# Patient Record
Sex: FEMALE | Race: WHITE | NOT HISPANIC OR LATINO | Employment: OTHER | URBAN - METROPOLITAN AREA
[De-identification: names, ages, dates, MRNs, and addresses within clinical notes are randomized per-mention and may not be internally consistent; named-entity substitution may affect disease eponyms.]

---

## 2017-01-02 RX ORDER — HYDROCHLOROTHIAZIDE 25 MG/1
TABLET ORAL
Qty: 30 TABLET | Refills: 11 | Status: SHIPPED | OUTPATIENT
Start: 2017-01-02 | End: 2017-03-03

## 2017-01-30 ENCOUNTER — OFFICE VISIT (OUTPATIENT)
Dept: PAIN MEDICINE | Facility: CLINIC | Age: 62
End: 2017-01-30
Attending: ANESTHESIOLOGY
Payer: COMMERCIAL

## 2017-01-30 VITALS
SYSTOLIC BLOOD PRESSURE: 123 MMHG | HEART RATE: 90 BPM | TEMPERATURE: 99 F | WEIGHT: 248.69 LBS | HEIGHT: 67 IN | BODY MASS INDEX: 39.03 KG/M2 | DIASTOLIC BLOOD PRESSURE: 76 MMHG

## 2017-01-30 DIAGNOSIS — M62.830 LUMBAR PARASPINAL MUSCLE SPASM: ICD-10-CM

## 2017-01-30 DIAGNOSIS — M79.10 MYALGIA: ICD-10-CM

## 2017-01-30 DIAGNOSIS — G57.11 MERALGIA PARAESTHETICA, RIGHT: Primary | ICD-10-CM

## 2017-01-30 PROCEDURE — 20553 NJX 1/MLT TRIGGER POINTS 3/>: CPT | Mod: 59,S$GLB,, | Performed by: ANESTHESIOLOGY

## 2017-01-30 PROCEDURE — 64450 NJX AA&/STRD OTHER PN/BRANCH: CPT | Mod: RT,S$GLB,, | Performed by: ANESTHESIOLOGY

## 2017-01-30 PROCEDURE — 3074F SYST BP LT 130 MM HG: CPT | Mod: S$GLB,,, | Performed by: ANESTHESIOLOGY

## 2017-01-30 PROCEDURE — 99214 OFFICE O/P EST MOD 30 MIN: CPT | Mod: 25,S$GLB,, | Performed by: ANESTHESIOLOGY

## 2017-01-30 PROCEDURE — 3078F DIAST BP <80 MM HG: CPT | Mod: S$GLB,,, | Performed by: ANESTHESIOLOGY

## 2017-01-30 PROCEDURE — 99999 PR PBB SHADOW E&M-EST. PATIENT-LVL III: CPT | Mod: PBBFAC,,, | Performed by: ANESTHESIOLOGY

## 2017-01-30 PROCEDURE — 76942 ECHO GUIDE FOR BIOPSY: CPT | Mod: RT,S$GLB,, | Performed by: ANESTHESIOLOGY

## 2017-01-30 RX ORDER — BETAMETHASONE SODIUM PHOSPHATE AND BETAMETHASONE ACETATE 3; 3 MG/ML; MG/ML
6 INJECTION, SUSPENSION INTRA-ARTICULAR; INTRALESIONAL; INTRAMUSCULAR; SOFT TISSUE
Status: COMPLETED | OUTPATIENT
Start: 2017-01-30 | End: 2017-01-30

## 2017-01-30 RX ORDER — BUPIVACAINE HYDROCHLORIDE 5 MG/ML
13.5 INJECTION, SOLUTION EPIDURAL; INTRACAUDAL
Status: COMPLETED | OUTPATIENT
Start: 2017-01-30 | End: 2017-01-30

## 2017-01-30 RX ADMIN — BETAMETHASONE SODIUM PHOSPHATE AND BETAMETHASONE ACETATE 6 MG: 3; 3 INJECTION, SUSPENSION INTRA-ARTICULAR; INTRALESIONAL; INTRAMUSCULAR; SOFT TISSUE at 03:01

## 2017-01-30 RX ADMIN — BUPIVACAINE HYDROCHLORIDE 67.5 MG: 5 INJECTION, SOLUTION EPIDURAL; INTRACAUDAL at 03:01

## 2017-01-30 NOTE — MR AVS SNAPSHOT
[facility]  [address]  [place] [address]  Phone: [phone]  Fax: [phone]                  Taty Max   2017 2:45 PM   Office Visit    Description:  Female : 1955   Provider:  Vashti Poe MD   Department:  [facility]           Reason for Visit     Leg Pain           Diagnoses this Visit        Comments    Meralgia paraesthetica, right    -  Primary     Myalgia         Lumbar paraspinal muscle spasm                To Do List           Goals (5 Years of Data)     None      [facility]     [facility] On Call Nurse Care Line -  Assistance  Registered nurses in the [facility] On Call Center provide clinical advisement, health education, appointment booking, and other advisory services.  Call for this free service at [phone].             Medications           Message regarding Medications     Verify the changes and/or additions to your medication regime listed below are the same as discussed with your clinician today.  If any of these changes or additions are incorrect, please notify your healthcare provider.        These medications were administered today        Dose Freq    bupivacaine (PF) 0.5% (5 mg/ml) injection 67.5 mg 13.5 mL Clinic/HOD 1 time    Si.5 mLs (67.5 mg total) by Perineural route one time.    Class: Normal    Route: Perineural    betamethasone acetate-betamethasone sodium phosphate injection 6 mg 6 mg Clinic/HOD 1 time    Si mL (6 mg total) by Intra-Lesional route one time.    Class: Normal    Route: Intra-Lesional           Verify that the below list of medications is an accurate representation of the medications you are currently taking.  If none reported, the list may be blank. If incorrect, please contact your healthcare provider. Carry this list with you in case of emergency.           Current Medications     blood sugar diagnostic Strp 1 strip by Misc.(Non-Drug; Combo Route) route 2 (two) times daily.  "DISP GLUCOMETER OF CHOICE AND LANCETS AS WELL    cyclobenzaprine (FLEXERIL) 5 MG tablet TAKE 1 TABLET (5 MG TOTAL) BY MOUTH NIGHTLY.    duloxetine (CYMBALTA) 30 MG capsule TAKE 1 CAPSULE (30 MG TOTAL) BY MOUTH ONCE DAILY.    econazole nitrate 1 % cream Apply topically 2 (two) times daily.    hydrochlorothiazide (HYDRODIURIL) 25 MG tablet TAKE 1 TABLET (25 MG TOTAL) BY MOUTH ONCE DAILY.    KRILL OIL ORAL Take 1 tablet by mouth once daily.    metformin (GLUCOPHAGE-XR) 750 MG 24 hr tablet 1 pill in the AM    montelukast (SINGULAIR) 10 mg tablet TAKE 1 TABLET (10 MG TOTAL) BY MOUTH EVERY EVENING.    multivitamin (THERAGRAN) per tablet Take 1 tablet by mouth once daily. 1 Tablet Oral Every day    zolpidem (AMBIEN) 5 MG Tab TAKE 1 TABLET  NIGHTLY AS NEEDED    albuterol 90 mcg/actuation inhaler Inhale 1-2 puffs into the lungs every 6 (six) hours as needed for Wheezing.    BENICAR 40 mg tablet TAKE 1 TABLET ONE TIME DAILY    diazePAM (VALIUM) 5 MG tablet TAKE 1 TABLET BY MOUTH AT BEDTIME AS NEEDED FOR ANXIETY AND SPASMS    fluocinonide 0.05% (LIDEX) 0.05 % cream Apply topically 2 (two) times daily.    fluorouracil (EFUDEX) 5 % cream AAA bid x 2 weeks    fluticasone (FLONASE) 50 mcg/actuation nasal spray 2 sprays by Each Nare route once daily.    levocetirizine (XYZAL) 5 MG tablet TAKE 1 TABLET BY MOUTH EVERY EVENING    metaxalone (SKELAXIN) 800 MG tablet TAKE 1 TABLET BY MOUTH 4 TIMES DAILY AS NEEDED FOR PAIN    penicillin v potassium (VEETID) 500 MG tablet Take 500 mg by mouth 4 (four) times daily. PT IS TAKING FOR DENTAL PURPOSES    tretinoin (RETIN-A) 0.025 % gel Apply topically nightly. Gel Topical .  AAA face qhs           Clinical Reference Information           Vital Signs - Last Recorded  Most recent update: 1/30/2017  2:18 PM by Toni Cesar MA    BP Pulse Temp Ht Wt BMI    123/76 90 98.5 °F (36.9 °C) (Oral) 5' 7" (1.702 m) 112.8 kg (248 lb 10.9 oz) 38.95 kg/m2      Blood Pressure          Most Recent Value    " BP  123/76      Allergies as of 1/30/2017     Oxaliplatin    Factive [Gemifloxacin]    Daypro [Oxaprozin]    Latex      Immunizations Administered on Date of Encounter - 1/30/2017     None      Administrations This Visit     betamethasone acetate-betamethasone sodium phosphate injection 6 mg     Admin Date Action Dose Route Administered By             01/30/2017 Given 6 mg Intra-Lesional Vashti Poe MD                    bupivacaine (PF) 0.5% (5 mg/ml) injection 67.5 mg     Admin Date Action Dose Route Administered By             01/30/2017 Given 67.5 mg Perineural Vashti Poe MD                      Instructions    Look up miracle noodles    Recommend obtaining a lidocaine patch 4%, which is the over the counter strength, and cutting this to a smaller size.  Use this over the site where I inject the nerve in your leg.

## 2017-01-30 NOTE — PATIENT INSTRUCTIONS
Look up mirkarla hanson    Recommend obtaining a lidocaine patch 4%, which is the over the counter strength, and cutting this to a smaller size.  Use this over the site where I inject the nerve in your leg.

## 2017-01-30 NOTE — PROGRESS NOTES
"Subjective:      Patient ID: Taty Max is a 61 y.o. female.    Chief Complaint: Leg Pain    Referred by: No ref. provider found     HPI:    Ms Max is a 60 yo female who presents today with back and right thigh pain. She complains of pain in her back and numbness in her right thigh. She states her pain is aggravated with bending forward. She had 3 injections at an outside pain clinic Nov-Feb, that helped her left leg pain but increased her right thigh numbness. She describes numbness on her outer right thigh. Her pain is improved with 2 gabapentin, but she gets diarrhea. She has a history of colon cancer with chemotherapy. The pain is described in detail below.    Interval History (10/17/2014):  She returns today for follow up. She reports that the lateral femoral cutaneous nerve block was very helpful for her right-sided thigh pain, numbness, and tingling. She reports recent muscle spasms in her low back but has made it difficult for her to perform her activities at her job. These are relieved with heat and stretching. The topical cream has been helpful for the pain.    Interval History (11/14/2014):  She returns today for follow up. She reports that the trigger point injections were very helpful for her muscle spasms, but they are starting to wear off on the right side. Last Tuesday, she had a severe spasm that caused severe pain in her anterior thigh. She reports that her muscles felt like they were being "shredded." This continues to be intermittently painful. She also has bilateral low back pain that radiates into her lateral thigh. This is similar to the pain she had in the past that was helped by the L5/S1 epidural.    Interval History (12/8/14)  She returns today for follow up.  She reports that the L4-L5 ASHLEY on 11/25/14 has been helpful for the pain, but pain in her right hip/buttocks area persists. She received about 50% relief of her pain from the epidural. The majority of her pain now, she locates " over the right SI joint, in addition to spasms in her lower back on the right side. She takes muscle relaxers at night for the spasms. She takes the pain is more severe after walking for long periods. The right lower extremity pain is much improved since epidural.     Interval History (1/14/2015):  She returns today for follow up.  She reports that the SI joint injection provided 50% relief.  She no longer has the sharp pain and is able to walk farther.  She continues to have significant pain in the AM on that right side.  The cream and the patch have been helpful for the pain.    Interval History (3/23/2016):  She returns today for follow up.  She reports that the most recent SI joint injection and TPIs have been helpful for the pain.  She is able to walk farther and work.  She does her HEP and stretches.    Interval History (9/23/2016):  She returns today for follow up.  She reports that her low back pain has begun to return.  She is also having more right lateral thigh pain.    Interval History (11/10/2016):  She returns today for follow up.  She reports that the RFA helped her back pain.  She is now experiencing increased pain over her right lateral thigh.  She is also having a muscle spasm in her right lower back.    Interval History (1/30/2017):  She returns today for follow up.  She reports that the most recent LFCN block lasted until two weeks ago.  Lidocaine patch over distal anterior thigh was not helpful. Gabapentin, NSAIDs are helpful.  Overall, her pain is improved with not working.    Physical Therapy: none    Non-pharmacologic Treatment: chiropractic  · TENS? yes    Pain Medications:   · Currently taking: gabapentin, aleve, ibuprofen, tylenol, Topical cream  · Has tried in the past: celebrex  · Has not tried: Opioids, TCAs    Blood thinners: no    Interventional Therapies:   · L5/S1 ILESI by Dr. Saavedra, most recent in 10/2012: 10 months relief  · Right LFCN block 9/2014: Excellent relief of right thigh  pain  · Bilateral lumbar TPIs 10/2014: Excellent relief of muscle pain x 3 weeks  · L4-L5 ILESI on 11/25/14 with 50% relief  · Right SI joint injection 12/2014:  50% relief  · Right SIJ injection and TPIs 3/2016: Excellent relief  · L4/5 ILESI 8/30/2016: Excellent relief of leg pain  · Right L3-5 Cooled RFA 10/2016:  Good relief of sharp back pain  · Right LFCN block 11/10/2016: Good relief until mid January 2017    Relevant Surgeries: no    Affecting sleep? yes    Affecting daily activities? yes    Depressive symptoms? no  · SI/HI? No    Work status: She has been laid off. 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     Pain Scales  Best: 3/10  Worst: 10/10  Usually: 3/10  Today: 6/10    Low-back Pain   This is a chronic problem. The current episode started more than 1 year ago. The problem occurs intermittently. The problem has been gradually improving since onset. The pain is present in the lumbar spine. The pain is at a severity of 5/10. Exacerbated by: increased activity. Associated symptoms include leg pain. Pertinent negatives include no chest pain, fever, headaches or weight loss. She has tried injection treatment and muscle relaxant for the symptoms. Physical therapy was not tried.      Review of Systems   Constitution: Negative for chills, fever, malaise/fatigue, weight gain and weight loss.   HENT: Negative for ear pain, headaches and hoarse voice.    Eyes: Negative for blurred vision, pain and visual disturbance.   Cardiovascular: Negative for chest pain, dyspnea on exertion and irregular heartbeat.   Respiratory: Negative for cough, shortness of breath and wheezing.    Endocrine: Negative for cold intolerance and heat intolerance.   Hematologic/Lymphatic: Negative for adenopathy and bleeding problem. Does not bruise/bleed easily.   Skin: Negative for color change, itching and rash.   Musculoskeletal: Positive for back pain.  "  Gastrointestinal: Negative for change in bowel habit, diarrhea, hematemesis, hematochezia, melena and vomiting.   Genitourinary: Negative for flank pain, frequency, hematuria and urgency.   Neurological: Negative for difficulty with concentration, dizziness, loss of balance and seizures.   Psychiatric/Behavioral: Negative for altered mental status, depression and suicidal ideas. The patient is not nervous/anxious.    Allergic/Immunologic: Negative for HIV exposure.           Objective:      Vitals:    01/30/17 1413   BP: 123/76   Pulse: 90   Temp: 98.5 °F (36.9 °C)   TempSrc: Oral   Weight: 112.8 kg (248 lb 10.9 oz)   Height: 5' 7" (1.702 m)   PainSc:   7   PainLoc: Leg     Ortho/SPM Exam        GEN: Well developed, well nourished. No acute distress. No pain behavior.  HEENT: No trauma. Mucous membranes moist. Nares patent bilaterally.  PSYCH: Normal affect. Thought content appropriate.  CHEST: Breathing symmetric. No audible wheezing.  ABD: Soft, non-tender, non-distended.  SKIN: Warm, pink, dry. No rash on exposed areas.   EXT: No cyanosis, clubbing, or edema. No color change or changes in nail or hair growth.  NEURO/MUSCULOSKELETAL:  Fully alert, oriented, and appropriate. Speech normal pete. No cranial nerve deficits.   Gait: wide base gait, normal pete. negative trendelenburg sign bilaterally.   Motor Strength: 5/5 motor strength throughout lower extremities.   Sensory: Allodynia along lateral thigh of right lower extremity, confirmed with light touch.   Reflexes: 1+ and symmetric throughout. Downgoing Babinski's bilaterally. No clonus or spasticity.  L-Spine: Full ROM with minimal pain on extension.     Imaging:      Result Narrative    DATE OF EXAM: Nov 23 2010     MRI 0013 - MRI SPINE CNL LUM W AND WO CONTR:   [id number]    CLINICAL HISTORY: 724.2 LUMBAGO    PROCEDURE COMMENT: MULTIPLANAR, MULTISEQUENCE MR IMAGES WERE OBTAINED   BEFORE AND AFTER THE INJECTION OF 20 ML OF IV GADOLINIUM.    ICD 9 " CODE(S): ()    CPT 4 CODE(S)/MODIFIER(S): ()    RESULTS: NO PRIOR MRIs ARE AVAILABLE FOR COMPARISON.     THERE IS NO EVIDENCE OF MARROW REPLACEMENT PROCESS, INFECTION, OR TUMOR.   NO ABNORMAL AREAS OF ENHANCEMENT ARE NOTED. MULTILEVEL OSSEOUS AND DISC   DEGENERATIVE CHANGE ARE NOTED WHICH WILL DETAILED BELOW. NO PARASPINOUS   SOFT TISSUE ABNORMALITY IS PRESENT. THE CONUS TERMINATES POSTERIOR TO L1.   THE ALIGNMENT IS ADEQUATE. LIMITED EVALUATION OF INTRAABDOMINAL   STRUCTURES REVEALS A 1 CM LEFT RENAL CYST.     AT T10-T11 THERE IS LOSS OF DISC SPACE HEIGHT.    AT T11-T12 THERE IS LOSS OF DISC SPACE HEIGHT.    AT T12-L1 THERE IS NO SIGNIFICANT ABNORMALITY PRESENT. NO CANAL OR   FORAMINAL STENOSIS.     AT L1-L2 THERE IS A MILD DISC BULGE PRESENT WITHOUT CANAL OR FORAMINAL   NARROWING.    AT L2-L3 THERE IS A MILD DISC BULGE PRESENT. THERE IS AN OSTEOPHYTE IN   THE RIGHT POSTERIOR PARACENTRAL REGION AND THIS IS CAUSING MILD NEURAL   FORAMINAL NARROWING.     AT L3-L4 THERE IS DISC BULGE PRESENT WITH MILD BILATERAL NEURAL FORAMINAL   NARROWING. THERE IS THICKENING OF THE LIGAMENTUM FLAVUM. THERE IS NO   CANAL NARROWING AT THIS LEVEL.     AT L4-L5 THERE IS SIGNIFICANT LOSS OF DISC SPACE HEIGHT AND ENDPLATE   CHANGES. THERE IS MILD CANAL STENOSIS SECONDARY TO LIGAMENTUM FLAVUM   THICKENING AND DISC BULGE.     AT L5-S1 THERE IS A BROAD BASED DISC BULGE PROTRUDING INTO BOTH NEURAL   FORAMINA. THERE IS MASS EFFECT SUPERIORLY ON THE EXITING NERVE ROOTS AT   L5-S1. THERE IS LOSS OF DISC SPACE HEIGHT AT THIS LEVEL AND MILD   ENDPLATE CHANGES.     IMPRESSION: MULTILEVEL OSSEOUS DEGENERATIVE CHANGE WITH MILD IMPINGEMENT   ON THE L5-S1 NERVE ROOT SUPERIORLY BY THE DISC.            Assessment:       Encounter Diagnoses   Name Primary?    Meralgia paraesthetica, right Yes    Myalgia     Lumbar paraspinal muscle spasm            Plan:       Taty was seen today for leg pain.    Diagnoses and all orders for this visit:    Meralgia  paraesthetica, right  -     bupivacaine (PF) 0.5% (5 mg/ml) injection 67.5 mg; 13.5 mLs (67.5 mg total) by Perineural route one time.  -     betamethasone acetate-betamethasone sodium phosphate injection 6 mg; 1 mL (6 mg total) by Intra-Lesional route one time.    Myalgia  -     bupivacaine (PF) 0.5% (5 mg/ml) injection 67.5 mg; 13.5 mLs (67.5 mg total) by Perineural route one time.  -     betamethasone acetate-betamethasone sodium phosphate injection 6 mg; 1 mL (6 mg total) by Intra-Lesional route one time.    Lumbar paraspinal muscle spasm  -     bupivacaine (PF) 0.5% (5 mg/ml) injection 67.5 mg; 13.5 mLs (67.5 mg total) by Perineural route one time.  -     betamethasone acetate-betamethasone sodium phosphate injection 6 mg; 1 mL (6 mg total) by Intra-Lesional route one time.    I discussed the treatment options with her today, including risks, benefits, and alternatives. All available images were reviewed. The patient is aware of the risks and benefits of the medications being prescribed, common side effects, and proper usage.    1. Repeat right LFCN block as below  1. If short duration, will set up for pulsed RFA  2. Can repeat right L3-5, cooled RFA PRN  3. Can repeat other injections as needed.    4. Flexeril 5mg provides her some relief from spasms. Instructed that she can take two tablets as needed for spasms in addition to her skelaxin 800mg which she has been taking together for spasms.  5. Continue pain patch as needed.   6. Continue Low back exercises and stretching maneuvers given for home use.  7. RTC as needed.    Date of Procedure: 01/30/2017    Procedure: Right LFCN Block using US    Referring Provider: None    Pre-op diagnosis: Meralgia paresthetica    Post-op diagnosis: Same     Physician: Dr. Vashti Poe     Assistant: Dr. Gonzalez    Anesthestia: local    EBL: None    Specimens: None    Lateral Femoral Cutaneous Nerve Block using US guidance, right:   The procedure was discussed with the  patient including complications of nerve damage, spinal headache, bleeding, infection, and failure of pain relief.     All medications, allergies, and relevant histories were reviewed. No recent antibiotics or infections. A time-out was taken to verify the correct patient, procedure, laterality, and appropriate medications/allergies.     The patient was placed in the supine position. Inguinal area was prepped with chlorhexidine x 3 and draped. Sterile precautions were observed throughout the procedure. After identifying the lateral femoral cutaneous nerve superficial to the sartorious muscle on the right using US guidance, Xylocaine 1% was infiltrated locally 1.5 cm. A 22-gauge B level needle was introduced and advanced to the lateral femoral cutaneous nerve. Stimulation with 0.35 mA reproduced her pain in her right leg.  An 5 mL mixture of 4.5cc of bupivacaine 0.5% with 0.5 cc of betamethasone (3mg) was injected around the nerve after repeated negative aspirations. Needle was removed and a Band-Aid dressing applied.      Special equipment and extra time required due to obesity.    Trigger Point Injection:   The procedure was discussed with the patient including complications of damage, bleeding, infection, and failure of pain relief.     All medications, allergies, and relevant histories were reviewed. No recent antibiotics or infections.  A time-out was taken to verify the correct patient, procedure, laterality, and appropriate medications/allergies.    Trigger points were identified by palpation and marked. CHG prep of sites done. A 27-gauge needle was advanced to the point of maximal tenderness, and 1 mL of a mixture of 0.5% bupivacaine with betamethasone 3 mg was injected after negative aspiration. All sites done in the same manner. Patient tolerated the procedure well and without complications. Sites injected included: right lumbar paraspinals x 3     The patient tolerated the procedure well and was discharged  in excellent condition.      The patient tolerated the procedure well without any complications and was released in excellent condition.    Greater than 25 minutes spent in total in todays visit with the patient, with more than half that time direct face to face counseling and education with the patient today. We discussed the disease process, prognosis, treatment plan, and risks and benefits.    The above plan and management options were discussed at length with patient. Patient is in agreement with the above and verbalized understanding. It will be communicated with the consulting physician via electronic record, fax, or mail

## 2017-02-10 RX ORDER — CYCLOBENZAPRINE HCL 5 MG
TABLET ORAL
Qty: 30 TABLET | Refills: 2 | Status: SHIPPED | OUTPATIENT
Start: 2017-02-10 | End: 2017-05-01 | Stop reason: SDUPTHER

## 2017-02-13 RX ORDER — DIAZEPAM 5 MG/1
TABLET ORAL
Qty: 30 TABLET | Refills: 1 | Status: SHIPPED | OUTPATIENT
Start: 2017-02-13 | End: 2017-05-03 | Stop reason: SDUPTHER

## 2017-02-13 RX ORDER — TRAMADOL HYDROCHLORIDE 50 MG/1
TABLET ORAL
Qty: 60 TABLET | Refills: 3 | Status: SHIPPED | OUTPATIENT
Start: 2017-02-13 | End: 2017-04-11 | Stop reason: SDUPTHER

## 2017-02-14 ENCOUNTER — TELEPHONE (OUTPATIENT)
Dept: PAIN MEDICINE | Facility: CLINIC | Age: 62
End: 2017-02-14

## 2017-02-14 NOTE — TELEPHONE ENCOUNTER
----- Message from Shlomo May sent at 2/14/2017 10:36 AM CST -----  X_  1st Request  _  2nd Request  _  3rd Request        Who: Taty Max    Why: Patient needs to schedule a right thigh injection    What Number to Call Back: [phone]    When to Expect a call back: (Before the end of the day)   -- if the call is after 12:00, the call back will be tomorrow.

## 2017-02-20 ENCOUNTER — OFFICE VISIT (OUTPATIENT)
Dept: PAIN MEDICINE | Facility: CLINIC | Age: 62
End: 2017-02-20
Attending: ANESTHESIOLOGY
Payer: COMMERCIAL

## 2017-02-20 VITALS
TEMPERATURE: 98 F | SYSTOLIC BLOOD PRESSURE: 143 MMHG | RESPIRATION RATE: 18 BRPM | BODY MASS INDEX: 38.76 KG/M2 | WEIGHT: 246.94 LBS | DIASTOLIC BLOOD PRESSURE: 92 MMHG | HEIGHT: 67 IN | HEART RATE: 98 BPM

## 2017-02-20 DIAGNOSIS — M79.651 PAIN OF RIGHT THIGH: Primary | ICD-10-CM

## 2017-02-20 PROCEDURE — 76942 ECHO GUIDE FOR BIOPSY: CPT | Mod: RT,S$GLB,, | Performed by: ANESTHESIOLOGY

## 2017-02-20 PROCEDURE — 3080F DIAST BP >= 90 MM HG: CPT | Mod: S$GLB,,, | Performed by: ANESTHESIOLOGY

## 2017-02-20 PROCEDURE — 99999 PR PBB SHADOW E&M-EST. PATIENT-LVL III: CPT | Mod: PBBFAC,,, | Performed by: ANESTHESIOLOGY

## 2017-02-20 PROCEDURE — 64450 NJX AA&/STRD OTHER PN/BRANCH: CPT | Mod: RT,S$GLB,, | Performed by: ANESTHESIOLOGY

## 2017-02-20 PROCEDURE — 3077F SYST BP >= 140 MM HG: CPT | Mod: S$GLB,,, | Performed by: ANESTHESIOLOGY

## 2017-02-20 PROCEDURE — 99213 OFFICE O/P EST LOW 20 MIN: CPT | Mod: 25,S$GLB,, | Performed by: ANESTHESIOLOGY

## 2017-02-20 RX ORDER — BETAMETHASONE SODIUM PHOSPHATE AND BETAMETHASONE ACETATE 3; 3 MG/ML; MG/ML
6 INJECTION, SUSPENSION INTRA-ARTICULAR; INTRALESIONAL; INTRAMUSCULAR; SOFT TISSUE
Status: COMPLETED | OUTPATIENT
Start: 2017-02-20 | End: 2017-02-20

## 2017-02-20 RX ORDER — BUPIVACAINE HYDROCHLORIDE 2.5 MG/ML
4.5 INJECTION, SOLUTION EPIDURAL; INFILTRATION; INTRACAUDAL
Status: COMPLETED | OUTPATIENT
Start: 2017-02-20 | End: 2017-02-20

## 2017-02-20 RX ADMIN — BUPIVACAINE HYDROCHLORIDE 11.25 MG: 2.5 INJECTION, SOLUTION EPIDURAL; INFILTRATION; INTRACAUDAL at 02:02

## 2017-02-20 RX ADMIN — BETAMETHASONE SODIUM PHOSPHATE AND BETAMETHASONE ACETATE 6 MG: 3; 3 INJECTION, SUSPENSION INTRA-ARTICULAR; INTRALESIONAL; INTRAMUSCULAR; SOFT TISSUE at 02:02

## 2017-02-20 NOTE — PROGRESS NOTES
"Subjective:      Patient ID: Taty Max is a 61 y.o. female.    Chief Complaint: Procedure (right anterior & femoral cutaneous Nerve Block)    Referred by: No ref. provider found     HPI:    Ms Max is a 58 yo female who presents today with back and right thigh pain. She complains of pain in her back and numbness in her right thigh. She states her pain is aggravated with bending forward. She had 3 injections at an outside pain clinic Nov-Feb, that helped her left leg pain but increased her right thigh numbness. She describes numbness on her outer right thigh. Her pain is improved with 2 gabapentin, but she gets diarrhea. She has a history of colon cancer with chemotherapy. The pain is described in detail below.    Interval History (10/17/2014):  She returns today for follow up. She reports that the lateral femoral cutaneous nerve block was very helpful for her right-sided thigh pain, numbness, and tingling. She reports recent muscle spasms in her low back but has made it difficult for her to perform her activities at her job. These are relieved with heat and stretching. The topical cream has been helpful for the pain.    Interval History (11/14/2014):  She returns today for follow up. She reports that the trigger point injections were very helpful for her muscle spasms, but they are starting to wear off on the right side. Last Tuesday, she had a severe spasm that caused severe pain in her anterior thigh. She reports that her muscles felt like they were being "shredded." This continues to be intermittently painful. She also has bilateral low back pain that radiates into her lateral thigh. This is similar to the pain she had in the past that was helped by the L5/S1 epidural.    Interval History (12/8/14)  She returns today for follow up.  She reports that the L4-L5 ASHLEY on 11/25/14 has been helpful for the pain, but pain in her right hip/buttocks area persists. She received about 50% relief of her pain from the " epidural. The majority of her pain now, she locates over the right SI joint, in addition to spasms in her lower back on the right side. She takes muscle relaxers at night for the spasms. She takes the pain is more severe after walking for long periods. The right lower extremity pain is much improved since epidural.     Interval History (1/14/2015):  She returns today for follow up.  She reports that the SI joint injection provided 50% relief.  She no longer has the sharp pain and is able to walk farther.  She continues to have significant pain in the AM on that right side.  The cream and the patch have been helpful for the pain.    Interval History (3/23/2016):  She returns today for follow up.  She reports that the most recent SI joint injection and TPIs have been helpful for the pain.  She is able to walk farther and work.  She does her HEP and stretches.    Interval History (9/23/2016):  She returns today for follow up.  She reports that her low back pain has begun to return.  She is also having more right lateral thigh pain.    Interval History (11/10/2016):  She returns today for follow up.  She reports that the RFA helped her back pain.  She is now experiencing increased pain over her right lateral thigh.  She is also having a muscle spasm in her right lower back.    Interval History (1/30/2017):  She returns today for follow up.  She reports that the most recent LFCN block lasted until two weeks ago.  Lidocaine patch over distal anterior thigh was not helpful. Gabapentin, NSAIDs are helpful.  Overall, her pain is improved with not working.    Interval History (2/20/2017):  She returns today for follow up.  She reports that the LFCN block has been helpful for the pain in her lateral thigh, but she is still having pain in her anterior thigh.  Lidocaine patches have been helpful for the pain, but they won't stay on long enough    Physical Therapy: none    Non-pharmacologic Treatment: chiropractic  · TENS?  yes    Pain Medications:   · Currently taking: gabapentin, aleve, ibuprofen, tylenol, Topical cream  · Has tried in the past: celebrex  · Has not tried: Opioids, TCAs    Blood thinners: no    Interventional Therapies:   · L5/S1 ILESI by Dr. Saavedra, most recent in 10/2012: 10 months relief  · Right LFCN block 9/2014: Excellent relief of right thigh pain  · Bilateral lumbar TPIs 10/2014: Excellent relief of muscle pain x 3 weeks  · L4-L5 ILESI on 11/25/14 with 50% relief  · Right SI joint injection 12/2014:  50% relief  · Right SIJ injection and TPIs 3/2016: Excellent relief  · L4/5 ILESI 8/30/2016: Excellent relief of leg pain  · Right L3-5 Cooled RFA 10/2016:  Good relief of sharp back pain  · Right LFCN block 11/10/2016: Good relief until mid January 2017    Relevant Surgeries: no    Affecting sleep? yes    Affecting daily activities? yes    Depressive symptoms? no  · SI/HI? No    Work status: She has been laid off. 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     Pain Scales  Best: 3/10  Worst: 10/10  Usually: 3/10  Today: 6/10    Low-back Pain   This is a chronic problem. The current episode started more than 1 year ago. The problem occurs intermittently. The problem has been gradually improving since onset. The pain is present in the lumbar spine. The pain is at a severity of 5/10. Exacerbated by: increased activity. Associated symptoms include leg pain. Pertinent negatives include no chest pain, fever, headaches or weight loss. She has tried injection treatment and muscle relaxant for the symptoms. Physical therapy was not tried.          HPI    ROS  Review of Systems   Constitution: Negative for chills, fever, malaise/fatigue, weight gain and weight loss.   HENT: Negative for ear pain, headaches and hoarse voice.    Eyes: Negative for blurred vision, pain and visual disturbance.   Cardiovascular: Negative for chest pain, dyspnea on exertion and  irregular heartbeat.   Respiratory: Negative for cough, shortness of breath and wheezing.    Endocrine: Negative for cold intolerance and heat intolerance.   Hematologic/Lymphatic: Negative for adenopathy and bleeding problem. Does not bruise/bleed easily.   Skin: Negative for color change, itching and rash.   Musculoskeletal: Positive for right thigh pain.   Gastrointestinal: Negative for change in bowel habit, diarrhea, hematemesis, hematochezia, melena and vomiting.   Genitourinary: Negative for flank pain, frequency, hematuria and urgency.   Neurological: Negative for difficulty with concentration, dizziness, loss of balance and seizures.   Psychiatric/Behavioral: Negative for altered mental status, depression and suicidal ideas. The patient is not nervous/anxious.    Allergic/Immunologic: Negative for HIV exposure.     Past Medical History   Diagnosis Date    Allergic rhinitis, seasonal 1/28/2013    Anxiety disorder 1/28/2013    Basal cell carcinoma     Bilateral retinal lattice degeneration     Cataract     Colon cancer 2008     s/p resection    DDD (degenerative disc disease), lumbar 11/25/2014    Diabetes mellitus type II, uncontrolled 7/25/2014    High myopia     Horseshoe retinal tear of both eyes     HTN (hypertension) 1/28/2013    Hypertension     Hypothyroidism 4/29/2014    Lumbar disc disease 7/25/2014    Metabolic syndrome 4/29/2014    Osteopenia 5/10/2013    Screening for colorectal cancer 9/11/2015     Normal 6/ 2015---5 yrs    Vitamin D deficiency disease     Vitreous detachment of both eyes        Past Surgical History   Procedure Laterality Date    Colon surgery      Cataract extraction      S/p laser ou      Colectomy       s/p reanastemosis    Cataract extraction bilateral w/ anterior vitrectomy      Eye examination under anesthesia w/ retinal cryotherapy and retinal laser      Uvulopalatopharyngoplasty  1990s    Ankle surgery       left     Hernia repair      Tubal  "ligation      Nasal polyp excision      Tonsillectomy      Adenoidectomy      Kidney surgery       "dilate urethra tube"    Nasal septum surgery      Breast lumpectomy         Review of patient's allergies indicates:   Allergen Reactions    Oxaliplatin Hives    Factive [gemifloxacin] Hives    Daypro [oxaprozin] Rash    Latex Hives     Adhesives       Current Outpatient Prescriptions   Medication Sig Dispense Refill    BENICAR 40 mg tablet TAKE 1 TABLET ONE TIME DAILY 90 tablet 3    blood sugar diagnostic Strp 1 strip by Misc.(Non-Drug; Combo Route) route 2 (two) times daily. DISP GLUCOMETER OF CHOICE AND LANCETS AS WELL 100 strip 12    cyclobenzaprine (FLEXERIL) 5 MG tablet TAKE 1 TABLET BY MOUTH NIGHTLY 30 tablet 2    diazePAM (VALIUM) 5 MG tablet TAKE 1 TABLET BY MOUTH AT BEDTIME AS NEEDED FOR ANXIETY AND SPASMS 30 tablet 1    duloxetine (CYMBALTA) 30 MG capsule TAKE 1 CAPSULE (30 MG TOTAL) BY MOUTH ONCE DAILY. 30 capsule 11    econazole nitrate 1 % cream Apply topically 2 (two) times daily. 30 g 3    fluorouracil (EFUDEX) 5 % cream AAA bid x 2 weeks 40 g 1    fluticasone (FLONASE) 50 mcg/actuation nasal spray 2 sprays by Each Nare route once daily. 3 Bottle 12    hydrochlorothiazide (HYDRODIURIL) 25 MG tablet TAKE 1 TABLET (25 MG TOTAL) BY MOUTH ONCE DAILY. 30 tablet 11    KRILL OIL ORAL Take 1 tablet by mouth once daily.      levocetirizine (XYZAL) 5 MG tablet TAKE 1 TABLET BY MOUTH EVERY EVENING 30 tablet 5    metaxalone (SKELAXIN) 800 MG tablet TAKE 1 TABLET BY MOUTH 4 TIMES DAILY AS NEEDED FOR PAIN 120 tablet 2    metformin (GLUCOPHAGE-XR) 750 MG 24 hr tablet 1 pill in the AM 90 tablet 3    montelukast (SINGULAIR) 10 mg tablet TAKE 1 TABLET (10 MG TOTAL) BY MOUTH EVERY EVENING. 90 tablet 1    multivitamin (THERAGRAN) per tablet Take 1 tablet by mouth once daily. 1 Tablet Oral Every day      penicillin v potassium (VEETID) 500 MG tablet Take 500 mg by mouth 4 (four) times daily. PT IS " "TAKING FOR DENTAL PURPOSES      tramadol (ULTRAM) 50 mg tablet TAKE 1 TO 2 TABLET(S) BY MOUTH EVERY 6 HOURS AS NEEDED 60 tablet 3    tretinoin (RETIN-A) 0.025 % gel Apply topically nightly. Gel Topical .  AAA face qhs 45 g 3    zolpidem (AMBIEN) 5 MG Tab TAKE 1 TABLET  NIGHTLY AS NEEDED 90 tablet 1    albuterol 90 mcg/actuation inhaler Inhale 1-2 puffs into the lungs every 6 (six) hours as needed for Wheezing. 1 Inhaler 0    fluocinonide 0.05% (LIDEX) 0.05 % cream Apply topically 2 (two) times daily. 30 g 5     No current facility-administered medications for this visit.        Family History   Problem Relation Age of Onset    Cancer Maternal Grandmother     Hyperlipidemia Mother     Hypertension Mother     Breast cancer Mother     Allergies Father     Allergies Sister     Allergies Brother     Heart disease Maternal Grandfather     Stroke Paternal Grandmother     Colon cancer Neg Hx     Ovarian cancer Neg Hx        Social History     Social History    Marital status:      Spouse name: N/A    Number of children: N/A    Years of education: N/A     Occupational History     [employer]     Social History Main Topics    Smoking status: Former Smoker     Quit date: 7/17/1977    Smokeless tobacco: Never Used    Alcohol use Yes      Comment: once per week    Drug use: No    Sexual activity: Yes     Partners: Male     Birth control/ protection: Post-menopausal     Other Topics Concern    Not on file     Social History Narrative           Objective:      Vitals:    02/20/17 1325   BP: (!) 143/92   Pulse: 98   Resp: 18   Temp: 98.4 °F (36.9 °C)   TempSrc: Oral   Weight: 112 kg (246 lb 14.6 oz)   Height: 5' 7" (1.702 m)   PainSc:   7         Ortho/SPM Exam      GEN: Well developed, well nourished. No acute distress. No pain behavior.  HEENT: No trauma. Mucous membranes moist. Nares patent bilaterally.  PSYCH: Normal affect. Thought content appropriate.  CHEST: Breathing symmetric. No " audible wheezing.  ABD: Soft, non-tender, non-distended.  SKIN: Warm, pink, dry. No rash on exposed areas.   EXT: No cyanosis, clubbing, or edema. No color change or changes in nail or hair growth.  NEURO/MUSCULOSKELETAL:  Fully alert, oriented, and appropriate. Speech normal pete. No cranial nerve deficits.   Gait: wide base gait, normal pete. negative trendelenburg sign bilaterally.   Motor Strength: 5/5 motor strength throughout lower extremities.   Sensory: Allodynia along anterior thigh of right lower extremity, confirmed with light touch.   Reflexes: 1+ and symmetric throughout. Downgoing Babinski's bilaterally. No clonus or spasticity.  L-Spine: Full ROM with minimal pain on extension.         Imaging:      Result Narrative    DATE OF EXAM: Nov 23 2010     MRI 0013 - MRI SPINE CNL LUM W AND WO CONTR:   [id number]    CLINICAL HISTORY: 724.2 LUMBAGO    PROCEDURE COMMENT: MULTIPLANAR, MULTISEQUENCE MR IMAGES WERE OBTAINED   BEFORE AND AFTER THE INJECTION OF 20 ML OF IV GADOLINIUM.    ICD 9 CODE(S): ()    CPT 4 CODE(S)/MODIFIER(S): ()    RESULTS: NO PRIOR MRIs ARE AVAILABLE FOR COMPARISON.     THERE IS NO EVIDENCE OF MARROW REPLACEMENT PROCESS, INFECTION, OR TUMOR.   NO ABNORMAL AREAS OF ENHANCEMENT ARE NOTED. MULTILEVEL OSSEOUS AND DISC   DEGENERATIVE CHANGE ARE NOTED WHICH WILL DETAILED BELOW. NO PARASPINOUS   SOFT TISSUE ABNORMALITY IS PRESENT. THE CONUS TERMINATES POSTERIOR TO L1.   THE ALIGNMENT IS ADEQUATE. LIMITED EVALUATION OF INTRAABDOMINAL   STRUCTURES REVEALS A 1 CM LEFT RENAL CYST.     AT T10-T11 THERE IS LOSS OF DISC SPACE HEIGHT.    AT T11-T12 THERE IS LOSS OF DISC SPACE HEIGHT.    AT T12-L1 THERE IS NO SIGNIFICANT ABNORMALITY PRESENT. NO CANAL OR   FORAMINAL STENOSIS.     AT L1-L2 THERE IS A MILD DISC BULGE PRESENT WITHOUT CANAL OR FORAMINAL   NARROWING.    AT L2-L3 THERE IS A MILD DISC BULGE PRESENT. THERE IS AN OSTEOPHYTE IN   THE RIGHT POSTERIOR PARACENTRAL REGION AND THIS IS CAUSING MILD  NEURAL   FORAMINAL NARROWING.     AT L3-L4 THERE IS DISC BULGE PRESENT WITH MILD BILATERAL NEURAL FORAMINAL   NARROWING. THERE IS THICKENING OF THE LIGAMENTUM FLAVUM. THERE IS NO   CANAL NARROWING AT THIS LEVEL.     AT L4-L5 THERE IS SIGNIFICANT LOSS OF DISC SPACE HEIGHT AND ENDPLATE   CHANGES. THERE IS MILD CANAL STENOSIS SECONDARY TO LIGAMENTUM FLAVUM   THICKENING AND DISC BULGE.     AT L5-S1 THERE IS A BROAD BASED DISC BULGE PROTRUDING INTO BOTH NEURAL   FORAMINA. THERE IS MASS EFFECT SUPERIORLY ON THE EXITING NERVE ROOTS AT   L5-S1. THERE IS LOSS OF DISC SPACE HEIGHT AT THIS LEVEL AND MILD   ENDPLATE CHANGES.     IMPRESSION: MULTILEVEL OSSEOUS DEGENERATIVE CHANGE WITH MILD IMPINGEMENT   ON THE L5-S1 NERVE ROOT SUPERIORLY BY THE DISC.            Assessment:       Encounter Diagnosis   Name Primary?    Pain of right thigh Yes           Plan:       Taty was seen today for procedure.    Diagnoses and all orders for this visit:    Pain of right thigh  -     bupivacaine (PF) 0.25% (2.5 mg/ml) injection 11.25 mg; 4.5 mLs (11.25 mg total) by Perineural route one time.  -     betamethasone acetate-betamethasone sodium phosphate injection 6 mg; 1 mL (6 mg total) by Intra-Lesional route one time.    I discussed the treatment options with her today, including risks, benefits, and alternatives. All available images were reviewed. The patient is aware of the risks and benefits of the medications being prescribed, common side effects, and proper usage.    1. Right anterior cutaneous branches of the femoral nerve block as below  2. Can repeat right LFCN block PRN  3. Can repeat right L3-5, cooled RFA PRN  4. Can repeat other injections as needed.    5. Flexeril 5mg provides her some relief from spasms. Instructed that she can take two tablets as needed for spasms in addition to her skelaxin 800mg which she has been taking together for spasms.  6. Continue pain patch as needed.   7. Continue Low back exercises and stretching  maneuvers given for home use.  8. RTC as needed.      Anterior Femoral Cutaneous Nerve Branches Block using US guidance, right:   The procedure was discussed with the patient including complications of nerve damage, spinal headache, bleeding, infection, and failure of pain relief.     All medications, allergies, and relevant histories were reviewed. No recent antibiotics or infections. A time-out was taken to verify the correct patient, procedure, laterality, and appropriate medications/allergies.     The patient was placed in the supine position. Anterior thigh was prepped with chlorhexidine x 3 and draped. Sterile precautions were observed throughout the procedure. After identifying the sartorious muscle and the femoral vessels on the right about 1/3-1/2 intermediate down the thigh using US guidance, Xylocaine 1% was infiltrated locally 1.5 cm. A 22-gauge B level needle was introduced and advanced to the medial aspect of the sartorious muscle.  A 5 mL mixture of4.5cc of bupivacaine 0.25% with 3 mg betamethasone was injected around the nerve after repeated negative aspirations. Needle was removed and a Band-Aid dressing applied.     The patient tolerated the procedure well without any complications and was released in excellent condition.      The above plan and management options were discussed at length with patient. Patient is in agreement with the above and verbalized understanding. It will be communicated with the consulting physician via electronic record, fax, or mail

## 2017-03-03 ENCOUNTER — PATIENT MESSAGE (OUTPATIENT)
Dept: FAMILY MEDICINE | Facility: CLINIC | Age: 62
End: 2017-03-03

## 2017-03-03 ENCOUNTER — OFFICE VISIT (OUTPATIENT)
Dept: FAMILY MEDICINE | Facility: CLINIC | Age: 62
End: 2017-03-03
Payer: COMMERCIAL

## 2017-03-03 VITALS
OXYGEN SATURATION: 96 % | HEIGHT: 67 IN | WEIGHT: 249.81 LBS | TEMPERATURE: 98 F | DIASTOLIC BLOOD PRESSURE: 80 MMHG | HEART RATE: 95 BPM | SYSTOLIC BLOOD PRESSURE: 130 MMHG | BODY MASS INDEX: 39.21 KG/M2

## 2017-03-03 DIAGNOSIS — I10 ESSENTIAL HYPERTENSION: ICD-10-CM

## 2017-03-03 DIAGNOSIS — E03.9 HYPOTHYROIDISM, UNSPECIFIED TYPE: ICD-10-CM

## 2017-03-03 DIAGNOSIS — E83.52 HYPERCALCEMIA: ICD-10-CM

## 2017-03-03 DIAGNOSIS — R79.89 ABNORMAL LIVER FUNCTION TESTS: ICD-10-CM

## 2017-03-03 DIAGNOSIS — E55.9 VITAMIN D DEFICIENCY DISEASE: ICD-10-CM

## 2017-03-03 DIAGNOSIS — E78.5 HYPERLIPIDEMIA, UNSPECIFIED HYPERLIPIDEMIA TYPE: ICD-10-CM

## 2017-03-03 PROCEDURE — 3079F DIAST BP 80-89 MM HG: CPT | Mod: S$GLB,,, | Performed by: INTERNAL MEDICINE

## 2017-03-03 PROCEDURE — 1160F RVW MEDS BY RX/DR IN RCRD: CPT | Mod: S$GLB,,, | Performed by: INTERNAL MEDICINE

## 2017-03-03 PROCEDURE — 3045F PR MOST RECENT HEMOGLOBIN A1C LEVEL 7.0-9.0%: CPT | Mod: S$GLB,,, | Performed by: INTERNAL MEDICINE

## 2017-03-03 PROCEDURE — 3075F SYST BP GE 130 - 139MM HG: CPT | Mod: S$GLB,,, | Performed by: INTERNAL MEDICINE

## 2017-03-03 PROCEDURE — 99999 PR PBB SHADOW E&M-EST. PATIENT-LVL III: CPT | Mod: PBBFAC,,, | Performed by: INTERNAL MEDICINE

## 2017-03-03 PROCEDURE — 4010F ACE/ARB THERAPY RXD/TAKEN: CPT | Mod: S$GLB,,, | Performed by: INTERNAL MEDICINE

## 2017-03-03 PROCEDURE — 99214 OFFICE O/P EST MOD 30 MIN: CPT | Mod: S$GLB,,, | Performed by: INTERNAL MEDICINE

## 2017-03-03 RX ORDER — ZOLPIDEM TARTRATE 5 MG/1
TABLET ORAL
Qty: 90 TABLET | Refills: 1 | Status: SHIPPED | OUTPATIENT
Start: 2017-03-03 | End: 2017-05-22 | Stop reason: SDUPTHER

## 2017-03-03 RX ORDER — HYDROCHLOROTHIAZIDE 12.5 MG/1
12.5 TABLET ORAL DAILY
Qty: 90 TABLET | Refills: 11 | Status: SHIPPED | OUTPATIENT
Start: 2017-03-03 | End: 2017-12-14 | Stop reason: SDUPTHER

## 2017-03-03 RX ORDER — LANCETS
EACH MISCELLANEOUS
Refills: 12 | COMMUNITY
Start: 2016-12-16 | End: 2019-02-04

## 2017-03-03 RX ORDER — OLMESARTAN MEDOXOMIL 20 MG/1
20 TABLET ORAL DAILY
Qty: 90 TABLET | Refills: 3 | Status: SHIPPED | OUTPATIENT
Start: 2017-03-03 | End: 2017-05-22 | Stop reason: SDUPTHER

## 2017-03-03 NOTE — PROGRESS NOTES
Chief complaint Followup on blood pressure,, diabetes, cholesterol    Patient's a 61-year-old white female with controlled diabetes, hypertension and hyperlipidemia.  She did not have labs prior to this appointment.  Her last A1c many months ago had risen to 7.6.  Her lipids were significant with an LDL of 163.  Liver function also worsened with the rise in the glucose.  Her calcium remained slightly elevated at 10.6.  Thyroid function was normal.  She improved her diet and her glucose readings by review of very much improved and is sleeping normal.  We discussed at great length her persistent hyperlipidemia for many years.  She tried 3 statins in the past and had mental can use and dysfunction and she had adamantly refuses taking another.  We discussed Zetia and I will encourage her to Consider.  Blood pressure running much better and she cut her Benicar and HCTZ in half and we'll make this correction.  We discussed the potential for cirrhosis with her fatty liver.  Counseled at length regarding all these issues.Total time over 25 minutes with over 50% counseling.    ROS:   CONST: Losing a little weight, no other new myalgias arthralgias and no new neurological deficits, no skin rashes     PAST MEDICAL HISTORY:                                                        1.  Allergic rhinitis.                                                       2.  Obstructive sleep apnea status post UPPP and treatment with CPAP.        3.  Chronic sinusitis with sinus surgery x2, Dr. Tucker and Dr. lAston.       4.  Hypertension.                                                            5.  Obesity.                                                                 6.  Colon cancer, status post right hemicolectomy and chemotherapy.          7.  History of iron deficiency anemia.                                       8.  Left superficial vein thrombosis of the arm.                             9.  Insomnia.                                                                 10.  Basal cell carcinoma.                                                   11.  Memory issues when taking WelChol.                                      12.  Hyperlipidemia with LDL rise on WelChol. Tried 3 statins -various effects- memory issue made her lose a job                               13.  Hypercalcemia secondary to HCTZ.                                        14.  Lumbar radiculopathy with history of epidurals -Dr Saavedra                       15.  Chronic cough due to allergies, per pulmonary workup, 2010.           16.  Osteopenia by bone density in .                                     17.  Elevated ALT.                                                           18.  Tubes tied.                                                             19.  Hernia repair.                                                          20.  Lumpectomy, of the breast I presume.   21.  COLONOSCOPY normal 6/15, 5 years  22. Left ankle surgery   23. METABOLIC SYNDROME/DM -A1c  was 6.9  24. Vit D def-    25.  Hypothyroid?,  Placed on [place] Thyroid by Dr. Montgomery??                                                                                                               SOCIAL HISTORY:  Works as a pipe .   for 30 years.  Has       prior marriage and no children.  Quit smoking in .  Drinks once a week.                                                                               FAMILY HISTORY:  Father  at 53 in a car accident.  Mom in good health.   Brother 46, a sister is 52.  She states that all members of her family have  Hypertension.                                                                  Vitals as above      Taty was seen today for diabetes.    Diagnoses and all orders for this visit:    Uncontrolled type 2 diabetes mellitus without complication, without long-term current use of insulin, hopefully improved  -     Hemoglobin A1c; Future  -      "Microalbumin/creatinine urine ratio; Future    Essential hypertension, improving    Hyperlipidemia, unspecified hyperlipidemia type, still uncontrolled and discussed the benefits of statin therapy but she adamantly declines.  Consider alternate treatment  -     Lipid panel; Future    Vitamin D deficiency disease, reassess in light of the hypercalcemia  -     Vitamin D; Future    Hypothyroidism, unspecified type, recent TSH normal    Hypercalcemia, assess for hyperparathyroidism  -     PTH, intact; Future  -     Vitamin D; Future    Abnormal liver function tests, hopefully improving better diabetic control  -     Comprehensive metabolic panel; Future    Chronic insomnia, Ambien refilled    Other orders  -     zolpidem (AMBIEN) 5 MG Tab; TAKE 1 TABLET  NIGHTLY AS NEEDED  -     olmesartan (BENICAR) 20 MG tablet; Take 1 tablet (20 mg total) by mouth once daily.  -     hydrochlorothiazide (HYDRODIURIL) 12.5 MG Tab; Take 1 tablet (12.5 mg total) by mouth once daily.  -     blood sugar diagnostic Strp; 1 strip by Misc.(Non-Drug; Combo Route) route 2 (two) times daily. DISP GLUCOMETER OF CHOICE AND LANCETS AS WELL             Based on patient's anxiety level after a lengthy discussion today, access to the clinical note I don't think would be therapeutic"This note will not be shared with the patient."  "

## 2017-04-12 RX ORDER — TRAMADOL HYDROCHLORIDE 50 MG/1
TABLET ORAL
Qty: 60 TABLET | Refills: 1 | OUTPATIENT
Start: 2017-04-12

## 2017-04-12 RX ORDER — TRAMADOL HYDROCHLORIDE 50 MG/1
TABLET ORAL
Qty: 60 TABLET | Refills: 1 | Status: SHIPPED | OUTPATIENT
Start: 2017-04-12 | End: 2017-05-22 | Stop reason: SDUPTHER

## 2017-04-28 RX ORDER — MONTELUKAST SODIUM 10 MG/1
10 TABLET ORAL NIGHTLY
Qty: 90 TABLET | Refills: 3 | Status: SHIPPED | OUTPATIENT
Start: 2017-04-28 | End: 2017-12-14 | Stop reason: SDUPTHER

## 2017-04-28 NOTE — TELEPHONE ENCOUNTER
----- Message from Yeimi Ward sent at 4/28/2017 12:27 PM CDT -----  [facility] pharmacy calling for refill on montelukast (SINGULAIR) 10 mg tablet. [facility] can be reached at [phone] Thank you!

## 2017-05-01 RX ORDER — CYCLOBENZAPRINE HCL 5 MG
TABLET ORAL
Qty: 30 TABLET | Refills: 2 | Status: SHIPPED | OUTPATIENT
Start: 2017-05-01 | End: 2017-08-04 | Stop reason: SDUPTHER

## 2017-05-03 ENCOUNTER — PATIENT MESSAGE (OUTPATIENT)
Dept: FAMILY MEDICINE | Facility: CLINIC | Age: 62
End: 2017-05-03

## 2017-05-05 RX ORDER — DIAZEPAM 5 MG/1
TABLET ORAL
Qty: 30 TABLET | Refills: 1 | Status: SHIPPED | OUTPATIENT
Start: 2017-05-05 | End: 2017-07-07 | Stop reason: SDUPTHER

## 2017-05-08 ENCOUNTER — LAB VISIT (OUTPATIENT)
Dept: LAB | Facility: HOSPITAL | Age: 62
End: 2017-05-08
Attending: INTERNAL MEDICINE
Payer: COMMERCIAL

## 2017-05-08 DIAGNOSIS — E78.5 HYPERLIPIDEMIA, UNSPECIFIED HYPERLIPIDEMIA TYPE: ICD-10-CM

## 2017-05-08 DIAGNOSIS — E83.52 HYPERCALCEMIA: ICD-10-CM

## 2017-05-08 DIAGNOSIS — E55.9 VITAMIN D DEFICIENCY DISEASE: ICD-10-CM

## 2017-05-08 DIAGNOSIS — R79.89 ABNORMAL LIVER FUNCTION TESTS: ICD-10-CM

## 2017-05-08 LAB
25(OH)D3+25(OH)D2 SERPL-MCNC: 45 NG/ML
ALBUMIN SERPL BCP-MCNC: 3.8 G/DL
ALP SERPL-CCNC: 60 U/L
ALT SERPL W/O P-5'-P-CCNC: 30 U/L
ANION GAP SERPL CALC-SCNC: 12 MMOL/L
AST SERPL-CCNC: 26 U/L
BILIRUB SERPL-MCNC: 0.4 MG/DL
BUN SERPL-MCNC: 20 MG/DL
CALCIUM SERPL-MCNC: 10.5 MG/DL
CHLORIDE SERPL-SCNC: 102 MMOL/L
CHOLEST/HDLC SERPL: 3.4 {RATIO}
CO2 SERPL-SCNC: 28 MMOL/L
CREAT SERPL-MCNC: 1 MG/DL
EST. GFR  (AFRICAN AMERICAN): >60 ML/MIN/1.73 M^2
EST. GFR  (NON AFRICAN AMERICAN): >60 ML/MIN/1.73 M^2
GLUCOSE SERPL-MCNC: 122 MG/DL
HDL/CHOLESTEROL RATIO: 29.1 %
HDLC SERPL-MCNC: 265 MG/DL
HDLC SERPL-MCNC: 77 MG/DL
LDLC SERPL CALC-MCNC: 136.2 MG/DL
NONHDLC SERPL-MCNC: 188 MG/DL
POTASSIUM SERPL-SCNC: 5.3 MMOL/L
PROT SERPL-MCNC: 7.6 G/DL
PTH-INTACT SERPL-MCNC: 46 PG/ML
SODIUM SERPL-SCNC: 142 MMOL/L
TRIGL SERPL-MCNC: 259 MG/DL

## 2017-05-08 PROCEDURE — 83036 HEMOGLOBIN GLYCOSYLATED A1C: CPT

## 2017-05-08 PROCEDURE — 80053 COMPREHEN METABOLIC PANEL: CPT

## 2017-05-08 PROCEDURE — 83970 ASSAY OF PARATHORMONE: CPT

## 2017-05-08 PROCEDURE — 36415 COLL VENOUS BLD VENIPUNCTURE: CPT | Mod: PO

## 2017-05-08 PROCEDURE — 82306 VITAMIN D 25 HYDROXY: CPT

## 2017-05-08 PROCEDURE — 80061 LIPID PANEL: CPT

## 2017-05-09 LAB
ESTIMATED AVG GLUCOSE: 137 MG/DL
ESTIMATED AVG GLUCOSE: 137 MG/DL
HBA1C MFR BLD HPLC: 6.4 %
HBA1C MFR BLD HPLC: 6.4 %

## 2017-05-22 ENCOUNTER — OFFICE VISIT (OUTPATIENT)
Dept: FAMILY MEDICINE | Facility: CLINIC | Age: 62
End: 2017-05-22
Payer: COMMERCIAL

## 2017-05-22 VITALS
DIASTOLIC BLOOD PRESSURE: 100 MMHG | OXYGEN SATURATION: 98 % | HEIGHT: 67 IN | WEIGHT: 261.94 LBS | SYSTOLIC BLOOD PRESSURE: 148 MMHG | TEMPERATURE: 98 F | BODY MASS INDEX: 41.11 KG/M2 | HEART RATE: 96 BPM

## 2017-05-22 DIAGNOSIS — E78.5 HYPERLIPIDEMIA, UNSPECIFIED HYPERLIPIDEMIA TYPE: ICD-10-CM

## 2017-05-22 DIAGNOSIS — M25.511 ACUTE PAIN OF RIGHT SHOULDER: Primary | ICD-10-CM

## 2017-05-22 DIAGNOSIS — G47.00 INSOMNIA, UNSPECIFIED TYPE: ICD-10-CM

## 2017-05-22 DIAGNOSIS — E55.9 VITAMIN D DEFICIENCY DISEASE: ICD-10-CM

## 2017-05-22 DIAGNOSIS — I10 ESSENTIAL HYPERTENSION: ICD-10-CM

## 2017-05-22 DIAGNOSIS — T45.1X5A NEUROPATHY DUE TO CHEMOTHERAPEUTIC DRUG: ICD-10-CM

## 2017-05-22 DIAGNOSIS — G62.0 NEUROPATHY DUE TO CHEMOTHERAPEUTIC DRUG: ICD-10-CM

## 2017-05-22 PROCEDURE — 3077F SYST BP >= 140 MM HG: CPT | Mod: S$GLB,,, | Performed by: INTERNAL MEDICINE

## 2017-05-22 PROCEDURE — 99214 OFFICE O/P EST MOD 30 MIN: CPT | Mod: S$GLB,,, | Performed by: INTERNAL MEDICINE

## 2017-05-22 PROCEDURE — 3080F DIAST BP >= 90 MM HG: CPT | Mod: S$GLB,,, | Performed by: INTERNAL MEDICINE

## 2017-05-22 PROCEDURE — 1160F RVW MEDS BY RX/DR IN RCRD: CPT | Mod: S$GLB,,, | Performed by: INTERNAL MEDICINE

## 2017-05-22 PROCEDURE — 4010F ACE/ARB THERAPY RXD/TAKEN: CPT | Mod: S$GLB,,, | Performed by: INTERNAL MEDICINE

## 2017-05-22 PROCEDURE — 99999 PR PBB SHADOW E&M-EST. PATIENT-LVL III: CPT | Mod: PBBFAC,,, | Performed by: INTERNAL MEDICINE

## 2017-05-22 PROCEDURE — 3044F HG A1C LEVEL LT 7.0%: CPT | Mod: S$GLB,,, | Performed by: INTERNAL MEDICINE

## 2017-05-22 RX ORDER — OLMESARTAN MEDOXOMIL 20 MG/1
20 TABLET ORAL DAILY
Qty: 90 TABLET | Refills: 3 | Status: SHIPPED | OUTPATIENT
Start: 2017-05-22 | End: 2017-12-14 | Stop reason: SDUPTHER

## 2017-05-22 RX ORDER — TRAMADOL HYDROCHLORIDE 50 MG/1
TABLET ORAL
Qty: 60 TABLET | Refills: 5 | Status: SHIPPED | OUTPATIENT
Start: 2017-05-22 | End: 2017-08-04 | Stop reason: SDUPTHER

## 2017-05-22 RX ORDER — ZOLPIDEM TARTRATE 5 MG/1
TABLET ORAL
Qty: 30 TABLET | Refills: 5 | Status: SHIPPED | OUTPATIENT
Start: 2017-05-22 | End: 2017-09-18 | Stop reason: SDUPTHER

## 2017-05-22 NOTE — PROGRESS NOTES
Chief complaint Followup on blood pressure,, diabetes, cholesterol, right shoulder pain    Patient's a 62-year-old white female with controlled diabetes, hypertension and hyperlipidemia.  She did have labs prior to the appointment.  Her A1c has improved from 7.6 back down to her baseline 6.4.  Vitamin D is normal.  HDL 77.  LDL improved from 163 down to 136 and we discussed that still not at goal.  We discussed the benefits of statin therapy in the recommendation that all diabetics be on statin but she is very resistant and reluctant and declines at this time.  She has had about 1 month of right arm pain worse with abduction.  She may well have overdone it lifting a heavy jug of water from one seat to the next in her car.  She had been doing this on a regular basis and is now stopped.  She rates her current pain today 3 out of 10.  She does have impingement .  No other injury per she does not sleep on that side.  Patient has painful neuropathy to both feet after chemotherapy and she has a new electric old device that does significantly improve her symptoms and she can now walk around stores with greater comfort and for greater distances.  Her blood pressures up today feeling that she is in pain.  We will have her follow-up in 3 months for diabetes and blood pressure assessment.      ROS:   CONST:  no other new myalgias arthralgias and no new neurological deficits, no skin rashes     PAST MEDICAL HISTORY:                                                        1.  Allergic rhinitis.                                                       2.  Obstructive sleep apnea status post UPPP and treatment with CPAP.        3.  Chronic sinusitis with sinus surgery x2, Dr. Tucker and Dr. Alston.       4.  Hypertension.                                                            5.  Obesity.                                                                 6.  Colon cancer, status post right hemicolectomy and chemotherapy.          7.   History of iron deficiency anemia.                                       8.  Left superficial vein thrombosis of the arm.                             9.  Insomnia.                                                                10.  Basal cell carcinoma.                                                   11.  Memory issues when taking WelChol.                                      12.  Hyperlipidemia with LDL rise on WelChol. Tried 3 statins -various effects- memory issue made her lose a job                               13.  Hypercalcemia secondary to HCTZ.                                        14.  Lumbar radiculopathy with history of epidurals -Dr Saavedra                       15.  Chronic cough due to allergies, per pulmonary workup, 2010.           16.  Osteopenia by bone density in .                                     17.  Elevated ALT.                                                           18.  Tubes tied.                                                             19.  Hernia repair.                                                          20.  Lumpectomy, of the breast I presume.   21.  COLONOSCOPY normal 6/15, 5 years  22. Left ankle surgery   23. METABOLIC SYNDROME/DM -A1c  was 6.9  24. Vit D def-    25.  Hypothyroid?,  Placed on [place] Thyroid by Dr. Montgomery??    26.  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   for 30 years.  Has       prior marriage and no children.  Quit smoking in .  Drinks once a week.                                                                               FAMILY HISTORY:  Father  at 53 in a car accident.  Mom in good health.   Brother 46, a sister is 52.  She states that all members of her family have  Hypertension.                                                                  Vitals as  "above    SHOULDER: both arms no defects, impingement on abduction on   The right    with reduced ROM and pain in supraspinatus. Other shoulder has good ROM. Both joints stable. Strength 5/5 both arms but slightly reduced due to pain on affected side.   SKIN no rashes, warm to touch.  NECK full ROM, no defect, no muscle pain    Taty was seen today for diabetes and results.    Diagnoses and all orders for this visit:    Acute pain of right shoulder, supraspinatus tendinitis with no suspected tear, offered physical therapy but she would like to look on the Internet and do her own exercises and she has some rubber band already.  Discussed limitations and the possibility that exercises could be done incorrectly.  If she fails to respond we will refer to physical therapy.  She can continue her meloxicam and I will refill her tramadol    Essential hypertension, monitor at home and reassess    Uncontrolled type 2 diabetes mellitus without complication, without long-term current use of insulin, still uncontrolled but improving  -     Ambulatory referral to Podiatry    Hyperlipidemia, unspecified hyperlipidemia type, some improvement but discussed benefits of statin which she declines    Vitamin D deficiency disease, labs reviewed and vitamin D responding    Neuropathy due to chemotherapeutic drug, using over-the-counter devices and frankincense oil    Insomnia, Ambien refilled           Based on patient's anxiety level after a lengthy discussion today, access to the clinical note I don't think would be therapeutic"This note will not be shared with the patient."  "

## 2017-05-23 ENCOUNTER — PATIENT MESSAGE (OUTPATIENT)
Dept: FAMILY MEDICINE | Facility: CLINIC | Age: 62
End: 2017-05-23

## 2017-05-24 RX ORDER — MELOXICAM 7.5 MG/1
7.5 TABLET ORAL DAILY
Qty: 30 TABLET | Refills: 6 | Status: SHIPPED | OUTPATIENT
Start: 2017-05-24 | End: 2017-12-14 | Stop reason: SDUPTHER

## 2017-05-24 RX ORDER — TRAMADOL HYDROCHLORIDE 50 MG/1
TABLET ORAL
Qty: 60 TABLET | Refills: 5 | OUTPATIENT
Start: 2017-05-24

## 2017-05-24 NOTE — TELEPHONE ENCOUNTER
----- Message from Fernanda Bajwa sent at 5/24/2017  3:29 PM CDT -----  Contact: [facility]  Pharmacist calling regarding TRAMADOL. Please call [phone]

## 2017-05-26 ENCOUNTER — TELEPHONE (OUTPATIENT)
Dept: FAMILY MEDICINE | Facility: CLINIC | Age: 62
End: 2017-05-26

## 2017-06-13 ENCOUNTER — OFFICE VISIT (OUTPATIENT)
Dept: PODIATRY | Facility: CLINIC | Age: 62
End: 2017-06-13
Payer: COMMERCIAL

## 2017-06-13 VITALS — WEIGHT: 261 LBS | BODY MASS INDEX: 40.97 KG/M2 | HEIGHT: 67 IN

## 2017-06-13 DIAGNOSIS — M20.10 HALLUX ABDUCTO VALGUS, UNSPECIFIED LATERALITY: ICD-10-CM

## 2017-06-13 DIAGNOSIS — E11.9 COMPREHENSIVE DIABETIC FOOT EXAMINATION, TYPE 2 DM, ENCOUNTER FOR: ICD-10-CM

## 2017-06-13 DIAGNOSIS — T45.1X5A NEUROPATHY DUE TO CHEMOTHERAPEUTIC DRUG: Primary | ICD-10-CM

## 2017-06-13 DIAGNOSIS — M20.41 HAMMER TOES OF BOTH FEET: ICD-10-CM

## 2017-06-13 DIAGNOSIS — G62.0 NEUROPATHY DUE TO CHEMOTHERAPEUTIC DRUG: Primary | ICD-10-CM

## 2017-06-13 DIAGNOSIS — M20.42 HAMMER TOES OF BOTH FEET: ICD-10-CM

## 2017-06-13 PROCEDURE — 3044F HG A1C LEVEL LT 7.0%: CPT | Mod: S$GLB,,, | Performed by: PODIATRIST

## 2017-06-13 PROCEDURE — 99999 PR PBB SHADOW E&M-EST. PATIENT-LVL III: CPT | Mod: PBBFAC,,, | Performed by: PODIATRIST

## 2017-06-13 PROCEDURE — 4010F ACE/ARB THERAPY RXD/TAKEN: CPT | Mod: S$GLB,,, | Performed by: PODIATRIST

## 2017-06-13 PROCEDURE — 99203 OFFICE O/P NEW LOW 30 MIN: CPT | Mod: S$GLB,,, | Performed by: PODIATRIST

## 2017-06-13 RX ORDER — HYDROCHLOROTHIAZIDE 12.5 MG/1
CAPSULE ORAL
Status: ON HOLD | COMMUNITY
Start: 2017-06-07 | End: 2018-11-19

## 2017-06-13 NOTE — PROGRESS NOTES
Subjective:      Patient ID: Taty Max is a 62 y.o. female.    Chief Complaint: Diabetes Mellitus (pcp dr. Dotson 5-22-17); Diabetic Foot Exam; and Nail Care    Taty is a 62 y.o. female who presents to the clinic for evaluation and treatment of high risk feet. Taty has a past medical history of Allergic rhinitis, seasonal (1/28/2013); Anxiety disorder (1/28/2013); Basal cell carcinoma; Bilateral retinal lattice degeneration; Cataract; Colon cancer (2008); DDD (degenerative disc disease), lumbar (11/25/2014); Diabetes mellitus type II, uncontrolled (7/25/2014); High myopia; Horseshoe retinal tear of both eyes; HTN (hypertension) (1/28/2013); Hypertension; Hypothyroidism (4/29/2014); Lumbar disc disease (7/25/2014); Metabolic syndrome (4/29/2014); Neuropathy due to chemotherapeutic drug (5/22/2017); Osteopenia (5/10/2013); Screening for colorectal cancer (9/11/2015); Vitamin D deficiency disease; and Vitreous detachment of both eyes. The patient has no major complaints with feet. Chief concern is how to care for feet as a diabetic. This patient has documented high risk feet requiring routine maintenance secondary to peripheral neuropathy.    Patient has neuropathy secondary to chemo 9 yrs ago. Patient uses a revitive medic device to and in neuropathic pain and relates some sensation has returned.    PCP: Fidel Dotson MD    Date Last Seen by PCP:   Chief Complaint   Patient presents with    Diabetes Mellitus     pcp dr. Dotson 5-22-17    Diabetic Foot Exam    Nail Care       Current shoe gear:  propet tennis shoes    Hemoglobin A1C   Date Value Ref Range Status   05/08/2017 6.4 (H) 4.5 - 6.2 % Final     Comment:     According to ADA guidelines, hemoglobin A1C <7.0% represents  optimal control in non-pregnant diabetic patients.  Different  metrics may apply to specific populations.   Standards of Medical Care in Diabetes - 2016.  For the purpose of screening for the presence of diabetes:  <5.7%      Consistent with the absence of diabetes  5.7-6.4%  Consistent with increasing risk for diabetes   (prediabetes)  >or=6.5%  Consistent with diabetes  Currently no consensus exists for use of hemoglobin A1C  for diagnosis of diabetes for children.     05/08/2017 6.4 (H) 4.5 - 6.2 % Final     Comment:     According to ADA guidelines, hemoglobin A1C <7.0% represents  optimal control in non-pregnant diabetic patients.  Different  metrics may apply to specific populations.   Standards of Medical Care in Diabetes - 2016.  For the purpose of screening for the presence of diabetes:  <5.7%     Consistent with the absence of diabetes  5.7-6.4%  Consistent with increasing risk for diabetes   (prediabetes)  >or=6.5%  Consistent with diabetes  Currently no consensus exists for use of hemoglobin A1C  for diagnosis of diabetes for children.     09/20/2016 7.6 (H) 4.5 - 6.2 % Final     Comment:     According to ADA guidelines, hemoglobin A1C <7.0% represents  optimal control in non-pregnant diabetic patients.  Different  metrics may apply to specific populations.   Standards of Medical Care in Diabetes - 2016.  For the purpose of screening for the presence of diabetes:  <5.7%     Consistent with the absence of diabetes  5.7-6.4%  Consistent with increasing risk for diabetes   (prediabetes)  >or=6.5%  Consistent with diabetes  Currently no consensus exists for use of hemoglobin A1C  for diagnosis of diabetes for children.           Patient Active Problem List   Diagnosis    Hypertension    Salmonella gastroenteritis    Anxiety disorder    Vitamin D deficiency disease    HTN (hypertension)    Allergic rhinitis, seasonal    Hyperlipidemia    Osteopenia    Horseshoe retinal tear, both eyes    Lattice degeneration, bilateral    Posterior vitreous detachment, both eyes    Posterior capsular opacification, bilateral    After-cataract, obscuring vision - Both Eyes    Vitreous detachment - Both Eyes    Idiopathic hypertension     High myopia - Both Eyes    Post-operative state - Left Eye    Lattice degeneration of both retinas - Both Eyes    Elevated blood pressure reading    Dizziness    Metabolic syndrome    Hypothyroidism    Pseudophakia    Refractive error    Diabetes mellitus type II, uncontrolled    Lumbar disc disease    DDD (degenerative disc disease), lumbar    Sacroiliitis    Lumbar spondylosis    Lumbar facet arthropathy    Screening    Screening for colorectal cancer    Neuropathy due to chemotherapeutic drug       Current Outpatient Prescriptions on File Prior to Visit   Medication Sig Dispense Refill    ACCU-CHEK SOFTCLIX LANCETS [facility] USE AS DIRECTED  12    blood sugar diagnostic Strp 1 strip by Misc.(Non-Drug; Combo Route) route 2 (two) times daily. DISP GLUCOMETER OF CHOICE AND LANCETS AS WELL 100 strip 12    cyclobenzaprine (FLEXERIL) 5 MG tablet TAKE 1 TABLET BY MOUTH NIGHTLY 30 tablet 2    diazePAM (VALIUM) 5 MG tablet TAKE 1 TABLET BY MOUTH AT BEDTIME AS NEEDED FOR ANXIETY AND SPASMS 30 tablet 1    duloxetine (CYMBALTA) 30 MG capsule TAKE 1 CAPSULE (30 MG TOTAL) BY MOUTH ONCE DAILY. 30 capsule 11    econazole nitrate 1 % cream Apply topically 2 (two) times daily. 30 g 3    fluorouracil (EFUDEX) 5 % cream AAA bid x 2 weeks 40 g 1    fluticasone (FLONASE) 50 mcg/actuation nasal spray 2 sprays by Each Nare route once daily. 3 Bottle 12    hydrochlorothiazide (HYDRODIURIL) 12.5 MG Tab Take 1 tablet (12.5 mg total) by mouth once daily. 90 tablet 11    KRILL OIL ORAL Take 1 tablet by mouth once daily.      levocetirizine (XYZAL) 5 MG tablet TAKE 1 TABLET BY MOUTH EVERY EVENING 30 tablet 5    meloxicam (MOBIC) 7.5 MG tablet Take 1 tablet (7.5 mg total) by mouth once daily. 30 tablet 6    metaxalone (SKELAXIN) 800 MG tablet TAKE 1 TABLET BY MOUTH 4 TIMES DAILY AS NEEDED FOR PAIN 120 tablet 2    metformin (GLUCOPHAGE-XR) 750 MG 24 hr tablet 1 pill in the AM 90 tablet 3    montelukast (SINGULAIR)  "10 mg tablet Take 1 tablet (10 mg total) by mouth every evening. 90 tablet 3    multivitamin (THERAGRAN) per tablet Take 1 tablet by mouth once daily. 1 Tablet Oral Every day      olmesartan (BENICAR) 20 MG tablet Take 1 tablet (20 mg total) by mouth once daily. 90 tablet 3    penicillin v potassium (VEETID) 500 MG tablet Take 500 mg by mouth 4 (four) times daily. PT IS TAKING FOR DENTAL PURPOSES      tramadol (ULTRAM) 50 mg tablet TAKE 1 TO 2 TABLET(S) BY MOUTH EVERY 6 HOURS AS NEEDED 60 tablet 5    tretinoin (RETIN-A) 0.025 % gel Apply topically nightly. Gel Topical .  AAA face qhs 45 g 3    zolpidem (AMBIEN) 5 MG Tab TAKE 1 TABLET  NIGHTLY AS NEEDED 30 tablet 5    albuterol 90 mcg/actuation inhaler Inhale 1-2 puffs into the lungs every 6 (six) hours as needed for Wheezing. 1 Inhaler 0    fluocinonide 0.05% (LIDEX) 0.05 % cream Apply topically 2 (two) times daily. 30 g 5     No current facility-administered medications on file prior to visit.        Review of patient's allergies indicates:   Allergen Reactions    Oxaliplatin Hives    Factive [gemifloxacin] Hives    Daypro [oxaprozin] Rash    Latex Hives     Adhesives       Past Surgical History:   Procedure Laterality Date    ADENOIDECTOMY      ANKLE SURGERY      left     BREAST LUMPECTOMY      CATARACT EXTRACTION      CATARACT EXTRACTION BILATERAL W/ ANTERIOR VITRECTOMY      COLECTOMY      s/p reanastemosis    COLON SURGERY      EYE EXAMINATION UNDER ANESTHESIA W/ RETINAL CRYOTHERAPY AND RETINAL LASER      HERNIA REPAIR      KIDNEY SURGERY      "dilate urethra tube"    NASAL POLYP EXCISION      NASAL SEPTUM SURGERY      s/p laser ou      TONSILLECTOMY      TUBAL LIGATION      UVULOPALATOPHARYNGOPLASTY  1990s       Family History   Problem Relation Age of Onset    Cancer Maternal Grandmother     Hyperlipidemia Mother     Hypertension Mother     Breast cancer Mother     Allergies Father     Allergies Sister     Allergies Brother  " "   Heart disease Maternal Grandfather     Stroke Paternal Grandmother     Colon cancer Neg Hx     Ovarian cancer Neg Hx        Social History     Social History    Marital status:      Spouse name: N/A    Number of children: N/A    Years of education: N/A     Occupational History     [employer]     Social History Main Topics    Smoking status: Former Smoker     Quit date: 7/17/1977    Smokeless tobacco: Never Used    Alcohol use Yes      Comment: once per week    Drug use: No    Sexual activity: Yes     Partners: Male     Birth control/ protection: Post-menopausal     Other Topics Concern    Not on file     Social History Narrative    No narrative on file       Review of Systems   Constitution: Negative for chills, fever and weakness.   Cardiovascular: Positive for leg swelling. Negative for claudication.   Respiratory: Negative for cough and shortness of breath.    Skin: Positive for dry skin. Negative for itching, nail changes and rash.   Musculoskeletal: Positive for arthritis, back pain, joint pain, muscle cramps (nocturnal) and myalgias. Negative for falls, joint swelling and muscle weakness.   Gastrointestinal: Negative for diarrhea, nausea and vomiting.   Neurological: Positive for numbness and paresthesias. Negative for tremors.   Psychiatric/Behavioral: Negative for altered mental status and hallucinations.           Objective:       Vitals:    06/13/17 1532   Weight: 118.4 kg (261 lb)   Height: 5' 7" (1.702 m)   PainSc: 0-No pain       Physical Exam   Constitutional:  Non-toxic appearance. She does not have a sickly appearance. No distress.   Pt. is well-developed, well-nourished, appears stated age, in no acute distress, alert and oriented x 3. No evidence of depression, anxiety, or agitation. Calm, cooperative, and communicative. Appropriate interactions and affect.   Cardiovascular:   Pulses:       Dorsalis pedis pulses are 2+ on the right side, and 2+ on the left side.      "   Posterior tibial pulses are 2+ on the right side, and 2+ on the left side.   dorsalis pedis, posterior tibial pulses, and perforating peroneal pulses are palpable bilaterally. Capillary refill time is within normal limits. Digital hair present.      Pulmonary/Chest: No respiratory distress.   Musculoskeletal:        Right ankle: No tenderness. No lateral malleolus, no medial malleolus, no AITFL, no CF ligament and no posterior TFL tenderness found. Achilles tendon exhibits no pain, no defect and normal Ramos's test results.        Left ankle: No tenderness. No lateral malleolus, no medial malleolus, no AITFL, no CF ligament and no posterior TFL tenderness found. Achilles tendon exhibits no pain, no defect and normal Ramos's test results.        Right foot: There is no tenderness and no bony tenderness.        Left foot: There is no tenderness and no bony tenderness.    Decreased stride, station of gait.  apropulsive toe off.  Increased angle and base of gait.      Patient has hammertoes of digits 2-5 bilateral partially reducible without symptom today.     Visible and palpable bunion without pain at dorsomedial 1st metatarsal head right and left.  Hallux abducted right and left partially reducible, tracks laterally without being track bound.  No ecchymosis, erythema, edema, or cardinal signs infection or signs of trauma same foot.     Lymphadenopathy:   No lymphatic streaking    Negative lymphadenopathy bilateral popliteal fossa and tarsal tunnel.     Neurological:   Monroe-Abbe 5.07 monofilamant testing is diminished Enmanuel feet. Decreased/absent vibratory sensation bilateral feet to 128Hz tuning fork.     Paresthesias, and hyperesthesia bilateral feet with no clearly identified trigger or source.           Skin: Skin is warm, dry and intact. No rash noted. She is not diaphoretic. No cyanosis. No pallor. Nails show no clubbing.   Skin is of normal turgor. Normal temperature gradient. Examination of the  skin reveals no evidence of significant rashes, open lesions, suspicious appearing nevi or other concerning lesions.    Psychiatric: Her mood appears not anxious. Her affect is not inappropriate. Her speech is not slurred. She is not combative. She is communicative. She is attentive.   Nursing note reviewed.            Assessment:       Encounter Diagnoses   Name Primary?    Neuropathy due to chemotherapeutic drug Yes    Comprehensive diabetic foot examination, type 2 DM, encounter for     Hallux abducto valgus, unspecified laterality     Hammer toes of both feet          Plan:       Taty was seen today for diabetes mellitus, diabetic foot exam and nail care.    Diagnoses and all orders for this visit:    Neuropathy due to chemotherapeutic drug    Comprehensive diabetic foot examination, type 2 DM, encounter for    Hallux abducto valgus, unspecified laterality    Hammer toes of both feet      I counseled the patient on her conditions, their implications and medical management.    Greater than 50% of this visit spent on counseling and coordination of care.    Greater than 20 minutes spent on education about the diabetic foot, neuropathy, and prevention of limb loss.    Shoe inspection. Diabetic Foot Education. Patient reminded of the importance of good nutrition and blood sugar control to help prevent podiatric complications of diabetes. Patient instructed on proper foot hygeine. We discussed wearing proper shoe gear, daily foot inspections, never walking without protective shoe gear, never putting sharp instruments to feet.      She will continue to monitor the areas daily, inspect her feet, wear protective shoe gear when ambulatory, moisturizer to maintain skin integrity and follow in this office in approximately 6-12 months, sooner p.r.n.

## 2017-06-13 NOTE — LETTER
June 13, 2017      Fidel Dotson MD  [address] [place] [address]           [facility]  [address] [place] [address]  Phone: [phone]          Patient: Taty Max   MR Number: [id number]   YOB: 1955   Date of Visit: 6/13/2017       Dear Dr. Fidel Dotson:    Thank you for referring Taty Max to me for evaluation. Attached you will find relevant portions of my assessment and plan of care.    If you have questions, please do not hesitate to call me. I look forward to following Taty Max along with you.    Sincerely,    Belinda Mcmanus DPM    Enclosure  CC:  No Recipients    If you would like to receive this communication electronically, please contact [email or web address] or [phone] to request more information on [email or web address] Link access.    For providers and/or their staff who would like to refer a patient to [facility], please contact us through our one-stop-shop provider referral line, [facility], at [phone].    If you feel you have received this communication in error or would no longer like to receive these types of communications, please e-mail [email or web address]

## 2017-06-13 NOTE — PATIENT INSTRUCTIONS
Recommend lotions: eucerin, aquaphor, A&D ointment, gold bond for diabetics, sween    Shoe recommendations: (try 6pm.com, zappos.com , nordstromrack.[email or web address], or shoes.com for discounted prices) you can visit DSW shoes in [place] or brendon rack in the [facility] as well    Asics (GT 1000 or gel foundations), new balance, saucony (stabil c3),  Napier (GTS or Beast or transcend), vionic, propet (tennis shoe)    sofft brand, clarks, crocs, aerosoles, naturalizers, SAS, ecco, lissette, elise jauregui, rockports (dress shoes)    Vionic, volitiles, burkenstocks, fitflops, propet (sandals)    Nike comfort thong sandals, crocs, propet (house shoes)    Nail Home remedy:  Vicks Vapor rub OR Listerine and apple cider vinegar in a spray bottle to nails    Occasional soaks for 15-20 mins in luke warm water with 1 cup of listerine and 1 cup of apple cider vinegar are ok You may add several drops of oil of oregano or tea tree oil as well      Diabetes: Inspecting Your Feet  Diabetes increases your chances of developing foot problems. So inspect your feet every day. This helps you find small skin irritations before they become serious infections. If you have trouble seeing the bottoms of your feet, use a mirror or ask a family member or friend to help.     Pressure spots on the bottom of the foot are common areas where problems develop.   How to check your feet  Below are tips to help you look for foot problems. Try to check your feet at the same time each day, such as when you get out of bed in the morning:  · Check the top of each foot. The tops of toes, back of the heel, and outer edge of the foot can get a lot of rubbing from poor-fitting shoes.  · Check the bottom of each foot. Daily wear and tear often leads to problems at pressure spots.  · Check the toes and nails. Fungal infections often occur between toes. Toenail problems can also be a sign of fungal infections or lead to breaks in the skin.  · Check your shoes, too. Loose objects  inside a shoe can injure the foot. Use your hand to feel inside your shoes for things like an, loose stitching, or rough areas that could irritate your skin.  Warning signs  Look for any color changes in the foot. Redness with streaks can signal a severe infection, which needs immediate medical attention. Tell your doctor right away if you have any of these problems:  · Swelling, sometimes with color changes, may be a sign of poor blood flow or infection. Symptoms include tenderness and an increase in the size of your foot.  · Warm or hot areas on your feet may be signs of infection. A foot that is cold may not be getting enough blood.  · Sensations such as burning, tingling, or pins and needles can be signs of a problem. Also check for areas that may be numb.  · Hot spots are caused by friction or pressure. Look for hot spots in areas that get a lot of rubbing. Hot spots can turn into blisters, calluses, or sores.  · Cracks and sores are caused by dry or irritated skin. They are a sign that the skin is breaking down, which can lead to infection.  · Toenail problems to watch for include nails growing into the skin (ingrown toenail) and causing redness or pain. Thick, yellow, or discolored nails can signal a fungal infection.  · Drainage and odor can develop from untreated sores and ulcers. Call your doctor right away if you notice white or yellow drainage, bleeding, or unpleasant odor.   © 1872-6941 "[employer]. [address] [place] [address], [place] [address]. All rights reserved. This information is not intended as a substitute for professional medical care. Always follow your healthcare professional's instructions.

## 2017-07-07 RX ORDER — DIAZEPAM 5 MG/1
TABLET ORAL
Qty: 30 TABLET | Refills: 3 | Status: SHIPPED | OUTPATIENT
Start: 2017-07-07 | End: 2018-01-21 | Stop reason: SDUPTHER

## 2017-08-04 RX ORDER — TRAMADOL HYDROCHLORIDE 50 MG/1
TABLET ORAL
Qty: 60 TABLET | Refills: 3 | Status: SHIPPED | OUTPATIENT
Start: 2017-08-04 | End: 2017-10-03 | Stop reason: SDUPTHER

## 2017-08-04 RX ORDER — CYCLOBENZAPRINE HCL 5 MG
TABLET ORAL
Qty: 30 TABLET | Refills: 3 | Status: SHIPPED | OUTPATIENT
Start: 2017-08-04 | End: 2018-01-05 | Stop reason: SDUPTHER

## 2017-09-18 RX ORDER — ZOLPIDEM TARTRATE 5 MG/1
TABLET ORAL
Qty: 30 TABLET | Refills: 0 | Status: SHIPPED | OUTPATIENT
Start: 2017-09-18 | End: 2017-10-17 | Stop reason: SDUPTHER

## 2017-09-18 RX ORDER — METFORMIN HYDROCHLORIDE 750 MG/1
TABLET, EXTENDED RELEASE ORAL
Qty: 120 TABLET | Refills: 0 | Status: SHIPPED | OUTPATIENT
Start: 2017-09-18 | End: 2017-12-14 | Stop reason: SDUPTHER

## 2017-10-03 DIAGNOSIS — R52 PAIN: Primary | ICD-10-CM

## 2017-10-04 RX ORDER — TRAMADOL HYDROCHLORIDE 50 MG/1
TABLET ORAL
Qty: 60 TABLET | OUTPATIENT
Start: 2017-10-04

## 2017-10-04 RX ORDER — TRAMADOL HYDROCHLORIDE 50 MG/1
TABLET ORAL
Qty: 60 TABLET | Refills: 3 | Status: SHIPPED | OUTPATIENT
Start: 2017-10-04 | End: 2017-11-16 | Stop reason: SDUPTHER

## 2017-10-05 ENCOUNTER — PATIENT MESSAGE (OUTPATIENT)
Dept: FAMILY MEDICINE | Facility: CLINIC | Age: 62
End: 2017-10-05

## 2017-10-17 RX ORDER — ZOLPIDEM TARTRATE 5 MG/1
TABLET ORAL
Qty: 30 TABLET | Refills: 0 | Status: SHIPPED | OUTPATIENT
Start: 2017-10-17 | End: 2017-12-14 | Stop reason: SDUPTHER

## 2017-10-23 ENCOUNTER — PATIENT MESSAGE (OUTPATIENT)
Dept: FAMILY MEDICINE | Facility: CLINIC | Age: 62
End: 2017-10-23

## 2017-11-16 DIAGNOSIS — R52 PAIN: ICD-10-CM

## 2017-11-17 DIAGNOSIS — E11.9 TYPE 2 DIABETES MELLITUS WITHOUT COMPLICATION: ICD-10-CM

## 2017-11-21 RX ORDER — TRAMADOL HYDROCHLORIDE 50 MG/1
TABLET ORAL
Qty: 60 TABLET | Refills: 5 | Status: SHIPPED | OUTPATIENT
Start: 2017-11-21 | End: 2017-12-14 | Stop reason: SDUPTHER

## 2017-12-04 DIAGNOSIS — R52 PAIN: ICD-10-CM

## 2017-12-04 RX ORDER — TRAMADOL HYDROCHLORIDE 50 MG/1
TABLET ORAL
Qty: 60 TABLET | OUTPATIENT
Start: 2017-12-04

## 2017-12-14 ENCOUNTER — OFFICE VISIT (OUTPATIENT)
Dept: FAMILY MEDICINE | Facility: CLINIC | Age: 62
End: 2017-12-14
Payer: COMMERCIAL

## 2017-12-14 ENCOUNTER — LAB VISIT (OUTPATIENT)
Dept: LAB | Facility: HOSPITAL | Age: 62
End: 2017-12-14
Attending: INTERNAL MEDICINE
Payer: COMMERCIAL

## 2017-12-14 VITALS
OXYGEN SATURATION: 96 % | WEIGHT: 273.38 LBS | SYSTOLIC BLOOD PRESSURE: 138 MMHG | HEIGHT: 67 IN | BODY MASS INDEX: 42.91 KG/M2 | DIASTOLIC BLOOD PRESSURE: 74 MMHG | HEART RATE: 104 BPM | TEMPERATURE: 98 F

## 2017-12-14 DIAGNOSIS — L30.9 DERMATITIS: ICD-10-CM

## 2017-12-14 DIAGNOSIS — M47.816 LUMBAR SPONDYLOSIS: ICD-10-CM

## 2017-12-14 DIAGNOSIS — G47.00 INSOMNIA, UNSPECIFIED TYPE: ICD-10-CM

## 2017-12-14 DIAGNOSIS — R52 PAIN: ICD-10-CM

## 2017-12-14 DIAGNOSIS — L30.4 INTERTRIGO: ICD-10-CM

## 2017-12-14 DIAGNOSIS — K43.9 VENTRAL HERNIA WITHOUT OBSTRUCTION OR GANGRENE: ICD-10-CM

## 2017-12-14 DIAGNOSIS — I10 ESSENTIAL HYPERTENSION: ICD-10-CM

## 2017-12-14 DIAGNOSIS — E78.5 HYPERLIPIDEMIA, UNSPECIFIED HYPERLIPIDEMIA TYPE: ICD-10-CM

## 2017-12-14 DIAGNOSIS — E03.9 HYPOTHYROIDISM, UNSPECIFIED TYPE: ICD-10-CM

## 2017-12-14 DIAGNOSIS — J30.2 CHRONIC SEASONAL ALLERGIC RHINITIS, UNSPECIFIED TRIGGER: ICD-10-CM

## 2017-12-14 LAB
ANION GAP SERPL CALC-SCNC: 9 MMOL/L
BUN SERPL-MCNC: 18 MG/DL
CALCIUM SERPL-MCNC: 10.4 MG/DL
CHLORIDE SERPL-SCNC: 103 MMOL/L
CO2 SERPL-SCNC: 29 MMOL/L
CREAT SERPL-MCNC: 0.9 MG/DL
EST. GFR  (AFRICAN AMERICAN): >60 ML/MIN/1.73 M^2
EST. GFR  (NON AFRICAN AMERICAN): >60 ML/MIN/1.73 M^2
ESTIMATED AVG GLUCOSE: 131 MG/DL
GLUCOSE SERPL-MCNC: 129 MG/DL
HBA1C MFR BLD HPLC: 6.2 %
POTASSIUM SERPL-SCNC: 4.7 MMOL/L
SODIUM SERPL-SCNC: 141 MMOL/L

## 2017-12-14 PROCEDURE — 99999 PR PBB SHADOW E&M-EST. PATIENT-LVL IV: CPT | Mod: PBBFAC,,, | Performed by: INTERNAL MEDICINE

## 2017-12-14 PROCEDURE — 80048 BASIC METABOLIC PNL TOTAL CA: CPT

## 2017-12-14 PROCEDURE — 99214 OFFICE O/P EST MOD 30 MIN: CPT | Mod: S$GLB,,, | Performed by: INTERNAL MEDICINE

## 2017-12-14 PROCEDURE — 36415 COLL VENOUS BLD VENIPUNCTURE: CPT | Mod: PO

## 2017-12-14 PROCEDURE — 83036 HEMOGLOBIN GLYCOSYLATED A1C: CPT

## 2017-12-14 RX ORDER — TRAMADOL HYDROCHLORIDE 50 MG/1
TABLET ORAL
Qty: 60 TABLET | Refills: 5 | Status: SHIPPED | OUTPATIENT
Start: 2017-12-14 | End: 2018-03-20 | Stop reason: SDUPTHER

## 2017-12-14 RX ORDER — MONTELUKAST SODIUM 10 MG/1
10 TABLET ORAL NIGHTLY
Qty: 90 TABLET | Refills: 3 | Status: SHIPPED | OUTPATIENT
Start: 2017-12-14 | End: 2018-11-16 | Stop reason: SDUPTHER

## 2017-12-14 RX ORDER — METFORMIN HYDROCHLORIDE 750 MG/1
750 TABLET, EXTENDED RELEASE ORAL EVERY MORNING
Qty: 360 TABLET | Refills: 3 | Status: SHIPPED | OUTPATIENT
Start: 2017-12-14 | End: 2018-11-12 | Stop reason: SDUPTHER

## 2017-12-14 RX ORDER — ECONAZOLE NITRATE 10 MG/G
CREAM TOPICAL 2 TIMES DAILY
Qty: 85 G | Refills: 12 | Status: SHIPPED | OUTPATIENT
Start: 2017-12-14 | End: 2018-04-30

## 2017-12-14 RX ORDER — HYDROCHLOROTHIAZIDE 12.5 MG/1
12.5 TABLET ORAL DAILY
Qty: 90 TABLET | Refills: 11 | Status: SHIPPED | OUTPATIENT
Start: 2017-12-14 | End: 2019-04-24 | Stop reason: SDUPTHER

## 2017-12-14 RX ORDER — OLMESARTAN MEDOXOMIL 20 MG/1
20 TABLET ORAL DAILY
Qty: 90 TABLET | Refills: 3 | Status: SHIPPED | OUTPATIENT
Start: 2017-12-14 | End: 2018-11-12 | Stop reason: SDUPTHER

## 2017-12-14 RX ORDER — MELOXICAM 7.5 MG/1
7.5 TABLET ORAL DAILY
Qty: 90 TABLET | Refills: 6 | Status: SHIPPED | OUTPATIENT
Start: 2017-12-14 | End: 2018-11-12 | Stop reason: SDUPTHER

## 2017-12-14 RX ORDER — ZOLPIDEM TARTRATE 5 MG/1
5 TABLET ORAL NIGHTLY
Qty: 30 TABLET | Refills: 5 | Status: SHIPPED | OUTPATIENT
Start: 2017-12-14 | End: 2018-06-28 | Stop reason: SDUPTHER

## 2017-12-14 RX ORDER — TRAMADOL HYDROCHLORIDE 50 MG/1
TABLET ORAL
Qty: 60 TABLET | Refills: 5 | Status: SHIPPED | OUTPATIENT
Start: 2017-12-14 | End: 2017-12-14 | Stop reason: SDUPTHER

## 2017-12-14 NOTE — PROGRESS NOTES
Chief complaint Followup on blood pressure,, diabetes, cholesterol    Patient's a 62-year-old white female with controlled diabetes, hypertension and hyperlipidemia.  She did NOT have labs prior to the appointment.  Her A1c had improved from 7.6 back down to her baseline 6.4.  Vitamin D is normal.  HDL 77.  LDL improved from 163 down to 136 and we discussed that still not at goal.  We discussed the benefits of statin therapy in the recommendation that all diabetics be on statin but she is very resistant and reluctant and declines at this time.   .  Her blood pressures is good today we again discussed the benefits of following up every 3 months for A1c.  She can use her 90 day supplies as a reminder.  Her ALLERGIES have an acting up with 1-2 months of a dry cough.  He discussed how she needs to restart her Flonase which she had been off although she is continuing on Zyrtec and Singulair.  She also has a ventral hernia that protrudes when she coughs.  It is in the upper abdomen at the top of her abdominal incision.  It is nontender and reducible today but does protrude when she coughs.  As long as is not giving her any trouble she can monitor.  We discussed going to the emergency room should he become acutely painful or nonreducible.  She now has a new insurance and needs several medications changed at 90 day and sent to a new pharmacy which was all done by myself searching for the pharmacy and so forth.  She needs her Ambien which she takes during the week but not on the weekends.  She does occasionally take her tramadol for arthralgias.  All these issues reviewed and patient counseled.Total time over 25 minutes with over 50% counseling.    ROS:   CONST:  no other new myalgias arthralgias and no new neurological deficits, no skin rashes     PAST MEDICAL HISTORY:                                                        1.  Allergic rhinitis.                                                       2.  Obstructive sleep  apnea status post UPPP and treatment with CPAP.        3.  Chronic sinusitis with sinus surgery x2, Dr. Tucker and Dr. Alston.       4.  Hypertension.                                                            5.  Obesity.                                                                 6.  Colon cancer, status post right hemicolectomy and chemotherapy.          7.  History of iron deficiency anemia.                                       8.  Left superficial vein thrombosis of the arm.                             9.  Insomnia.                                                                10.  Basal cell carcinoma.                                                   11.  Memory issues when taking WelChol.                                      12.  Hyperlipidemia with LDL rise on WelChol. Tried 3 statins -various effects- memory issue made her lose a job                               13.  Hypercalcemia secondary to HCTZ.                                        14.  Lumbar radiculopathy with history of epidurals -Dr Saavedra                       15.  Chronic cough due to allergies, per pulmonary workup, 7/2010.           16.  Osteopenia by bone density in 2008.                                     17.  Elevated ALT.                                                           18.  Tubes tied.                                                             19.  Hernia repair.                                                          20.  Lumpectomy, of the breast I presume.   21.  COLONOSCOPY normal 6/15, 5 years  22. Left ankle surgery 2010  23. METABOLIC SYNDROME/DM -A1c 11/11 was 6.9  24. Vit D def- 1/13   25.  Hypothyroid?,  Placed on [place] Thyroid by Dr. Montgomery??    26.  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   for 30 years.  Has       prior marriage and no children.   Quit smoking in .  Drinks once a week.                                                                               FAMILY HISTORY:  Father  at 53 in a car accident.  Mom in good health.   Brother 46, a sister is 52.  She states that all members of her family have  Hypertension.                                                                  Vitals as above    Taty was seen today for cough.    Diagnoses and all orders for this visit:    Uncontrolled type 2 diabetes mellitus without complication, without long-term current use of insulin, hopefully improved, discussed the importance of statin therapy in diabetics but she adamantly refuses  -     Basic metabolic panel; Future  -     Hemoglobin A1c; Future    Essential hypertension, chronic and stable    Hypothyroidism, unspecified type, chronic and stable    Hyperlipidemia, unspecified hyperlipidemia type, declines statin    Intertrigo  -     econazole nitrate 1 % cream; Apply topically 2 (two) times daily.      Pain, controlled substance management done  -     Discontinue: traMADol (ULTRAM) 50 mg tablet; TAKE TAKE 1 TO 2 TABLETS BY MOUTH EVERY 6 HOURS AS NEEDED FOR PAIN  -     traMADol (ULTRAM) 50 mg tablet; TAKE TAKE 1 TO 2 TABLETS BY MOUTH EVERY 6 HOURS AS NEEDED FOR PAIN    Insomnia, unspecified type, intermittent use of Ambien    Lumbar spondylosis, tramadol refilled    Chronic seasonal allergic rhinitis, unspecified trigger, restart triple therapy    Ventral hernia without obstruction or gangrene, monitor    Other orders  -     olmesartan (BENICAR) 20 MG tablet; Take 1 tablet (20 mg total) by mouth once daily.  -     montelukast (SINGULAIR) 10 mg tablet; Take 1 tablet (10 mg total) by mouth every evening.  -     metFORMIN (GLUCOPHAGE-XR) 750 MG 24 hr tablet; Take 1 tablet (750 mg total) by mouth every morning.  -     hydroCHLOROthiazide (HYDRODIURIL) 12.5 MG Tab; Take 1 tablet (12.5 mg total) by mouth once daily.  -     meloxicam (MOBIC) 7.5 MG tablet;  "Take 1 tablet (7.5 mg total) by mouth once daily.  -     zolpidem (AMBIEN) 5 MG Tab; Take 1 tablet (5 mg total) by mouth nightly. at bedtime.  -     blood sugar diagnostic Strp; 1 strip by Misc.(Non-Drug; Combo Route) route 2 (two) times daily. DISP GLUCOMETER OF CHOICE AND LANCETS AS WELL                Based on patient's anxiety level after a lengthy discussion today, access to the clinical note I don't think would be therapeutic"This note will not be shared with the patient."  "

## 2018-01-04 ENCOUNTER — HOSPITAL ENCOUNTER (EMERGENCY)
Facility: OTHER | Age: 63
Discharge: HOME | End: 2018-01-04
Attending: EMERGENCY MEDICINE
Payer: COMMERCIAL

## 2018-01-04 VITALS
WEIGHT: 270 LBS | HEIGHT: 67 IN | RESPIRATION RATE: 20 BRPM | DIASTOLIC BLOOD PRESSURE: 99 MMHG | OXYGEN SATURATION: 98 % | TEMPERATURE: 98 F | BODY MASS INDEX: 42.38 KG/M2 | SYSTOLIC BLOOD PRESSURE: 153 MMHG | HEART RATE: 97 BPM

## 2018-01-04 DIAGNOSIS — S61.211A LACERATION OF LEFT INDEX FINGER WITHOUT FOREIGN BODY WITHOUT DAMAGE TO NAIL, INITIAL ENCOUNTER: Primary | ICD-10-CM

## 2018-01-04 LAB — POCT GLUCOSE: 93 MG/DL (ref 70–110)

## 2018-01-04 PROCEDURE — 12001 RPR S/N/AX/GEN/TRNK 2.5CM/<: CPT | Mod: F1

## 2018-01-04 PROCEDURE — 25000003 PHARM REV CODE 250: Performed by: EMERGENCY MEDICINE

## 2018-01-04 PROCEDURE — 99283 EMERGENCY DEPT VISIT LOW MDM: CPT | Mod: 25

## 2018-01-04 RX ADMIN — BACITRACIN ZINC, NEOMYCIN SULFATE, POLYMYXIN B SULFATE 1 EACH: 3.5; 5000; 4 OINTMENT TOPICAL at 01:01

## 2018-01-04 NOTE — ED PROVIDER NOTES
Encounter Date: 1/4/2018       History     Chief Complaint   Patient presents with    Laceration     Pt. reports she accicdently cut herself with a knife while cutting an orange approx. 30 minutes PTA. Pt. presents with finger wrapped in kitchen towel.      Chief complaint: Laceration    -year-old sustained a laceration to her left index finger about an hour prior to arrival.  Patient cut it while slicing an orange.  She reports throbbing pain.  She was unable to get the bleeding stopped.  Her pain is mild.  Tetanus is up-to-date.  She denies numbness      The history is provided by the patient.     Review of patient's allergies indicates:   Allergen Reactions    Oxaliplatin Hives    Factive [gemifloxacin] Hives    Daypro [oxaprozin] Rash    Latex Hives     Adhesives     Past Medical History:   Diagnosis Date    Allergic rhinitis, seasonal 1/28/2013    Anxiety disorder 1/28/2013    Basal cell carcinoma     Bilateral retinal lattice degeneration     Cataract     Colon cancer 2008    s/p resection    DDD (degenerative disc disease), lumbar 11/25/2014    Diabetes mellitus type II, uncontrolled 7/25/2014    High myopia     Horseshoe retinal tear of both eyes     HTN (hypertension) 1/28/2013    Hypertension     Hypothyroidism 4/29/2014    Lumbar disc disease 7/25/2014    Metabolic syndrome 4/29/2014    Neuropathy due to chemotherapeutic drug 5/22/2017    Osteopenia 5/10/2013    Screening for colorectal cancer 9/11/2015    Normal 6/ 2015---5 yrs    Vitamin D deficiency disease     Vitreous detachment of both eyes      Past Surgical History:   Procedure Laterality Date    ADENOIDECTOMY      ANKLE SURGERY      left     BREAST LUMPECTOMY      CATARACT EXTRACTION      CATARACT EXTRACTION BILATERAL W/ ANTERIOR VITRECTOMY      COLECTOMY      s/p reanastemosis    COLON SURGERY      EYE EXAMINATION UNDER ANESTHESIA W/ RETINAL CRYOTHERAPY AND RETINAL LASER      HERNIA REPAIR      KIDNEY SURGERY    "   "dilate urethra tube"    NASAL POLYP EXCISION      NASAL SEPTUM SURGERY      s/p laser ou      TONSILLECTOMY      TUBAL LIGATION      UVULOPALATOPHARYNGOPLASTY  1990s     Family History   Problem Relation Age of Onset    Cancer Maternal Grandmother     Hyperlipidemia Mother     Hypertension Mother     Breast cancer Mother     Allergies Father     Allergies Sister     Allergies Brother     Heart disease Maternal Grandfather     Stroke Paternal Grandmother     Colon cancer Neg Hx     Ovarian cancer Neg Hx      Social History   Substance Use Topics    Smoking status: Former Smoker     Quit date: 7/17/1977    Smokeless tobacco: Never Used    Alcohol use Yes      Comment: once per week     Review of Systems   Skin: Positive for wound. Negative for color change.   Neurological: Negative for weakness and numbness.   Hematological: Does not bruise/bleed easily.       Physical Exam     Initial Vitals [01/04/18 1253]   BP Pulse Resp Temp SpO2   (!) 153/99 97 20 98 °F (36.7 °C) 98 %      MAP       117         Physical Exam    Pulmonary/Chest: No respiratory distress.   Musculoskeletal: Normal range of motion. She exhibits tenderness (left 2nd finger). She exhibits no edema.        Hands:  Neurological: She is alert and oriented to person, place, and time. She has normal strength. No sensory deficit.   Skin: Capillary refill takes less than 2 seconds.         ED Course   Lac Repair  Date/Time: 1/4/2018 1:47 PM  Performed by: EDDIE ROBLES  Authorized by: EDDIE ROBLES   Body area: upper extremity  Location details: left index finger  Laceration length: 1 cm  Foreign bodies: no foreign bodies  Tendon involvement: none  Nerve involvement: none  Vascular damage: no  Anesthesia: local infiltration    Anesthesia:  Local Anesthetic: lidocaine 1% without epinephrine  Anesthetic total: 0.5 mL  Patient sedated: no  Preparation: Patient was prepped and draped in the usual sterile fashion.  Irrigation " solution: saline  Irrigation method: syringe  Amount of cleaning: standard  Debridement: none  Degree of undermining: none  Skin closure: 5-0 nylon  Number of sutures: 2  Technique: simple  Approximation: close  Approximation difficulty: simple  Dressing: antibiotic ointment and bulky dressing  Patient tolerance: Patient tolerated the procedure well with no immediate complications        Labs Reviewed   POCT GLUCOSE   POCT GLUCOSE MONITORING CONTINUOUS             Medical Decision Making:   Initial Assessment:   2-year-old presents with a laceration to her left index finger that occurred just prior to arrival.  On exam patient has a small laceration without neurovascular deficits.  ED Management:  Patient's wound was sutured and she was instructed on wound care.                   ED Course      Clinical Impression:   The encounter diagnosis was Laceration of left index finger without foreign body without damage to nail, initial encounter.                           Joseline Sewell MD  01/04/18 8821

## 2018-01-05 RX ORDER — CYCLOBENZAPRINE HCL 5 MG
TABLET ORAL
Qty: 30 TABLET | Refills: 1 | Status: SHIPPED | OUTPATIENT
Start: 2018-01-05 | End: 2018-03-20 | Stop reason: SDUPTHER

## 2018-01-22 RX ORDER — DIAZEPAM 5 MG/1
TABLET ORAL
Qty: 30 TABLET | Refills: 3 | Status: SHIPPED | OUTPATIENT
Start: 2018-01-22 | End: 2018-05-29 | Stop reason: SDUPTHER

## 2018-03-20 DIAGNOSIS — R52 PAIN: ICD-10-CM

## 2018-03-22 DIAGNOSIS — R52 PAIN: ICD-10-CM

## 2018-03-22 RX ORDER — TRAMADOL HYDROCHLORIDE 50 MG/1
TABLET ORAL
Qty: 60 TABLET | Refills: 5 | Status: SHIPPED | OUTPATIENT
Start: 2018-03-22 | End: 2018-06-19 | Stop reason: SDUPTHER

## 2018-03-22 RX ORDER — TRAMADOL HYDROCHLORIDE 50 MG/1
TABLET ORAL
Qty: 60 TABLET | OUTPATIENT
Start: 2018-03-22

## 2018-03-22 RX ORDER — CYCLOBENZAPRINE HCL 5 MG
5 TABLET ORAL NIGHTLY
Qty: 30 TABLET | Refills: 1 | Status: SHIPPED | OUTPATIENT
Start: 2018-03-22 | End: 2018-05-29 | Stop reason: SDUPTHER

## 2018-03-23 RX ORDER — CYCLOBENZAPRINE HCL 5 MG
TABLET ORAL
Qty: 30 TABLET | Refills: 4 | Status: SHIPPED | OUTPATIENT
Start: 2018-03-23 | End: 2018-04-30 | Stop reason: SDUPTHER

## 2018-04-16 ENCOUNTER — PATIENT MESSAGE (OUTPATIENT)
Dept: PAIN MEDICINE | Facility: CLINIC | Age: 63
End: 2018-04-16

## 2018-04-30 ENCOUNTER — OFFICE VISIT (OUTPATIENT)
Dept: PAIN MEDICINE | Facility: CLINIC | Age: 63
End: 2018-04-30
Attending: ANESTHESIOLOGY
Payer: COMMERCIAL

## 2018-04-30 VITALS
BODY MASS INDEX: 42.49 KG/M2 | HEART RATE: 98 BPM | SYSTOLIC BLOOD PRESSURE: 117 MMHG | DIASTOLIC BLOOD PRESSURE: 73 MMHG | TEMPERATURE: 98 F | WEIGHT: 270.75 LBS | HEIGHT: 67 IN

## 2018-04-30 DIAGNOSIS — G57.11 MERALGIA PARAESTHETICA, RIGHT: ICD-10-CM

## 2018-04-30 DIAGNOSIS — M79.2 NEURALGIA: ICD-10-CM

## 2018-04-30 DIAGNOSIS — M47.816 LUMBAR SPONDYLOSIS: ICD-10-CM

## 2018-04-30 DIAGNOSIS — M79.651 PAIN OF RIGHT THIGH: Primary | ICD-10-CM

## 2018-04-30 DIAGNOSIS — E66.01 MORBID OBESITY WITH BMI OF 40.0-44.9, ADULT: ICD-10-CM

## 2018-04-30 PROCEDURE — 64450 NJX AA&/STRD OTHER PN/BRANCH: CPT | Mod: RT,S$GLB,, | Performed by: ANESTHESIOLOGY

## 2018-04-30 PROCEDURE — 99214 OFFICE O/P EST MOD 30 MIN: CPT | Mod: 25,S$GLB,, | Performed by: ANESTHESIOLOGY

## 2018-04-30 PROCEDURE — 99999 PR PBB SHADOW E&M-EST. PATIENT-LVL III: CPT | Mod: PBBFAC,,, | Performed by: ANESTHESIOLOGY

## 2018-04-30 PROCEDURE — 3074F SYST BP LT 130 MM HG: CPT | Mod: CPTII,S$GLB,, | Performed by: ANESTHESIOLOGY

## 2018-04-30 PROCEDURE — 3078F DIAST BP <80 MM HG: CPT | Mod: CPTII,S$GLB,, | Performed by: ANESTHESIOLOGY

## 2018-04-30 RX ORDER — LIDOCAINE HYDROCHLORIDE 10 MG/ML
3 INJECTION INFILTRATION; PERINEURAL
Status: COMPLETED | OUTPATIENT
Start: 2018-04-30 | End: 2018-04-30

## 2018-04-30 RX ORDER — BETAMETHASONE SODIUM PHOSPHATE AND BETAMETHASONE ACETATE 3; 3 MG/ML; MG/ML
6 INJECTION, SUSPENSION INTRA-ARTICULAR; INTRALESIONAL; INTRAMUSCULAR; SOFT TISSUE
Status: COMPLETED | OUTPATIENT
Start: 2018-04-30 | End: 2018-04-30

## 2018-04-30 RX ORDER — BUPIVACAINE HYDROCHLORIDE 2.5 MG/ML
4 INJECTION, SOLUTION EPIDURAL; INFILTRATION; INTRACAUDAL ONCE
Status: COMPLETED | OUTPATIENT
Start: 2018-04-30 | End: 2018-04-30

## 2018-04-30 RX ADMIN — BUPIVACAINE HYDROCHLORIDE 10 MG: 2.5 INJECTION, SOLUTION EPIDURAL; INFILTRATION; INTRACAUDAL at 11:04

## 2018-04-30 RX ADMIN — LIDOCAINE HYDROCHLORIDE 3 ML: 10 INJECTION INFILTRATION; PERINEURAL at 11:04

## 2018-04-30 RX ADMIN — BETAMETHASONE SODIUM PHOSPHATE AND BETAMETHASONE ACETATE 6 MG: 3; 3 INJECTION, SUSPENSION INTRA-ARTICULAR; INTRALESIONAL; INTRAMUSCULAR; SOFT TISSUE at 11:04

## 2018-04-30 NOTE — PROGRESS NOTES
"Subjective:      Patient ID: Taty Max is a 62 y.o. female.    Chief Complaint: Low-back Pain and Leg Pain (right side)    Referred by: No ref. provider found     HPI:    Ms Max is a 58 yo female who presents today with back and right thigh pain. She complains of pain in her back and numbness in her right thigh. She states her pain is aggravated with bending forward. She had 3 injections at an outside pain clinic Nov-Feb, that helped her left leg pain but increased her right thigh numbness. She describes numbness on her outer right thigh. Her pain is improved with 2 gabapentin, but she gets diarrhea. She has a history of colon cancer with chemotherapy. The pain is described in detail below.    Interval History (10/17/2014):  She returns today for follow up. She reports that the lateral femoral cutaneous nerve block was very helpful for her right-sided thigh pain, numbness, and tingling. She reports recent muscle spasms in her low back but has made it difficult for her to perform her activities at her job. These are relieved with heat and stretching. The topical cream has been helpful for the pain.    Interval History (11/14/2014):  She returns today for follow up. She reports that the trigger point injections were very helpful for her muscle spasms, but they are starting to wear off on the right side. Last Tuesday, she had a severe spasm that caused severe pain in her anterior thigh. She reports that her muscles felt like they were being "shredded." This continues to be intermittently painful. She also has bilateral low back pain that radiates into her lateral thigh. This is similar to the pain she had in the past that was helped by the L5/S1 epidural.    Interval History (12/8/14)  She returns today for follow up.  She reports that the L4-L5 ASHLEY on 11/25/14 has been helpful for the pain, but pain in her right hip/buttocks area persists. She received about 50% relief of her pain from the epidural. The " majority of her pain now, she locates over the right SI joint, in addition to spasms in her lower back on the right side. She takes muscle relaxers at night for the spasms. She takes the pain is more severe after walking for long periods. The right lower extremity pain is much improved since epidural.     Interval History (1/14/2015):  She returns today for follow up.  She reports that the SI joint injection provided 50% relief.  She no longer has the sharp pain and is able to walk farther.  She continues to have significant pain in the AM on that right side.  The cream and the patch have been helpful for the pain.    Interval History (3/23/2016):  She returns today for follow up.  She reports that the most recent SI joint injection and TPIs have been helpful for the pain.  She is able to walk farther and work.  She does her HEP and stretches.    Interval History (9/23/2016):  She returns today for follow up.  She reports that her low back pain has begun to return.  She is also having more right lateral thigh pain.    Interval History (11/10/2016):  She returns today for follow up.  She reports that the RFA helped her back pain.  She is now experiencing increased pain over her right lateral thigh.  She is also having a muscle spasm in her right lower back.    Interval History (1/30/2017):  She returns today for follow up.  She reports that the most recent LFCN block lasted until two weeks ago.  Lidocaine patch over distal anterior thigh was not helpful. Gabapentin, NSAIDs are helpful.  Overall, her pain is improved with not working.    Interval History (2/20/2017):  She returns today for follow up.  She reports that the LFCN block has been helpful for the pain in her lateral thigh, but she is still having pain in her anterior thigh.  Lidocaine patches have been helpful for the pain, but they won't stay on long enough    Interval History (4/30/2018):  She returns today for follow up.  She reports that the LFCN block  and the anterior femoral cutaneous nerve block have been helpful for the pain in her thigh.  Her pain did not return until the beginning of 2018.  Her right sided low back pain is returning as well.    Physical Therapy: none    Non-pharmacologic Treatment: chiropractic  · TENS? yes    Pain Medications:   · Currently taking: gabapentin, aleve, ibuprofen, tylenol, aspercreme (very helpful)  · Has tried in the past: celebrex  · Has not tried: Opioids, TCAs    Blood thinners: no    Interventional Therapies:   · L5/S1 ILESI by Dr. Saavedra, most recent in 10/2012: 10 months relief  · Right LFCN block 9/2014: Excellent relief of right thigh pain  · Bilateral lumbar TPIs 10/2014: Excellent relief of muscle pain x 3 weeks  · L4-L5 ILESI on 11/25/14 with 50% relief  · Right SI joint injection 12/2014:  50% relief  · Right SIJ injection and TPIs 3/2016: Excellent relief  · L4/5 ILESI 8/30/2016: Excellent relief of leg pain  · Right L3-5 Cooled RFA 10/2016:  Good relief of sharp back pain  · Right LFCN block 11/10/2016: Good relief until mid January 2017  · 2/2017: Right anterior femoral cutaneous nerve block    Relevant Surgeries: no    Affecting sleep? yes    Affecting daily activities? yes    Depressive symptoms? no  · SI/HI? 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     Pain Scales  Best: 3/10  Worst: 10/10  Usually: 3/10  Today: 6/10    Low-back Pain   This is a chronic problem. The current episode started more than 1 year ago. The problem occurs intermittently. The problem has been gradually improving since onset. The pain is present in the lumbar spine. The pain is at a severity of 5/10. Exacerbated by: increased activity. Associated symptoms include leg pain. Pertinent negatives include no chest pain, fever, headaches or weight loss. She has tried injection treatment and muscle relaxant for the symptoms. Physical therapy was not tried.          Pain Scales  Best: 5/10  Worst:  9/10  Usually: 5/10  Today: 8/10    Low-back Pain   Associated symptoms include leg pain.   Leg Pain          ROS  Review of Systems   Constitution: Negative for chills, fever, malaise/fatigue, weight gain and weight loss.   HENT: Negative for ear pain, headaches and hoarse voice.    Eyes: Negative for blurred vision, pain and visual disturbance.   Cardiovascular: Negative for chest pain, dyspnea on exertion and irregular heartbeat.   Respiratory: Negative for cough, shortness of breath and wheezing.    Endocrine: Negative for cold intolerance and heat intolerance.   Hematologic/Lymphatic: Negative for adenopathy and bleeding problem. Does not bruise/bleed easily.   Skin: Negative for color change, itching and rash.   Musculoskeletal: Positive for right thigh pain.   Gastrointestinal: Negative for change in bowel habit, diarrhea, hematemesis, hematochezia, melena and vomiting.   Genitourinary: Negative for flank pain, frequency, hematuria and urgency.   Neurological: Negative for difficulty with concentration, dizziness, loss of balance and seizures.   Psychiatric/Behavioral: Negative for altered mental status, depression and suicidal ideas. The patient is not nervous/anxious.    Allergic/Immunologic: Negative for HIV exposure.     Past Medical History:   Diagnosis Date    Allergic rhinitis, seasonal 1/28/2013    Anxiety disorder 1/28/2013    Basal cell carcinoma     Bilateral retinal lattice degeneration     Cataract     Colon cancer 2008    s/p resection    DDD (degenerative disc disease), lumbar 11/25/2014    Diabetes mellitus type II, uncontrolled 7/25/2014    High myopia     Horseshoe retinal tear of both eyes     HTN (hypertension) 1/28/2013    Hypertension     Hypothyroidism 4/29/2014    Lumbar disc disease 7/25/2014    Metabolic syndrome 4/29/2014    Neuropathy due to chemotherapeutic drug 5/22/2017    Osteopenia 5/10/2013    Screening for colorectal cancer 9/11/2015    Normal 6/ 2015---5  "yrs    Vitamin D deficiency disease     Vitreous detachment of both eyes        Past Surgical History:   Procedure Laterality Date    ADENOIDECTOMY      ANKLE SURGERY      left     BREAST LUMPECTOMY      CATARACT EXTRACTION      CATARACT EXTRACTION BILATERAL W/ ANTERIOR VITRECTOMY      COLECTOMY      s/p reanastemosis    COLON SURGERY      EYE EXAMINATION UNDER ANESTHESIA W/ RETINAL CRYOTHERAPY AND RETINAL LASER      HERNIA REPAIR      KIDNEY SURGERY      "dilate urethra tube"    NASAL POLYP EXCISION      NASAL SEPTUM SURGERY      s/p laser ou      TONSILLECTOMY      TUBAL LIGATION      UVULOPALATOPHARYNGOPLASTY  1990s       Review of patient's allergies indicates:   Allergen Reactions    Oxaliplatin Hives    Factive [gemifloxacin] Hives    Daypro [oxaprozin] Rash    Latex Hives     Adhesives       Current Outpatient Prescriptions   Medication Sig Dispense Refill    ACCU-CHEK SOFTCLIX LANCETS [facility] [place] USE AS DIRECTED  12    blood sugar diagnostic Strp 1 strip by Misc.(Non-Drug; Combo Route) route 2 (two) times daily. DISP GLUCOMETER OF CHOICE AND LANCETS AS WELL 100 strip 12    cyclobenzaprine (FLEXERIL) 5 MG tablet Take 1 tablet (5 mg total) by mouth nightly. 30 tablet 1    diazePAM (VALIUM) 5 MG tablet TAKE ONE TABLET BY MOUTH EVERY NIGHT AT BEDTIME AS NEEDED FOR ANXIETY AND spasms 30 tablet 3    fluticasone (FLONASE) 50 mcg/actuation nasal spray 2 sprays by Each Nare route once daily. 3 Bottle 12    hydroCHLOROthiazide (HYDRODIURIL) 12.5 MG Tab Take 1 tablet (12.5 mg total) by mouth once daily. 90 tablet 11    hydrochlorothiazide (MICROZIDE) 12.5 mg capsule       KRILL OIL ORAL Take 1 tablet by mouth once daily.      meloxicam (MOBIC) 7.5 MG tablet Take 1 tablet (7.5 mg total) by mouth once daily. 90 tablet 6    metaxalone (SKELAXIN) 800 MG tablet TAKE 1 TABLET BY MOUTH 4 TIMES DAILY AS NEEDED FOR PAIN 120 tablet 2    metFORMIN (GLUCOPHAGE-XR) 750 MG 24 hr tablet Take 1 tablet (750 mg " total) by mouth every morning. 360 tablet 3    montelukast (SINGULAIR) 10 mg tablet Take 1 tablet (10 mg total) by mouth every evening. 90 tablet 3    multivitamin (THERAGRAN) per tablet Take 1 tablet by mouth once daily. 1 Tablet Oral Every day      olmesartan (BENICAR) 20 MG tablet Take 1 tablet (20 mg total) by mouth once daily. 90 tablet 3    traMADol (ULTRAM) 50 mg tablet TAKE TAKE 1 TO 2 TABLETS BY MOUTH EVERY 6 HOURS AS NEEDED FOR PAIN 60 tablet 5    zolpidem (AMBIEN) 5 MG Tab Take 1 tablet (5 mg total) by mouth nightly. at bedtime. 30 tablet 5    albuterol 90 mcg/actuation inhaler Inhale 1-2 puffs into the lungs every 6 (six) hours as needed for Wheezing. 1 Inhaler 0    fluocinonide 0.05% (LIDEX) 0.05 % cream Apply topically 2 (two) times daily. 30 g 5    levocetirizine (XYZAL) 5 MG tablet TAKE 1 TABLET BY MOUTH EVERY EVENING 30 tablet 5    penicillin v potassium (VEETID) 500 MG tablet Take 500 mg by mouth 4 (four) times daily. PT IS TAKING FOR DENTAL PURPOSES      tretinoin (RETIN-A) 0.025 % gel Apply topically nightly. Gel Topical .  AAA face qhs 45 g 3     No current facility-administered medications for this visit.        Family History   Problem Relation Age of Onset    Cancer Maternal Grandmother     Hyperlipidemia Mother     Hypertension Mother     Breast cancer Mother     Allergies Father     Allergies Sister     Allergies Brother     Heart disease Maternal Grandfather     Stroke Paternal Grandmother     Colon cancer Neg Hx     Ovarian cancer Neg Hx        Social History     Social History    Marital status:      Spouse name: N/A    Number of children: N/A    Years of education: N/A     Occupational History     [employer]     Social History Main Topics    Smoking status: Former Smoker     Quit date: 7/17/1977    Smokeless tobacco: Never Used    Alcohol use Yes      Comment: once per week    Drug use: No    Sexual activity: Yes     Partners: Male     Birth  "control/ protection: Post-menopausal     Other Topics Concern    Not on file     Social History Narrative    No narrative on file           Objective:      Vitals:    04/30/18 1007   Weight: 122.8 kg (270 lb 11.6 oz)   Height: 5' 7" (1.702 m)   PainSc:   8   PainLoc: Back         Ortho/SPM Exam      GEN: Well developed, well nourished. No acute distress. No pain behavior.  HEENT: No trauma. Mucous membranes moist. Nares patent bilaterally.  PSYCH: Normal affect. Thought content appropriate.  CHEST: Breathing symmetric. No audible wheezing.  ABD: Soft, non-tender, non-distended.  SKIN: Warm, pink, dry. No rash on exposed areas.   EXT: No cyanosis, clubbing, or edema. No color change or changes in nail or hair growth.  NEURO/MUSCULOSKELETAL:  Fully alert, oriented, and appropriate. Speech normal pete. No cranial nerve deficits.   Gait: wide base gait, normal pete. negative trendelenburg sign bilaterally.   Motor Strength: 5/5 motor strength throughout lower extremities.   Sensory: Allodynia along anterior thigh of right lower extremity, confirmed with light touch, and over LFCN distribution  Reflexes: 1+ and symmetric throughout. Downgoing Babinski's bilaterally. No clonus or spasticity.  L-Spine: Full ROM with minimal pain on extension.         Imaging:      Result Narrative    DATE OF EXAM: Nov 23 2010     MRI 0013 - MRI SPINE CNL LUM W AND WO CONTR:   [id number]    CLINICAL HISTORY: 724.2 LUMBAGO    PROCEDURE COMMENT: MULTIPLANAR, MULTISEQUENCE MR IMAGES WERE OBTAINED   BEFORE AND AFTER THE INJECTION OF 20 ML OF IV GADOLINIUM.    ICD 9 CODE(S): ()    CPT 4 CODE(S)/MODIFIER(S): ()    RESULTS: NO PRIOR MRIs ARE AVAILABLE FOR COMPARISON.     THERE IS NO EVIDENCE OF MARROW REPLACEMENT PROCESS, INFECTION, OR TUMOR.   NO ABNORMAL AREAS OF ENHANCEMENT ARE NOTED. MULTILEVEL OSSEOUS AND DISC   DEGENERATIVE CHANGE ARE NOTED WHICH WILL DETAILED BELOW. NO PARASPINOUS   SOFT TISSUE ABNORMALITY IS PRESENT. THE CONUS " TERMINATES POSTERIOR TO L1.   THE ALIGNMENT IS ADEQUATE. LIMITED EVALUATION OF INTRAABDOMINAL   STRUCTURES REVEALS A 1 CM LEFT RENAL CYST.     AT T10-T11 THERE IS LOSS OF DISC SPACE HEIGHT.    AT T11-T12 THERE IS LOSS OF DISC SPACE HEIGHT.    AT T12-L1 THERE IS NO SIGNIFICANT ABNORMALITY PRESENT. NO CANAL OR   FORAMINAL STENOSIS.     AT L1-L2 THERE IS A MILD DISC BULGE PRESENT WITHOUT CANAL OR FORAMINAL   NARROWING.    AT L2-L3 THERE IS A MILD DISC BULGE PRESENT. THERE IS AN OSTEOPHYTE IN   THE RIGHT POSTERIOR PARACENTRAL REGION AND THIS IS CAUSING MILD NEURAL   FORAMINAL NARROWING.     AT L3-L4 THERE IS DISC BULGE PRESENT WITH MILD BILATERAL NEURAL FORAMINAL   NARROWING. THERE IS THICKENING OF THE LIGAMENTUM FLAVUM. THERE IS NO   CANAL NARROWING AT THIS LEVEL.     AT L4-L5 THERE IS SIGNIFICANT LOSS OF DISC SPACE HEIGHT AND ENDPLATE   CHANGES. THERE IS MILD CANAL STENOSIS SECONDARY TO LIGAMENTUM FLAVUM   THICKENING AND DISC BULGE.     AT L5-S1 THERE IS A BROAD BASED DISC BULGE PROTRUDING INTO BOTH NEURAL   FORAMINA. THERE IS MASS EFFECT SUPERIORLY ON THE EXITING NERVE ROOTS AT   L5-S1. THERE IS LOSS OF DISC SPACE HEIGHT AT THIS LEVEL AND MILD   ENDPLATE CHANGES.     IMPRESSION: MULTILEVEL OSSEOUS DEGENERATIVE CHANGE WITH MILD IMPINGEMENT   ON THE L5-S1 NERVE ROOT SUPERIORLY BY THE DISC.            Assessment:       Encounter Diagnoses   Name Primary?    Pain of right thigh Yes    Meralgia paraesthetica, right     Neuralgia     Lumbar spondylosis     Morbid obesity with BMI of 40.0-44.9, adult            Plan:       Taty was seen today for low-back pain and leg pain.    Diagnoses and all orders for this visit:    Pain of right thigh    Meralgia paraesthetica, right  -     bupivacaine (PF) 0.25% (2.5 mg/ml) injection 10 mg; Inject 4 mLs (10 mg total) into the muscle once.  -     betamethasone acetate-betamethasone sodium phosphate injection 6 mg; Inject 1 mL (6 mg total) into the muscle one time.  -      lidocaine HCL 10 mg/ml (1%) injection 3 mL; 3 mLs by Other route one time.    Neuralgia  -     bupivacaine (PF) 0.25% (2.5 mg/ml) injection 10 mg; 4 mLs (10 mg total) by Intrapleural route once.  -     betamethasone acetate-betamethasone sodium phosphate injection 6 mg; Inject 1 mL (6 mg total) into the muscle one time.    Lumbar spondylosis    Morbid obesity with BMI of 40.0-44.9, adult      I discussed the treatment options with her today, including risks, benefits, and alternatives. All available images were reviewed. The patient is aware of the risks and benefits of the medications being prescribed, common side effects, and proper usage.    1. Right anterior cutaneous branches of the femoral nerve block and LCFN block under US as below  2. Schedule for repeat right L3-5, thermal RFA   3. Flexeril 5mg provides her some relief from spasms. Instructed that she can take two tablets as needed for spasms in addition to her skelaxin 800mg which she has been taking together for spasms.  4. Continue pain patch as needed.   5. Continue Low back exercises and stretching maneuvers given for home use.  6. RTC for above.      Lateral Femoral Cutaneous Nerve Block using US guidance, Right:   The procedure was discussed with the patient including complications of nerve damage, spinal headache, bleeding, infection, and failure of pain relief.     All medications, allergies, and relevant histories were reviewed. No recent antibiotics or infections. A time-out was taken to verify the correct patient, procedure, laterality, and appropriate medications/allergies.     The patient was placed in the supine position. Inguinal area was prepped with chlorhexidine x 3 and draped. Sterile precautions were observed throughout the procedure. After identifying the lateral femoral cutaneous nerve superficial to the sartorious muscle on the right using US guidance, Xylocaine 1% was infiltrated locally 1.5 cm. A 22-gauge B level needle was  introduced and advanced to the lateral femoral cutaneous nerve. Stimulation with 0.48 mA reproduced her pain in her right leg.  A 5 mL mixture of 4 cc of bupivacaine 0.25% with 1cc of betamethasone (6mg) was injected around the nerve after repeated negative aspirations. Needle was removed and a Band-Aid dressing applied.      Anterior Femoral Cutaneous Nerve Branches Block using US guidance, right:   The procedure was discussed with the patient including complications of nerve damage, spinal headache, bleeding, infection, and failure of pain relief.     All medications, allergies, and relevant histories were reviewed. No recent antibiotics or infections. A time-out was taken to verify the correct patient, procedure, laterality, and appropriate medications/allergies.     The patient was placed in the supine position. Anterior thigh was prepped with chlorhexidine x 3 and draped. Sterile precautions were observed throughout the procedure. After identifying the sartorious muscle and the femoral vessels on the right about 1/3-1/2 FPC down the thigh using US guidance, Xylocaine 1% was infiltrated locally 1.5 cm. A 22-gauge B level needle was introduced and advanced to the medial aspect of the sartorious muscle.  A 5 mL mixture of 4 cc of bupivacaine 0.25% with 6 mg betamethasone was injected around the nerve after repeated negative aspirations. Needle was removed and a Band-Aid dressing applied.     The patient tolerated the procedures well without any complications and was released in excellent condition.      The above plan and management options were discussed at length with patient. Patient is in agreement with the above and verbalized understanding. It will be communicated with the consulting physician via electronic record, fax, or mail

## 2018-05-07 ENCOUNTER — TELEPHONE (OUTPATIENT)
Dept: ADMINISTRATIVE | Facility: OTHER | Age: 63
End: 2018-05-07

## 2018-05-07 NOTE — TELEPHONE ENCOUNTER
----- Message from Minnie Toth LPN sent at 5/4/2018  4:58 PM CDT -----      ----- Message -----  From: Clara Mc  Sent: 5/4/2018   3:09 PM  To: [facility] Staff    Name of Who is Calling: BLAYNE SNYDER [[id number]]      What is the request in detail: Pt would like to reschedule her procedure that's scheduled for 5/11/18      Can the clinic reply by [facility]:  Yes       What Number to Call Back if not in [facility]: [phone]

## 2018-05-11 DIAGNOSIS — E11.9 TYPE 2 DIABETES MELLITUS WITHOUT COMPLICATION: ICD-10-CM

## 2018-05-22 ENCOUNTER — PATIENT MESSAGE (OUTPATIENT)
Dept: PAIN MEDICINE | Facility: CLINIC | Age: 63
End: 2018-05-22

## 2018-05-29 NOTE — TELEPHONE ENCOUNTER
Contacted patient regarding message.     Patient stated she wanted to know if the RFA was approved, because last time she had the RFA, it was very successful and the relief lasted about 18 months, so she would like to have it again.    Staff informed patient that per pre-service last notes approval was pending, however, the procedure was cancelled and once it's cancelled pre-service stops working on it.    Patient stated she had cancelled it since she was notified it will not be covered and that they required more documentation, however, she wanted to know if they could try again.    Patient was informed message will be relayed to Dr. Poe for review and advise, and a member of the staff will contact her once a response is received, or if Dr. Poe orders the procedure again, one of the procedure schedulers will contact her to schedule the procedure and it will be resubmitted for approval.    Patient acknowledged information given and expressed understanding.    Please review and advise.

## 2018-05-30 RX ORDER — DIAZEPAM 5 MG/1
TABLET ORAL
Qty: 30 TABLET | Refills: 3 | Status: SHIPPED | OUTPATIENT
Start: 2018-05-30 | End: 2018-10-01 | Stop reason: SDUPTHER

## 2018-05-30 RX ORDER — CYCLOBENZAPRINE HCL 5 MG
5 TABLET ORAL NIGHTLY
Qty: 30 TABLET | Refills: 1 | Status: SHIPPED | OUTPATIENT
Start: 2018-05-30 | End: 2018-07-26 | Stop reason: SDUPTHER

## 2018-06-01 ENCOUNTER — TELEPHONE (OUTPATIENT)
Dept: DERMATOLOGY | Facility: CLINIC | Age: 63
End: 2018-06-01

## 2018-06-01 NOTE — TELEPHONE ENCOUNTER
----- Message from Sabrina Richards sent at 6/1/2018 12:02 PM CDT -----  Contact: pt at [phone]  [facility] pt-Portia-pt is returning a call from Patient Returning Call from [facility]    Who Left Message for Patient:Portia/ref to appt  Communication Preference:call  Additional Information:3 days ago in ref to her appt.

## 2018-06-01 NOTE — TELEPHONE ENCOUNTER
Please submit for a repeat right L3-5 Thermal RFA with IV sedation.  I am not sure what the hold up is as she had this done in 10/2016 with 100% relief of her sharp shooting pain.

## 2018-06-01 NOTE — TELEPHONE ENCOUNTER
Can you please contact patient to schedule procedure, please see Dr. Poe's orders below. Thank you.

## 2018-06-19 DIAGNOSIS — R52 PAIN: ICD-10-CM

## 2018-06-19 RX ORDER — TRAMADOL HYDROCHLORIDE 50 MG/1
TABLET ORAL
Qty: 60 TABLET | Refills: 3 | Status: SHIPPED | OUTPATIENT
Start: 2018-06-19 | End: 2018-08-22 | Stop reason: SDUPTHER

## 2018-06-25 ENCOUNTER — TELEPHONE (OUTPATIENT)
Dept: PAIN MEDICINE | Facility: CLINIC | Age: 63
End: 2018-06-25

## 2018-06-25 NOTE — TELEPHONE ENCOUNTER
----- Message from Marcelino Ray sent at 6/25/2018  3:25 PM CDT -----            Name of Who is Calling: BLAYNE SNYDER [[id number]]      What is the request in detail: Pt would like to speak with the nurse regarding her upcoming. She would like to know if the procedure is going to cover it. Please call to discuss.      Can the clinic reply by [facility]: yes      [facility] [phone]

## 2018-06-25 NOTE — TELEPHONE ENCOUNTER
Hello, this is staff I've received your request I'm returning your call from the pain management clinic at [facility]. I just wanted to let you know that I'm working on getting an answer for you by return call. ( Please add all other notes here if needed.)    Staff contacted patient regarding message, no answer, left message asking for a return call.

## 2018-06-26 ENCOUNTER — PATIENT MESSAGE (OUTPATIENT)
Dept: FAMILY MEDICINE | Facility: CLINIC | Age: 63
End: 2018-06-26

## 2018-06-26 ENCOUNTER — PATIENT MESSAGE (OUTPATIENT)
Dept: PAIN MEDICINE | Facility: OTHER | Age: 63
End: 2018-06-26

## 2018-06-26 ENCOUNTER — TELEPHONE (OUTPATIENT)
Dept: PAIN MEDICINE | Facility: CLINIC | Age: 63
End: 2018-06-26

## 2018-06-26 DIAGNOSIS — Z00.00 ROUTINE MEDICAL EXAM: ICD-10-CM

## 2018-06-26 NOTE — TELEPHONE ENCOUNTER
Contacted patient regarding message.    Patient stated she wanted to know the if her procedure was approved or denied and wanted to speak to someone to see what needs to be done in regards to it.    Patient also stated after speaking to the pre-authorization staff and finds out they will definitely not approve it, she may ask Dr. Poe to have an epidural instead.    Staff informed patient as per pre-service notes, it states her procedure was denied. Staff provided patient with pre-services phone number and advised her to contact the office after she speaks with them.    Patient expressed understanding.

## 2018-06-26 NOTE — TELEPHONE ENCOUNTER
Kenia returned call and stated that a request for authorization for the 07/06/18 procedure had not been submitted because the procedure had already been denied in May 2018 and this was the same procedure previously ordered.    Kenia Daniel with Pre-service stated that a Peer to Peer needs to be done.     Staff informed Kenia that a request to do a peer to peer will be sent to Dr. Poe.    Staff sent Kenia Daniel with Pre-service an IM requesting contact number/information for Dr. Poe to do Peer to Peer. Information will be sent to Dr. Poe once received.

## 2018-06-26 NOTE — TELEPHONE ENCOUNTER
Staff contacted pre-service department and spoke to lAisa Nolan in regards to pre-authorization/denial for the procedure.     Alisa stated she will inform the staff in charge of the pre-authorization and will have her contact the patient and the clinic in regards to this matter.

## 2018-06-26 NOTE — TELEPHONE ENCOUNTER
Patient requesting an epidural steroid injection until she can get approval for the RFA.    Can you please review patient's message and advise. Thank you.

## 2018-06-26 NOTE — TELEPHONE ENCOUNTER
----- Message from Tuyet Bautista sent at 6/26/2018  2:02 PM CDT -----  Contact: pt            Name of Who is Calling: Taty      What is the request in detail: returning call to clinic about previous msg. Please call and advise      Can the clinic reply by [facility]: no      [facility] [phone]

## 2018-06-27 ENCOUNTER — TELEPHONE (OUTPATIENT)
Dept: PAIN MEDICINE | Facility: CLINIC | Age: 63
End: 2018-06-27

## 2018-06-27 NOTE — TELEPHONE ENCOUNTER
Staff called [facility] at [phone] ext. [phone] to schedule Peer to Peer and spoke to Briana, she transferred call to Marie-'s voice mail, staff left detailed message with patient's reference number requesting a peer to peer for patient's procedure and requested a return call to a direct line [phone].        Message     Hi,         Please contact Marie SHIN @  [facility], [phone]; ext.[phone] or [phone]   to discuss case and/or do peer to peer. The ref# is [id number].         Thank You   Kenia   Ext.[phone]

## 2018-06-28 NOTE — TELEPHONE ENCOUNTER
As a follow up to my message on June 1, I am confused as to why the RFA is being denied as this is a repeat procedure having last been done in 10/2016 with near total relief.  Can we set up a phone call with them to review this?

## 2018-06-28 NOTE — TELEPHONE ENCOUNTER
Per Kenia Daniel from pre-service, a peer to peer needs to be done.    Staff contacted Marie at [facility] [phone] ext. [phone], and a peer to peer was requested.    Marie stated they cannot give an exact time but they will call today 06/28/18 between 1:30pm-3:30pm or 07/05/18 between 9:00am-11:00am to do peer to peer.

## 2018-06-29 RX ORDER — ZOLPIDEM TARTRATE 5 MG/1
5 TABLET ORAL NIGHTLY
Qty: 30 TABLET | Refills: 5 | Status: SHIPPED | OUTPATIENT
Start: 2018-06-29 | End: 2018-12-27 | Stop reason: SDUPTHER

## 2018-07-01 ENCOUNTER — PATIENT MESSAGE (OUTPATIENT)
Dept: FAMILY MEDICINE | Facility: CLINIC | Age: 63
End: 2018-07-01

## 2018-07-02 ENCOUNTER — PATIENT MESSAGE (OUTPATIENT)
Dept: FAMILY MEDICINE | Facility: CLINIC | Age: 63
End: 2018-07-02

## 2018-07-02 ENCOUNTER — PATIENT MESSAGE (OUTPATIENT)
Dept: PAIN MEDICINE | Facility: OTHER | Age: 63
End: 2018-07-02

## 2018-07-05 ENCOUNTER — LAB VISIT (OUTPATIENT)
Dept: LAB | Facility: HOSPITAL | Age: 63
End: 2018-07-05
Attending: INTERNAL MEDICINE
Payer: COMMERCIAL

## 2018-07-05 DIAGNOSIS — Z00.00 ROUTINE MEDICAL EXAM: ICD-10-CM

## 2018-07-05 LAB
25(OH)D3+25(OH)D2 SERPL-MCNC: 34 NG/ML
ALBUMIN SERPL BCP-MCNC: 3.9 G/DL
ALP SERPL-CCNC: 68 U/L
ALT SERPL W/O P-5'-P-CCNC: 66 U/L
ANION GAP SERPL CALC-SCNC: 10 MMOL/L
AST SERPL-CCNC: 40 U/L
BASOPHILS # BLD AUTO: 0.05 K/UL
BASOPHILS NFR BLD: 0.8 %
BILIRUB SERPL-MCNC: 0.5 MG/DL
BUN SERPL-MCNC: 18 MG/DL
CALCIUM SERPL-MCNC: 10.3 MG/DL
CHLORIDE SERPL-SCNC: 102 MMOL/L
CHOLEST SERPL-MCNC: 252 MG/DL
CHOLEST/HDLC SERPL: 3.9 {RATIO}
CO2 SERPL-SCNC: 27 MMOL/L
CREAT SERPL-MCNC: 0.9 MG/DL
DIFFERENTIAL METHOD: ABNORMAL
EOSINOPHIL # BLD AUTO: 0.4 K/UL
EOSINOPHIL NFR BLD: 6.1 %
ERYTHROCYTE [DISTWIDTH] IN BLOOD BY AUTOMATED COUNT: 13.1 %
EST. GFR  (AFRICAN AMERICAN): >60 ML/MIN/1.73 M^2
EST. GFR  (NON AFRICAN AMERICAN): >60 ML/MIN/1.73 M^2
ESTIMATED AVG GLUCOSE: 128 MG/DL
GLUCOSE SERPL-MCNC: 123 MG/DL
HBA1C MFR BLD HPLC: 6.1 %
HCT VFR BLD AUTO: 42 %
HDLC SERPL-MCNC: 65 MG/DL
HDLC SERPL: 25.8 %
HGB BLD-MCNC: 13.3 G/DL
IMM GRANULOCYTES # BLD AUTO: 0.02 K/UL
IMM GRANULOCYTES NFR BLD AUTO: 0.3 %
LDLC SERPL CALC-MCNC: 134 MG/DL
LYMPHOCYTES # BLD AUTO: 2.4 K/UL
LYMPHOCYTES NFR BLD: 40.1 %
MCH RBC QN AUTO: 30 PG
MCHC RBC AUTO-ENTMCNC: 31.7 G/DL
MCV RBC AUTO: 95 FL
MONOCYTES # BLD AUTO: 0.5 K/UL
MONOCYTES NFR BLD: 8.4 %
NEUTROPHILS # BLD AUTO: 2.7 K/UL
NEUTROPHILS NFR BLD: 44.3 %
NONHDLC SERPL-MCNC: 187 MG/DL
NRBC BLD-RTO: 0 /100 WBC
PLATELET # BLD AUTO: 267 K/UL
PMV BLD AUTO: 10.3 FL
POTASSIUM SERPL-SCNC: 4.7 MMOL/L
PROT SERPL-MCNC: 7.5 G/DL
RBC # BLD AUTO: 4.43 M/UL
SODIUM SERPL-SCNC: 139 MMOL/L
TRIGL SERPL-MCNC: 265 MG/DL
TSH SERPL DL<=0.005 MIU/L-ACNC: 1.5 UIU/ML
WBC # BLD AUTO: 6.04 K/UL

## 2018-07-05 PROCEDURE — 85025 COMPLETE CBC W/AUTO DIFF WBC: CPT

## 2018-07-05 PROCEDURE — 84443 ASSAY THYROID STIM HORMONE: CPT

## 2018-07-05 PROCEDURE — 80053 COMPREHEN METABOLIC PANEL: CPT

## 2018-07-05 PROCEDURE — 36415 COLL VENOUS BLD VENIPUNCTURE: CPT | Mod: PO

## 2018-07-05 PROCEDURE — 80061 LIPID PANEL: CPT

## 2018-07-05 PROCEDURE — 82306 VITAMIN D 25 HYDROXY: CPT

## 2018-07-05 PROCEDURE — 83036 HEMOGLOBIN GLYCOSYLATED A1C: CPT

## 2018-07-06 ENCOUNTER — SURGERY (OUTPATIENT)
Age: 63
End: 2018-07-06

## 2018-07-06 ENCOUNTER — HOSPITAL ENCOUNTER (OUTPATIENT)
Facility: OTHER | Age: 63
Discharge: HOME | End: 2018-07-06
Attending: ANESTHESIOLOGY | Admitting: ANESTHESIOLOGY
Payer: COMMERCIAL

## 2018-07-06 VITALS
OXYGEN SATURATION: 96 % | BODY MASS INDEX: 42.38 KG/M2 | SYSTOLIC BLOOD PRESSURE: 120 MMHG | HEIGHT: 67 IN | DIASTOLIC BLOOD PRESSURE: 73 MMHG | RESPIRATION RATE: 18 BRPM | TEMPERATURE: 99 F | WEIGHT: 270 LBS | HEART RATE: 90 BPM

## 2018-07-06 DIAGNOSIS — M47.816 LUMBAR SPONDYLOSIS: Primary | ICD-10-CM

## 2018-07-06 LAB — POCT GLUCOSE: 115 MG/DL (ref 70–110)

## 2018-07-06 PROCEDURE — 99152 MOD SED SAME PHYS/QHP 5/>YRS: CPT | Mod: ,,, | Performed by: ANESTHESIOLOGY

## 2018-07-06 PROCEDURE — 64635 DESTROY LUMB/SAC FACET JNT: CPT | Performed by: ANESTHESIOLOGY

## 2018-07-06 PROCEDURE — 64635 DESTROY LUMB/SAC FACET JNT: CPT | Mod: RT,,, | Performed by: ANESTHESIOLOGY

## 2018-07-06 PROCEDURE — 64636 DESTROY L/S FACET JNT ADDL: CPT | Mod: RT,,, | Performed by: ANESTHESIOLOGY

## 2018-07-06 PROCEDURE — S0020 INJECTION, BUPIVICAINE HYDRO: HCPCS | Performed by: ANESTHESIOLOGY

## 2018-07-06 PROCEDURE — 82947 ASSAY GLUCOSE BLOOD QUANT: CPT | Performed by: ANESTHESIOLOGY

## 2018-07-06 PROCEDURE — 64636 DESTROY L/S FACET JNT ADDL: CPT | Performed by: ANESTHESIOLOGY

## 2018-07-06 PROCEDURE — 63600175 PHARM REV CODE 636 W HCPCS: Performed by: ANESTHESIOLOGY

## 2018-07-06 PROCEDURE — 25000003 PHARM REV CODE 250: Performed by: ANESTHESIOLOGY

## 2018-07-06 RX ORDER — FENTANYL CITRATE 50 UG/ML
INJECTION, SOLUTION INTRAMUSCULAR; INTRAVENOUS
Status: DISCONTINUED | OUTPATIENT
Start: 2018-07-06 | End: 2018-07-06 | Stop reason: HOSPADM

## 2018-07-06 RX ORDER — MIDAZOLAM HYDROCHLORIDE 1 MG/ML
INJECTION INTRAMUSCULAR; INTRAVENOUS
Status: DISCONTINUED | OUTPATIENT
Start: 2018-07-06 | End: 2018-07-06 | Stop reason: HOSPADM

## 2018-07-06 RX ORDER — SODIUM CHLORIDE 9 MG/ML
INJECTION, SOLUTION INTRAVENOUS CONTINUOUS
Status: DISCONTINUED | OUTPATIENT
Start: 2018-07-06 | End: 2018-07-06 | Stop reason: HOSPADM

## 2018-07-06 RX ORDER — BUPIVACAINE HYDROCHLORIDE 5 MG/ML
INJECTION, SOLUTION EPIDURAL; INTRACAUDAL
Status: DISCONTINUED | OUTPATIENT
Start: 2018-07-06 | End: 2018-07-06 | Stop reason: HOSPADM

## 2018-07-06 RX ORDER — LIDOCAINE HYDROCHLORIDE 20 MG/ML
INJECTION, SOLUTION INFILTRATION; PERINEURAL
Status: DISCONTINUED | OUTPATIENT
Start: 2018-07-06 | End: 2018-07-06 | Stop reason: HOSPADM

## 2018-07-06 RX ORDER — DEXAMETHASONE SODIUM PHOSPHATE 4 MG/ML
INJECTION, SOLUTION INTRA-ARTICULAR; INTRALESIONAL; INTRAMUSCULAR; INTRAVENOUS; SOFT TISSUE
Status: DISCONTINUED | OUTPATIENT
Start: 2018-07-06 | End: 2018-07-06 | Stop reason: HOSPADM

## 2018-07-06 RX ADMIN — MIDAZOLAM HYDROCHLORIDE 1 MG: 1 INJECTION, SOLUTION INTRAMUSCULAR; INTRAVENOUS at 11:07

## 2018-07-06 RX ADMIN — DEXAMETHASONE SODIUM PHOSPHATE 10 MG: 4 INJECTION, SOLUTION INTRAMUSCULAR; INTRAVENOUS at 11:07

## 2018-07-06 RX ADMIN — LIDOCAINE HYDROCHLORIDE 10 MG: 20 INJECTION, SOLUTION INFILTRATION; PERINEURAL at 11:07

## 2018-07-06 RX ADMIN — SODIUM CHLORIDE: 0.9 INJECTION, SOLUTION INTRAVENOUS at 11:07

## 2018-07-06 RX ADMIN — BUPIVACAINE HYDROCHLORIDE 10 ML: 5 INJECTION, SOLUTION EPIDURAL; INTRACAUDAL; PERINEURAL at 11:07

## 2018-07-06 RX ADMIN — FENTANYL CITRATE 50 MCG: 50 INJECTION, SOLUTION INTRAMUSCULAR; INTRAVENOUS at 11:07

## 2018-07-06 NOTE — H&P
HPI 62 yo female here for repeat RFA on the right      PMHx, PSHx, Allergies, Medications reviewed in epic    ROS negative except pain complaints in HPI    OBJECTIVE:    There were no vitals taken for this visit.    PHYSICAL EXAMINATION:    GENERAL: Well appearing, in no acute distress, alert and oriented x3.  PSYCH:  Mood and affect appropriate.  SKIN: Skin color, texture, turgor normal, no rashes or lesions.  CV: RRR with palpation of the radial artery.  PULM: No evidence of respiratory difficulty, symmetric chest rise. Clear to auscultation.  NEURO: Cranial nerves grossly intact.    Plan:    Proceed with procedure as planned    Vashti Poe  07/06/2018

## 2018-07-06 NOTE — OP NOTE
"Date of Procedure: 07/06/2018    Procedure: Right L3-5 Lumbar Medial Branch Nerve Thermal Radiofrequency Ablation    Pre-op diagnosis: Lumbar Spondylosis [M47.816]    Post-op diagnosis: Lumbar Spondylosis [M47.816]     Physician: Dr. Vashti Poe     Assistant: Dr. Mendosa    Anesthestia: local/IV sedation:  Versed 2 mg and fentanyl 50 mcg IV.  Conscious sedation provided by MD and monitored by RN.  Total sedation time was less than 45 minutes. (See nurse documentation and case log for sedation time)    EBL: None    Specimens: None    All medications, allergies, and relevant histories were reviewed. No recent antibiotics or infections.  A time-out was taken to verify the correct patient, procedure, laterality, and appropriate medications/allergies.    Procedure: Lumbar RFA    Lumbar Medial Branch Block with radiofrequency ablation, levels L3-5     The procedure risks, benefits, and possible complications were discussed with the patient including nerve damage, infection, spinal headache, and paresis.   Patient was placed in the prone position with the midriff elevated. Skin was prepped with CHG and draped. Oblique view of the spine was obtained with fluoroscopy. Entry sites were marked over the skin and Xylocaine 1% was used to anesthetize the skin and subcutaneous tissues.     A 5 " Collins Venom needle was introduced at an angle to parallel the medial branches in the groove between superior articular process and transverse process, and L5 primary dorsal ramus at the junction of the S1 superior articular process and sacral ala.    Multifidus stim elicited at each level.  No distal motor stimulation was elicited at any level at 2V with a frequency of 2Hz.  All impedances were within the acceptable range.    1cc of 2% lidocaine was injected at each level.  Thermal RF was then conducted at each level at 80 degrees, for 2:30 minutes   1 cc of a mixture of 0.5% bupivacaine with dexamethasone 5 mg was injected at each " level.    Special equipment and extra time required due to obesity.    Patient tolerated the procedure well and there were no complications.      Future Management:   If helpful, can repeat as needed.  Follow up with me in 4-6 weeks.      I certify that I provided the above services.  I was present for the entire procedure, which was performed by myself with the assistance of the resident physician.  There were no parts of the procedure that were performed not by myself or without my direct supervision.

## 2018-07-06 NOTE — DISCHARGE INSTRUCTIONS
Home Care Instructions Pain Management:    1. DIET:   You may resume your normal diet today.   2. BATHING:   You may shower with luke warm water. No soaking in tub.  3. DRESSING:   You may remove your bandage today.   4. ACTIVITY LEVEL:   You may resume your normal activities 24 hrs after your procedure.  5. MEDICATIONS:   You may resume your normal medications today.   6. SPECIAL INSTRUCTIONS:   No heat to the injection site for 24 hrs including, bath or shower, heating pad, moist heat, or hot tubs.    Use ice pack to injection site for any pain or discomfort.  Apply ice packs for 20 minute intervals as needed.   If you have received any sedatives by mouth today you may not drive for 12 hours.    If you have received any sedation through your IV, you may not drive for 24 hrs.     PLEASE CALL YOUR DOCTOR IF:  1. Redness or swelling around the injection site.  2. Fever of 101 degrees  3. Drainage (pus) from the injection site.  4. For any continuous bleeding (some dried blood over the incision is normal.)  5. For severe headache that is relieved when lying flat.    FOR EMERGENCIES:   If any unusual problems or difficulties occur during clinic hours, call [phone] or [phone].     Adult Procedural Sedation Instructions    Recovery After Procedural Sedation (Adult)  You have been given medicine by vein to make you sleep during your surgery. This may have included both a pain medicine and sleeping medicine. Most of the effects have worn off. But you may still have some drowsiness for the next 6 to 8 hours.  Home care  Follow these guidelines when you get home:  · For the next 8 hours, you should be watched by a responsible adult. This person should make sure your condition is not getting worse.  · Don't drink any alcohol for the next 24 hours.  · Don't drive, operate dangerous machinery, or make important business or personal decisions during the next 24 hours.  Note: Your healthcare provider may tell you not to take  any medicine by mouth for pain or sleep in the next 4 hours. These medicines may react with the medicines you were given in the hospital. This could cause a much stronger response than usual.  Follow-up care  Follow up with your healthcare provider if you are not alert and back to your usual level of activity within 12 hours.  When to seek medical advice  Call your healthcare provider right away if any of these occur:  · Drowsiness gets worse  · Weakness or dizziness gets worse  · Repeated vomiting  · You can't be awakened   Date Last Reviewed: 10/18/2016  © 5942-5817 [employer]. [address] [place] [id number]. All rights reserved. This information is not intended as a substitute for professional medical care. Always follow your healthcare professional's instructions.

## 2018-07-06 NOTE — DISCHARGE SUMMARY
Discharge Note  Short Stay      SUMMARY     Admit Date: 7/6/2018    Attending Physician: Vashti Poe    Procedure: Right L3-5 Lumbar Medial Branch Nerve Thermal Radiofrequency Ablation    Discharge Physician: Vashti Poe    Discharge Date: 7/6/2018 11:55 AM    Final Diagnosis: Facet arthritis of lumbar region [M46.96]    Disposition: Home or self care    Patient Instructions:   Current Discharge Medication List      CONTINUE these medications which have NOT CHANGED    Details   ACCU-CHEK SOFTCLIX LANCETS Misc USE AS DIRECTED  Refills: 12      albuterol 90 mcg/actuation inhaler Inhale 1-2 puffs into the lungs every 6 (six) hours as needed for Wheezing.  Qty: 1 Inhaler, Refills: 0      blood sugar diagnostic Strp 1 strip by Misc.(Non-Drug; Combo Route) route 2 (two) times daily. DISP GLUCOMETER OF CHOICE AND LANCETS AS WELL  Qty: 100 strip, Refills: 12      cyclobenzaprine (FLEXERIL) 5 MG tablet Take 1 tablet (5 mg total) by mouth nightly.  Qty: 30 tablet, Refills: 1      diazePAM (VALIUM) 5 MG tablet TAKE ONE TABLET BY MOUTH EVERY NIGHT AT BEDTIME AS NEEDED FOR ANXIETY AND spasms  Qty: 30 tablet, Refills: 3      fluticasone (FLONASE) 50 mcg/actuation nasal spray 2 sprays by Each Nare route once daily.  Qty: 3 Bottle, Refills: 12      KRILL OIL ORAL Take 1 tablet by mouth once daily.      levocetirizine (XYZAL) 5 MG tablet TAKE 1 TABLET BY MOUTH EVERY EVENING  Qty: 30 tablet, Refills: 5      meloxicam (MOBIC) 7.5 MG tablet Take 1 tablet (7.5 mg total) by mouth once daily.  Qty: 90 tablet, Refills: 6      metaxalone (SKELAXIN) 800 MG tablet TAKE 1 TABLET BY MOUTH 4 TIMES DAILY AS NEEDED FOR PAIN  Qty: 120 tablet, Refills: 2      metFORMIN (GLUCOPHAGE-XR) 750 MG 24 hr tablet Take 1 tablet (750 mg total) by mouth every morning.  Qty: 360 tablet, Refills: 3      montelukast (SINGULAIR) 10 mg tablet Take 1 tablet (10 mg total) by mouth every evening.  Qty: 90 tablet, Refills: 3      multivitamin (THERAGRAN) per  tablet Take 1 tablet by mouth once daily. 1 Tablet Oral Every day      olmesartan (BENICAR) 20 MG tablet Take 1 tablet (20 mg total) by mouth once daily.  Qty: 90 tablet, Refills: 3      penicillin v potassium (VEETID) 500 MG tablet Take 500 mg by mouth 4 (four) times daily. PT IS TAKING FOR DENTAL PURPOSES      traMADol (ULTRAM) 50 mg tablet TAKE 1 OR 2 TABLETS BY MOUTH EVERY 6 HOURS AS NEEDED FOR PAIN  Qty: 60 tablet, Refills: 3    Associated Diagnoses: Pain      tretinoin (RETIN-A) 0.025 % gel Apply topically nightly. Gel Topical .  AAA face qhs  Qty: 45 g, Refills: 3      zolpidem (AMBIEN) 5 MG Tab Take 1 tablet (5 mg total) by mouth nightly. at bedtime.  Qty: 30 tablet, Refills: 5      fluocinonide 0.05% (LIDEX) 0.05 % cream Apply topically 2 (two) times daily.  Qty: 30 g, Refills: 5    Associated Diagnoses: Rash      hydroCHLOROthiazide (HYDRODIURIL) 12.5 MG Tab Take 1 tablet (12.5 mg total) by mouth once daily.  Qty: 90 tablet, Refills: 11      hydrochlorothiazide (MICROZIDE) 12.5 mg capsule              Resume home diet and activity

## 2018-07-10 ENCOUNTER — OFFICE VISIT (OUTPATIENT)
Dept: DERMATOLOGY | Facility: CLINIC | Age: 63
End: 2018-07-10
Payer: COMMERCIAL

## 2018-07-10 VITALS — BODY MASS INDEX: 42.29 KG/M2 | WEIGHT: 270 LBS

## 2018-07-10 DIAGNOSIS — L81.4 LENTIGINES: ICD-10-CM

## 2018-07-10 DIAGNOSIS — L57.0 MULTIPLE ACTINIC KERATOSES: Primary | ICD-10-CM

## 2018-07-10 DIAGNOSIS — Z85.828 HISTORY OF SKIN CANCER: ICD-10-CM

## 2018-07-10 PROCEDURE — 17003 DESTRUCT PREMALG LES 2-14: CPT | Mod: S$GLB,,, | Performed by: DERMATOLOGY

## 2018-07-10 PROCEDURE — 17000 DESTRUCT PREMALG LESION: CPT | Mod: S$GLB,,, | Performed by: DERMATOLOGY

## 2018-07-10 PROCEDURE — 99213 OFFICE O/P EST LOW 20 MIN: CPT | Mod: 25,S$GLB,, | Performed by: DERMATOLOGY

## 2018-07-10 PROCEDURE — 99999 PR PBB SHADOW E&M-EST. PATIENT-LVL III: CPT | Mod: PBBFAC,,, | Performed by: DERMATOLOGY

## 2018-07-10 PROCEDURE — 3008F BODY MASS INDEX DOCD: CPT | Mod: CPTII,S$GLB,, | Performed by: DERMATOLOGY

## 2018-07-10 RX ORDER — FLUOROURACIL 50 MG/G
CREAM TOPICAL
Qty: 40 G | Refills: 3 | Status: SHIPPED | OUTPATIENT
Start: 2018-07-10 | End: 2023-02-13

## 2018-07-10 NOTE — PROGRESS NOTES
Subjective:       Patient ID:  Taty Max is a 63 y.o. female who presents for   Chief Complaint   Patient presents with    Spot     chest    Skin Check     UBSE     History of Present Illness: The patient presents with chief complaint of spots.  Location: chest  Duration: months  Signs/Symptoms: none    Prior treatments: none\  This is a high risk patient here to check for the development of new lesions.            Review of Systems   Constitutional: Negative for fever.   Skin: Negative for itching and rash.   Hematologic/Lymphatic: Does not bruise/bleed easily.        Objective:    Physical Exam   Constitutional: She appears well-developed and well-nourished. No distress.   Neurological: She is alert and oriented to person, place, and time. She is not disoriented.   Psychiatric: She has a normal mood and affect.   Skin:   Areas Examined (abnormalities noted in diagram):   Head / Face Inspection Performed  Neck Inspection Performed  Chest / Axilla Inspection Performed  Back Inspection Performed  RUE Inspected  LUE Inspection Performed                   Diagram Legend     Erythematous scaling macule/papule c/w actinic keratosis       Vascular papule c/w angioma      Pigmented verrucoid papule/plaque c/w seborrheic keratosis      Yellow umbilicated papule c/w sebaceous hyperplasia      Irregularly shaped tan macule c/w lentigo     1-2 mm smooth white papules consistent with Milia      Movable subcutaneous cyst with punctum c/w epidermal inclusion cyst      Subcutaneous movable cyst c/w pilar cyst      Firm pink to brown papule c/w dermatofibroma      Pedunculated fleshy papule(s) c/w skin tag(s)      Evenly pigmented macule c/w junctional nevus     Mildly variegated pigmented, slightly irregular-bordered macule c/w mildly atypical nevus      Flesh colored to evenly pigmented papule c/w intradermal nevus       Pink pearly papule/plaque c/w basal cell carcinoma      Erythematous hyperkeratotic cursted plaque  c/w SCC      Surgical scar with no sign of skin cancer recurrence      Open and closed comedones      Inflammatory papules and pustules      Verrucoid papule consistent consistent with wart     Erythematous eczematous patches and plaques     Dystrophic onycholytic nail with subungual debris c/w onychomycosis     Umbilicated papule    Erythematous-base heme-crusted tan verrucoid plaque consistent with inflamed seborrheic keratosis     Erythematous Silvery Scaling Plaque c/w Psoriasis     See annotation      Assessment / Plan:        Multiple actinic keratoses   Cryosurgery Procedure Note    Verbal consent from the patient is obtained and the patient is aware of the precancerous quality and need for treatment of these lesions. Liquid nitrogen cryosurgery is applied to the 5 actinic keratoses, as detailed in the physical exam, to produce a freeze injury.    -     fluorouracil (EFUDEX) 5 % cream; Use hs for 2 weeks  Dispense: 40 g; Refill: 3    History of skin cancer  Comments:  bcc right arm             Follow-up in about 3 months (around 10/10/2018).

## 2018-07-26 RX ORDER — CYCLOBENZAPRINE HCL 5 MG
5 TABLET ORAL NIGHTLY
Qty: 30 TABLET | Refills: 1 | Status: SHIPPED | OUTPATIENT
Start: 2018-07-26 | End: 2018-10-01 | Stop reason: SDUPTHER

## 2018-08-02 ENCOUNTER — OFFICE VISIT (OUTPATIENT)
Dept: PAIN MEDICINE | Facility: CLINIC | Age: 63
End: 2018-08-02
Attending: ANESTHESIOLOGY
Payer: COMMERCIAL

## 2018-08-02 VITALS
TEMPERATURE: 98 F | WEIGHT: 271.19 LBS | HEIGHT: 67 IN | RESPIRATION RATE: 18 BRPM | BODY MASS INDEX: 42.56 KG/M2 | HEART RATE: 97 BPM | DIASTOLIC BLOOD PRESSURE: 80 MMHG | SYSTOLIC BLOOD PRESSURE: 144 MMHG

## 2018-08-02 DIAGNOSIS — G57.11 MERALGIA PARAESTHETICA, RIGHT: ICD-10-CM

## 2018-08-02 DIAGNOSIS — M47.816 LUMBAR SPONDYLOSIS: ICD-10-CM

## 2018-08-02 DIAGNOSIS — M79.18 PIRIFORMIS MUSCLE PAIN: ICD-10-CM

## 2018-08-02 DIAGNOSIS — G89.4 CHRONIC PAIN DISORDER: Primary | ICD-10-CM

## 2018-08-02 DIAGNOSIS — E66.01 MORBID OBESITY WITH BMI OF 40.0-44.9, ADULT: ICD-10-CM

## 2018-08-02 PROCEDURE — 3079F DIAST BP 80-89 MM HG: CPT | Mod: CPTII,S$GLB,, | Performed by: ANESTHESIOLOGY

## 2018-08-02 PROCEDURE — 3077F SYST BP >= 140 MM HG: CPT | Mod: CPTII,S$GLB,, | Performed by: ANESTHESIOLOGY

## 2018-08-02 PROCEDURE — 99213 OFFICE O/P EST LOW 20 MIN: CPT | Mod: S$GLB,,, | Performed by: ANESTHESIOLOGY

## 2018-08-02 PROCEDURE — 3008F BODY MASS INDEX DOCD: CPT | Mod: CPTII,S$GLB,, | Performed by: ANESTHESIOLOGY

## 2018-08-02 PROCEDURE — 99999 PR PBB SHADOW E&M-EST. PATIENT-LVL IV: CPT | Mod: PBBFAC,,, | Performed by: ANESTHESIOLOGY

## 2018-08-02 NOTE — PROGRESS NOTES
"Subjective:      Patient ID: Taty Max is a 63 y.o. female.    Chief Complaint: No chief complaint on file.    Referred by: No ref. provider found     HPI:    Ms Max is a 58 yo female who presents today with back and right thigh pain. She complains of pain in her back and numbness in her right thigh. She states her pain is aggravated with bending forward. She had 3 injections at an outside pain clinic Nov-Feb, that helped her left leg pain but increased her right thigh numbness. She describes numbness on her outer right thigh. Her pain is improved with 2 gabapentin, but she gets diarrhea. She has a history of colon cancer with chemotherapy. The pain is described in detail below.    Interval History (10/17/2014):  She returns today for follow up. She reports that the lateral femoral cutaneous nerve block was very helpful for her right-sided thigh pain, numbness, and tingling. She reports recent muscle spasms in her low back but has made it difficult for her to perform her activities at her job. These are relieved with heat and stretching. The topical cream has been helpful for the pain.    Interval History (11/14/2014):  She returns today for follow up. She reports that the trigger point injections were very helpful for her muscle spasms, but they are starting to wear off on the right side. Last Tuesday, she had a severe spasm that caused severe pain in her anterior thigh. She reports that her muscles felt like they were being "shredded." This continues to be intermittently painful. She also has bilateral low back pain that radiates into her lateral thigh. This is similar to the pain she had in the past that was helped by the L5/S1 epidural.    Interval History (12/8/14)  She returns today for follow up.  She reports that the L4-L5 ASHLEY on 11/25/14 has been helpful for the pain, but pain in her right hip/buttocks area persists. She received about 50% relief of her pain from the epidural. The majority of her " pain now, she locates over the right SI joint, in addition to spasms in her lower back on the right side. She takes muscle relaxers at night for the spasms. She takes the pain is more severe after walking for long periods. The right lower extremity pain is much improved since epidural.     Interval History (1/14/2015):  She returns today for follow up.  She reports that the SI joint injection provided 50% relief.  She no longer has the sharp pain and is able to walk farther.  She continues to have significant pain in the AM on that right side.  The cream and the patch have been helpful for the pain.    Interval History (3/23/2016):  She returns today for follow up.  She reports that the most recent SI joint injection and TPIs have been helpful for the pain.  She is able to walk farther and work.  She does her HEP and stretches.    Interval History (9/23/2016):  She returns today for follow up.  She reports that her low back pain has begun to return.  She is also having more right lateral thigh pain.    Interval History (11/10/2016):  She returns today for follow up.  She reports that the RFA helped her back pain.  She is now experiencing increased pain over her right lateral thigh.  She is also having a muscle spasm in her right lower back.    Interval History (1/30/2017):  She returns today for follow up.  She reports that the most recent LFCN block lasted until two weeks ago.  Lidocaine patch over distal anterior thigh was not helpful. Gabapentin, NSAIDs are helpful.  Overall, her pain is improved with not working.    Interval History (2/20/2017):  She returns today for follow up.  She reports that the LFCN block has been helpful for the pain in her lateral thigh, but she is still having pain in her anterior thigh.  Lidocaine patches have been helpful for the pain, but they won't stay on long enough    Interval History (4/30/2018):  She returns today for follow up.  She reports that the LFCN block and the anterior  femoral cutaneous nerve block have been helpful for the pain in her thigh.  Her pain did not return until the beginning of 2018.  Her right sided low back pain is returning as well.    Interval History (8/2/2018):  She returns today for follow up.  She reports that the RFA has been helpful for the pain.  She reports 90% relief from this procedure.  She is happy with her current pain control except that she would like to improve her endurance.    Physical Therapy: Went to [facility] for 6 weeks.  The exercises made it worse, but the stretches were helpful.  She does pelvic tilts, the cat stretch, and the baby pose.    Non-pharmacologic Treatment: chiropractic  · TENS? yes    Pain Medications:   · Currently taking: gabapentin, aleve, ibuprofen, tylenol, aspercreme (very helpful)  · Has tried in the past: celebrex  · Has not tried: Opioids, TCAs    Blood thinners: no    Interventional Therapies:   · L5/S1 ILESI by Dr. Saavedra, most recent in 10/2012: 10 months relief  · Right LFCN block 9/2014: Excellent relief of right thigh pain  · Bilateral lumbar TPIs 10/2014: Excellent relief of muscle pain x 3 weeks  · L4-L5 ILESI on 11/25/14 with 50% relief  · Right SI joint injection 12/2014:  50% relief  · Right SIJ injection and TPIs 3/2016: Excellent relief  · L4/5 ILESI 8/30/2016: Excellent relief of leg pain  · Right L3-5 Cooled RFA 10/2016:  Good relief of sharp back pain  · Right LFCN block 11/10/2016: Good relief until mid January 2017  · 2/2017: Right anterior femoral cutaneous nerve block  · 4/2018: Right anterior femoral cutaneous nerve and right LFCN block: 100% relief of that pain  · 7/2018: Right L3-5 Thermal RFA: 90% relief    Relevant Surgeries: no    Affecting sleep? yes    Affecting daily activities? yes    Depressive symptoms? no  · SI/HI? 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     Pain Scales  Best: 3/10  Worst: 10/10  Usually: 3/10  Today: 6/10    Low-back Pain    This is a chronic problem. The current episode started more than 1 year ago. The problem occurs intermittently. The problem has been gradually improving since onset. The pain is present in the lumbar spine. The pain is at a severity of 5/10. Exacerbated by: increased activity. Associated symptoms include leg pain. Pertinent negatives include no chest pain, fever, headaches or weight loss. She has tried injection treatment and muscle relaxant for the symptoms. Physical therapy was not tried.          Pain Scales  Best: 3/10  Worst: 7/10  Usually: 4/10  Today: 3/10    Low-back Pain   Associated symptoms include leg pain.   Leg Pain          ROS  Review of Systems   Constitution: Negative for chills, fever, malaise/fatigue, weight gain and weight loss.   HENT: Negative for ear pain, headaches and hoarse voice.    Eyes: Negative for blurred vision, pain and visual disturbance.   Cardiovascular: Negative for chest pain, dyspnea on exertion and irregular heartbeat.   Respiratory: Negative for cough, shortness of breath and wheezing.    Endocrine: Negative for cold intolerance and heat intolerance.   Hematologic/Lymphatic: Negative for adenopathy and bleeding problem. Does not bruise/bleed easily.   Skin: Negative for color change, itching and rash.   Musculoskeletal: Positive for right thigh pain.   Gastrointestinal: Negative for change in bowel habit, diarrhea, hematemesis, hematochezia, melena and vomiting.   Genitourinary: Negative for flank pain, frequency, hematuria and urgency.   Neurological: Negative for difficulty with concentration, dizziness, loss of balance and seizures.   Psychiatric/Behavioral: Negative for altered mental status, depression and suicidal ideas. The patient is not nervous/anxious.    Allergic/Immunologic: Negative for HIV exposure.     Past Medical History:   Diagnosis Date    Allergic rhinitis, seasonal 1/28/2013    Anxiety disorder 1/28/2013    Basal cell carcinoma     Bilateral  "retinal lattice degeneration     Cataract     Colon cancer 2008    s/p resection    DDD (degenerative disc disease), lumbar 11/25/2014    Diabetes mellitus type II, uncontrolled 7/25/2014    High myopia     Horseshoe retinal tear of both eyes     HTN (hypertension) 1/28/2013    Hypertension     Hypothyroidism 4/29/2014    Lumbar disc disease 7/25/2014    Metabolic syndrome 4/29/2014    Neuropathy due to chemotherapeutic drug 5/22/2017    Osteopenia 5/10/2013    Screening for colorectal cancer 9/11/2015    Normal 6/ 2015---5 yrs    Vitamin D deficiency disease     Vitreous detachment of both eyes        Past Surgical History:   Procedure Laterality Date    ADENOIDECTOMY      ANKLE SURGERY      left     BREAST LUMPECTOMY      CATARACT EXTRACTION      CATARACT EXTRACTION BILATERAL W/ ANTERIOR VITRECTOMY      COLECTOMY      s/p reanastemosis    COLON SURGERY      EYE EXAMINATION UNDER ANESTHESIA W/ RETINAL CRYOTHERAPY AND RETINAL LASER      HERNIA REPAIR      KIDNEY SURGERY      "dilate urethra tube"    NASAL POLYP EXCISION      NASAL SEPTUM SURGERY      RADIOFREQUENCY ABLATION OF LUMBAR MEDIAL BRANCH NERVE AT SINGLE LEVEL Right 7/6/2018    Procedure: RADIOFREQUENCY ABLATION, NERVE, MEDIAL BRANCH, LUMBAR, 1 LEVEL;  Surgeon: Vashti Poe MD;  Location: [facility], [place] [facility] [place]  Service: Pain Management;  Laterality: Right;  RIght Thermal RFA @ L3-5  (REPEAT)  [id number]  with IV Sedation    CONSENT NEEDED    s/p laser ou      TONSILLECTOMY      TUBAL LIGATION      UVULOPALATOPHARYNGOPLASTY  1990s       Review of patient's allergies indicates:   Allergen Reactions    Oxaliplatin Hives    Factive [gemifloxacin] Hives    Daypro [oxaprozin] Rash    Latex Hives     Adhesives       Current Outpatient Prescriptions   Medication Sig Dispense Refill    ACCU-CHEK SOFTCLIX LANCETS Misc USE AS DIRECTED  12    albuterol 90 mcg/actuation inhaler Inhale 1-2 puffs into the lungs every 6 (six) hours as " needed for Wheezing. 1 Inhaler 0    blood sugar diagnostic Strp 1 strip by Misc.(Non-Drug; Combo Route) route 2 (two) times daily. DISP GLUCOMETER OF CHOICE AND LANCETS AS WELL 100 strip 12    cyclobenzaprine (FLEXERIL) 5 MG tablet Take 1 tablet (5 mg total) by mouth nightly. 30 tablet 1    diazePAM (VALIUM) 5 MG tablet TAKE ONE TABLET BY MOUTH EVERY NIGHT AT BEDTIME AS NEEDED FOR ANXIETY AND spasms 30 tablet 3    fluocinonide 0.05% (LIDEX) 0.05 % cream Apply topically 2 (two) times daily. 30 g 5    fluorouracil (EFUDEX) 5 % cream Use hs for 2 weeks 40 g 3    fluticasone (FLONASE) 50 mcg/actuation nasal spray 2 sprays by Each Nare route once daily. 3 Bottle 12    hydroCHLOROthiazide (HYDRODIURIL) 12.5 MG Tab Take 1 tablet (12.5 mg total) by mouth once daily. 90 tablet 11    hydrochlorothiazide (MICROZIDE) 12.5 mg capsule       KRILL OIL ORAL Take 1 tablet by mouth once daily.      levocetirizine (XYZAL) 5 MG tablet TAKE 1 TABLET BY MOUTH EVERY EVENING 30 tablet 5    meloxicam (MOBIC) 7.5 MG tablet Take 1 tablet (7.5 mg total) by mouth once daily. 90 tablet 6    metaxalone (SKELAXIN) 800 MG tablet TAKE 1 TABLET BY MOUTH 4 TIMES DAILY AS NEEDED FOR PAIN 120 tablet 2    metFORMIN (GLUCOPHAGE-XR) 750 MG 24 hr tablet Take 1 tablet (750 mg total) by mouth every morning. 360 tablet 3    montelukast (SINGULAIR) 10 mg tablet Take 1 tablet (10 mg total) by mouth every evening. 90 tablet 3    multivitamin (THERAGRAN) per tablet Take 1 tablet by mouth once daily. 1 Tablet Oral Every day      olmesartan (BENICAR) 20 MG tablet Take 1 tablet (20 mg total) by mouth once daily. 90 tablet 3    ONETOUCH DELICA LANCETS 33 gauge Misc       penicillin v potassium (VEETID) 500 MG tablet Take 500 mg by mouth 4 (four) times daily. PT IS TAKING FOR DENTAL PURPOSES      traMADol (ULTRAM) 50 mg tablet TAKE 1 OR 2 TABLETS BY MOUTH EVERY 6 HOURS AS NEEDED FOR PAIN 60 tablet 3    tretinoin (RETIN-A) 0.025 % gel Apply topically  nightly. Gel Topical .  AAA face qhs 45 g 3    zolpidem (AMBIEN) 5 MG Tab Take 1 tablet (5 mg total) by mouth nightly. at bedtime. 30 tablet 5     No current facility-administered medications for this visit.        Family History   Problem Relation Age of Onset    Cancer Maternal Grandmother     Hyperlipidemia Mother     Hypertension Mother     Breast cancer Mother     Allergies Father     Allergies Sister     Allergies Brother     Heart disease Maternal Grandfather     Stroke Paternal Grandmother     Colon cancer Neg Hx     Ovarian cancer Neg Hx        Social History     Social History    Marital status:      Spouse name: N/A    Number of children: N/A    Years of education: N/A     Occupational History     [employer]     Social History Main Topics    Smoking status: Former Smoker     Quit date: 7/17/1977    Smokeless tobacco: Never Used    Alcohol use Yes      Comment: once per week    Drug use: No    Sexual activity: Yes     Partners: Male     Birth control/ protection: Post-menopausal     Other Topics Concern    Not on file     Social History Narrative    No narrative on file           Objective:      There were no vitals filed for this visit.      Ortho/SPM Exam      GEN: Well developed, well nourished. No acute distress. No pain behavior.  HEENT: No trauma. Mucous membranes moist. Nares patent bilaterally.  PSYCH: Normal affect. Thought content appropriate.  CHEST: Breathing symmetric. No audible wheezing.  ABD: Soft, non-tender, non-distended.  SKIN: Warm, pink, dry. No rash on exposed areas.   EXT: No cyanosis, clubbing, or edema. No color change or changes in nail or hair growth.  NEURO/MUSCULOSKELETAL:  Fully alert, oriented, and appropriate. Speech normal pete. No cranial nerve deficits.   Gait: wide base gait, normal pete. negative trendelenburg sign bilaterally.   Motor Strength: 5/5 motor strength throughout lower extremities.   Sensory: Allodynia along  anterior thigh of right lower extremity, confirmed with light touch, and over LFCN distribution  Reflexes: 1+ and symmetric throughout. Downgoing Babinski's bilaterally. No clonus or spasticity.  L-Spine: Full ROM with minimal pain on extension.         Imaging:      Result Narrative    DATE OF EXAM: Nov 23 2010     MRI 0013 - MRI SPINE CNL LUM W AND WO CONTR:   [id number]    CLINICAL HISTORY: 724.2 LUMBAGO    PROCEDURE COMMENT: MULTIPLANAR, MULTISEQUENCE MR IMAGES WERE OBTAINED   BEFORE AND AFTER THE INJECTION OF 20 ML OF IV GADOLINIUM.    ICD 9 CODE(S): ()    CPT 4 CODE(S)/MODIFIER(S): ()    RESULTS: NO PRIOR MRIs ARE AVAILABLE FOR COMPARISON.     THERE IS NO EVIDENCE OF MARROW REPLACEMENT PROCESS, INFECTION, OR TUMOR.   NO ABNORMAL AREAS OF ENHANCEMENT ARE NOTED. MULTILEVEL OSSEOUS AND DISC   DEGENERATIVE CHANGE ARE NOTED WHICH WILL DETAILED BELOW. NO PARASPINOUS   SOFT TISSUE ABNORMALITY IS PRESENT. THE CONUS TERMINATES POSTERIOR TO L1.   THE ALIGNMENT IS ADEQUATE. LIMITED EVALUATION OF INTRAABDOMINAL   STRUCTURES REVEALS A 1 CM LEFT RENAL CYST.     AT T10-T11 THERE IS LOSS OF DISC SPACE HEIGHT.    AT T11-T12 THERE IS LOSS OF DISC SPACE HEIGHT.    AT T12-L1 THERE IS NO SIGNIFICANT ABNORMALITY PRESENT. NO CANAL OR   FORAMINAL STENOSIS.     AT L1-L2 THERE IS A MILD DISC BULGE PRESENT WITHOUT CANAL OR FORAMINAL   NARROWING.    AT L2-L3 THERE IS A MILD DISC BULGE PRESENT. THERE IS AN OSTEOPHYTE IN   THE RIGHT POSTERIOR PARACENTRAL REGION AND THIS IS CAUSING MILD NEURAL   FORAMINAL NARROWING.     AT L3-L4 THERE IS DISC BULGE PRESENT WITH MILD BILATERAL NEURAL FORAMINAL   NARROWING. THERE IS THICKENING OF THE LIGAMENTUM FLAVUM. THERE IS NO   CANAL NARROWING AT THIS LEVEL.     AT L4-L5 THERE IS SIGNIFICANT LOSS OF DISC SPACE HEIGHT AND ENDPLATE   CHANGES. THERE IS MILD CANAL STENOSIS SECONDARY TO LIGAMENTUM FLAVUM   THICKENING AND DISC BULGE.     AT L5-S1 THERE IS A BROAD BASED DISC BULGE PROTRUDING INTO BOTH NEURAL    FORAMINA. THERE IS MASS EFFECT SUPERIORLY ON THE EXITING NERVE ROOTS AT   L5-S1. THERE IS LOSS OF DISC SPACE HEIGHT AT THIS LEVEL AND MILD   ENDPLATE CHANGES.     IMPRESSION: MULTILEVEL OSSEOUS DEGENERATIVE CHANGE WITH MILD IMPINGEMENT   ON THE L5-S1 NERVE ROOT SUPERIORLY BY THE DISC.            Assessment:       Encounter Diagnoses   Name Primary?    Chronic pain disorder Yes    Lumbar spondylosis     Piriformis muscle pain     Meralgia paraesthetica, right     Morbid obesity with BMI of 40.0-44.9, adult            Plan:       Taty was seen today for follow-up.    Diagnoses and all orders for this visit:    Chronic pain disorder  -     Ambulatory consult to [facility]    Lumbar spondylosis  -     Ambulatory consult to [facility]    Piriformis muscle pain  -     Ambulatory consult to [facility]    Meralgia paraesthetica, right  -     Ambulatory consult to [facility]    Morbid obesity with BMI of 40.0-44.9, adult  -     Ambulatory consult to [facility]      I discussed the treatment options with her today, including risks, benefits, and alternatives. All available images were reviewed. The patient is aware of the risks and benefits of the medications being prescribed, common side effects, and proper usage.    1. Referral to [facility] for ongoing PT exercises.  We had an extensive discussion regarding the expected time course for PT for chronic pain vs after a surgery.  Specifically, we discussed that PT for chronic pain almost always causes a worsening of pain before any improvement, which is generally not seen for 3-6 months, in which time she will likely already be home doing home exercises given that a formal course of PT generally lasts anywhere from 6-12 weeks  2. Can repeat right L3-5 thermal RFA PRN  3. Can repeat Right anterior cutaneous branches of the femoral nerve block and LCFN block under US PRN  4. Schedule for repeat right L3-5,  thermal RFA   5. Flexeril 5mg provides her some relief from spasms. Instructed that she can take two tablets as needed for spasms in addition to her skelaxin 800mg which she has been taking together for spasms.  6. Continue pain patch as needed.   7. Continue Low back exercises and stretching maneuvers given for home use.  8. RTC in 6 weeks or sooner if needed    The above plan and management options were discussed at length with patient. Patient is in agreement with the above and verbalized understanding. It will be communicated with the consulting physician via electronic record, fax, or mail

## 2018-08-21 DIAGNOSIS — R52 PAIN: ICD-10-CM

## 2018-08-22 DIAGNOSIS — R52 PAIN: ICD-10-CM

## 2018-08-23 RX ORDER — TRAMADOL HYDROCHLORIDE 50 MG/1
TABLET ORAL
Qty: 60 TABLET | Refills: 3 | Status: SHIPPED | OUTPATIENT
Start: 2018-08-23 | End: 2018-10-18 | Stop reason: SDUPTHER

## 2018-08-24 RX ORDER — TRAMADOL HYDROCHLORIDE 50 MG/1
TABLET ORAL
Qty: 60 TABLET | Refills: 3 | Status: ON HOLD | OUTPATIENT
Start: 2018-08-24 | End: 2018-11-19

## 2018-08-27 ENCOUNTER — CLINICAL SUPPORT (OUTPATIENT)
Dept: REHABILITATION | Facility: HOSPITAL | Age: 63
End: 2018-08-27
Payer: COMMERCIAL

## 2018-08-27 DIAGNOSIS — M54.42 CHRONIC BILATERAL LOW BACK PAIN WITH BILATERAL SCIATICA: ICD-10-CM

## 2018-08-27 DIAGNOSIS — M54.41 CHRONIC BILATERAL LOW BACK PAIN WITH BILATERAL SCIATICA: ICD-10-CM

## 2018-08-27 DIAGNOSIS — G89.29 CHRONIC BILATERAL LOW BACK PAIN WITH BILATERAL SCIATICA: ICD-10-CM

## 2018-08-27 PROCEDURE — 97110 THERAPEUTIC EXERCISES: CPT | Mod: PN

## 2018-08-27 PROCEDURE — 97161 PT EVAL LOW COMPLEX 20 MIN: CPT | Mod: PN

## 2018-08-27 NOTE — PLAN OF CARE
[facility] - PHYSICAL THERAPY EVALUATION     Name: Taty Max  Clinic Number: [id number]    Taty is a 63 y.o. female evaluated on 08/27/2018.   Time In: 3:30  Time out: 4:52    Diagnosis:   Encounter Diagnosis   Name Primary?    Chronic bilateral low back pain with bilateral sciatica      Physician: Vashti Poe MD  Treatment Orders: PT Eval and Treat    Past Medical History:   Diagnosis Date    Allergic rhinitis, seasonal 1/28/2013    Anxiety disorder 1/28/2013    Basal cell carcinoma     Bilateral retinal lattice degeneration     Cataract     Colon cancer 2008    s/p resection    DDD (degenerative disc disease), lumbar 11/25/2014    Diabetes mellitus type II, uncontrolled 7/25/2014    High myopia     Horseshoe retinal tear of both eyes     HTN (hypertension) 1/28/2013    Hypertension     Hypothyroidism 4/29/2014    Lumbar disc disease 7/25/2014    Metabolic syndrome 4/29/2014    Neuropathy due to chemotherapeutic drug 5/22/2017    Osteopenia 5/10/2013    Screening for colorectal cancer 9/11/2015    Normal 6/ 2015---5 yrs    Vitamin D deficiency disease     Vitreous detachment of both eyes      Current Outpatient Medications   Medication Sig    ACCU-CHEK SOFTCLIX LANCETS Misc USE AS DIRECTED    albuterol 90 mcg/actuation inhaler Inhale 1-2 puffs into the lungs every 6 (six) hours as needed for Wheezing.    blood sugar diagnostic Strp 1 strip by Misc.(Non-Drug; Combo Route) route 2 (two) times daily. DISP GLUCOMETER OF CHOICE AND LANCETS AS WELL    cyclobenzaprine (FLEXERIL) 5 MG tablet Take 1 tablet (5 mg total) by mouth nightly.    diazePAM (VALIUM) 5 MG tablet TAKE ONE TABLET BY MOUTH EVERY NIGHT AT BEDTIME AS NEEDED FOR ANXIETY AND spasms    fluocinonide 0.05% (LIDEX) 0.05 % cream Apply topically 2 (two) times daily.    fluorouracil (EFUDEX) 5 % cream Use hs for 2 weeks    fluticasone (FLONASE) 50 mcg/actuation nasal spray 2 sprays by Each Nare route once daily.     hydroCHLOROthiazide (HYDRODIURIL) 12.5 MG Tab Take 1 tablet (12.5 mg total) by mouth once daily.    hydrochlorothiazide (MICROZIDE) 12.5 mg capsule     KRILL OIL ORAL Take 1 tablet by mouth once daily.    levocetirizine (XYZAL) 5 MG tablet TAKE 1 TABLET BY MOUTH EVERY EVENING    meloxicam (MOBIC) 7.5 MG tablet Take 1 tablet (7.5 mg total) by mouth once daily.    metaxalone (SKELAXIN) 800 MG tablet TAKE 1 TABLET BY MOUTH 4 TIMES DAILY AS NEEDED FOR PAIN    metFORMIN (GLUCOPHAGE-XR) 750 MG 24 hr tablet Take 1 tablet (750 mg total) by mouth every morning.    montelukast (SINGULAIR) 10 mg tablet Take 1 tablet (10 mg total) by mouth every evening.    multivitamin (THERAGRAN) per tablet Take 1 tablet by mouth once daily. 1 Tablet Oral Every day    olmesartan (BENICAR) 20 MG tablet Take 1 tablet (20 mg total) by mouth once daily.    ONETOUCH DELICA LANCETS 33 gauge Misc     penicillin v potassium (VEETID) 500 MG tablet Take 500 mg by mouth 4 (four) times daily. PT IS TAKING FOR DENTAL PURPOSES    traMADol (ULTRAM) 50 mg tablet TAKE 1 OR 2 TABLETS BY MOUTH EVERY 6 HOURS AS NEEDED FOR PAIN    traMADol (ULTRAM) 50 mg tablet TAKE 1 OR 2 TABLETS BY MOUTH EVERY 6 HOURS AS NEEDED FOR PAIN    tretinoin (RETIN-A) 0.025 % gel Apply topically nightly. Gel Topical .  AAA face qhs    zolpidem (AMBIEN) 5 MG Tab Take 1 tablet (5 mg total) by mouth nightly. at bedtime.     No current facility-administered medications for this visit.      Review of patient's allergies indicates:   Allergen Reactions    Oxaliplatin Hives    Factive [gemifloxacin] Hives    Daypro [oxaprozin] Rash    Latex Hives     Adhesives   Precautions: Standard       Pattern of pain determined: : 3  Plan of care Expiration: 90 days  Visit # authorized: 10  Authorization period  To Vendor Referred By By Location/Place of Service By Department   none Vashti Poe MD [facility]   Priority Start Date Expiration Date Referral  Entered By   Routine 08/27/2018 12/31/2018 Vashti Poe MD   Visits Requested Visits Authorized Visits Completed Visits Scheduled   1 10  1         HISTORY   History of Present Illness: Patient has c/o Bilateral Low Back Pain with B LE radiculopathy, L>R.  Her symptoms have persisted for many years and increase with functional activities.  There is no specific cause for her symptoms.      Diagnostic Tests: From EPIC     Pain Scale: Taty rates pain on a scale of 0-10 to be 10 at worst; 6 currently; 3 at best using VAS.   Pain location: Bilateral Low Back Pain with B LE radiculopathy, L>R    Aggravating factors: Not sitting correctly, prolonged walking and standing on concrete, 1/2 bend, walking up incline  Easing Factors: ice packs, stretches, Chiro, epsom salt bath, essential oils  Disturbed Sleep: yes    Pattern of pain questions:  1.  Where is your pain the worst? Bilateral Low Back Pain with B LE radiculopathy, L>R  2.  Is your pain constant or intermittent? constant  3.  Does bending forward make your typical pain worse? yes  4.  Since the start of your back pain, has there been a change in your bowel or bladder? Constant Bowel  5.  What can't you do now that you use to be able to do? Scrub the bathtub, run the vacuum, walk without a limp, Stronger legs    Prior Treatment: ambien  Prior functional status: Independent  DME owned/used: CPAP machine owned  Occupation:  Pipe    Leisure: Concerts, prolonged sitting/concerts                     Pts goals:  Lose weight, yard work, take a walk around the block    Red Flag Screening:   Cough  Sneeze  Strain: (+)  Bladder/ bowel: (+)  Falls: (--)  Night pain: (+)  Unexplained weight loss: (--)  General health: Good    OBJECTIVE     Postural examination/scapula alignment: Rounded shoulder, Head forward, Slouched posture and Increased Lumbar Lordosis    Correction of posture: better with lumbar roll    MOVEMENT LOSS    ROM Loss   Flexion moderate loss    Extension moderate loss   Side bending Right moderate loss   Side bending Left major loss   Rotation Right moderate loss   Rotation Left moderate loss     Lower Extremity Strength  Right LE  Left LE    Hip flexion: 4+/5 Hip flexion: 4+/5   Hip extension:  4+/5 Hip extension: 4+/5   Hip abduction: 4+/5 Hip abduction: 4+/5   Hip adduction:  4+/5 Hip adduction:  4+/5   Hip Internal rotation   4+/5 Hip Internal rotation 4+/5   Knee Flexion 4+/5 Knee Flexion 4+/5   Knee Extension 4+/5 Knee Extension 4+/5   Ankle dorsiflexion: 4+/5 Ankle dorsiflexion: 4+/5   Ankle plantarflexion: 4+/5 Ankle plantarflexion: 4+/5       GAIT:  Assistive Device used: none  Level of Assistance: independent  Patient displays the following gait deviations:  no gait deviations observed.     Special Tests:       -SLR test    NEUROLOGICAL SCREENING     Sensory deficit: B LE's are intact to light touch.    Reflexes:    Left Right   Patella Tendon absent absent   Achilles Tendon absent absent   Babinski  (--) (--)   Clonus (--) (--)       Baseline Isometric Testing on Med X equipment: Testing administered by PT  Date of testing: Will text first visit.      CMS Impairment/Limitation/Restriction for FOTO Lumbar Spine Survey  Status Limitation G-Code CMS Severity Modifier  Intake 36% 64% Current Status CL - At least 60 percent but less than 80 percent  Predicted 46% 54% Goal Status+ CK - At least 40 percent but less than 60 percent  D/C Status CL **only report if this is a one time visit  +Based on FOTO predicted change score    Goal:  CK, 46%-54%    Score interpretation is as follows:     TEST SCORE  Modifier  Impairment Limitation Restriction    0/50  CH  0 % impaired, limited or restricted   1-9/50  CI  @ least 1% but less than 20% impaired, limited or restricted   10-19/50  CJ  @ least 20%<40% impaired, limited or restricted   20-29/50  CK  @ least 40%<60% impaired, limited or restricted   30-39/50  CL  @ least 60% <80% impaired, limited or  restricted   40-49/50  CM  @ least 80%<100% impaired limited or restricted   50/50  CN  100% impaired, limited or restricted       Treatment   Time In: 3:50  Time Out: 4:52    PT Evaluation Completed? Yes  Discussed Plan of Care with patient: Yes      Home Exercise Program as follows:   Handouts were given to the patient. Pt demo good understanding of the education provided. Taty demonstrated good return demonstration of activities.     - Patient received education regarding proper posture and body mechanics.  Patient was given top 10 tips handout which discusses posture seated, standing, lifting correctly, components of exercise, importance of nutrition and hydration, and importance of sleep.  - [place] roll tried, recommended, and purchase information was provided.    - Patient received a handout regarding anticipated muscular soreness following the isometric test and strategies for management were reviewed with patient including stretching, using ice and scheduled rest.       Pt was instructed in and performed the following:   Cardiovascular exercise and therapeutic exercise to improve posture, lumbar/cervical ROM, strength, and muscular endurance as follows:     Taty received therapeutic exercises to develop/improve posture, lumbar/cervical ROM, strength and muscular endurance for 32 minutes including the following exercises:       Assessment   This is a 63 y.o. female referred to [facility] and presents with a medical diagnosis of   Encounter Diagnosis   Name Primary?    Chronic bilateral low back pain with bilateral sciatica     and demonstrates limitations as described below in the problem list. Pt rehab potential is Good. Pt presents with Chronic Low Back Pain with Bilateral Sciatica.    Pain Pattern: 3       Patient received education on the Healthy Back program, purpose of the isometric test, progression of back strengthening as well as wellness approach and systemic strengthening.  Details  of the program were discussed.  Reviewed that patient should feel support/pressure from med ex restraints but no pain or discomfort and patient expressed understanding.    Based on the above history and physical examination an active physical therapy program is recommended.  Pt will continue to benefit from skilled outpatient physical therapy to address the deficits listed below in the chart, provide pt/family education and to maximize pt's level of independence in the home and community environment. .     No environmental, cultural, spiritual, developmental or education needs expressed or noted    Medical necessity is demonstrated by the following problem list.    Pt presents with the following impairments:   History  Co-morbidities and personal factors that may impact the plan of care Examination  Body Structures and Functions, activity limitations and participation restrictions that may impact the plan of care Clinical Presentation   Decision Making/ Complexity Score   Co-morbidities:     Past Medical History:   Diagnosis Date    Allergic rhinitis, seasonal 1/28/2013    Anxiety disorder 1/28/2013    Basal cell carcinoma     Bilateral retinal lattice degeneration     Cataract     Colon cancer 2008    s/p resection    DDD (degenerative disc disease), lumbar 11/25/2014    Diabetes mellitus type II, uncontrolled 7/25/2014    High myopia     Horseshoe retinal tear of both eyes     HTN (hypertension) 1/28/2013    Hypertension     Hypothyroidism 4/29/2014    Lumbar disc disease 7/25/2014    Metabolic syndrome 4/29/2014    Neuropathy due to chemotherapeutic drug 5/22/2017    Osteopenia 5/10/2013    Screening for colorectal cancer 9/11/2015    Normal 6/ 2015---5 yrs    Vitamin D deficiency disease     Vitreous detachment of both eyes            Personal Factors:   no deficits Body Regions:   back  lower extremities    Body Systems:   ROM  strength  motor control    Activity limitations:   Learning and  applying knowledge  no deficits    General Tasks and Commands  no deficits    Communication  no deficits    Mobility  lifting and carrying objects  walking    Self care  no deficits    Domestic Life  doing house work (cleaning house, washing dishes, laundry)  assisting others    Interactions/Relationships  intimate relationships    Life Areas  no deficits    Community and Social Life  community life  recreation and leisure    Participation Restrictions:   None     stable and uncomplicated   Low         GOALS: Pt is in agreement with the following goals.    Short term goals:  6 weeks or 10 visits   1.  Pt will demonstrate increased lumbar ROM by at least 3 degrees from the initial ROM value with improvements noted in functional ROM and ability to perform ADLs  2.  Pt will demonstrate increased maximum isometric torque value by 5% when compared to the initial value resulting in improved ability to perform bending, lifting, and carrying activities safely, confidently.    3.  Patient report a reduction in worst pain score by 1-2 points for improved tolerance during work and recreational activities  4.  Pt able to perform HEP correctly with minimal cueing or supervision for therapist      Long term goals: 13 weeks or 20 visits   1. Pt will demonstrate increased lumbar ROM by at least 6 degrees from initial ROM value, resulting in improved ability to perform functional fwd bending while standing and sitting.   2. Pt will demonstrate increased maximum isometric torque value by 10% when compared to the initial value resulting in improved ability to perform bending, lifting, and carrying activities safely, confidently.  3. Pt to demonstrate ability to independently control and reduce their pain through posture positioning and mechanical movements throughout a typical day.  4.  Patient will demonstrate improved overall function per FOTO Survey to CK, 46%-54% score or less.      Plan   Outpatient physical therapy 2x week for  "13 weeks or 20 visits to include the following:   - Patient education  - Therapeutic exercise  - Manual therapy  - Performance testing   - Neuromuscular Re-education  - Therapeutic activity   - Modalities    Pt may be seen by PTA as part of the rehabilitation team.     Therapist: Dante Orozco, PT  8/27/2018    "I certify the need for these services furnished under this plan of treatment and while under my care."    ____________________________________  Physician/Referring Practitioner    _______________  Date of Signature          "

## 2018-08-28 NOTE — PLAN OF CARE
[facility]     Name: Taty Max  Clinic Number: [id number]    Taty is a 63 y.o. female evaluated on 08/27/2018.   Time In: 3:30  Time out: 4:52    Diagnosis:   Encounter Diagnosis   Name Primary?    Chronic bilateral low back pain with bilateral sciatica      Physician: Vashti Poe MD  Treatment Orders: PT Eval and Treat    Past Medical History:   Diagnosis Date    Allergic rhinitis, seasonal 1/28/2013    Anxiety disorder 1/28/2013    Basal cell carcinoma     Bilateral retinal lattice degeneration     Cataract     Colon cancer 2008    s/p resection    DDD (degenerative disc disease), lumbar 11/25/2014    Diabetes mellitus type II, uncontrolled 7/25/2014    High myopia     Horseshoe retinal tear of both eyes     HTN (hypertension) 1/28/2013    Hypertension     Hypothyroidism 4/29/2014    Lumbar disc disease 7/25/2014    Metabolic syndrome 4/29/2014    Neuropathy due to chemotherapeutic drug 5/22/2017    Osteopenia 5/10/2013    Screening for colorectal cancer 9/11/2015    Normal 6/ 2015---5 yrs    Vitamin D deficiency disease     Vitreous detachment of both eyes      Current Outpatient Medications   Medication Sig    ACCU-CHEK SOFTCLIX LANCETS Misc USE AS DIRECTED    albuterol 90 mcg/actuation inhaler Inhale 1-2 puffs into the lungs every 6 (six) hours as needed for Wheezing.    blood sugar diagnostic Strp 1 strip by Misc.(Non-Drug; Combo Route) route 2 (two) times daily. DISP GLUCOMETER OF CHOICE AND LANCETS AS WELL    cyclobenzaprine (FLEXERIL) 5 MG tablet Take 1 tablet (5 mg total) by mouth nightly.    diazePAM (VALIUM) 5 MG tablet TAKE ONE TABLET BY MOUTH EVERY NIGHT AT BEDTIME AS NEEDED FOR ANXIETY AND spasms    fluocinonide 0.05% (LIDEX) 0.05 % cream Apply topically 2 (two) times daily.    fluorouracil (EFUDEX) 5 % cream Use hs for 2 weeks    fluticasone (FLONASE) 50 mcg/actuation nasal spray 2 sprays by Each Nare route once daily.     hydroCHLOROthiazide (HYDRODIURIL) 12.5 MG Tab Take 1 tablet (12.5 mg total) by mouth once daily.    hydrochlorothiazide (MICROZIDE) 12.5 mg capsule     KRILL OIL ORAL Take 1 tablet by mouth once daily.    levocetirizine (XYZAL) 5 MG tablet TAKE 1 TABLET BY MOUTH EVERY EVENING    meloxicam (MOBIC) 7.5 MG tablet Take 1 tablet (7.5 mg total) by mouth once daily.    metaxalone (SKELAXIN) 800 MG tablet TAKE 1 TABLET BY MOUTH 4 TIMES DAILY AS NEEDED FOR PAIN    metFORMIN (GLUCOPHAGE-XR) 750 MG 24 hr tablet Take 1 tablet (750 mg total) by mouth every morning.    montelukast (SINGULAIR) 10 mg tablet Take 1 tablet (10 mg total) by mouth every evening.    multivitamin (THERAGRAN) per tablet Take 1 tablet by mouth once daily. 1 Tablet Oral Every day    olmesartan (BENICAR) 20 MG tablet Take 1 tablet (20 mg total) by mouth once daily.    ONETOUCH DELICA LANCETS 33 gauge Misc     penicillin v potassium (VEETID) 500 MG tablet Take 500 mg by mouth 4 (four) times daily. PT IS TAKING FOR DENTAL PURPOSES    traMADol (ULTRAM) 50 mg tablet TAKE 1 OR 2 TABLETS BY MOUTH EVERY 6 HOURS AS NEEDED FOR PAIN    traMADol (ULTRAM) 50 mg tablet TAKE 1 OR 2 TABLETS BY MOUTH EVERY 6 HOURS AS NEEDED FOR PAIN    tretinoin (RETIN-A) 0.025 % gel Apply topically nightly. Gel Topical .  AAA face qhs    zolpidem (AMBIEN) 5 MG Tab Take 1 tablet (5 mg total) by mouth nightly. at bedtime.     No current facility-administered medications for this visit.      Review of patient's allergies indicates:   Allergen Reactions    Oxaliplatin Hives    Factive [gemifloxacin] Hives    Daypro [oxaprozin] Rash    Latex Hives     Adhesives   Precautions: Standard       Pattern of pain determined: : 3  Plan of care Expiration: 90 days  Visit # authorized: 10  Authorization period  To Vendor Referred By By Location/Place of Service By Department   none Vashti Poe MD [facility]   Priority Start Date Expiration Date Referral  Entered By   Routine 08/27/2018 12/31/2018 Vashti Poe MD   Visits Requested Visits Authorized Visits Completed Visits Scheduled   1 10  1         HISTORY   History of Present Illness: Patient has c/o Bilateral Low Back Pain with B LE radiculopathy, L>R.  Her symptoms have persisted for many years and increase with functional activities.  There is no specific cause for her symptoms.      Diagnostic Tests: From EPIC     Pain Scale: Taty rates pain on a scale of 0-10 to be 10 at worst; 6 currently; 3 at best using VAS.   Pain location: Bilateral Low Back Pain with B LE radiculopathy, L>R    Aggravating factors: Not sitting correctly, prolonged walking and standing on concrete, 1/2 bend, walking up incline  Easing Factors: ice packs, stretches, Chiro, epsom salt bath, essential oils  Disturbed Sleep: yes    Pattern of pain questions:  1.  Where is your pain the worst? Bilateral Low Back Pain with B LE radiculopathy, L>R  2.  Is your pain constant or intermittent? constant  3.  Does bending forward make your typical pain worse? yes  4.  Since the start of your back pain, has there been a change in your bowel or bladder? Constant Bowel  5.  What can't you do now that you use to be able to do? Scrub the bathtub, run the vacuum, walk without a limp, Stronger legs    Prior Treatment: ambien  Prior functional status: Independent  DME owned/used: CPAP machine owned  Occupation:  Pipe    Leisure: Concerts, prolonged sitting/concerts                     Pts goals:  Lose weight, yard work, take a walk around the block    Red Flag Screening:   Cough  Sneeze  Strain: (+)  Bladder/ bowel: (+)  Falls: (--)  Night pain: (+)  Unexplained weight loss: (--)  General health: Good    OBJECTIVE     Postural examination/scapula alignment: Rounded shoulder, Head forward, Slouched posture and Increased Lumbar Lordosis    Correction of posture: better with lumbar roll    MOVEMENT LOSS    ROM Loss   Flexion moderate loss    Extension moderate loss   Side bending Right moderate loss   Side bending Left major loss   Rotation Right moderate loss   Rotation Left moderate loss     Lower Extremity Strength  Right LE  Left LE    Hip flexion: 4+/5 Hip flexion: 4+/5   Hip extension:  4+/5 Hip extension: 4+/5   Hip abduction: 4+/5 Hip abduction: 4+/5   Hip adduction:  4+/5 Hip adduction:  4+/5   Hip Internal rotation   4+/5 Hip Internal rotation 4+/5   Knee Flexion 4+/5 Knee Flexion 4+/5   Knee Extension 4+/5 Knee Extension 4+/5   Ankle dorsiflexion: 4+/5 Ankle dorsiflexion: 4+/5   Ankle plantarflexion: 4+/5 Ankle plantarflexion: 4+/5       GAIT:  Assistive Device used: none  Level of Assistance: independent  Patient displays the following gait deviations:  no gait deviations observed.     Special Tests:       -SLR test    NEUROLOGICAL SCREENING     Sensory deficit: B LE's are intact to light touch.    Reflexes:    Left Right   Patella Tendon absent absent   Achilles Tendon absent absent   Babinski  (--) (--)   Clonus (--) (--)       Baseline Isometric Testing on Med X equipment: Testing administered by PT  Date of testing: Will text first visit.      CMS Impairment/Limitation/Restriction for FOTO Lumbar Spine Survey  Status Limitation G-Code CMS Severity Modifier  Intake 36% 64% Current Status CL - At least 60 percent but less than 80 percent  Predicted 46% 54% Goal Status+ CK - At least 40 percent but less than 60 percent  D/C Status CL **only report if this is a one time visit  +Based on FOTO predicted change score    Goal:  CK, 46%-54%    Score interpretation is as follows:     TEST SCORE  Modifier  Impairment Limitation Restriction    0/50  CH  0 % impaired, limited or restricted   1-9/50  CI  @ least 1% but less than 20% impaired, limited or restricted   10-19/50  CJ  @ least 20%<40% impaired, limited or restricted   20-29/50  CK  @ least 40%<60% impaired, limited or restricted   30-39/50  CL  @ least 60% <80% impaired, limited or  restricted   40-49/50  CM  @ least 80%<100% impaired limited or restricted   50/50  CN  100% impaired, limited or restricted       Treatment   Time In: 3:50  Time Out: 4:52    PT Evaluation Completed? Yes  Discussed Plan of Care with patient: Yes    Pt was instructed in and performed the following:   Cardiovascular exercise and therapeutic exercise to improve posture, lumbar/cervical ROM, strength, and muscular endurance as follows:     Taty received therapeutic exercises to develop/improve posture, lumbar/cervical ROM, strength and muscular endurance for 32 minutes including the following exercises:     Back Extension 10:5 sec hold  Posterior Pelvic Tilt 10:5 sec hold  Supine Trunk Rotation with SwissBall 10:5 sec hold    Followed by Ice Pack x 10 min to the Lumbar spine.      Home Exercise Program as follows:     Back Extension 10:5 sec hold  Posterior Pelvic Tilt 10:5 sec hold  Supine Trunk Rotation with SwissBall 10:5 sec hold    Followed by Ice Pack x 10 min to the Lumbar spine.    Handouts were given to the patient. Pt demo good understanding of the education provided. Taty demonstrated good return demonstration of activities.     - Patient received education regarding proper posture and body mechanics.  Patient was given top 10 tips handout which discusses posture seated, standing, lifting correctly, components of exercise, importance of nutrition and hydration, and importance of sleep.  - [place] roll tried, recommended, and purchase information was provided.    - Patient received a handout regarding anticipated muscular soreness following the isometric test and strategies for management were reviewed with patient including stretching, using ice and scheduled rest.       Assessment   This is a 63 y.o. female referred to [facility] and presents with a medical diagnosis of   Encounter Diagnosis   Name Primary?    Chronic bilateral low back pain with bilateral sciatica     and demonstrates  limitations as described below in the problem list. Pt rehab potential is Good. Pt presents with Chronic Low Back Pain with Bilateral Sciatica.    Pain Pattern: 3       Patient received education on the [facility] program, purpose of the isometric test, progression of back strengthening as well as wellness approach and systemic strengthening.  Details of the program were discussed.  Reviewed that patient should feel support/pressure from med ex restraints but no pain or discomfort and patient expressed understanding.    Based on the above history and physical examination an active physical therapy program is recommended.  Pt will continue to benefit from skilled outpatient physical therapy to address the deficits listed below in the chart, provide pt/family education and to maximize pt's level of independence in the home and community environment. .     No environmental, cultural, spiritual, developmental or education needs expressed or noted    Medical necessity is demonstrated by the following problem list.    Pt presents with the following impairments:   History  Co-morbidities and personal factors that may impact the plan of care Examination  Body Structures and Functions, activity limitations and participation restrictions that may impact the plan of care Clinical Presentation   Decision Making/ Complexity Score   Co-morbidities:     Past Medical History:   Diagnosis Date    Allergic rhinitis, seasonal 1/28/2013    Anxiety disorder 1/28/2013    Basal cell carcinoma     Bilateral retinal lattice degeneration     Cataract     Colon cancer 2008    s/p resection    DDD (degenerative disc disease), lumbar 11/25/2014    Diabetes mellitus type II, uncontrolled 7/25/2014    High myopia     Horseshoe retinal tear of both eyes     HTN (hypertension) 1/28/2013    Hypertension     Hypothyroidism 4/29/2014    Lumbar disc disease 7/25/2014    Metabolic syndrome 4/29/2014    Neuropathy due to  chemotherapeutic drug 5/22/2017    Osteopenia 5/10/2013    Screening for colorectal cancer 9/11/2015    Normal 6/ 2015---5 yrs    Vitamin D deficiency disease     Vitreous detachment of both eyes            Personal Factors:   no deficits Body Regions:   back  lower extremities    Body Systems:   ROM  strength  motor control    Activity limitations:   Learning and applying knowledge  no deficits    General Tasks and Commands  no deficits    Communication  no deficits    Mobility  lifting and carrying objects  walking    Self care  no deficits    Domestic Life  doing house work (cleaning house, washing dishes, laundry)  assisting others    Interactions/Relationships  intimate relationships    Life Areas  no deficits    Community and Social Life  community life  recreation and leisure    Participation Restrictions:   None     stable and uncomplicated   Low         GOALS: Pt is in agreement with the following goals.    Short term goals:  6 weeks or 10 visits   1.  Pt will demonstrate increased lumbar ROM by at least 3 degrees from the initial ROM value with improvements noted in functional ROM and ability to perform ADLs  2.  Pt will demonstrate increased maximum isometric torque value by 5% when compared to the initial value resulting in improved ability to perform bending, lifting, and carrying activities safely, confidently.    3.  Patient report a reduction in worst pain score by 1-2 points for improved tolerance during work and recreational activities  4.  Pt able to perform HEP correctly with minimal cueing or supervision for therapist      Long term goals: 13 weeks or 20 visits   1. Pt will demonstrate increased lumbar ROM by at least 6 degrees from initial ROM value, resulting in improved ability to perform functional fwd bending while standing and sitting.   2. Pt will demonstrate increased maximum isometric torque value by 10% when compared to the initial value resulting in improved ability to perform  "bending, lifting, and carrying activities safely, confidently.  3. Pt to demonstrate ability to independently control and reduce their pain through posture positioning and mechanical movements throughout a typical day.  4.  Patient will demonstrate improved overall function per FOTO Survey to CK, 46%-54% score or less.      Plan   Outpatient physical therapy 2x week for 13 weeks or 20 visits to include the following:   - Patient education  - Therapeutic exercise  - Manual therapy  - Performance testing   - Neuromuscular Re-education  - Therapeutic activity   - Modalities    Pt may be seen by PTA as part of the rehabilitation team.     Therapist: Dante Orozco, PT  8/27/2018    "I certify the need for these services furnished under this plan of treatment and while under my care."    ____________________________________  Physician/Referring Practitioner    _______________  Date of Signature          "

## 2018-09-09 NOTE — PROGRESS NOTES
[facility]      Name: Taty Max  Clinic Number: [id number]  Date of Treatment: 09/10/2018   Diagnosis:   Encounter Diagnosis   Name Primary?    Chronic bilateral low back pain with bilateral sciatica      Physician: Vashti Poe MD    Pain pattern determined: 1 PEN   Plan of care signed: 18  Time in: 3:30 pm  Time Out: 4:30 pm  Total Billable Units: 50  Precautions: 10 years ago  colon CA    Visit #: 2, med x test today      Subjective   Taty reports Patient has c/o Bilateral Low Back Pain with B LE radiculopathy, L>R.  Her symptoms have persisted for many years and increase with functional activities.  There is no specific cause for her symptoms.  She has a new chair at work and lumbar roll for car and this is helping.  She is trying to do her exercises and thinks they help.  Back pain 5/10 right side.   .      Patient reports tolerating previous visit fair  Patient reports their pain to be 5/10 on a 0-10 scale with 0 being no pain and 10 being the worst pain imaginable.  Pain Location: right back     Prior Treatment: ambien  Prior functional status: Independent  DME owned/used: CPAP machine owned  Occupation:  Pipe    Leisure: Concerts, prolonged sitting/concerts                     Pts goals:  Lose weight, yard work, take a walk around the block         Objective     Baseline IM Testing Results:  9/10/18  Date of testin/10/18  ROM 0-42 deg   Max Peak Torque 88    Min Peak Torque 37    Flex/Ext Ratio 2.1/1   % below normative data -41%     FOTO:  Intake 3 64% Current Status CL - At least 60 percent but less than 80 percent  Predicted  54% Goal Status+ CK - At least 40 percent but less than 60 percent  Goal:  CK, 46%-54%      Treatment    Pt was instructed in and performed the following:     Taty received therapeutic exercises to develop/improved posture, cardiovascular endurance, muscular endurance, lumbar/cervical ROM, strength and muscular endurance  for 50 minutes including the following exercises:     Back Extension 10:5 sec hold  Posterior Pelvic Tilt 10:5 sec hold  Supine Trunk Rotation with SwissBall 10:5 sec hold       [facility] 9/10/2018   Visit Number 2   VAS Pain Rating 5   Treadmill Time (in min.) 5   Speed 1   Extension in Standing 10   Lumbar Extension Seat Pad 0   Femur Restraint 5   Top Dead Center 24   Counterweight 208   Lumbar Flexion 42   Lumbar Extension 0   Lumbar Peak Torque 88   Min Torque 37   Test Percent Below Normative Data 41   Ice - Z Lie (in min.) 10             Peripheral muscle strengthening which included 1 set of 15-20 repetitions at a slow, controlled 7 second per rep pace focused on strengthening supporting musculature for improved body mechanics and functional mobility.  Pt and therapist focused on proper form during treatment to ensure optimal strengthening of each targeted muscle group.  Machines were utilized including torso rotation,   chest press, upright row, Tricep extension, bicep curl.   Leg ext, leg curl, hip add, leg press, and hip abduction added visit 3        Home Exercise Program as follows:   Back Extension 10:5 sec hold  Posterior Pelvic Tilt 10:5 sec hold  Supine Trunk Rotation with SwissBall 10:5 sec hold     Handouts were given to the patient. Pt demo fair understanding of the education provided. Taty demonstrated fair return demonstration of activities.   Lumbar roll use compliance: yes  Additional exercises taught this treatment session:     Assessment       Patient is making good progress towards established goals.  She was tested today for visit 2 and peripheral exercise initiated.  Pt will continue to benefit from skilled outpatient physical therapy to address the deficits stated in the impairment chart, provide pt/family education and to maximize pt's level of independence in the home and community environment.           GOALS: Pt is in agreement with the following goals.     Short term  goals:  6 weeks or 10 visits   1.  Pt will demonstrate increased lumbar ROM by at least 3 degrees from the initial ROM value with improvements noted in functional ROM and ability to perform ADLs  2.  Pt will demonstrate increased maximum isometric torque value by 5% when compared to the initial value resulting in improved ability to perform bending, lifting, and carrying activities safely, confidently.     3.  Patient report a reduction in worst pain score by 1-2 points for improved tolerance during work and recreational activities  4.  Pt able to perform HEP correctly with minimal cueing or supervision for therapist        Long term goals: 13 weeks or 20 visits   1. Pt will demonstrate increased lumbar ROM by at least 6 degrees from initial ROM value, resulting in improved ability to perform functional fwd bending while standing and sitting.   2. Pt will demonstrate increased maximum isometric torque value by 10% when compared to the initial value resulting in improved ability to perform bending, lifting, and carrying activities safely, confidently.  3. Pt to demonstrate ability to independently control and reduce their pain through posture positioning and mechanical movements throughout a typical day.  4.  Patient will demonstrate improved overall function per FOTO Survey to CK, 46%-54% score or less.        Plan   Continue with established Plan of Care towards established PT goals.

## 2018-09-10 ENCOUNTER — CLINICAL SUPPORT (OUTPATIENT)
Dept: REHABILITATION | Facility: HOSPITAL | Age: 63
End: 2018-09-10
Payer: COMMERCIAL

## 2018-09-10 DIAGNOSIS — G89.29 CHRONIC BILATERAL LOW BACK PAIN WITH BILATERAL SCIATICA: ICD-10-CM

## 2018-09-10 DIAGNOSIS — M54.41 CHRONIC BILATERAL LOW BACK PAIN WITH BILATERAL SCIATICA: ICD-10-CM

## 2018-09-10 DIAGNOSIS — M54.42 CHRONIC BILATERAL LOW BACK PAIN WITH BILATERAL SCIATICA: ICD-10-CM

## 2018-09-10 PROCEDURE — 97750 PHYSICAL PERFORMANCE TEST: CPT | Mod: PN | Performed by: PHYSICAL MEDICINE & REHABILITATION

## 2018-09-10 PROCEDURE — 97110 THERAPEUTIC EXERCISES: CPT | Mod: PN | Performed by: PHYSICAL MEDICINE & REHABILITATION

## 2018-09-13 NOTE — PROGRESS NOTES
"[facility]      Name: Taty Max  Clinic Number: [id number]  Date of Treatment: 2018   Diagnosis:   Encounter Diagnosis   Name Primary?    Chronic bilateral low back pain with bilateral sciatica      Physician: Vashti Poe MD    Pain pattern determined: 1 PEN   Plan of care signed: 18  Time in: 3:30 pm  Time Out: 4:30 pm  Total Billable Units: 30  Precautions: 10 years ago  colon CA    Visit #: 3      Subjective   Taty reports that "my back feels not too painful." She reports soreness in her biceps after the previous visit..      Patient reports tolerating previous visit fair  Patient reports their pain to be 5/10 on a 0-10 scale with 0 being no pain and 10 being the worst pain imaginable.  Pain Location: right back     Prior Treatment: ambien  Prior functional status: Independent  DME owned/used: CPAP machine owned  Occupation:  Pipe    Leisure: Concerts, prolonged sitting/concerts                     Pts goals:  Lose weight, yard work, take a walk around the block         Objective     Baseline IM Testing Results:  9/10/18  Date of testin/10/18  ROM 0-42 deg   Max Peak Torque 88    Min Peak Torque 37    Flex/Ext Ratio 2.1/1   % below normative data -41%     FOTO:  Intake 3 64% Current Status CL - At least 60 percent but less than 80 percent  Predicted  54% Goal Status+ CK - At least 40 percent but less than 60 percent  Goal:  CK, 46%-54%      Treatment    Pt was instructed in and performed the following:     Taty received therapeutic exercises to develop/improved posture, cardiovascular endurance, muscular endurance, lumbar/cervical ROM, strength and muscular endurance for 50 minutes including the following exercises:     Back Extension 10:5 sec hold  Posterior Pelvic Tilt 10:5 sec hold  Supine Trunk Rotation with SwissBall 10:5 sec hold     [facility] 2018   Visit Number 3   VAS Pain Rating 5   Treadmill Time (in min.) 10   Speed " 1   Extension in Standing 10   Lumbar Extension Seat Pad -   Femur Restraint -   Top Dead Center -   Counterweight -   Lumbar Flexion -   Lumbar Extension -   Lumbar Peak Torque -   Min Torque -   Test Percent Below Normative Data -   Lumbar Weight 45   Repetitions 16   Rating of Perceived Exertion 4   Ice - Z Lie (in min.) 10                 Peripheral muscle strengthening which included 1 set of 15-20 repetitions at a slow, controlled 7 second per rep pace focused on strengthening supporting musculature for improved body mechanics and functional mobility.  Pt and therapist focused on proper form during treatment to ensure optimal strengthening of each targeted muscle group.  Machines were utilized including torso rotation,   chest press, upright row, Tricep extension, bicep curl.   Leg ext, leg curl, hip add, leg press, and hip abduction added visit 3        Home Exercise Program as follows:   Back Extension 10:5 sec hold  Posterior Pelvic Tilt 10:5 sec hold  Supine Trunk Rotation with SwissBall 10:5 sec hold     Handouts were given to the patient. Pt demo fair understanding of the education provided. Taty demonstrated fair return demonstration of activities.   Lumbar roll use compliance: yes  Additional exercises taught this treatment session:     Assessment     Initiated trunk extension strengthening today with use of 45 ft-lbs on the IntervalZero trunk extension machine today with 20 repetitions. Pt able to tolerate with good muscle response. Also able to initiate peripheral strengthening today with torso rotation, upright row, all UE exercises, as well as leg extensions and curls, hip abduction, and leg press with pt able to achieve 15-20 repetitions with good muscle response. Progress peripheral strengthening program to include additional UE and LE machine next visit. Educated pt today on importance of consistency and compliance with her HEP to limit pain and improve function.    Pt will continue to benefit from  skilled outpatient physical therapy to address the deficits stated in the impairment chart, provide pt/family education and to maximize pt's level of independence in the home and community environment.           GOALS: Pt is in agreement with the following goals.     Short term goals:  6 weeks or 10 visits   1.  Pt will demonstrate increased lumbar ROM by at least 3 degrees from the initial ROM value with improvements noted in functional ROM and ability to perform ADLs  2.  Pt will demonstrate increased maximum isometric torque value by 5% when compared to the initial value resulting in improved ability to perform bending, lifting, and carrying activities safely, confidently.     3.  Patient report a reduction in worst pain score by 1-2 points for improved tolerance during work and recreational activities  4.  Pt able to perform HEP correctly with minimal cueing or supervision for therapist        Long term goals: 13 weeks or 20 visits   1. Pt will demonstrate increased lumbar ROM by at least 6 degrees from initial ROM value, resulting in improved ability to perform functional fwd bending while standing and sitting.   2. Pt will demonstrate increased maximum isometric torque value by 10% when compared to the initial value resulting in improved ability to perform bending, lifting, and carrying activities safely, confidently.  3. Pt to demonstrate ability to independently control and reduce their pain through posture positioning and mechanical movements throughout a typical day.  4.  Patient will demonstrate improved overall function per FOTO Survey to CK, 46%-54% score or less.        Plan   Continue with established Plan of Care towards established PT goals.

## 2018-09-14 ENCOUNTER — CLINICAL SUPPORT (OUTPATIENT)
Dept: REHABILITATION | Facility: HOSPITAL | Age: 63
End: 2018-09-14
Payer: COMMERCIAL

## 2018-09-14 DIAGNOSIS — M54.42 CHRONIC BILATERAL LOW BACK PAIN WITH BILATERAL SCIATICA: ICD-10-CM

## 2018-09-14 DIAGNOSIS — M54.41 CHRONIC BILATERAL LOW BACK PAIN WITH BILATERAL SCIATICA: ICD-10-CM

## 2018-09-14 DIAGNOSIS — G89.29 CHRONIC BILATERAL LOW BACK PAIN WITH BILATERAL SCIATICA: ICD-10-CM

## 2018-09-14 PROCEDURE — 97110 THERAPEUTIC EXERCISES: CPT | Mod: PN

## 2018-09-14 NOTE — PROGRESS NOTES
[facility]      Name: Taty Max  Clinic Number: [id number]  Date of Treatment: 2018   Diagnosis:   Encounter Diagnosis   Name Primary?    Chronic bilateral low back pain with bilateral sciatica      Physician: Vashti Poe MD    Pain pattern determined: 1 PEN   Plan of care signed: 18  Time in: 3:30 pm  Time Out: 4:30 pm  Total Billable Units: 2  (1:1 with PT for 30 min)  Precautions: 10 years ago  colon CA    Visit #: 3       Subjective   Taty reports  That she cont with both lower back pain shooting down towards  Both lower extremity. Pt c/o of left side pain greater than right.    .  Patient reports tolerating previous visit fair  Patient reports their pain to be 5/10 on a 0-10 scale with 0 being no pain and 10 being the worst pain imaginable.  Pain Location: right back     Prior Treatment: ambien  Prior functional status: Independent  DME owned/used: CPAP machine owned  Occupation:  Pipe    Leisure: Concerts, prolonged sitting/concerts                     Pts goals:  Lose weight, yard work, take a walk around the block         Objective     Baseline IM Testing Results:  9/10/18  Date of testin/10/18  ROM 0-42 deg   Max Peak Torque 88    Min Peak Torque 37    Flex/Ext Ratio 2.1/1   % below normative data -41%     FOTO:  Intake 3 64% Current Status CL - At least 60 percent but less than 80 percent  Predicted  54% Goal Status+ CK - At least 40 percent but less than 60 percent  Goal:  CK, 46%-54%      Treatment    Pt was instructed in and performed the following:     Taty received therapeutic exercises to develop/improved posture, cardiovascular endurance, muscular endurance, lumbar/cervical ROM, strength and muscular endurance for 55 minutes including the following exercises:     Back Extension 10:5 sec hold  Posterior Pelvic Tilt 10:5 sec hold  Supine Trunk Rotation with SwissBall 10:5 sec hold     [facility] 2018   Visit Number 4    VAS Pain Rating 5   Treadmill Time (in min.) 10   Speed 1   Incline 0   Extension in Standing -   Lumbar Extension Seat Pad -   Femur Restraint -   Top Dead Center -   Counterweight -   Lumbar Flexion 42   Lumbar Extension 0   Lumbar Peak Torque -   Min Torque -   Test Percent Below Normative Data -   Lumbar Weight 45   Repetitions 20   Rating of Perceived Exertion 4   Ice - Z Lie (in min.) 10     Peripheral muscle strengthening which included 1 set of 15-20 repetitions at a slow, controlled 7 second per rep pace focused on strengthening supporting musculature for improved body mechanics and functional mobility.  Pt and therapist focused on proper form during treatment to ensure optimal strengthening of each targeted muscle group.  Machines were utilized including torso rotation,   chest press, upright row, Tricep extension, bicep curl.   Leg ext, leg curl, hip add, leg press, and hip abduction added visit 3    Home Exercise Program as follows:   Back Extension 10:5 sec hold  Posterior Pelvic Tilt 10:5 sec hold  Supine Trunk Rotation with SwissBall 10:5 sec hold     Handouts were given to the patient. Pt demo fair understanding of the education provided. Taty demonstrated fair return demonstration of activities.   Lumbar roll use compliance: yes  Additional exercises taught this treatment session:     Assessment     Pt tolerated therapy well today. Pt demonstrated good response to 45 ft lbs in medx lumbar extension. Pt was able to perform 20 reps with RPE of 4. Pt tolerated well UE and LE muscle strengthening with proper muscle fatigue. Cont skilled PT services to decrease functional limitations.     Pt will continue to benefit from skilled outpatient physical therapy to address the deficits stated in the impairment chart, provide pt/family education and to maximize pt's level of independence in the home and community environment.     GOALS: Pt is in agreement with the following goals.     Short term goals:  6  weeks or 10 visits   1.  Pt will demonstrate increased lumbar ROM by at least 3 degrees from the initial ROM value with improvements noted in functional ROM and ability to perform ADLs  2.  Pt will demonstrate increased maximum isometric torque value by 5% when compared to the initial value resulting in improved ability to perform bending, lifting, and carrying activities safely, confidently.  3.  Patient report a reduction in worst pain score by 1-2 points for improved tolerance during work and recreational activities  4.  Pt able to perform HEP correctly with minimal cueing or supervision for therapist     Long term goals: 13 weeks or 20 visits   1. Pt will demonstrate increased lumbar ROM by at least 6 degrees from initial ROM value, resulting in improved ability to perform functional fwd bending while standing and sitting.   2. Pt will demonstrate increased maximum isometric torque value by 10% when compared to the initial value resulting in improved ability to perform bending, lifting, and carrying activities safely, confidently.  3. Pt to demonstrate ability to independently control and reduce their pain through posture positioning and mechanical movements throughout a typical day.  4.  Patient will demonstrate improved overall function per FOTO Survey to CK, 46%-54% score or less.    Plan   Continue with established Plan of Care towards established PT goals.     Eugenio Bush, PT  09/17/2018

## 2018-09-17 ENCOUNTER — CLINICAL SUPPORT (OUTPATIENT)
Dept: REHABILITATION | Facility: HOSPITAL | Age: 63
End: 2018-09-17
Payer: COMMERCIAL

## 2018-09-17 DIAGNOSIS — G89.29 CHRONIC BILATERAL LOW BACK PAIN WITH BILATERAL SCIATICA: ICD-10-CM

## 2018-09-17 DIAGNOSIS — M54.41 CHRONIC BILATERAL LOW BACK PAIN WITH BILATERAL SCIATICA: ICD-10-CM

## 2018-09-17 DIAGNOSIS — M54.42 CHRONIC BILATERAL LOW BACK PAIN WITH BILATERAL SCIATICA: ICD-10-CM

## 2018-09-17 PROCEDURE — 97110 THERAPEUTIC EXERCISES: CPT | Mod: PN

## 2018-09-21 ENCOUNTER — CLINICAL SUPPORT (OUTPATIENT)
Dept: REHABILITATION | Facility: HOSPITAL | Age: 63
End: 2018-09-21
Payer: COMMERCIAL

## 2018-09-21 DIAGNOSIS — G89.29 CHRONIC BILATERAL LOW BACK PAIN WITH BILATERAL SCIATICA: ICD-10-CM

## 2018-09-21 DIAGNOSIS — M54.41 CHRONIC BILATERAL LOW BACK PAIN WITH BILATERAL SCIATICA: ICD-10-CM

## 2018-09-21 DIAGNOSIS — M54.42 CHRONIC BILATERAL LOW BACK PAIN WITH BILATERAL SCIATICA: ICD-10-CM

## 2018-09-21 PROCEDURE — 97110 THERAPEUTIC EXERCISES: CPT | Mod: PN

## 2018-09-21 NOTE — PROGRESS NOTES
[facility]      Name: Taty Max  Clinic Number: [id number]  Date of Treatment: 2018   Diagnosis:   Encounter Diagnosis   Name Primary?    Chronic bilateral low back pain with bilateral sciatica      Physician: Vashti Poe MD    Pain pattern determined: 1 PEN   Plan of care signed: 18  Time in: 2:30  pm  Time Out: 3:35  pm  Total Billable Units: 4  (1:1 with PT for 55 min)  Precautions: 10 years ago  colon CA    Visit #: 5      Subjective   Taty reports  That her lower back pain is about 7/10. Pt reports her lower back pain was about 9/10 this morning. Pt reports that she was not sore after last PT session.  Pt c/o of left side pain greater than right.    .  Patient reports tolerating previous visit fair  Patient reports their pain to be 7/10 on a 0-10 scale with 0 being no pain and 10 being the worst pain imaginable.  Pain Location: right back     Prior Treatment: ambien  Prior functional status: Independent  DME owned/used: CPAP machine owned  Occupation:  Pipe    Leisure: Concerts, prolonged sitting/concerts                     Pts goals:  Lose weight, yard work, take a walk around the block         Objective     Baseline IM Testing Results:  9/10/18  Date of testin/10/18  ROM 0-42 deg   Max Peak Torque 88    Min Peak Torque 37    Flex/Ext Ratio 2.1/1   % below normative data -41%     FOTO:  Intake 3 64% Current Status CL - At least 60 percent but less than 80 percent  Predicted  54% Goal Status+ CK - At least 40 percent but less than 60 percent  Goal:  CK, 46%-54%      Treatment    Pt was instructed in and performed the following:     Taty received therapeutic exercises to develop/improved posture, cardiovascular endurance, muscular endurance, lumbar/cervical ROM, strength and muscular endurance for 55 minutes including the following exercises:     Back Extension 10:5 sec hold  Posterior Pelvic Tilt 10:5 sec hold  Supine Trunk Rotation with  SwissBall 10:5 sec hold     [facility] 9/21/2018   Visit Number 5   VAS Pain Rating 7   Treadmill Time (in min.) 10   Speed 1   Incline 0   Extension in Standing -   Lumbar Extension Seat Pad -   Femur Restraint -   Top Dead Center -   Counterweight -   Lumbar Flexion 42   Lumbar Extension 0   Lumbar Peak Torque -   Min Torque -   Test Percent Below Normative Data -   Lumbar Weight 48   Repetitions 20   Rating of Perceived Exertion 3   Ice - Z Lie (in min.) 10     Peripheral muscle strengthening which included 1 set of 15-20 repetitions at a slow, controlled 7 second per rep pace focused on strengthening supporting musculature for improved body mechanics and functional mobility.  Pt and therapist focused on proper form during treatment to ensure optimal strengthening of each targeted muscle group.  Machines were utilized including torso rotation,   chest press, upright row, Tricep extension, bicep curl.   Leg ext, leg curl, hip add, leg press, and hip abduction added visit 3    Home Exercise Program as follows:   Back Extension 10:5 sec hold  Posterior Pelvic Tilt 10:5 sec hold  Supine Trunk Rotation with SwissBall 10:5 sec hold     Handouts were given to the patient. Pt demo fair understanding of the education provided. Taty demonstrated fair return demonstration of activities.   Lumbar roll use compliance: yes  Additional exercises taught this treatment session:     Assessment     Pt tolerated therapy well today. Good tolerance improvement of 3  Ft lbs on Medx lumbar extension with PRE of 3 and performing 20 reps. Pt tolerated peripheral muscle strengthening the same as last PT session. Cont skilled PT services to decrease functional limitations.     Pt will continue to benefit from skilled outpatient physical therapy to address the deficits stated in the impairment chart, provide pt/family education and to maximize pt's level of independence in the home and community environment.     GOALS: Pt is in  agreement with the following goals.     Short term goals:  6 weeks or 10 visits   1.  Pt will demonstrate increased lumbar ROM by at least 3 degrees from the initial ROM value with improvements noted in functional ROM and ability to perform ADLs  2.  Pt will demonstrate increased maximum isometric torque value by 5% when compared to the initial value resulting in improved ability to perform bending, lifting, and carrying activities safely, confidently.  3.  Patient report a reduction in worst pain score by 1-2 points for improved tolerance during work and recreational activities  4.  Pt able to perform HEP correctly with minimal cueing or supervision for therapist     Long term goals: 13 weeks or 20 visits   1. Pt will demonstrate increased lumbar ROM by at least 6 degrees from initial ROM value, resulting in improved ability to perform functional fwd bending while standing and sitting.   2. Pt will demonstrate increased maximum isometric torque value by 10% when compared to the initial value resulting in improved ability to perform bending, lifting, and carrying activities safely, confidently.  3. Pt to demonstrate ability to independently control and reduce their pain through posture positioning and mechanical movements throughout a typical day.  4.  Patient will demonstrate improved overall function per FOTO Survey to CK, 46%-54% score or less.    Plan   Continue with established Plan of Care towards established PT goals.     Eugenio Bush, PT  09/21/2018

## 2018-09-24 ENCOUNTER — OFFICE VISIT (OUTPATIENT)
Dept: FAMILY MEDICINE | Facility: CLINIC | Age: 63
End: 2018-09-24
Payer: COMMERCIAL

## 2018-09-24 VITALS
HEART RATE: 96 BPM | TEMPERATURE: 98 F | OXYGEN SATURATION: 97 % | SYSTOLIC BLOOD PRESSURE: 138 MMHG | HEIGHT: 67 IN | WEIGHT: 266.75 LBS | DIASTOLIC BLOOD PRESSURE: 80 MMHG | BODY MASS INDEX: 41.87 KG/M2

## 2018-09-24 DIAGNOSIS — E11.9 CONTROLLED TYPE 2 DIABETES MELLITUS WITHOUT COMPLICATION, WITHOUT LONG-TERM CURRENT USE OF INSULIN: Primary | ICD-10-CM

## 2018-09-24 DIAGNOSIS — F13.20 SEDATIVE DEPENDENCE: ICD-10-CM

## 2018-09-24 DIAGNOSIS — M54.42 CHRONIC BILATERAL LOW BACK PAIN WITH BILATERAL SCIATICA: ICD-10-CM

## 2018-09-24 DIAGNOSIS — I10 ESSENTIAL HYPERTENSION: ICD-10-CM

## 2018-09-24 DIAGNOSIS — G47.00 INSOMNIA, UNSPECIFIED TYPE: ICD-10-CM

## 2018-09-24 DIAGNOSIS — R79.89 ABNORMAL LIVER FUNCTION TESTS: ICD-10-CM

## 2018-09-24 DIAGNOSIS — M54.41 CHRONIC BILATERAL LOW BACK PAIN WITH BILATERAL SCIATICA: ICD-10-CM

## 2018-09-24 DIAGNOSIS — G89.29 CHRONIC BILATERAL LOW BACK PAIN WITH BILATERAL SCIATICA: ICD-10-CM

## 2018-09-24 DIAGNOSIS — E78.5 HYPERLIPIDEMIA, UNSPECIFIED HYPERLIPIDEMIA TYPE: ICD-10-CM

## 2018-09-24 DIAGNOSIS — E03.9 HYPOTHYROIDISM, UNSPECIFIED TYPE: ICD-10-CM

## 2018-09-24 PROCEDURE — 3079F DIAST BP 80-89 MM HG: CPT | Mod: CPTII,S$GLB,, | Performed by: INTERNAL MEDICINE

## 2018-09-24 PROCEDURE — 3008F BODY MASS INDEX DOCD: CPT | Mod: CPTII,S$GLB,, | Performed by: INTERNAL MEDICINE

## 2018-09-24 PROCEDURE — 3075F SYST BP GE 130 - 139MM HG: CPT | Mod: CPTII,S$GLB,, | Performed by: INTERNAL MEDICINE

## 2018-09-24 PROCEDURE — 99214 OFFICE O/P EST MOD 30 MIN: CPT | Mod: S$GLB,,, | Performed by: INTERNAL MEDICINE

## 2018-09-24 PROCEDURE — 99999 PR PBB SHADOW E&M-EST. PATIENT-LVL III: CPT | Mod: PBBFAC,,, | Performed by: INTERNAL MEDICINE

## 2018-09-24 PROCEDURE — 3044F HG A1C LEVEL LT 7.0%: CPT | Mod: CPTII,S$GLB,, | Performed by: INTERNAL MEDICINE

## 2018-09-24 NOTE — PROGRESS NOTES
Chief complaint Followup on blood pressure,, diabetes, cholesterol    Patient's a 63-year-old white female with controlled diabetes, hypertension and hyperlipidemia.  She did  have labs 2 mo prior to the appointment.  Her A1c had improved from 7.6 back down to her baseline 6.4 and then 6.1.  Vitamin D is normal.  HDL 77.  LDL improved from 163 down to 136 and we discussed that still not at goal.  We discussed the benefits of statin therapy in the recommendation that all diabetics be on statin but she is very resistant and reluctant and declines at this time.   .  Her blood pressure at home runs 138/80 we discussed it should be better.  On questioning she actually stopped taking the HCTZ 12.5.  When she started her ankles actually got more swollen and discussed that was coincidental.  We will restart 12.5 rather than increasing to 25 at this point.  Similar we discussed benefits of statin.  Her LDLs about the same as it was 2 years ago.  She had memory problems with Lipitor, 1 pill gave her pain across the lower back and I reassured her that was unlikely to be the statin although the pain went away 2 weeks after she stopped and she was very reluctant to restarted.  We discussed Zetia as an option but she is reluctant.  She jokingly admits that if she has a heart attack or stroke 0 well but I explained her that having such an event may not necessitate that she would die and therefore she would have to live with the problems.      She does occasionally take her tramadol for arthralgias.  She recently went to physical therapy and so has some increasing pains.  All these issues reviewed and patient counseled.Total time over 25 minutes with over 50% counseling.    ROS:   CONST:  no other new myalgias arthralgias and no new neurological deficits, no skin rashes     PAST MEDICAL HISTORY:                                                        1.  Allergic rhinitis.                                                       2.   Obstructive sleep apnea status post UPPP and treatment with CPAP.        3.  Chronic sinusitis with sinus surgery x2, Dr. Tucker and Dr. Alston.       4.  Hypertension.                                                            5.  Obesity.                                                                 6.  Colon cancer, status post right hemicolectomy and chemotherapy.          7.  History of iron deficiency anemia.                                       8.  Left superficial vein thrombosis of the arm.                             9.  Insomnia.                                                                10.  Basal cell carcinoma.                                                   11.  Memory issues when taking WelChol.                                      12.  Hyperlipidemia with LDL rise on WelChol. Tried 3 statins -various effects- memory issue made her lose a job                               13.  Hypercalcemia secondary to HCTZ.                                        14.  Lumbar radiculopathy with history of epidurals -Dr Saavedra                       15.  Chronic cough due to allergies, per pulmonary workup, 7/2010.           16.  Osteopenia by bone density in 2008.                                     17.  Elevated ALT.                                                           18.  Tubes tied.                                                             19.  Hernia repair.                                                          20.  Lumpectomy, of the breast I presume.   21.  COLONOSCOPY normal 6/15, 5 years  22. Left ankle surgery 2010  23. METABOLIC SYNDROME/DM -A1c 11/11 was 6.9  24. Vit D def- 1/13   25.  Hypothyroid?,  Placed on [place] Thyroid by Dr. Montgomery??    26.  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   for 30 years.  Has       prior marriage  "and no children.  Quit smoking in .  Drinks once a week.                                                                               FAMILY HISTORY:  Father  at 53 in a car accident.  Mom in good health.   Brother 46, a sister is 52.  She states that all members of her family have  Hypertension.                                                                  Vitals as above    Taty was seen today for results and diabetes.    Diagnoses and all orders for this visit:    Controlled type 2 diabetes mellitus without complication, without long-term current use of insulin, chronic and stable    Hyperlipidemia, unspecified hyperlipidemia type, untreated, discussed Zetia as an option and she will consider    Hypothyroidism, unspecified type, euthyroid    Chronic bilateral low back pain with bilateral sciatica, continue with therapy, continue tramadol    Essential hypertension, somewhat uncontrolled, restart HCTZ 12.5    Abnormal liver function tests, fluctuates, likely better with better diabetes control but also needs to lose weight and watch use of anti-inflammatories    Sedative dependence    Insomnia, unspecified type           Based on patient's anxiety level after a lengthy discussion today, access to the clinical note I don't think would be therapeutic"This note will not be shared with the patient."  "

## 2018-09-25 ENCOUNTER — OFFICE VISIT (OUTPATIENT)
Dept: DERMATOLOGY | Facility: CLINIC | Age: 63
End: 2018-09-25
Payer: COMMERCIAL

## 2018-09-25 VITALS — BODY MASS INDEX: 41.66 KG/M2 | WEIGHT: 266 LBS

## 2018-09-25 DIAGNOSIS — Z85.828 HISTORY OF SKIN CANCER: ICD-10-CM

## 2018-09-25 DIAGNOSIS — L81.4 LENTIGINES: ICD-10-CM

## 2018-09-25 DIAGNOSIS — Z87.2 HISTORY OF ACTINIC KERATOSES: Primary | ICD-10-CM

## 2018-09-25 PROCEDURE — 3008F BODY MASS INDEX DOCD: CPT | Mod: CPTII,S$GLB,, | Performed by: DERMATOLOGY

## 2018-09-25 PROCEDURE — 99213 OFFICE O/P EST LOW 20 MIN: CPT | Mod: S$GLB,,, | Performed by: DERMATOLOGY

## 2018-09-25 PROCEDURE — 99999 PR PBB SHADOW E&M-EST. PATIENT-LVL III: CPT | Mod: PBBFAC,,, | Performed by: DERMATOLOGY

## 2018-09-25 NOTE — PROGRESS NOTES
Subjective:       Patient ID:  Taty Max is a 63 y.o. female who presents for   Chief Complaint   Patient presents with    Follow-up     UBSE     Used the efudex on her arms and chest had brisk response no lesions remain. This is a high risk patient here to check for the development of new lesions.          Review of Systems   Constitutional: Negative for fever.   Skin: Negative for itching and rash.   Hematologic/Lymphatic: Does not bruise/bleed easily.        Objective:    Physical Exam   Constitutional: She appears well-developed and well-nourished. No distress.   Neurological: She is alert and oriented to person, place, and time. She is not disoriented.   Psychiatric: She has a normal mood and affect.   Skin:   Areas Examined (abnormalities noted in diagram):   Head / Face Inspection Performed  Neck Inspection Performed  Chest / Axilla Inspection Performed  Back Inspection Performed  RUE Inspected  LUE Inspection Performed              Diagram Legend     Erythematous scaling macule/papule c/w actinic keratosis       Vascular papule c/w angioma      Pigmented verrucoid papule/plaque c/w seborrheic keratosis      Yellow umbilicated papule c/w sebaceous hyperplasia      Irregularly shaped tan macule c/w lentigo     1-2 mm smooth white papules consistent with Milia      Movable subcutaneous cyst with punctum c/w epidermal inclusion cyst      Subcutaneous movable cyst c/w pilar cyst      Firm pink to brown papule c/w dermatofibroma      Pedunculated fleshy papule(s) c/w skin tag(s)      Evenly pigmented macule c/w junctional nevus     Mildly variegated pigmented, slightly irregular-bordered macule c/w mildly atypical nevus      Flesh colored to evenly pigmented papule c/w intradermal nevus       Pink pearly papule/plaque c/w basal cell carcinoma      Erythematous hyperkeratotic cursted plaque c/w SCC      Surgical scar with no sign of skin cancer recurrence      Open and closed comedones      Inflammatory  "papules and pustules      Verrucoid papule consistent consistent with wart     Erythematous eczematous patches and plaques     Dystrophic onycholytic nail with subungual debris c/w onychomycosis     Umbilicated papule    Erythematous-base heme-crusted tan verrucoid plaque consistent with inflamed seborrheic keratosis     Erythematous Silvery Scaling Plaque c/w Psoriasis     See annotation      Assessment / Plan:        History of actinic keratoses    History of skin cancer  Comments:  bcc right arm no recurrence    Lentigines  The "ABCD" rules to observe pigmented lesions were reviewed.               Follow-up in about 6 months (around 3/25/2019).  "

## 2018-09-26 ENCOUNTER — CLINICAL SUPPORT (OUTPATIENT)
Dept: REHABILITATION | Facility: HOSPITAL | Age: 63
End: 2018-09-26
Payer: COMMERCIAL

## 2018-09-26 DIAGNOSIS — M54.41 CHRONIC BILATERAL LOW BACK PAIN WITH BILATERAL SCIATICA: ICD-10-CM

## 2018-09-26 DIAGNOSIS — M54.42 CHRONIC BILATERAL LOW BACK PAIN WITH BILATERAL SCIATICA: ICD-10-CM

## 2018-09-26 DIAGNOSIS — G89.29 CHRONIC BILATERAL LOW BACK PAIN WITH BILATERAL SCIATICA: ICD-10-CM

## 2018-09-26 PROCEDURE — 97110 THERAPEUTIC EXERCISES: CPT | Mod: PN

## 2018-09-26 NOTE — PROGRESS NOTES
"[facility]      Name: Taty Max  Clinic Number: [id number]  Date of Treatment: 2018   Diagnosis:   Encounter Diagnosis   Name Primary?    Chronic bilateral low back pain with bilateral sciatica      Physician: Vashti Poe MD    Pain pattern determined: 1 PEN   Plan of care signed: 18  Time in: 1500  Time Out: 1605  Total Treatment Time: 55 minutes (1:1 with PTA for 30 minutes)  Total Billable Units: 2    Precautions: 10 years ago colon CA    Visit #: 6    Subjective     Taty reports that she could not walk on Saturday after Friday's appointment. Patient reports increased pain over the weekend and into today's appointment stating "maybe it's the weather, I don't know." Patient states that the pain is going down both of her legs, Left worse than Right. Patient notes that she is also having bilateral knee pain today.     Patient reports tolerating previous visit fair  Patient reports their pain to be 7-8/10 on a 0-10 scale with 0 being no pain and 10 being the worst pain imaginable.  Pain Location: right back     Prior Treatment: ambien  Prior functional status: Independent  DME owned/used: CPAP machine owned  Occupation:  Pipe    Leisure: Concerts, prolonged sitting/concerts                     Pts goals:  Lose weight, yard work, take a walk around the block    Objective     Baseline IM Testing Results:  9/10/18  Date of testin/10/18  ROM 0-42 deg   Max Peak Torque 88    Min Peak Torque 37    Flex/Ext Ratio 2.1/1   % below normative data -41%     CMS Impairment/Limitation/Restriction for FOTO Lumbar Spine Survey  Status Limitation G-Code CMS Severity Modifier  Intake 36% 64%  Predicted 46% 54% Goal Status+ CK - At least 40 percent but less than 60 percent  2018 36% 64% Current Status CL - At least 60 percent but less than 80 percent  D/C Status CL **only report if this is discharge survey    Treatment      Pt was instructed in and performed the " following:     Taty received therapeutic exercises to develop/improved posture, cardiovascular endurance, muscular endurance, lumbar/cervical ROM, strength and muscular endurance for 55 minutes including the following exercises:     Back Extension 10:5 sec hold - increase in symptoms, makes pain worse  Posterior Pelvic Tilt 10:5 sec hold  Supine Trunk Rotation with SwissBall 10:5 sec hold  +Flexion in standing: 10x - no change  +Flexion in sitting with SB: 10 x 5 sec hold - no change     [facility] 9/26/2018   Visit Number 6   VAS Pain Rating 8   Treadmill Time (in min.) 10   Speed 1   Incline 0   Flexion in Sitting 10   Manual Therapy 0   Lumbar Flexion 42   Lumbar Extension 0   Lumbar Weight 48   Repetitions 10   Rating of Perceived Exertion 5   Ice - Z Lie (in min.) 10     Peripheral muscle strengthening which included 1 set of 15-20 repetitions at a slow, controlled 7 second per rep pace focused on strengthening supporting musculature for improved body mechanics and functional mobility.  Pt and therapist focused on proper form during treatment to ensure optimal strengthening of each targeted muscle group.  Machines were utilized including torso rotation, chest press, upright row, tricep extension, bicep curl, leg ext, leg curl, hip add, leg press, and hip abduction.    Home Exercise Program as follows:   Back Extension 10:5 sec hold  Posterior Pelvic Tilt 10:5 sec hold  Supine Trunk Rotation with SwissBall 10:5 sec hold     Handouts were given to the patient. Pt demo fair understanding of the education provided. Taty demonstrated fair return demonstration of activities.   Lumbar roll use compliance: yes  Additional exercises taught this treatment session:     Assessment     Patient with poor tolerance to MedX lumbar extension machine this visit. Patient unable to initiate exercise at a 5% increase to 51 ft/lbs. Weight was decreased back to 48 ft/lbs with increased difficulty performing exercise and  was unable to complete more than 10 repetitions reporting increased radicular pain down both legs. Patient challenged with peripheral machines reporting increased muscle fatigue. Attempted flexion based stretches based on patient report of pain reduction with cat stretch and child's pose. Patient reported no change in symptoms, however also stated that her pain did not get worse. Patient did not perform EIS exercise due to report of increased pain and radicular symptoms down both legs.     Pt will continue to benefit from skilled outpatient physical therapy to address the deficits stated in the impairment chart, provide pt/family education and to maximize pt's level of independence in the home and community environment.     GOALS: Pt is in agreement with the following goals.     Short term goals:  6 weeks or 10 visits   1.  Pt will demonstrate increased lumbar ROM by at least 3 degrees from the initial ROM value with improvements noted in functional ROM and ability to perform ADLs  2.  Pt will demonstrate increased maximum isometric torque value by 5% when compared to the initial value resulting in improved ability to perform bending, lifting, and carrying activities safely, confidently.  3.  Patient report a reduction in worst pain score by 1-2 points for improved tolerance during work and recreational activities  4.  Pt able to perform HEP correctly with minimal cueing or supervision for therapist     Long term goals: 13 weeks or 20 visits   1. Pt will demonstrate increased lumbar ROM by at least 6 degrees from initial ROM value, resulting in improved ability to perform functional fwd bending while standing and sitting.   2. Pt will demonstrate increased maximum isometric torque value by 10% when compared to the initial value resulting in improved ability to perform bending, lifting, and carrying activities safely, confidently.  3. Pt to demonstrate ability to independently control and reduce their pain through  posture positioning and mechanical movements throughout a typical day.  4.  Patient will demonstrate improved overall function per FOTO Survey to CK, 46%-54% score or less.    Plan     Continue with established Plan of Care towards established PT goals.     Zhanna Chang, PTA  09/26/2018

## 2018-09-30 ENCOUNTER — PATIENT MESSAGE (OUTPATIENT)
Dept: PAIN MEDICINE | Facility: CLINIC | Age: 63
End: 2018-09-30

## 2018-10-03 RX ORDER — DIAZEPAM 5 MG/1
TABLET ORAL
Qty: 30 TABLET | Refills: 3 | Status: SHIPPED | OUTPATIENT
Start: 2018-10-03 | End: 2019-02-04 | Stop reason: SDUPTHER

## 2018-10-03 RX ORDER — CYCLOBENZAPRINE HCL 5 MG
5 TABLET ORAL NIGHTLY
Qty: 30 TABLET | Refills: 1 | Status: SHIPPED | OUTPATIENT
Start: 2018-10-03 | End: 2018-12-03 | Stop reason: SDUPTHER

## 2018-10-11 ENCOUNTER — OFFICE VISIT (OUTPATIENT)
Dept: SURGERY | Facility: CLINIC | Age: 63
End: 2018-10-11
Payer: COMMERCIAL

## 2018-10-11 VITALS
WEIGHT: 270.63 LBS | TEMPERATURE: 98 F | HEART RATE: 101 BPM | SYSTOLIC BLOOD PRESSURE: 130 MMHG | DIASTOLIC BLOOD PRESSURE: 85 MMHG | BODY MASS INDEX: 42.48 KG/M2 | HEIGHT: 67 IN

## 2018-10-11 DIAGNOSIS — E11.9 TYPE 2 DIABETES MELLITUS WITHOUT COMPLICATION, WITHOUT LONG-TERM CURRENT USE OF INSULIN: Primary | ICD-10-CM

## 2018-10-11 DIAGNOSIS — K43.2 INCISIONAL HERNIA, WITHOUT OBSTRUCTION OR GANGRENE: Primary | ICD-10-CM

## 2018-10-11 PROCEDURE — 99999 PR PBB SHADOW E&M-EST. PATIENT-LVL III: CPT | Mod: PBBFAC,,, | Performed by: SURGERY

## 2018-10-11 PROCEDURE — 99204 OFFICE O/P NEW MOD 45 MIN: CPT | Mod: S$GLB,,, | Performed by: SURGERY

## 2018-10-11 PROCEDURE — 3008F BODY MASS INDEX DOCD: CPT | Mod: CPTII,S$GLB,, | Performed by: SURGERY

## 2018-10-11 PROCEDURE — 3075F SYST BP GE 130 - 139MM HG: CPT | Mod: CPTII,S$GLB,, | Performed by: SURGERY

## 2018-10-11 PROCEDURE — 3079F DIAST BP 80-89 MM HG: CPT | Mod: CPTII,S$GLB,, | Performed by: SURGERY

## 2018-10-11 NOTE — PROGRESS NOTES
History & Physical    SUBJECTIVE:     History of Present Illness:  Taty Max is a 63 y.o. female with h/o HTN, obesity, and DM who presents for evaluation of incisional hernia. Prior epigastric hernia repair with mesh prior to 2008. In 2008 was diagnosed with colon cancer and underwent ex lap for colon resection as well as chemotherapy. Reports known hernia of lower aspect of incision, recently during heavy lifting felt a tearing sensation in superior aspect of incision. Now with bulge that increases in size with coughing. Feels better when wears abdominal binder. No obstructive symptoms.  No anticoagulation. Nonsmoker.    Chief Complaint   Patient presents with    Consult    Cancer       Review of patient's allergies indicates:   Allergen Reactions    Oxaliplatin Hives    Factive [gemifloxacin] Hives    Statins-hmg-coa reductase inhibitors      Memory w lipitor, muscles for 2 others    Daypro [oxaprozin] Rash    Latex Hives     Adhesives       Current Outpatient Medications   Medication Sig Dispense Refill    ACCU-CHEK SOFTCLIX LANCETS Misc USE AS DIRECTED  12    blood sugar diagnostic Strp 1 strip by Misc.(Non-Drug; Combo Route) route 2 (two) times daily. DISP GLUCOMETER OF CHOICE AND LANCETS AS WELL 100 strip 12    cyclobenzaprine (FLEXERIL) 5 MG tablet Take 1 tablet (5 mg total) by mouth nightly. 30 tablet 1    diazePAM (VALIUM) 5 MG tablet TAKE ONE TABLET BY MOUTH EVERY NIGHT AT BEDTIME AS NEEDED FOR ANXIETY AND spasms 30 tablet 3    fluorouracil (EFUDEX) 5 % cream Use hs for 2 weeks 40 g 3    fluticasone (FLONASE) 50 mcg/actuation nasal spray 2 sprays by Each Nare route once daily. 3 Bottle 12    hydroCHLOROthiazide (HYDRODIURIL) 12.5 MG Tab Take 1 tablet (12.5 mg total) by mouth once daily. 90 tablet 11    hydrochlorothiazide (MICROZIDE) 12.5 mg capsule       KRILL OIL ORAL Take 1 tablet by mouth once daily.      levocetirizine (XYZAL) 5 MG tablet TAKE 1 TABLET BY MOUTH EVERY EVENING 30  tablet 5    meloxicam (MOBIC) 7.5 MG tablet Take 1 tablet (7.5 mg total) by mouth once daily. 90 tablet 6    metaxalone (SKELAXIN) 800 MG tablet TAKE 1 TABLET BY MOUTH 4 TIMES DAILY AS NEEDED FOR PAIN 120 tablet 2    metFORMIN (GLUCOPHAGE-XR) 750 MG 24 hr tablet Take 1 tablet (750 mg total) by mouth every morning. 360 tablet 3    montelukast (SINGULAIR) 10 mg tablet Take 1 tablet (10 mg total) by mouth every evening. 90 tablet 3    multivitamin (THERAGRAN) per tablet Take 1 tablet by mouth once daily. 1 Tablet Oral Every day      olmesartan (BENICAR) 20 MG tablet Take 1 tablet (20 mg total) by mouth once daily. 90 tablet 3    ONETOUCH DELICA LANCETS 33 gauge Misc       penicillin v potassium (VEETID) 500 MG tablet Take 500 mg by mouth 4 (four) times daily. PT IS TAKING FOR DENTAL PURPOSES      traMADol (ULTRAM) 50 mg tablet TAKE 1 OR 2 TABLETS BY MOUTH EVERY 6 HOURS AS NEEDED FOR PAIN 60 tablet 3    traMADol (ULTRAM) 50 mg tablet TAKE 1 OR 2 TABLETS BY MOUTH EVERY 6 HOURS AS NEEDED FOR PAIN 60 tablet 3    tretinoin (RETIN-A) 0.025 % gel Apply topically nightly. Gel Topical .  AAA face qhs 45 g 3    zolpidem (AMBIEN) 5 MG Tab Take 1 tablet (5 mg total) by mouth nightly. at bedtime. 30 tablet 5    albuterol 90 mcg/actuation inhaler Inhale 1-2 puffs into the lungs every 6 (six) hours as needed for Wheezing. 1 Inhaler 0    fluocinonide 0.05% (LIDEX) 0.05 % cream Apply topically 2 (two) times daily. 30 g 5     No current facility-administered medications for this visit.        Past Medical History:   Diagnosis Date    Allergic rhinitis, seasonal 1/28/2013    Anxiety disorder 1/28/2013    Basal cell carcinoma     Bilateral retinal lattice degeneration     Cataract     Colon cancer 2008    s/p resection    DDD (degenerative disc disease), lumbar 11/25/2014    Diabetes mellitus type II, uncontrolled 7/25/2014    High myopia     Horseshoe retinal tear of both eyes     HTN (hypertension) 1/28/2013     "Hypertension     Hypothyroidism 4/29/2014    Lumbar disc disease 7/25/2014    Metabolic syndrome 4/29/2014    Neuropathy due to chemotherapeutic drug 5/22/2017    Osteopenia 5/10/2013    Screening for colorectal cancer 9/11/2015    Normal 6/ 2015---5 yrs    Vitamin D deficiency disease     Vitreous detachment of both eyes      Past Surgical History:   Procedure Laterality Date    ADENOIDECTOMY      ANKLE SURGERY      left     BLOCK-NERVE-MEDIAL BRANCH-LUMBAR Bilateral 1/20/2015    [facility]    BREAST LUMPECTOMY      CATARACT EXTRACTION      CATARACT EXTRACTION BILATERAL W/ ANTERIOR VITRECTOMY      COLECTOMY      s/p reanastemosis    COLON SURGERY      COLONOSCOPY N/A 6/9/2015    [facility]    EYE EXAMINATION UNDER ANESTHESIA W/ RETINAL CRYOTHERAPY AND RETINAL LASER      HERNIA REPAIR      INJECTION-JOINT Right 3/2/2016    Performed [facility] at [facility]    INJECTION-JOINT Right 12/16/2014    Performed by [facility] at [facility]    INJECTION-STEROID-EPIDURAL-LUMBAR N/A 8/30/2016    Performed by [facility]hti Poe [facility] at [facility]    INJECTION-STEROID-EPIDURAL-LUMBAR N/A 11/3/2015    Performed by [facility] at [facility]    INJECTION-STEROID-EPIDURAL-LUMBAR N/A 11/25/2014    [facility] at [facility]    KIDNEY SURGERY      "dilate urethra tube"    NASAL POLYP EXCISION      NASAL SEPTUM SURGERY      RADIOFREQUENCY ABLATION OF LUMBAR MEDIAL BRANCH NERVE AT SINGLE LEVEL Right 7/6/2018    Procedure: RADIOFREQUENCY ABLATION, NERVE, MEDIAL BRANCH, LUMBAR, 1 LEVEL;  Surgeon: Vashti Poe MD;  Location: [facility];  Service: Pain Management;  Laterality: Right;  RIght Thermal RFA @ L3-5  (REPEAT)  [id number]  with IV Sedation    CONSENT NEEDED    RADIOFREQUENCY ABLATION, NERVE, MEDIAL BRANCH, LUMBAR, 1 LEVEL Right 7/6/2018    " Performed by [facility] at [facility]    RADIOFREQUENCY THERMOCOAGULATION (RFTC)-NERVE-MEDIAN BRANCH-LUMBAR Right 2015    Performed by Vashti Garner MD at [facility]    RADIOFREQUENCY THERMOCOAGULATION (RFTC)-NERVE-MEDIAN BRANCH-LUMBAR/COOLED Right 10/18/2016    Performed by [facility] at [facility]    s/p laser ou      TONSILLECTOMY      TRIGGER POINT INJECTION (TPI) N/A 2016    Performed by [facility] at [facility]    TRIGGER POINT INJECTION (TPI) Right 3/2/2016    [facility] by [facility] at [facility]    TUBAL LIGATION      UVULOPALATOPHARYNGOPLASTY  1990s     Family History   Problem Relation Age of Onset    Cancer Maternal Grandmother     Hyperlipidemia Mother     Hypertension Mother     Breast cancer Mother     Allergies Father     Allergies Sister     Allergies Brother     Heart disease Maternal Grandfather     Stroke Paternal Grandmother     Colon cancer Neg Hx     Ovarian cancer Neg Hx      Social History     Tobacco Use    Smoking status: Former Smoker     Last attempt to quit: 1977     Years since quittin.2    Smokeless tobacco: Never Used   Substance Use Topics    Alcohol use: Yes     Comment: once per week    Drug use: No        Review of Systems:  Review of Systems   Constitutional: Negative for chills and fever.   HENT: Negative for congestion and sore throat.    Respiratory: Negative for cough and shortness of breath.    Cardiovascular: Negative for chest pain and palpitations.   Gastrointestinal: Negative for abdominal distention, abdominal pain, constipation, diarrhea, nausea and vomiting.   Musculoskeletal: Positive for back pain. Negative for arthralgias.   Neurological: Negative for dizziness and headaches.   Psychiatric/Behavioral: Negative for agitation and confusion.       OBJECTIVE:     Vital Signs (Most Recent)  Temp: 98.3 °F (36.8 °C) (10/11/18 0815)  Pulse: 101 (10/11/18 0815)  BP: 130/85  "(10/11/18 0815)  5' 7" (1.702 m)  122.7 kg (270 lb 9.8 oz)     Physical Exam:  Physical Exam   Constitutional: She is oriented to person, place, and time. She appears well-developed and well-nourished. No distress.   Cardiovascular: Normal rate.   Pulmonary/Chest: Effort normal. No respiratory distress.   Abdominal: Soft. She exhibits no distension. There is no tenderness. There is no rebound.   Reducible hernia at superior aspect of incision, possible hernia at lower aspect of incision as well. DIfficult to palpate due to body habitus   Neurological: She is alert and oriented to person, place, and time.   Skin: Skin is warm and dry.   Psychiatric: She has a normal mood and affect. Her behavior is normal.       ASSESSMENT/PLAN:     Taty Max is a 63 y.o. female with incisional hernia    PLAN:  CT abdomen to evaluate abdominal wall hernias  RTC following scan to discuss surgical repair  Encourage weight loss prior to surgery    Cari Leija MD, PGY-3  General Surgery  [phone]     I have personally performed a detailed history and physical examination on this patient. My findings are summarized in the resident's note included in the record.  Fascial defect is clinically obvious at the superior part of her incision  The lower part might also be involved  Will get CT scan to evaluate fascia  Surgery is tentatively scheduled for Nov 19 "

## 2018-10-16 ENCOUNTER — PATIENT MESSAGE (OUTPATIENT)
Dept: FAMILY MEDICINE | Facility: CLINIC | Age: 63
End: 2018-10-16

## 2018-10-16 DIAGNOSIS — R52 PAIN: ICD-10-CM

## 2018-10-17 ENCOUNTER — HOSPITAL ENCOUNTER (OUTPATIENT)
Dept: RADIOLOGY | Facility: HOSPITAL | Age: 63
Discharge: HOME | End: 2018-10-17
Attending: SURGERY
Payer: COMMERCIAL

## 2018-10-17 DIAGNOSIS — K43.2 INCISIONAL HERNIA, WITHOUT OBSTRUCTION OR GANGRENE: ICD-10-CM

## 2018-10-17 PROCEDURE — 74177 CT ABD & PELVIS W/CONTRAST: CPT | Mod: TC

## 2018-10-17 PROCEDURE — 74177 CT ABD & PELVIS W/CONTRAST: CPT | Mod: 26,,, | Performed by: RADIOLOGY

## 2018-10-17 PROCEDURE — 25500020 PHARM REV CODE 255: Performed by: SURGERY

## 2018-10-17 RX ADMIN — IOHEXOL 15 ML: 300 INJECTION, SOLUTION INTRAVENOUS at 03:10

## 2018-10-17 RX ADMIN — IOHEXOL 100 ML: 350 INJECTION, SOLUTION INTRAVENOUS at 03:10

## 2018-10-18 DIAGNOSIS — R52 PAIN: ICD-10-CM

## 2018-10-19 ENCOUNTER — LAB VISIT (OUTPATIENT)
Dept: LAB | Facility: HOSPITAL | Age: 63
End: 2018-10-19
Attending: SURGERY
Payer: COMMERCIAL

## 2018-10-19 DIAGNOSIS — Z12.39 BREAST CANCER SCREENING: ICD-10-CM

## 2018-10-19 DIAGNOSIS — E11.9 TYPE 2 DIABETES MELLITUS WITHOUT COMPLICATION, WITHOUT LONG-TERM CURRENT USE OF INSULIN: ICD-10-CM

## 2018-10-19 LAB
CREAT SERPL-MCNC: 1 MG/DL
EST. GFR  (AFRICAN AMERICAN): >60 ML/MIN/1.73 M^2
EST. GFR  (NON AFRICAN AMERICAN): >60 ML/MIN/1.73 M^2

## 2018-10-19 PROCEDURE — 82565 ASSAY OF CREATININE: CPT

## 2018-10-19 PROCEDURE — 36415 COLL VENOUS BLD VENIPUNCTURE: CPT | Mod: PO

## 2018-10-19 RX ORDER — TRAMADOL HYDROCHLORIDE 50 MG/1
50 TABLET ORAL EVERY 6 HOURS PRN
Qty: 60 TABLET | Refills: 3 | Status: ON HOLD | OUTPATIENT
Start: 2018-10-19 | End: 2018-11-19

## 2018-10-19 RX ORDER — TRAMADOL HYDROCHLORIDE 50 MG/1
TABLET ORAL
Qty: 60 TABLET | OUTPATIENT
Start: 2018-10-19

## 2018-10-25 ENCOUNTER — OFFICE VISIT (OUTPATIENT)
Dept: SURGERY | Facility: CLINIC | Age: 63
End: 2018-10-25
Payer: COMMERCIAL

## 2018-10-25 ENCOUNTER — HOSPITAL ENCOUNTER (OUTPATIENT)
Dept: CARDIOLOGY | Facility: CLINIC | Age: 63
Discharge: HOME | End: 2018-10-25
Payer: COMMERCIAL

## 2018-10-25 VITALS
SYSTOLIC BLOOD PRESSURE: 142 MMHG | WEIGHT: 268.63 LBS | BODY MASS INDEX: 42.16 KG/M2 | TEMPERATURE: 98 F | HEART RATE: 90 BPM | HEIGHT: 67 IN | DIASTOLIC BLOOD PRESSURE: 86 MMHG

## 2018-10-25 DIAGNOSIS — Z01.818 PREOP TESTING: Primary | ICD-10-CM

## 2018-10-25 DIAGNOSIS — K43.2 INCISIONAL HERNIA, WITHOUT OBSTRUCTION OR GANGRENE: ICD-10-CM

## 2018-10-25 DIAGNOSIS — Z01.818 PREOP TESTING: ICD-10-CM

## 2018-10-25 PROCEDURE — 3008F BODY MASS INDEX DOCD: CPT | Mod: CPTII,S$GLB,, | Performed by: SURGERY

## 2018-10-25 PROCEDURE — 99214 OFFICE O/P EST MOD 30 MIN: CPT | Mod: S$GLB,,, | Performed by: SURGERY

## 2018-10-25 PROCEDURE — 3077F SYST BP >= 140 MM HG: CPT | Mod: CPTII,S$GLB,, | Performed by: SURGERY

## 2018-10-25 PROCEDURE — 3079F DIAST BP 80-89 MM HG: CPT | Mod: CPTII,S$GLB,, | Performed by: SURGERY

## 2018-10-25 PROCEDURE — 93000 ELECTROCARDIOGRAM COMPLETE: CPT | Mod: S$GLB,,, | Performed by: INTERNAL MEDICINE

## 2018-10-25 PROCEDURE — 99999 PR PBB SHADOW E&M-EST. PATIENT-LVL III: CPT | Mod: PBBFAC,,, | Performed by: SURGERY

## 2018-10-25 NOTE — H&P (VIEW-ONLY)
History & Physical    SUBJECTIVE:     History of Present Illness:  Taty Max is a 63 y.o. female with h/o HTN, obesity, and DM (last A1c 6.1)who presents for evaluation of incisional hernia. Prior epigastric hernia repair with mesh prior to 2008. In 2008 was diagnosed with colon cancer and underwent ex lap for colon resection as well as chemotherapy. Reports known hernia of lower aspect of incision, recently during heavy lifting felt a tearing sensation in superior aspect of incision. Now with bulge that increases in size with coughing. Feels better when wears abdominal binder. No obstructive symptoms.  No anticoagulation. Nonsmoker.    She presents today after CT abd/pelv.    Chief Complaint   Patient presents with    Follow-up       Review of patient's allergies indicates:   Allergen Reactions    Oxaliplatin Hives    Factive [gemifloxacin] Hives    Statins-hmg-coa reductase inhibitors      Memory w lipitor, muscles for 2 others    Daypro [oxaprozin] Rash    Latex Hives     Adhesives       Current Outpatient Medications   Medication Sig Dispense Refill    ACCU-CHEK SOFTCLIX LANCETS [facility] USE AS DIRECTED  12    albuterol 90 mcg/actuation inhaler Inhale 1-2 puffs into the lungs every 6 (six) hours as needed for Wheezing. 1 Inhaler 0    blood sugar diagnostic Strp 1 strip by Misc.(Non-Drug; Combo Route) route 2 (two) times daily. DISP GLUCOMETER OF CHOICE AND LANCETS AS WELL 100 strip 12    cyclobenzaprine (FLEXERIL) 5 MG tablet Take 1 tablet (5 mg total) by mouth nightly. 30 tablet 1    diazePAM (VALIUM) 5 MG tablet TAKE ONE TABLET BY MOUTH EVERY NIGHT AT BEDTIME AS NEEDED FOR ANXIETY AND spasms 30 tablet 3    fluocinonide 0.05% (LIDEX) 0.05 % cream Apply topically 2 (two) times daily. 30 g 5    fluorouracil (EFUDEX) 5 % cream Use hs for 2 weeks 40 g 3    fluticasone (FLONASE) 50 mcg/actuation nasal spray 2 sprays by Each Nare route once daily. 3 Bottle 12    hydroCHLOROthiazide (HYDRODIURIL) 12.5  MG Tab Take 1 tablet (12.5 mg total) by mouth once daily. 90 tablet 11    hydrochlorothiazide (MICROZIDE) 12.5 mg capsule       KRILL OIL ORAL Take 1 tablet by mouth once daily.      levocetirizine (XYZAL) 5 MG tablet TAKE 1 TABLET BY MOUTH EVERY EVENING 30 tablet 5    meloxicam (MOBIC) 7.5 MG tablet Take 1 tablet (7.5 mg total) by mouth once daily. 90 tablet 6    metaxalone (SKELAXIN) 800 MG tablet TAKE 1 TABLET BY MOUTH 4 TIMES DAILY AS NEEDED FOR PAIN 120 tablet 2    metFORMIN (GLUCOPHAGE-XR) 750 MG 24 hr tablet Take 1 tablet (750 mg total) by mouth every morning. 360 tablet 3    montelukast (SINGULAIR) 10 mg tablet Take 1 tablet (10 mg total) by mouth every evening. 90 tablet 3    multivitamin (THERAGRAN) per tablet Take 1 tablet by mouth once daily. 1 Tablet Oral Every day      olmesartan (BENICAR) 20 MG tablet Take 1 tablet (20 mg total) by mouth once daily. 90 tablet 3    ONETOUCH DELICA LANCETS 33 gauge Misc       penicillin v potassium (VEETID) 500 MG tablet Take 500 mg by mouth 4 (four) times daily. PT IS TAKING FOR DENTAL PURPOSES      traMADol (ULTRAM) 50 mg tablet TAKE 1 OR 2 TABLETS BY MOUTH EVERY 6 HOURS AS NEEDED FOR PAIN 60 tablet 3    traMADol (ULTRAM) 50 mg tablet Take 1 tablet (50 mg total) by mouth every 6 (six) hours as needed for Pain. 60 tablet 3    tretinoin (RETIN-A) 0.025 % gel Apply topically nightly. Gel Topical .  AAA face qhs 45 g 3    zolpidem (AMBIEN) 5 MG Tab Take 1 tablet (5 mg total) by mouth nightly. at bedtime. 30 tablet 5     No current facility-administered medications for this visit.        Past Medical History:   Diagnosis Date    Allergic rhinitis, seasonal 1/28/2013    Anxiety disorder 1/28/2013    Basal cell carcinoma     Bilateral retinal lattice degeneration     Cataract     Colon cancer 2008    s/p resection    DDD (degenerative disc disease), lumbar 11/25/2014    Diabetes mellitus type II, uncontrolled 7/25/2014    High myopia     Horseshoe  "retinal tear of both eyes     HTN (hypertension) 1/28/2013    Hypertension     Hypothyroidism 4/29/2014    Lumbar disc disease 7/25/2014    Metabolic syndrome 4/29/2014    Neuropathy due to chemotherapeutic drug 5/22/2017    Osteopenia 5/10/2013    Screening for colorectal cancer 9/11/2015    Normal 6/ 2015---5 yrs    Vitamin D deficiency disease     Vitreous detachment of both eyes      Past Surgical History:   Procedure Laterality Date    ADENOIDECTOMY      ANKLE SURGERY      left     BLOCK-NERVE-MEDIAL BRANCH-LUMBAR Bilateral 1/20/2015    [facility]    BREAST LUMPECTOMY      CATARACT EXTRACTION      CATARACT EXTRACTION BILATERAL W/ ANTERIOR VITRECTOMY      COLECTOMY      s/p reanastemosis    COLON SURGERY      COLONOSCOPY N/A 6/9/2015    Performed by Khang Najera MD at [facility] ([address] [place] [address])    EYE EXAMINATION UNDER ANESTHESIA W/ RETINAL CRYOTHERAPY AND RETINAL LASER      HERNIA REPAIR      INJECTION-JOINT Right 3/2/2016    [facility]    INJECTION-JOINT Right 12/16/2014    [facility]    INJECTION-STEROID-EPIDURAL-LUMBAR N/A 8/30/2016    Performed by [facility] at [facility]    INJECTION-STEROID-EPIDURAL-LUMBAR N/A 11/3/2015    [facility]    INJECTION-STEROID-EPIDURAL-LUMBAR N/A 11/25/2014    [facility]    KIDNEY SURGERY      "dilate urethra tube"    NASAL POLYP EXCISION      NASAL SEPTUM SURGERY      RADIOFREQUENCY ABLATION OF LUMBAR MEDIAL BRANCH NERVE AT SINGLE LEVEL Right 7/6/2018    Procedure: RADIOFREQUENCY ABLATION, NERVE, MEDIAL BRANCH, LUMBAR, 1 LEVEL;  Surgeon: Vashti Poe MD;  Location: [facility];  Service: Pain Management;  Laterality: Right;  RIght Thermal RFA @ L3-5  (REPEAT)  [id number]  with IV Sedation    CONSENT NEEDED    RADIOFREQUENCY " ABLATION, NERVE, MEDIAL BRANCH, LUMBAR, 1 LEVEL Right 2018    [facility]    RADIOFREQUENCY THERMOCOAGULATION (RFTC)-NERVE-MEDIAN BRANCH-LUMBAR Right 2015    Performed by [facility] at [facility]    RADIOFREQUENCY THERMOCOAGULATION (RFTC)-NERVE-MEDIAN BRANCH-LUMBAR/COOLED Right 10/18/2016    Performed by [facility] at [facility]    s/p laser ou      TONSILLECTOMY      TRIGGER POINT INJECTION (TPI) N/A 2016    Performed by [facility]    TRIGGER POINT INJECTION (TPI) Right 3/2/2016    [facility] at [facility]    TUBAL LIGATION      UVULOPALATOPHARYNGOPLASTY  1990s     Family History   Problem Relation Age of Onset    Cancer Maternal Grandmother     Hyperlipidemia Mother     Hypertension Mother     Breast cancer Mother     Allergies Father     Allergies Sister     Allergies Brother     Heart disease Maternal Grandfather     Stroke Paternal Grandmother     Colon cancer Neg Hx     Ovarian cancer Neg Hx      Social History     Tobacco Use    Smoking status: Former Smoker     Last attempt to quit: 1977     Years since quittin.3    Smokeless tobacco: Never Used   Substance Use Topics    Alcohol use: Yes     Comment: once per week    Drug use: No        Review of Systems:  Review of Systems   Constitutional: Negative for chills and fever.   HENT: Negative for congestion and sore throat.    Respiratory: Negative for cough and shortness of breath.    Cardiovascular: Negative for chest pain and palpitations.   Gastrointestinal: Negative for abdominal distention, abdominal pain, constipation, diarrhea, nausea and vomiting.   Musculoskeletal: Positive for back pain. Negative for arthralgias.   Neurological: Negative for dizziness and headaches.   Psychiatric/Behavioral: Negative for agitation and confusion.       OBJECTIVE:     Vital Signs (Most Recent)  Temp: 98.1 °F  "(36.7 °C) (10/25/18 1056)  Pulse: 90 (10/25/18 1056)  BP: (!) 142/86 (10/25/18 1056)  5' 7" (1.702 m)  121.9 kg (268 lb 10.1 oz)     Physical Exam:  Physical Exam   Constitutional: She is oriented to person, place, and time. She appears well-developed and well-nourished. No distress.   Cardiovascular: Normal rate.   Pulmonary/Chest: Effort normal. No respiratory distress.   Abdominal: Soft. She exhibits no distension. There is no tenderness. There is no rebound.   Reducible hernia at superior aspect of incision, possible hernia at lower aspect of incision as well. DIfficult to palpate due to body habitus   Neurological: She is alert and oriented to person, place, and time.   Skin: Skin is warm and dry.   Psychiatric: She has a normal mood and affect. Her behavior is normal.         ASSESSMENT/PLAN:     Taty Max is a 63 y.o. female with incisional hernia x2.  Non smoker  Not on chronic steroid  A1c 6%  BMI 42    PLAN:    Encourage weight loss prior to surgery.  Discussed that we will use the same incision, it will be a large incision with a large piece of mesh and she will be admitted overnight.  Surgery scheduled for Nov 19, 2018; consents obtained.     I have personally performed a detailed history and physical examination on this patient. My findings are summarized in the resident's note included in the record.  "

## 2018-10-25 NOTE — PROGRESS NOTES
History & Physical    SUBJECTIVE:     History of Present Illness:  Taty Max is a 63 y.o. female with h/o HTN, obesity, and DM (last A1c 6.1)who presents for evaluation of incisional hernia. Prior epigastric hernia repair with mesh prior to 2008. In 2008 was diagnosed with colon cancer and underwent ex lap for colon resection as well as chemotherapy. Reports known hernia of lower aspect of incision, recently during heavy lifting felt a tearing sensation in superior aspect of incision. Now with bulge that increases in size with coughing. Feels better when wears abdominal binder. No obstructive symptoms.  No anticoagulation. Nonsmoker.    She presents today after CT abd/pelv.    Chief Complaint   Patient presents with    Follow-up       Review of patient's allergies indicates:   Allergen Reactions    Oxaliplatin Hives    Factive [gemifloxacin] Hives    Statins-hmg-coa reductase inhibitors      Memory w lipitor, muscles for 2 others    Daypro [oxaprozin] Rash    Latex Hives     Adhesives       Current Outpatient Medications   Medication Sig Dispense Refill    ACCU-CHEK SOFTCLIX LANCETS [facility] USE AS DIRECTED  12    albuterol 90 mcg/actuation inhaler Inhale 1-2 puffs into the lungs every 6 (six) hours as needed for Wheezing. 1 Inhaler 0    blood sugar diagnostic Strp 1 strip by Misc.(Non-Drug; Combo Route) route 2 (two) times daily. DISP GLUCOMETER OF CHOICE AND LANCETS AS WELL 100 strip 12    cyclobenzaprine (FLEXERIL) 5 MG tablet Take 1 tablet (5 mg total) by mouth nightly. 30 tablet 1    diazePAM (VALIUM) 5 MG tablet TAKE ONE TABLET BY MOUTH EVERY NIGHT AT BEDTIME AS NEEDED FOR ANXIETY AND spasms 30 tablet 3    fluocinonide 0.05% (LIDEX) 0.05 % cream Apply topically 2 (two) times daily. 30 g 5    fluorouracil (EFUDEX) 5 % cream Use hs for 2 weeks 40 g 3    fluticasone (FLONASE) 50 mcg/actuation nasal spray 2 sprays by Each Nare route once daily. 3 Bottle 12    hydroCHLOROthiazide (HYDRODIURIL) 12.5  MG Tab Take 1 tablet (12.5 mg total) by mouth once daily. 90 tablet 11    hydrochlorothiazide (MICROZIDE) 12.5 mg capsule       KRILL OIL ORAL Take 1 tablet by mouth once daily.      levocetirizine (XYZAL) 5 MG tablet TAKE 1 TABLET BY MOUTH EVERY EVENING 30 tablet 5    meloxicam (MOBIC) 7.5 MG tablet Take 1 tablet (7.5 mg total) by mouth once daily. 90 tablet 6    metaxalone (SKELAXIN) 800 MG tablet TAKE 1 TABLET BY MOUTH 4 TIMES DAILY AS NEEDED FOR PAIN 120 tablet 2    metFORMIN (GLUCOPHAGE-XR) 750 MG 24 hr tablet Take 1 tablet (750 mg total) by mouth every morning. 360 tablet 3    montelukast (SINGULAIR) 10 mg tablet Take 1 tablet (10 mg total) by mouth every evening. 90 tablet 3    multivitamin (THERAGRAN) per tablet Take 1 tablet by mouth once daily. 1 Tablet Oral Every day      olmesartan (BENICAR) 20 MG tablet Take 1 tablet (20 mg total) by mouth once daily. 90 tablet 3    ONETOUCH DELICA LANCETS 33 gauge Misc       penicillin v potassium (VEETID) 500 MG tablet Take 500 mg by mouth 4 (four) times daily. PT IS TAKING FOR DENTAL PURPOSES      traMADol (ULTRAM) 50 mg tablet TAKE 1 OR 2 TABLETS BY MOUTH EVERY 6 HOURS AS NEEDED FOR PAIN 60 tablet 3    traMADol (ULTRAM) 50 mg tablet Take 1 tablet (50 mg total) by mouth every 6 (six) hours as needed for Pain. 60 tablet 3    tretinoin (RETIN-A) 0.025 % gel Apply topically nightly. Gel Topical .  AAA face qhs 45 g 3    zolpidem (AMBIEN) 5 MG Tab Take 1 tablet (5 mg total) by mouth nightly. at bedtime. 30 tablet 5     No current facility-administered medications for this visit.        Past Medical History:   Diagnosis Date    Allergic rhinitis, seasonal 1/28/2013    Anxiety disorder 1/28/2013    Basal cell carcinoma     Bilateral retinal lattice degeneration     Cataract     Colon cancer 2008    s/p resection    DDD (degenerative disc disease), lumbar 11/25/2014    Diabetes mellitus type II, uncontrolled 7/25/2014    High myopia     Horseshoe  "retinal tear of both eyes     HTN (hypertension) 1/28/2013    Hypertension     Hypothyroidism 4/29/2014    Lumbar disc disease 7/25/2014    Metabolic syndrome 4/29/2014    Neuropathy due to chemotherapeutic drug 5/22/2017    Osteopenia 5/10/2013    Screening for colorectal cancer 9/11/2015    Normal 6/ 2015---5 yrs    Vitamin D deficiency disease     Vitreous detachment of both eyes      Past Surgical History:   Procedure Laterality Date    ADENOIDECTOMY      ANKLE SURGERY      left     BLOCK-NERVE-MEDIAL BRANCH-LUMBAR Bilateral 1/20/2015    [facility]    BREAST LUMPECTOMY      CATARACT EXTRACTION      CATARACT EXTRACTION BILATERAL W/ ANTERIOR VITRECTOMY      COLECTOMY      s/p reanastemosis    COLON SURGERY      COLONOSCOPY N/A 6/9/2015    Performed by Khang Najera MD at [facility] ([address] [place] [address])    EYE EXAMINATION UNDER ANESTHESIA W/ RETINAL CRYOTHERAPY AND RETINAL LASER      HERNIA REPAIR      INJECTION-JOINT Right 3/2/2016    [facility]    INJECTION-JOINT Right 12/16/2014    Performed by [facility] at [facility]    INJECTION-STEROID-EPIDURAL-LUMBAR N/A 8/30/2016    Performed by [facility] at [facility]    INJECTION-STEROID-EPIDURAL-LUMBAR N/A 11/3/2015    [facility]    INJECTION-STEROID-EPIDURAL-LUMBAR N/A 11/25/2014    Performed by [facility] at [facility]    KIDNEY SURGERY      "dilate urethra tube"    NASAL POLYP EXCISION      NASAL SEPTUM SURGERY      RADIOFREQUENCY ABLATION OF LUMBAR MEDIAL BRANCH NERVE AT SINGLE LEVEL Right 7/6/2018    Procedure: RADIOFREQUENCY ABLATION, NERVE, MEDIAL BRANCH, LUMBAR, 1 LEVEL;  Surgeon: Vashti Poe MD;  Location: [facility];  Service: Pain Management;  Laterality: Right;  RIght Thermal RFA @ L3-5  (REPEAT)  [id number]  with IV Sedation    CONSENT NEEDED    RADIOFREQUENCY " ABLATION, NERVE, MEDIAL BRANCH, LUMBAR, 1 LEVEL Right 2018    [facility] at [facility]    RADIOFREQUENCY THERMOCOAGULATION (RFTC)-NERVE-MEDIAN BRANCH-LUMBAR Right 2015    Performed by [facility] at [facility]    RADIOFREQUENCY THERMOCOAGULATION (RFTC)-NERVE-MEDIAN BRANCH-LUMBAR/COOLED Right 10/18/2016    Performed by [facility] at [facility]    s/p laser ou      TONSILLECTOMY      TRIGGER POINT INJECTION (TPI) N/A 2016    Performed by [facility] at [facility]    TRIGGER POINT INJECTION (TPI) Right 3/2/2016    Performed by [facility] at [facility]    TUBAL LIGATION      UVULOPALATOPHARYNGOPLASTY  1990s     Family History   Problem Relation Age of Onset    Cancer Maternal Grandmother     Hyperlipidemia Mother     Hypertension Mother     Breast cancer Mother     Allergies Father     Allergies Sister     Allergies Brother     Heart disease Maternal Grandfather     Stroke Paternal Grandmother     Colon cancer Neg Hx     Ovarian cancer Neg Hx      Social History     Tobacco Use    Smoking status: Former Smoker     Last attempt to quit: 1977     Years since quittin.3    Smokeless tobacco: Never Used   Substance Use Topics    Alcohol use: Yes     Comment: once per week    Drug use: No        Review of Systems:  Review of Systems   Constitutional: Negative for chills and fever.   HENT: Negative for congestion and sore throat.    Respiratory: Negative for cough and shortness of breath.    Cardiovascular: Negative for chest pain and palpitations.   Gastrointestinal: Negative for abdominal distention, abdominal pain, constipation, diarrhea, nausea and vomiting.   Musculoskeletal: Positive for back pain. Negative for arthralgias.   Neurological: Negative for dizziness and headaches.   Psychiatric/Behavioral: Negative for agitation and confusion.       OBJECTIVE:     Vital Signs (Most Recent)  Temp: 98.1 °F  "(36.7 °C) (10/25/18 1056)  Pulse: 90 (10/25/18 1056)  BP: (!) 142/86 (10/25/18 1056)  5' 7" (1.702 m)  121.9 kg (268 lb 10.1 oz)     Physical Exam:  Physical Exam   Constitutional: She is oriented to person, place, and time. She appears well-developed and well-nourished. No distress.   Cardiovascular: Normal rate.   Pulmonary/Chest: Effort normal. No respiratory distress.   Abdominal: Soft. She exhibits no distension. There is no tenderness. There is no rebound.   Reducible hernia at superior aspect of incision, possible hernia at lower aspect of incision as well. DIfficult to palpate due to body habitus   Neurological: She is alert and oriented to person, place, and time.   Skin: Skin is warm and dry.   Psychiatric: She has a normal mood and affect. Her behavior is normal.         ASSESSMENT/PLAN:     Taty Max is a 63 y.o. female with incisional hernia x2.  Non smoker  Not on chronic steroid  A1c 6%  BMI 42    PLAN:    Encourage weight loss prior to surgery.  Discussed that we will use the same incision, it will be a large incision with a large piece of mesh and she will be admitted overnight.  Surgery scheduled for Nov 19, 2018; consents obtained.     I have personally performed a detailed history and physical examination on this patient. My findings are summarized in the resident's note included in the record.  "

## 2018-11-08 DIAGNOSIS — K43.2 INCISIONAL HERNIA, WITHOUT OBSTRUCTION OR GANGRENE: Primary | ICD-10-CM

## 2018-11-12 DIAGNOSIS — K43.2 INCISIONAL HERNIA: ICD-10-CM

## 2018-11-12 RX ORDER — SODIUM CHLORIDE 9 MG/ML
INJECTION, SOLUTION INTRAVENOUS CONTINUOUS
Status: CANCELLED | OUTPATIENT
Start: 2018-11-12

## 2018-11-13 RX ORDER — MELOXICAM 7.5 MG/1
TABLET ORAL
Qty: 90 TABLET | Refills: 3 | Status: SHIPPED | OUTPATIENT
Start: 2018-11-13 | End: 2019-02-04 | Stop reason: SDUPTHER

## 2018-11-13 RX ORDER — METFORMIN HYDROCHLORIDE 750 MG/1
TABLET, EXTENDED RELEASE ORAL
Qty: 90 TABLET | Refills: 3 | Status: SHIPPED | OUTPATIENT
Start: 2018-11-13 | End: 2019-05-13 | Stop reason: SDUPTHER

## 2018-11-13 RX ORDER — OLMESARTAN MEDOXOMIL 20 MG/1
TABLET ORAL
Qty: 90 TABLET | Refills: 3 | Status: SHIPPED | OUTPATIENT
Start: 2018-11-13 | End: 2019-09-25 | Stop reason: SDUPTHER

## 2018-11-15 ENCOUNTER — TELEPHONE (OUTPATIENT)
Dept: SURGERY | Facility: CLINIC | Age: 63
End: 2018-11-15

## 2018-11-15 NOTE — TELEPHONE ENCOUNTER
Pre op phone call completed.  Reviewed the arrival time of 0600, [facility] location, NPO after MN and have someone to drive her home the next day.  She verbalized understanding.

## 2018-11-16 ENCOUNTER — PATIENT MESSAGE (OUTPATIENT)
Dept: SURGERY | Facility: CLINIC | Age: 63
End: 2018-11-16

## 2018-11-16 ENCOUNTER — TELEPHONE (OUTPATIENT)
Dept: SURGERY | Facility: CLINIC | Age: 63
End: 2018-11-16

## 2018-11-16 ENCOUNTER — PATIENT MESSAGE (OUTPATIENT)
Dept: FAMILY MEDICINE | Facility: CLINIC | Age: 63
End: 2018-11-16

## 2018-11-16 ENCOUNTER — ANESTHESIA EVENT (OUTPATIENT)
Dept: SURGERY | Facility: HOSPITAL | Age: 63
DRG: 354 | End: 2018-11-16
Payer: COMMERCIAL

## 2018-11-16 NOTE — TELEPHONE ENCOUNTER
Left messages on both home and pt's cell phone voicemails, and through [facility] for her to arrive at [facility] on Monday 11/19/18 at 5:30am for surgery with Dr. Minor.  This is a revised arrival time.

## 2018-11-16 NOTE — ANESTHESIA PREPROCEDURE EVALUATION
[facility]  Anesthesia Pre-Operative Evaluation         Patient Name: Taty Max  YOB: 1955  MRN: [id number]    SUBJECTIVE:     Pre-operative evaluation for Procedure(s) (LRB):  REPAIR, HERNIA, INCISIONAL OR VENTRAL, WITHOUT HISTORY OF PRIOR REPAIR w/mesh (N/A)     11/16/2018    Taty Max is a 63 y.o. female w/ a significant PMHx of HTN, HLD, morbid obesity, JEREMY (CPAP),  DM type 2 (last A1c 6.1), chronic LBP, anxiety and incisional hernia.    Patient now presents for the above procedure(s).    LDA: None documented.    Prev airway: None documented.    Drips: None documented.      Patient Active Problem List   Diagnosis    Hypertension    Salmonella gastroenteritis    Anxiety disorder    Vitamin D deficiency disease    HTN (hypertension)    Allergic rhinitis, seasonal    Hyperlipidemia    Osteopenia    Horseshoe retinal tear, both eyes    Lattice degeneration, bilateral    Posterior vitreous detachment, both eyes    Posterior capsular opacification, bilateral    After-cataract, obscuring vision - Both Eyes    Vitreous detachment - Both Eyes    Idiopathic hypertension    High myopia - Both Eyes    Post-operative state - Left Eye    Lattice degeneration of both retinas - Both Eyes    Elevated blood pressure reading    Dizziness    Metabolic syndrome    Hypothyroidism    Pseudophakia    Refractive error    Diabetes mellitus type II, uncontrolled    Lumbar disc disease    DDD (degenerative disc disease), lumbar    Sacroiliitis    Lumbar spondylosis    Lumbar facet arthropathy    Screening    Screening for colorectal cancer    Neuropathy due to chemotherapeutic drug    Chronic bilateral low back pain with bilateral sciatica    Abnormal liver function tests       Review of patient's allergies indicates:   Allergen Reactions    Oxaliplatin Hives    Factive [gemifloxacin] Hives    Statins-hmg-coa reductase inhibitors      Memory w lipitor, muscles  for 2 others    Daypro [oxaprozin] Rash    Latex Hives     Adhesives       Current Inpatient Medications:      No current facility-administered medications on file prior to encounter.      Current Outpatient Medications on File Prior to Encounter   Medication Sig Dispense Refill    cyclobenzaprine (FLEXERIL) 5 MG tablet Take 1 tablet (5 mg total) by mouth nightly. 30 tablet 1    diazePAM (VALIUM) 5 MG tablet TAKE ONE TABLET BY MOUTH EVERY NIGHT AT BEDTIME AS NEEDED FOR ANXIETY AND spasms 30 tablet 3    hydroCHLOROthiazide (HYDRODIURIL) 12.5 MG Tab Take 1 tablet (12.5 mg total) by mouth once daily. 90 tablet 11    hydrochlorothiazide (MICROZIDE) 12.5 mg capsule       KRILL OIL ORAL Take 1 tablet by mouth once daily.      montelukast (SINGULAIR) 10 mg tablet Take 1 tablet (10 mg total) by mouth every evening. 90 tablet 3    multivitamin (THERAGRAN) per tablet Take 1 tablet by mouth once daily. 1 Tablet Oral Every day      traMADol (ULTRAM) 50 mg tablet Take 1 tablet (50 mg total) by mouth every 6 (six) hours as needed for Pain. 60 tablet 3    zolpidem (AMBIEN) 5 MG Tab Take 1 tablet (5 mg total) by mouth nightly. at bedtime. 30 tablet 5    ACCU-CHEK SOFTCLIX LANCETS [facility] [place] AS DIRECTED  12    albuterol 90 mcg/actuation inhaler Inhale 1-2 puffs into the lungs every 6 (six) hours as needed for Wheezing. 1 Inhaler 0    blood sugar diagnostic Strp 1 strip by Misc.(Non-Drug; Combo Route) route 2 (two) times daily. DISP GLUCOMETER OF CHOICE AND LANCETS AS WELL 100 strip 12    fluocinonide 0.05% (LIDEX) 0.05 % cream Apply topically 2 (two) times daily. 30 g 5    fluorouracil (EFUDEX) 5 % cream Use hs for 2 weeks 40 g 3    fluticasone (FLONASE) 50 mcg/actuation nasal spray 2 sprays by Each Nare route once daily. 3 Bottle 12    metaxalone (SKELAXIN) 800 MG tablet TAKE 1 TABLET BY MOUTH 4 TIMES DAILY AS NEEDED FOR PAIN 120 tablet 2    ONETOUCH DELICA LANCETS 33 gauge Misc       penicillin v potassium  "(VEETID) 500 MG tablet Take 500 mg by mouth 4 (four) times daily. PT IS TAKING FOR DENTAL PURPOSES      traMADol (ULTRAM) 50 mg tablet TAKE 1 OR 2 TABLETS BY MOUTH EVERY 6 HOURS AS NEEDED FOR PAIN 60 tablet 3    tretinoin (RETIN-A) 0.025 % gel Apply topically nightly. Gel Topical .  AAA face qhs 45 g 3       Past Surgical History:   Procedure Laterality Date    ADENOIDECTOMY      ANKLE SURGERY      left     BLOCK-NERVE-MEDIAL BRANCH-LUMBAR Bilateral 1/20/2015    Performed by [facility] at [facility]    BREAST LUMPECTOMY      CATARACT EXTRACTION      CATARACT EXTRACTION BILATERAL W/ ANTERIOR VITRECTOMY      COLECTOMY      s/p reanastemosis    COLON SURGERY      COLONOSCOPY N/A 6/9/2015    Performed by Khang Najera MD at [facility] ([address] [place] [address])    EYE EXAMINATION UNDER ANESTHESIA W/ RETINAL CRYOTHERAPY AND RETINAL LASER      HERNIA REPAIR      INJECTION-JOINT Right 3/2/2016    Performed [facility]    INJECTION-JOINT Right 12/16/2014    Performed by [facility] at [facility]    INJECTION-STEROID-EPIDURAL-LUMBAR N/A 8/30/2016    Performed by [facility] Poe [facility] at [facility]    INJECTION-STEROID-EPIDURAL-LUMBAR N/A 11/3/2015    Performed by [facility]ner [facility] at [facility]    INJECTION-STEROID-EPIDURAL-LUMBAR N/A 11/25/2014    Performed by [facility] at [facility]    KIDNEY SURGERY      "dilate urethra tube"    NASAL POLYP EXCISION      NASAL SEPTUM SURGERY      RADIOFREQUENCY ABLATION OF LUMBAR MEDIAL BRANCH NERVE AT SINGLE LEVEL Right 7/6/2018    Procedure: RADIOFREQUENCY ABLATION, NERVE, MEDIAL BRANCH, LUMBAR, 1 LEVEL;  Surgeon: Vashti Poe MD;  Location: [facility];  Service: Pain Management;  Laterality: Right;  RIght Thermal RFA @ L3-5  (REPEAT)  [id number]  with IV Sedation    CONSENT NEEDED    RADIOFREQUENCY ABLATION, NERVE, MEDIAL BRANCH, LUMBAR, 1 LEVEL Right 7/6/2018    Performed by Vashti Poe, " MD at [facility]    RADIOFREQUENCY THERMOCOAGULATION (RFTC)-NERVE-MEDIAN BRANCH-LUMBAR Right 2015    Performed by [facility] at [facility]    RADIOFREQUENCY THERMOCOAGULATION (RFTC)-NERVE-MEDIAN BRANCH-LUMBAR/COOLED Right 10/18/2016    Performed by [facility] at [facility]    s/p laser ou      TONSILLECTOMY      TRIGGER POINT INJECTION (TPI) N/A 2016    Performed by [facility] at [facility]    TRIGGER POINT INJECTION (TPI) Right 3/2/2016    Performed by [facility] at [facility]    TUBAL LIGATION      UVULOPALATOPHARYNGOPLASTY         Social History     Socioeconomic History    Marital status:      Spouse name: Not on file    Number of children: Not on file    Years of education: Not on file    Highest education level: Not on file   Social Needs    Financial resource strain: Not on file    Food insecurity - worry: Not on file    Food insecurity - inability: Not on file    Transportation needs - medical: Not on file    Transportation needs - non-medical: Not on file   Occupational History     Employer: [employer]   Tobacco Use    Smoking status: Former Smoker     Last attempt to quit: 1977     Years since quittin.3    Smokeless tobacco: Never Used   Substance and Sexual Activity    Alcohol use: Yes     Comment: once per week    Drug use: No    Sexual activity: Yes     Partners: Male     Birth control/protection: Post-menopausal   Other Topics Concern    Are you pregnant or think you may be? Not Asked    Breast-feeding Not Asked   Social History Narrative    Not on file       OBJECTIVE:     Vital Signs Range (Last 24H):         Significant Labs:  Lab Results   Component Value Date    WBC 6.04 2018    HGB 13.3 2018    HCT 42.0 2018     2018    CHOL 252 (H) 2018    TRIG 265 (H) 2018    HDL 65 2018    ALT 66 (H) 2018    AST 40 2018      07/05/2018    K 4.7 07/05/2018     07/05/2018    CREATININE 1.0 10/19/2018    BUN 18 07/05/2018    CO2 27 07/05/2018    TSH 1.501 07/05/2018    INR 0.9 06/29/2010    HGBA1C 6.1 (H) 07/05/2018       Diagnostic Studies: No relevant studies.    EKG: No recent studies available.    2D ECHO:  No results found for this or any previous visit.      ASSESSMENT/PLAN:       Anesthesia Evaluation    I have reviewed the Patient Summary Reports.      I have reviewed the Medications.     Review of Systems  Anesthesia Hx:  Hx of Anesthetic complications HX of shallow respirations postop-requests humidified O2 to avoid sinus headaches. History of prior surgery of interest to airway management or planning: Previous anesthesia: General Family Hx of Anesthesia complications: Family Anesthesia Complications are MOM-HX of PONV Personal Hx of Anesthesia complications, Post-Operative Nausea/Vomiting   Social:  Non-Smoker   Cardiovascular:   Hypertension hyperlipidemia    Pulmonary:   Sleep Apnea, CPAP  Obstructive Sleep Apnea (JEREMY), CPAP used.   Musculoskeletal:   Arthritis   Spine Disorders: Chronic Pain    Endocrine:   Diabetes    Psych:   anxiety          Physical Exam  General:  Obesity    Airway/Jaw/Neck:  Airway Findings: Mouth Opening: Normal Tongue: Normal  General Airway Assessment: Adult  Mallampati: II  Improves to II with phonation.  TM Distance: Normal, at least 6 cm  Jaw/Neck Findings:  Micrognathia: Negative    Neck ROM: Normal ROM  Neck Findings:  Girth Increased      Dental:  Dental Findings: In tact   Chest/Lungs:  Chest/Lungs Findings: Clear to auscultation, Normal Respiratory Rate, Decreased Breath Sounds Bilateral     Heart/Vascular:  Heart Findings: Rate: Normal  Rhythm: Regular Rhythm  Sounds: Normal        Mental Status:  Mental Status Findings:  Cooperative, Alert and Oriented         Anesthesia Plan  Type of Anesthesia, risks & benefits discussed:  Anesthesia Type:  general  Patient's Preference: as  indicated  Intra-op Monitoring Plan: standard ASA monitors  Intra-op Monitoring Plan Comments:   Post Op Pain Control Plan: per primary service following discharge from PACU, IV/PO Opioids PRN and multimodal analgesia  Post Op Pain Control Plan Comments:   Induction:   IV  Beta Blocker:  Patient is not currently on a Beta-Blocker (No further documentation required).       Informed Consent: Patient understands risks and agrees with Anesthesia plan.  Questions answered. Anesthesia consent signed with patient.  ASA Score: 3     Day of Surgery Review of History & Physical:  There are no significant changes.  H&P update referred to the provider.     Anesthesia Plan Notes: Discussed need for inhalational agents during maintenance of general anesthesia, agrees to proceed. PONV plan of care discussed with patient and . Reassurance given.        Ready For Surgery From Anesthesia Perspective.

## 2018-11-17 RX ORDER — MONTELUKAST SODIUM 10 MG/1
10 TABLET ORAL NIGHTLY
Qty: 90 TABLET | Refills: 3 | Status: SHIPPED | OUTPATIENT
Start: 2018-11-17 | End: 2019-09-25 | Stop reason: SDUPTHER

## 2018-11-19 ENCOUNTER — HOSPITAL ENCOUNTER (INPATIENT)
Facility: HOSPITAL | Age: 63
LOS: 3 days | Discharge: HOME | DRG: 354 | End: 2018-11-22
Attending: SURGERY | Admitting: SURGERY
Payer: COMMERCIAL

## 2018-11-19 ENCOUNTER — ANESTHESIA (OUTPATIENT)
Dept: SURGERY | Facility: HOSPITAL | Age: 63
DRG: 354 | End: 2018-11-19
Payer: COMMERCIAL

## 2018-11-19 DIAGNOSIS — K43.2 INCISIONAL HERNIA: Primary | ICD-10-CM

## 2018-11-19 PROBLEM — E66.01 MORBID OBESITY: Status: ACTIVE | Noted: 2018-11-19

## 2018-11-19 LAB
ESTIMATED AVG GLUCOSE: 143 MG/DL
HBA1C MFR BLD HPLC: 6.6 %
POCT GLUCOSE: 128 MG/DL (ref 70–110)
POCT GLUCOSE: 144 MG/DL (ref 70–110)
POCT GLUCOSE: 160 MG/DL (ref 70–110)
POCT GLUCOSE: 161 MG/DL (ref 70–110)
POCT GLUCOSE: 167 MG/DL (ref 70–110)

## 2018-11-19 PROCEDURE — 63600175 PHARM REV CODE 636 W HCPCS: Performed by: STUDENT IN AN ORGANIZED HEALTH CARE EDUCATION/TRAINING PROGRAM

## 2018-11-19 PROCEDURE — 25000003 PHARM REV CODE 250: Performed by: SURGERY

## 2018-11-19 PROCEDURE — 37000008 HC ANESTHESIA 1ST 15 MINUTES: Performed by: SURGERY

## 2018-11-19 PROCEDURE — 71000039 HC RECOVERY, EACH ADD'L HOUR: Performed by: SURGERY

## 2018-11-19 PROCEDURE — 88302 TISSUE EXAM BY PATHOLOGIST: CPT | Mod: 26,,, | Performed by: PATHOLOGY

## 2018-11-19 PROCEDURE — 36000707: Performed by: SURGERY

## 2018-11-19 PROCEDURE — 63600175 PHARM REV CODE 636 W HCPCS: Performed by: SURGERY

## 2018-11-19 PROCEDURE — 82962 GLUCOSE BLOOD TEST: CPT | Performed by: SURGERY

## 2018-11-19 PROCEDURE — 99900035 HC TECH TIME PER 15 MIN (STAT)

## 2018-11-19 PROCEDURE — 94799 UNLISTED PULMONARY SVC/PX: CPT

## 2018-11-19 PROCEDURE — 25000003 PHARM REV CODE 250: Performed by: STUDENT IN AN ORGANIZED HEALTH CARE EDUCATION/TRAINING PROGRAM

## 2018-11-19 PROCEDURE — 94760 N-INVAS EAR/PLS OXIMETRY 1: CPT

## 2018-11-19 PROCEDURE — 36415 COLL VENOUS BLD VENIPUNCTURE: CPT

## 2018-11-19 PROCEDURE — 83036 HEMOGLOBIN GLYCOSYLATED A1C: CPT

## 2018-11-19 PROCEDURE — 36000706: Performed by: SURGERY

## 2018-11-19 PROCEDURE — 37000009 HC ANESTHESIA EA ADD 15 MINS: Performed by: SURGERY

## 2018-11-19 PROCEDURE — 94761 N-INVAS EAR/PLS OXIMETRY MLT: CPT

## 2018-11-19 PROCEDURE — D9220A PRA ANESTHESIA: Mod: ,,, | Performed by: ANESTHESIOLOGY

## 2018-11-19 PROCEDURE — 94660 CPAP INITIATION&MGMT: CPT

## 2018-11-19 PROCEDURE — 27000221 HC OXYGEN, UP TO 24 HOURS

## 2018-11-19 PROCEDURE — 49560 PR REPAIR INCISIONAL HERNIA,REDUCIBLE: CPT | Mod: ,,, | Performed by: SURGERY

## 2018-11-19 PROCEDURE — 88302 TISSUE EXAM BY PATHOLOGIST: CPT | Performed by: PATHOLOGY

## 2018-11-19 PROCEDURE — 27000190 HC CPAP FULL FACE MASK W/VALVE

## 2018-11-19 PROCEDURE — 0WUF0JZ SUPPLEMENT ABDOMINAL WALL WITH SYNTHETIC SUBSTITUTE, OPEN APPROACH: ICD-10-PCS | Performed by: SURGERY

## 2018-11-19 PROCEDURE — S0028 INJECTION, FAMOTIDINE, 20 MG: HCPCS | Performed by: ANESTHESIOLOGY

## 2018-11-19 PROCEDURE — C1781 MESH (IMPLANTABLE): HCPCS | Performed by: SURGERY

## 2018-11-19 PROCEDURE — 25000003 PHARM REV CODE 250: Performed by: PHYSICIAN ASSISTANT

## 2018-11-19 PROCEDURE — 49568 PR IMPLANT MESH HERNIA REPAIR/DEBRIDEMENT CLOSURE: CPT | Mod: ,,, | Performed by: SURGERY

## 2018-11-19 PROCEDURE — 71000033 HC RECOVERY, INTIAL HOUR: Performed by: SURGERY

## 2018-11-19 PROCEDURE — 25000003 PHARM REV CODE 250: Performed by: ANESTHESIOLOGY

## 2018-11-19 DEVICE — MESH PROCEED RECTANGLE 10X14: Type: IMPLANTABLE DEVICE | Site: ABDOMEN | Status: FUNCTIONAL

## 2018-11-19 RX ORDER — IPRATROPIUM BROMIDE AND ALBUTEROL SULFATE 2.5; .5 MG/3ML; MG/3ML
3 SOLUTION RESPIRATORY (INHALATION) EVERY 4 HOURS PRN
Status: CANCELLED | OUTPATIENT
Start: 2018-11-19

## 2018-11-19 RX ORDER — NEOSTIGMINE METHYLSULFATE 1 MG/ML
INJECTION, SOLUTION INTRAVENOUS
Status: DISCONTINUED | OUTPATIENT
Start: 2018-11-19 | End: 2018-12-03

## 2018-11-19 RX ORDER — SODIUM CHLORIDE 9 MG/ML
INJECTION, SOLUTION INTRAVENOUS CONTINUOUS
Status: DISCONTINUED | OUTPATIENT
Start: 2018-11-19 | End: 2018-11-19

## 2018-11-19 RX ORDER — PROPOFOL 10 MG/ML
VIAL (ML) INTRAVENOUS
Status: DISCONTINUED | OUTPATIENT
Start: 2018-11-19 | End: 2018-12-03

## 2018-11-19 RX ORDER — SODIUM CHLORIDE 9 MG/ML
INJECTION, SOLUTION INTRAVENOUS CONTINUOUS
Status: DISCONTINUED | OUTPATIENT
Start: 2018-11-19 | End: 2018-11-20

## 2018-11-19 RX ORDER — SODIUM CHLORIDE 0.9 % (FLUSH) 0.9 %
3 SYRINGE (ML) INJECTION
Status: DISCONTINUED | OUTPATIENT
Start: 2018-11-19 | End: 2018-11-19

## 2018-11-19 RX ORDER — ONDANSETRON 2 MG/ML
4 INJECTION INTRAMUSCULAR; INTRAVENOUS EVERY 8 HOURS PRN
Status: DISCONTINUED | OUTPATIENT
Start: 2018-11-19 | End: 2018-11-22 | Stop reason: HOSPADM

## 2018-11-19 RX ORDER — MEPERIDINE HYDROCHLORIDE 50 MG/ML
12.5 INJECTION INTRAMUSCULAR; INTRAVENOUS; SUBCUTANEOUS ONCE AS NEEDED
Status: DISCONTINUED | OUTPATIENT
Start: 2018-11-19 | End: 2018-11-19

## 2018-11-19 RX ORDER — ONDANSETRON 2 MG/ML
4 INJECTION INTRAMUSCULAR; INTRAVENOUS DAILY PRN
Status: DISCONTINUED | OUTPATIENT
Start: 2018-11-19 | End: 2018-11-19

## 2018-11-19 RX ORDER — DEXAMETHASONE SODIUM PHOSPHATE 4 MG/ML
INJECTION, SOLUTION INTRA-ARTICULAR; INTRALESIONAL; INTRAMUSCULAR; INTRAVENOUS; SOFT TISSUE
Status: DISCONTINUED | OUTPATIENT
Start: 2018-11-19 | End: 2018-12-03

## 2018-11-19 RX ORDER — IBUPROFEN 200 MG
16 TABLET ORAL
Status: DISCONTINUED | OUTPATIENT
Start: 2018-11-19 | End: 2018-11-22 | Stop reason: HOSPADM

## 2018-11-19 RX ORDER — RAMELTEON 8 MG/1
8 TABLET ORAL NIGHTLY PRN
Status: DISCONTINUED | OUTPATIENT
Start: 2018-11-19 | End: 2018-11-22 | Stop reason: HOSPADM

## 2018-11-19 RX ORDER — LORAZEPAM 2 MG/ML
0.25 INJECTION INTRAMUSCULAR ONCE AS NEEDED
Status: DISCONTINUED | OUTPATIENT
Start: 2018-11-19 | End: 2018-11-19

## 2018-11-19 RX ORDER — ROCURONIUM BROMIDE 10 MG/ML
INJECTION, SOLUTION INTRAVENOUS
Status: DISCONTINUED | OUTPATIENT
Start: 2018-11-19 | End: 2018-12-03

## 2018-11-19 RX ORDER — FENTANYL CITRATE 50 UG/ML
25 INJECTION, SOLUTION INTRAMUSCULAR; INTRAVENOUS EVERY 5 MIN PRN
Status: DISCONTINUED | OUTPATIENT
Start: 2018-11-19 | End: 2018-11-19 | Stop reason: HOSPADM

## 2018-11-19 RX ORDER — ENOXAPARIN SODIUM 100 MG/ML
40 INJECTION SUBCUTANEOUS EVERY 24 HOURS
Status: DISCONTINUED | OUTPATIENT
Start: 2018-11-20 | End: 2018-11-22 | Stop reason: HOSPADM

## 2018-11-19 RX ORDER — INSULIN ASPART 100 [IU]/ML
1-10 INJECTION, SOLUTION INTRAVENOUS; SUBCUTANEOUS
Status: DISCONTINUED | OUTPATIENT
Start: 2018-11-19 | End: 2018-11-22 | Stop reason: HOSPADM

## 2018-11-19 RX ORDER — IBUPROFEN 200 MG
24 TABLET ORAL
Status: DISCONTINUED | OUTPATIENT
Start: 2018-11-19 | End: 2018-11-22 | Stop reason: HOSPADM

## 2018-11-19 RX ORDER — HYDROMORPHONE HCL IN 0.9% NACL 6 MG/30 ML
PATIENT CONTROLLED ANALGESIA SYRINGE INTRAVENOUS
Status: DISPENSED
Start: 2018-11-19 | End: 2018-11-19

## 2018-11-19 RX ORDER — HYDROMORPHONE HYDROCHLORIDE 1 MG/ML
0.2 INJECTION, SOLUTION INTRAMUSCULAR; INTRAVENOUS; SUBCUTANEOUS EVERY 5 MIN PRN
Status: DISCONTINUED | OUTPATIENT
Start: 2018-11-19 | End: 2018-11-19 | Stop reason: HOSPADM

## 2018-11-19 RX ORDER — GLYCOPYRROLATE 0.2 MG/ML
INJECTION INTRAMUSCULAR; INTRAVENOUS
Status: DISCONTINUED | OUTPATIENT
Start: 2018-11-19 | End: 2018-12-03

## 2018-11-19 RX ORDER — ONDANSETRON 2 MG/ML
INJECTION INTRAMUSCULAR; INTRAVENOUS
Status: DISCONTINUED | OUTPATIENT
Start: 2018-11-19 | End: 2018-12-03

## 2018-11-19 RX ORDER — KETOROLAC TROMETHAMINE 30 MG/ML
30 INJECTION, SOLUTION INTRAMUSCULAR; INTRAVENOUS EVERY 6 HOURS
Status: COMPLETED | OUTPATIENT
Start: 2018-11-19 | End: 2018-11-22

## 2018-11-19 RX ORDER — HYDROCHLOROTHIAZIDE 12.5 MG/1
12.5 TABLET ORAL DAILY
Status: DISCONTINUED | OUTPATIENT
Start: 2018-11-20 | End: 2018-11-22 | Stop reason: HOSPADM

## 2018-11-19 RX ORDER — CEFAZOLIN SODIUM 1 G/3ML
2 INJECTION, POWDER, FOR SOLUTION INTRAMUSCULAR; INTRAVENOUS
Status: COMPLETED | OUTPATIENT
Start: 2018-11-19 | End: 2018-11-20

## 2018-11-19 RX ORDER — ACETAMINOPHEN 10 MG/ML
INJECTION, SOLUTION INTRAVENOUS
Status: DISCONTINUED | OUTPATIENT
Start: 2018-11-19 | End: 2018-12-03

## 2018-11-19 RX ORDER — BACITRACIN 50000 [IU]/1
INJECTION, POWDER, FOR SOLUTION INTRAMUSCULAR
Status: DISCONTINUED | OUTPATIENT
Start: 2018-11-19 | End: 2018-11-19 | Stop reason: HOSPADM

## 2018-11-19 RX ORDER — NALOXONE HCL 0.4 MG/ML
0.02 VIAL (ML) INJECTION
Status: DISCONTINUED | OUTPATIENT
Start: 2018-11-19 | End: 2018-11-20

## 2018-11-19 RX ORDER — LABETALOL HCL 20 MG/4 ML
10 SYRINGE (ML) INTRAVENOUS ONCE
Status: COMPLETED | OUTPATIENT
Start: 2018-11-19 | End: 2018-11-19

## 2018-11-19 RX ORDER — PROMETHAZINE HYDROCHLORIDE 25 MG/1
25 SUPPOSITORY RECTAL EVERY 6 HOURS PRN
Status: DISCONTINUED | OUTPATIENT
Start: 2018-11-19 | End: 2018-11-19

## 2018-11-19 RX ORDER — MONTELUKAST SODIUM 10 MG/1
10 TABLET ORAL NIGHTLY
Status: DISCONTINUED | OUTPATIENT
Start: 2018-11-19 | End: 2018-11-22 | Stop reason: HOSPADM

## 2018-11-19 RX ORDER — CEFAZOLIN SODIUM 1 G/3ML
INJECTION, POWDER, FOR SOLUTION INTRAMUSCULAR; INTRAVENOUS
Status: DISCONTINUED | OUTPATIENT
Start: 2018-11-19 | End: 2018-12-03

## 2018-11-19 RX ORDER — FAMOTIDINE 10 MG/ML
20 INJECTION INTRAVENOUS ONCE
Status: COMPLETED | OUTPATIENT
Start: 2018-11-19 | End: 2018-11-19

## 2018-11-19 RX ORDER — HYDROMORPHONE HCL IN 0.9% NACL 6 MG/30 ML
PATIENT CONTROLLED ANALGESIA SYRINGE INTRAVENOUS CONTINUOUS
Status: DISCONTINUED | OUTPATIENT
Start: 2018-11-19 | End: 2018-11-20

## 2018-11-19 RX ORDER — LIDOCAINE HYDROCHLORIDE 10 MG/ML
1 INJECTION, SOLUTION EPIDURAL; INFILTRATION; INTRACAUDAL; PERINEURAL ONCE
Status: COMPLETED | OUTPATIENT
Start: 2018-11-19 | End: 2018-11-19

## 2018-11-19 RX ORDER — PROMETHAZINE HYDROCHLORIDE AND CODEINE PHOSPHATE 6.25; 1 MG/5ML; MG/5ML
5 SOLUTION ORAL EVERY 8 HOURS
Status: DISCONTINUED | OUTPATIENT
Start: 2018-11-19 | End: 2018-11-22 | Stop reason: HOSPADM

## 2018-11-19 RX ORDER — ACETAMINOPHEN 10 MG/ML
1000 INJECTION, SOLUTION INTRAVENOUS EVERY 8 HOURS
Status: COMPLETED | OUTPATIENT
Start: 2018-11-19 | End: 2018-11-20

## 2018-11-19 RX ORDER — POLYETHYLENE GLYCOL 3350 17 G/17G
17 POWDER, FOR SOLUTION ORAL DAILY
Status: DISCONTINUED | OUTPATIENT
Start: 2018-11-20 | End: 2018-11-22 | Stop reason: HOSPADM

## 2018-11-19 RX ORDER — AMOXICILLIN 250 MG
1 CAPSULE ORAL 2 TIMES DAILY
Status: DISCONTINUED | OUTPATIENT
Start: 2018-11-20 | End: 2018-11-22 | Stop reason: HOSPADM

## 2018-11-19 RX ORDER — FENTANYL CITRATE 50 UG/ML
INJECTION, SOLUTION INTRAMUSCULAR; INTRAVENOUS
Status: DISCONTINUED | OUTPATIENT
Start: 2018-11-19 | End: 2018-12-03

## 2018-11-19 RX ORDER — MIDAZOLAM HYDROCHLORIDE 1 MG/ML
INJECTION, SOLUTION INTRAMUSCULAR; INTRAVENOUS
Status: DISCONTINUED | OUTPATIENT
Start: 2018-11-19 | End: 2018-12-03

## 2018-11-19 RX ORDER — LIDOCAINE HCL/PF 100 MG/5ML
SYRINGE (ML) INTRAVENOUS
Status: DISCONTINUED | OUTPATIENT
Start: 2018-11-19 | End: 2018-12-03

## 2018-11-19 RX ORDER — CEFAZOLIN SODIUM 1 G/3ML
2 INJECTION, POWDER, FOR SOLUTION INTRAMUSCULAR; INTRAVENOUS
Status: DISCONTINUED | OUTPATIENT
Start: 2018-11-19 | End: 2018-11-19

## 2018-11-19 RX ORDER — GLUCAGON 1 MG
1 KIT INJECTION
Status: DISCONTINUED | OUTPATIENT
Start: 2018-11-19 | End: 2018-11-22 | Stop reason: HOSPADM

## 2018-11-19 RX ADMIN — SODIUM CHLORIDE 500 ML: 0.9 INJECTION, SOLUTION INTRAVENOUS at 05:11

## 2018-11-19 RX ADMIN — INSULIN ASPART 1 UNITS: 100 INJECTION, SOLUTION INTRAVENOUS; SUBCUTANEOUS at 11:11

## 2018-11-19 RX ADMIN — KETOROLAC TROMETHAMINE 30 MG: 30 INJECTION, SOLUTION INTRAMUSCULAR at 11:11

## 2018-11-19 RX ADMIN — Medication: at 07:11

## 2018-11-19 RX ADMIN — SODIUM CHLORIDE: 0.9 INJECTION, SOLUTION INTRAVENOUS at 06:11

## 2018-11-19 RX ADMIN — FENTANYL CITRATE 75 MCG: 50 INJECTION, SOLUTION INTRAMUSCULAR; INTRAVENOUS at 08:11

## 2018-11-19 RX ADMIN — FENTANYL CITRATE 25 MCG: 50 INJECTION, SOLUTION INTRAMUSCULAR; INTRAVENOUS at 08:11

## 2018-11-19 RX ADMIN — CEFAZOLIN 2 G: 1 INJECTION, POWDER, FOR SOLUTION INTRAMUSCULAR; INTRAVENOUS at 01:11

## 2018-11-19 RX ADMIN — MONTELUKAST SODIUM 10 MG: 10 TABLET, FILM COATED ORAL at 09:11

## 2018-11-19 RX ADMIN — LIDOCAINE HYDROCHLORIDE 100 MG: 20 INJECTION, SOLUTION INTRAVENOUS at 07:11

## 2018-11-19 RX ADMIN — GLYCOPYRROLATE 0.6 MG: 0.2 INJECTION, SOLUTION INTRAMUSCULAR; INTRAVENOUS at 09:11

## 2018-11-19 RX ADMIN — ROCURONIUM BROMIDE 50 MG: 10 INJECTION, SOLUTION INTRAVENOUS at 07:11

## 2018-11-19 RX ADMIN — INSULIN ASPART 2 UNITS: 100 INJECTION, SOLUTION INTRAVENOUS; SUBCUTANEOUS at 01:11

## 2018-11-19 RX ADMIN — SODIUM CHLORIDE: 0.9 INJECTION, SOLUTION INTRAVENOUS at 09:11

## 2018-11-19 RX ADMIN — PROMETHAZINE HYDROCHLORIDE AND CODEINE PHOSPHATE 5 ML: 10; 6.25 SOLUTION ORAL at 09:11

## 2018-11-19 RX ADMIN — FENTANYL CITRATE 100 MCG: 50 INJECTION, SOLUTION INTRAMUSCULAR; INTRAVENOUS at 07:11

## 2018-11-19 RX ADMIN — ACETAMINOPHEN 1000 MG: 10 INJECTION, SOLUTION INTRAVENOUS at 11:11

## 2018-11-19 RX ADMIN — PROPOFOL 200 MG: 10 INJECTION, EMULSION INTRAVENOUS at 07:11

## 2018-11-19 RX ADMIN — NEOSTIGMINE METHYLSULFATE 5 MG: 1 INJECTION INTRAVENOUS at 09:11

## 2018-11-19 RX ADMIN — Medication: at 09:11

## 2018-11-19 RX ADMIN — ONDANSETRON 4 MG: 2 INJECTION INTRAMUSCULAR; INTRAVENOUS at 09:11

## 2018-11-19 RX ADMIN — ACETAMINOPHEN 1000 MG: 10 INJECTION, SOLUTION INTRAVENOUS at 04:11

## 2018-11-19 RX ADMIN — DEXAMETHASONE SODIUM PHOSPHATE 4 MG: 4 INJECTION, SOLUTION INTRAMUSCULAR; INTRAVENOUS at 07:11

## 2018-11-19 RX ADMIN — INSULIN ASPART 2 UNITS: 100 INJECTION, SOLUTION INTRAVENOUS; SUBCUTANEOUS at 10:11

## 2018-11-19 RX ADMIN — CEFAZOLIN 3 G: 330 INJECTION, POWDER, FOR SOLUTION INTRAMUSCULAR; INTRAVENOUS at 07:11

## 2018-11-19 RX ADMIN — FAMOTIDINE 20 MG: 10 INJECTION, SOLUTION INTRAVENOUS at 06:11

## 2018-11-19 RX ADMIN — KETOROLAC TROMETHAMINE 30 MG: 30 INJECTION, SOLUTION INTRAMUSCULAR at 05:11

## 2018-11-19 RX ADMIN — ROCURONIUM BROMIDE 10 MG: 10 INJECTION, SOLUTION INTRAVENOUS at 09:11

## 2018-11-19 RX ADMIN — CEFAZOLIN 2 G: 1 INJECTION, POWDER, FOR SOLUTION INTRAMUSCULAR; INTRAVENOUS at 09:11

## 2018-11-19 RX ADMIN — SODIUM CHLORIDE, SODIUM GLUCONATE, SODIUM ACETATE, POTASSIUM CHLORIDE, MAGNESIUM CHLORIDE, SODIUM PHOSPHATE, DIBASIC, AND POTASSIUM PHOSPHATE: .53; .5; .37; .037; .03; .012; .00082 INJECTION, SOLUTION INTRAVENOUS at 07:11

## 2018-11-19 RX ADMIN — PROMETHAZINE HYDROCHLORIDE AND CODEINE PHOSPHATE 5 ML: 10; 6.25 SOLUTION ORAL at 03:11

## 2018-11-19 RX ADMIN — ROCURONIUM BROMIDE 20 MG: 10 INJECTION, SOLUTION INTRAVENOUS at 07:11

## 2018-11-19 RX ADMIN — LIDOCAINE HYDROCHLORIDE 0.2 MG: 10 INJECTION, SOLUTION EPIDURAL; INFILTRATION; INTRACAUDAL; PERINEURAL at 05:11

## 2018-11-19 RX ADMIN — LABETALOL HYDROCHLORIDE 10 MG: 5 INJECTION, SOLUTION INTRAVENOUS at 09:11

## 2018-11-19 RX ADMIN — SODIUM CHLORIDE: 0.9 INJECTION, SOLUTION INTRAVENOUS at 05:11

## 2018-11-19 RX ADMIN — MIDAZOLAM HYDROCHLORIDE 2 MG: 1 INJECTION, SOLUTION INTRAMUSCULAR; INTRAVENOUS at 06:11

## 2018-11-19 RX ADMIN — ACETAMINOPHEN 1000 MG: 10 INJECTION, SOLUTION INTRAVENOUS at 07:11

## 2018-11-19 RX ADMIN — ROCURONIUM BROMIDE 10 MG: 10 INJECTION, SOLUTION INTRAVENOUS at 08:11

## 2018-11-19 NOTE — NURSING TRANSFER
Nursing Transfer Note      11/19/2018     Transfer To: 1048    Transfer via stretcher    Transfer with  to O2    Transported by pct    Medicines sent: IVF's PCA dilaudid    Chart send with patient: Yes    Notified: spouse    Patient reassessed at: 11/19/18    Upon arrival to floor:

## 2018-11-19 NOTE — ANESTHESIA RELEASE NOTE
"Anesthesia Release from PACU Note    Patient: Taty Max    Procedure(s) Performed: Procedure(s) (LRB):  REPAIR, HERNIA, INCISIONAL OR VENTRAL, WITHOUT HISTORY OF PRIOR REPAIR w/mesh (N/A)    Anesthesia type: GEN    Post pain: Adequate analgesia reported    Post assessment: no apparent anesthetic complications, tolerated procedure well and no evidence of recall    Post vital signs: BP (!) 141/63 (BP Location: Left arm, Patient Position: Lying)   Pulse 65   Temp 36.2 °C (97.2 °F) (Temporal)   Resp 12   Ht 5' 7" (1.702 m)   Wt 122 kg (269 lb)   SpO2 (!) 94%   Breastfeeding? No   BMI 42.13 kg/m²     Level of consciousness: awake, alert and oriented    Nausea/Vomiting: no nausea/no vomiting    Complications: none    Airway Patency: patent    Respiratory: unassisted, spontaneous ventilation, exogenous oxygen administration.    Cardiovascular: stable and blood pressure at baseline    Hydration: euvolemic    "

## 2018-11-19 NOTE — TRANSFER OF CARE
"Anesthesia Transfer of Care Note    Patient: Taty Max    Procedure(s) Performed: Procedure(s) (LRB):  REPAIR, HERNIA, INCISIONAL OR VENTRAL, WITHOUT HISTORY OF PRIOR REPAIR w/mesh (N/A)    Patient location: PACU    Anesthesia Type: general    Transport from OR: Transported from OR on 6-10 L/min O2 by face mask with adequate spontaneous ventilation    Post pain: adequate analgesia    Post assessment: no apparent anesthetic complications    Post vital signs: stable    Level of consciousness: awake    Nausea/Vomiting: no nausea/vomiting    Complications: none    Transfer of care protocol was followedComments: CPAP in PACU due to hx of JEREMY      Last vitals:   Visit Vitals  BP (!) 181/99 (BP Location: Left arm, Patient Position: Lying)   Pulse 82   Temp 36.3 °C (97.3 °F) (Temporal)   Resp (!) 21   Ht 5' 7" (1.702 m)   Wt 122 kg (269 lb)   SpO2 (!) 89%   Breastfeeding? No   BMI 42.13 kg/m²     "

## 2018-11-19 NOTE — OR NURSING
Family updated at 0911 via epic messenger.     Specimen to pathology:   1.) Hernia sac (permanent)

## 2018-11-19 NOTE — INTERVAL H&P NOTE
The patient has been examined and the H&P has been reviewed:    I concur with the findings and no changes have occurred since H&P was written.   Chronic cough. Stable.     Anesthesia/Surgery risks, benefits and alternative options discussed and understood by patient/family.          Active Hospital Problems    Diagnosis  POA    Incisional hernia [K43.2]  Yes      Resolved Hospital Problems   No resolved problems to display.

## 2018-11-19 NOTE — NURSING
Received from Pacu. Pt drowsy but awakens easily and follows commands. PCA dilaudid in progress as needed for pain. Instructed patient on use. HOB elevated. O2 per nc at 2l. Abd incsion  with dressing D/I with abdominal binder in place. Left MARIAELENA to suction with scant bloody drainage. Gunter to gravity with CYU. Patient and  instructed to call for needs or concerns. WCTM

## 2018-11-19 NOTE — PLAN OF CARE
Pt required BIPAP on arrival to Pacu due to JEREMY. O2 sat's 94-96% on bipap. Weaned to NC 2L O2 sat 94%. Pt AAOX4 Pt remained stable during pacu stay. VSS. Patient states pain is tolerable with PCA Dilaudid pump. No N&V. Dressing to abdomen with island dressing and abdominal binder. MARIAELENA drain to bulb suction with bloody drainage. Teds/SCD's in place throughout duration in PACU. Transferred to the next Phase of Care.

## 2018-11-19 NOTE — OP NOTE
DATE OF PROCEDURE:  11/19/2018    PREOPERATIVE DIAGNOSIS:  Incisional hernia.    POSTOPERATIVE DIAGNOSIS:  Incisional hernia.    PROCEDURE PERFORMED:  Repair of incisional hernia with mesh, defect was 25 x 10   cm.    SURGEON:  Mariusz Minor M.D.    ASSISTANT:  Radha Zuniga M.D. (RES)    ANESTHESIA:  General.    ESTIMATED BLOOD LOSS:  100 mL.    COMPLICATIONS:  None.    INDICATIONS:  This is a 63-year-old woman status post a prior midline surgery.    Her original incision was for colon cancer.  The patient has had CT imaging,   which demonstrates 2 hernias, one in the upper midline and then another at the   inferior most portion of her prior incision, which is below the umbilicus.  The   connecting bridge of good fascia measures no more than about 6 cm and the plan   was to connect these 2 defects into a single incisional hernia.    OPERATIVE REPORT IN DETAIL:  The patient was brought to the Operating Room,   placed in supine position and prepped and draped in sterile fashion.  Once   satisfactory general anesthesia was induced, a midline incision was made from   just below the xiphoid to well below the umbilicus.  The incision was carried   down through the soft tissue to expose the intact fascia as well as the hernia   sac.  The hernia sacs were opened and the excess tissue was excised, flushed   with strong fascial tissue.  The underlying viscera were dissected free from the   hernia sac with cautery uneventfully and then a piece of Proceed mesh with the   Seprafilm smooth side down was sutured in with circumferential U sutures of #1   Prolene.  With this completed, hemostasis was achieved and considered excellent.    A #19 Huang drain was brought in through a stab incision and then, the   overlying soft tissues were reapproximated.  The drain was secured with a nylon   suture.  Needle, sponge and instrument counts were correct.  The patient   tolerated the procedure well and was stable at the completion of  the operation.      GF/IN  dd: 11/19/2018 09:31:30 (CST)  td: 11/19/2018 09:59:08 (CST)  Doc ID   #[id number]  Job ID #[id number]    CC:

## 2018-11-19 NOTE — BRIEF OP NOTE
[facility]  Brief Operative Note    SUMMARY     Surgery Date: 11/19/2018     Surgeon(s) and Role:     * Mariusz Minor MD - Primary     * Radha Zuniga MD - Resident - Assisting     * Kristine Nunn MD - Resident - Assisting        Pre-op Diagnosis:  Incisional hernia, without obstruction or gangrene [K43.2]    Post-op Diagnosis:  Post-Op Diagnosis Codes:     * Incisional hernia, without obstruction or gangrene [K43.2]    Procedure(s) (LRB):  REPAIR, HERNIA, INCISIONAL OR VENTRAL, WITHOUT HISTORY OF PRIOR REPAIR w/mesh (N/A)    Anesthesia: General    Description of Procedure: Large piece of proceed mesh underlay. Fascia closed over top.     Description of the findings of the procedure: as above    Estimated Blood Loss: minimal         Specimens:   Specimen (12h ago, onward)    Start     Ordered    11/19/18 0740  Specimen to Pathology - Surgery  Once     Comments:  1.) Hernia sac (permanent)     Start Status   11/19/18 0740 Collected (11/19/18 0753)       11/19/18 0752

## 2018-11-19 NOTE — ANESTHESIA POSTPROCEDURE EVALUATION
"Anesthesia Post Evaluation    Patient: Taty Max    Procedure(s) Performed: Procedure(s) (LRB):  REPAIR, HERNIA, INCISIONAL OR VENTRAL, WITHOUT HISTORY OF PRIOR REPAIR w/mesh (N/A)    Final Anesthesia Type: general  Patient location during evaluation: PACU  Patient participation: Yes- Able to Participate  Level of consciousness: awake and alert and oriented  Post-procedure vital signs: reviewed and stable  Pain management: adequate  Airway patency: patent  PONV status at discharge: No PONV  Anesthetic complications: no      Cardiovascular status: stable  Respiratory status: CPAP, spontaneous ventilation, unassisted and nasal cannula (CPAP orders)  Hydration status: euvolemic  Follow-up not needed.        Visit Vitals  BP (!) 159/81 (BP Location: Left arm, Patient Position: Lying)   Pulse 64   Temp 36.1 °C (97 °F) (Oral)   Resp 16   Ht 5' 7" (1.702 m)   Wt 122 kg (268 lb 15.4 oz)   SpO2 (!) 92%   Breastfeeding? No   BMI 42.13 kg/m²       Pain/Pina Score: Pain Assessment Performed: Yes (11/19/2018 12:28 PM)  Presence of Pain: complains of pain/discomfort (11/19/2018 12:28 PM)  Pain Rating Prior to Med Admin: 6 (11/19/2018 12:28 PM)  Pain Rating Post Med Admin: 4 (11/19/2018 12:28 PM)  Pina Score: 8 (11/19/2018 11:40 AM)        "

## 2018-11-20 LAB
ALBUMIN SERPL BCP-MCNC: 3.2 G/DL
ALP SERPL-CCNC: 61 U/L
ALT SERPL W/O P-5'-P-CCNC: 59 U/L
ANION GAP SERPL CALC-SCNC: 13 MMOL/L
AST SERPL-CCNC: 40 U/L
BASOPHILS # BLD AUTO: 0.03 K/UL
BASOPHILS NFR BLD: 0.3 %
BILIRUB SERPL-MCNC: 0.4 MG/DL
BUN SERPL-MCNC: 20 MG/DL
CALCIUM SERPL-MCNC: 9.1 MG/DL
CHLORIDE SERPL-SCNC: 105 MMOL/L
CO2 SERPL-SCNC: 22 MMOL/L
CREAT SERPL-MCNC: 0.9 MG/DL
DIFFERENTIAL METHOD: ABNORMAL
EOSINOPHIL # BLD AUTO: 0 K/UL
EOSINOPHIL NFR BLD: 0.3 %
ERYTHROCYTE [DISTWIDTH] IN BLOOD BY AUTOMATED COUNT: 13.4 %
EST. GFR  (AFRICAN AMERICAN): >60 ML/MIN/1.73 M^2
EST. GFR  (NON AFRICAN AMERICAN): >60 ML/MIN/1.73 M^2
GLUCOSE SERPL-MCNC: 138 MG/DL
HCT VFR BLD AUTO: 36.5 %
HGB BLD-MCNC: 11.8 G/DL
IMM GRANULOCYTES # BLD AUTO: 0.05 K/UL
IMM GRANULOCYTES NFR BLD AUTO: 0.4 %
LYMPHOCYTES # BLD AUTO: 1.9 K/UL
LYMPHOCYTES NFR BLD: 16.5 %
MAGNESIUM SERPL-MCNC: 1.7 MG/DL
MCH RBC QN AUTO: 29.9 PG
MCHC RBC AUTO-ENTMCNC: 32.3 G/DL
MCV RBC AUTO: 92 FL
MONOCYTES # BLD AUTO: 0.9 K/UL
MONOCYTES NFR BLD: 7.3 %
NEUTROPHILS # BLD AUTO: 8.8 K/UL
NEUTROPHILS NFR BLD: 75.2 %
NRBC BLD-RTO: 0 /100 WBC
PLATELET # BLD AUTO: 253 K/UL
PMV BLD AUTO: 10.4 FL
POCT GLUCOSE: 125 MG/DL (ref 70–110)
POCT GLUCOSE: 131 MG/DL (ref 70–110)
POCT GLUCOSE: 147 MG/DL (ref 70–110)
POTASSIUM SERPL-SCNC: 3.9 MMOL/L
PROT SERPL-MCNC: 6.6 G/DL
RBC # BLD AUTO: 3.95 M/UL
SODIUM SERPL-SCNC: 140 MMOL/L
WBC # BLD AUTO: 11.63 K/UL

## 2018-11-20 PROCEDURE — 83735 ASSAY OF MAGNESIUM: CPT

## 2018-11-20 PROCEDURE — 97530 THERAPEUTIC ACTIVITIES: CPT

## 2018-11-20 PROCEDURE — 80053 COMPREHEN METABOLIC PANEL: CPT

## 2018-11-20 PROCEDURE — G8987 SELF CARE CURRENT STATUS: HCPCS | Mod: CJ

## 2018-11-20 PROCEDURE — 36415 COLL VENOUS BLD VENIPUNCTURE: CPT

## 2018-11-20 PROCEDURE — 85025 COMPLETE CBC W/AUTO DIFF WBC: CPT

## 2018-11-20 PROCEDURE — 97165 OT EVAL LOW COMPLEX 30 MIN: CPT

## 2018-11-20 PROCEDURE — 25000003 PHARM REV CODE 250: Performed by: STUDENT IN AN ORGANIZED HEALTH CARE EDUCATION/TRAINING PROGRAM

## 2018-11-20 PROCEDURE — 63600175 PHARM REV CODE 636 W HCPCS: Performed by: STUDENT IN AN ORGANIZED HEALTH CARE EDUCATION/TRAINING PROGRAM

## 2018-11-20 PROCEDURE — 97161 PT EVAL LOW COMPLEX 20 MIN: CPT

## 2018-11-20 PROCEDURE — G8988 SELF CARE GOAL STATUS: HCPCS | Mod: CH

## 2018-11-20 PROCEDURE — 25000003 PHARM REV CODE 250: Performed by: SURGERY

## 2018-11-20 PROCEDURE — 20600001 HC STEP DOWN PRIVATE ROOM

## 2018-11-20 PROCEDURE — 63600175 PHARM REV CODE 636 W HCPCS: Performed by: SURGERY

## 2018-11-20 RX ORDER — OXYCODONE AND ACETAMINOPHEN 10; 325 MG/1; MG/1
1 TABLET ORAL EVERY 4 HOURS PRN
Status: DISCONTINUED | OUTPATIENT
Start: 2018-11-20 | End: 2018-11-20

## 2018-11-20 RX ORDER — OXYCODONE HYDROCHLORIDE 10 MG/1
10 TABLET ORAL EVERY 4 HOURS PRN
Status: DISCONTINUED | OUTPATIENT
Start: 2018-11-20 | End: 2018-11-22 | Stop reason: HOSPADM

## 2018-11-20 RX ORDER — OXYCODONE HYDROCHLORIDE 5 MG/1
5 TABLET ORAL EVERY 4 HOURS PRN
Status: DISCONTINUED | OUTPATIENT
Start: 2018-11-20 | End: 2018-11-22 | Stop reason: HOSPADM

## 2018-11-20 RX ORDER — POTASSIUM CHLORIDE 20 MEQ/1
40 TABLET, EXTENDED RELEASE ORAL ONCE
Status: COMPLETED | OUTPATIENT
Start: 2018-11-20 | End: 2018-11-20

## 2018-11-20 RX ORDER — ACETAMINOPHEN 325 MG/1
650 TABLET ORAL EVERY 6 HOURS
Status: DISCONTINUED | OUTPATIENT
Start: 2018-11-20 | End: 2018-11-22 | Stop reason: HOSPADM

## 2018-11-20 RX ORDER — LANOLIN ALCOHOL/MO/W.PET/CERES
400 CREAM (GRAM) TOPICAL ONCE
Status: COMPLETED | OUTPATIENT
Start: 2018-11-20 | End: 2018-11-20

## 2018-11-20 RX ORDER — OXYCODONE AND ACETAMINOPHEN 5; 325 MG/1; MG/1
1 TABLET ORAL EVERY 4 HOURS PRN
Status: DISCONTINUED | OUTPATIENT
Start: 2018-11-20 | End: 2018-11-20

## 2018-11-20 RX ADMIN — KETOROLAC TROMETHAMINE 30 MG: 30 INJECTION, SOLUTION INTRAMUSCULAR at 05:11

## 2018-11-20 RX ADMIN — HYDROCHLOROTHIAZIDE 12.5 MG: 12.5 TABLET ORAL at 09:11

## 2018-11-20 RX ADMIN — KETOROLAC TROMETHAMINE 30 MG: 30 INJECTION, SOLUTION INTRAMUSCULAR at 11:11

## 2018-11-20 RX ADMIN — OXYCODONE HYDROCHLORIDE 10 MG: 10 TABLET ORAL at 10:11

## 2018-11-20 RX ADMIN — MONTELUKAST SODIUM 10 MG: 10 TABLET, FILM COATED ORAL at 10:11

## 2018-11-20 RX ADMIN — OXYCODONE HYDROCHLORIDE 10 MG: 10 TABLET ORAL at 04:11

## 2018-11-20 RX ADMIN — PROMETHAZINE HYDROCHLORIDE AND CODEINE PHOSPHATE 5 ML: 10; 6.25 SOLUTION ORAL at 10:11

## 2018-11-20 RX ADMIN — SENNOSIDES AND DOCUSATE SODIUM 1 TABLET: 8.6; 5 TABLET ORAL at 09:11

## 2018-11-20 RX ADMIN — KETOROLAC TROMETHAMINE 30 MG: 30 INJECTION, SOLUTION INTRAMUSCULAR at 12:11

## 2018-11-20 RX ADMIN — ACETAMINOPHEN 650 MG: 325 TABLET, FILM COATED ORAL at 11:11

## 2018-11-20 RX ADMIN — CEFAZOLIN 2 G: 1 INJECTION, POWDER, FOR SOLUTION INTRAMUSCULAR; INTRAVENOUS at 01:11

## 2018-11-20 RX ADMIN — SENNOSIDES AND DOCUSATE SODIUM 1 TABLET: 8.6; 5 TABLET ORAL at 10:11

## 2018-11-20 RX ADMIN — ACETAMINOPHEN 650 MG: 325 TABLET, FILM COATED ORAL at 05:11

## 2018-11-20 RX ADMIN — PROMETHAZINE HYDROCHLORIDE AND CODEINE PHOSPHATE 5 ML: 10; 6.25 SOLUTION ORAL at 06:11

## 2018-11-20 RX ADMIN — ACETAMINOPHEN 1000 MG: 10 INJECTION, SOLUTION INTRAVENOUS at 08:11

## 2018-11-20 RX ADMIN — MAGNESIUM OXIDE TAB 400 MG (241.3 MG ELEMENTAL MG) 400 MG: 400 (241.3 MG) TAB at 10:11

## 2018-11-20 RX ADMIN — ENOXAPARIN SODIUM 40 MG: 100 INJECTION SUBCUTANEOUS at 09:11

## 2018-11-20 RX ADMIN — PROMETHAZINE HYDROCHLORIDE AND CODEINE PHOSPHATE 5 ML: 10; 6.25 SOLUTION ORAL at 01:11

## 2018-11-20 RX ADMIN — POTASSIUM CHLORIDE 40 MEQ: 1500 TABLET, EXTENDED RELEASE ORAL at 10:11

## 2018-11-20 RX ADMIN — ACETAMINOPHEN 325 MG: 325 TABLET, FILM COATED ORAL at 12:11

## 2018-11-20 RX ADMIN — KETOROLAC TROMETHAMINE 30 MG: 30 INJECTION, SOLUTION INTRAMUSCULAR at 06:11

## 2018-11-20 NOTE — PT/OT/SLP EVAL
Occupational Therapy   Evaluation    Name: Taty Max  MRN: [id number]  Admitting Diagnosis:  Incisional hernia 1 Day Post-Op    Recommendations:     Discharge Recommendations: home  Discharge Equipment Recommendations:  none  Barriers to discharge:  None    History:     Occupational Profile:  Living Environment: Pt lives with spouse in a [facility] with 1 small step to enter.   Previous level of function: I in all ADLs/ IADLs  Roles and Routines: Wife, Homemaker, Type   Equipment Used at Home:  CPAP  Assistance upon Discharge: Assistance from spouse when needed    Past Medical History:   Diagnosis Date    Allergic rhinitis, seasonal 1/28/2013    Anxiety disorder 1/28/2013    Basal cell carcinoma     Bilateral retinal lattice degeneration     Cataract     Colon cancer 2008    s/p resection    DDD (degenerative disc disease), lumbar 11/25/2014    Diabetes mellitus type II, uncontrolled 7/25/2014    High myopia     Horseshoe retinal tear of both eyes     HTN (hypertension) 1/28/2013    Hypertension     Hypothyroidism 4/29/2014    Lumbar disc disease 7/25/2014    Metabolic syndrome 4/29/2014    Neuropathy due to chemotherapeutic drug 5/22/2017    Osteopenia 5/10/2013    Screening for colorectal cancer 9/11/2015    Normal 6/ 2015---5 yrs    Vitamin D deficiency disease     Vitreous detachment of both eyes        Past Surgical History:   Procedure Laterality Date    ADENOIDECTOMY      ANKLE SURGERY      left     BLOCK-NERVE-MEDIAL BRANCH-LUMBAR Bilateral 1/20/2015    [facility]    BREAST LUMPECTOMY      CATARACT EXTRACTION      CATARACT EXTRACTION BILATERAL W/ ANTERIOR VITRECTOMY      COLECTOMY      s/p reanastemosis    COLON SURGERY      COLONOSCOPY N/A 6/9/2015    [facility]    EYE EXAMINATION UNDER ANESTHESIA W/ RETINAL CRYOTHERAPY AND RETINAL LASER      HERNIA REPAIR      INJECTION-JOINT Right 3/2/2016  "   [facility]    INJECTION-JOINT Right 12/16/2014    [facility]    INJECTION-STEROID-EPIDURAL-LUMBAR N/A 8/30/2016    [facility]    INJECTION-STEROID-EPIDURAL-LUMBAR N/A 11/3/2015    [facility]    INJECTION-STEROID-EPIDURAL-LUMBAR N/A 11/25/2014    [facility]    KIDNEY SURGERY      "dilate urethra tube"    NASAL POLYP EXCISION      NASAL SEPTUM SURGERY      RADIOFREQUENCY ABLATION OF LUMBAR MEDIAL BRANCH NERVE AT SINGLE LEVEL Right 7/6/2018    Procedure: RADIOFREQUENCY ABLATION, NERVE, MEDIAL BRANCH, LUMBAR, 1 LEVEL;  Surgeon: Vashti Poe MD;  Location: [facility];  Service: Pain Management;  Laterality: Right;  RIght Thermal RFA @ L3-5  (REPEAT)  [id number]  with IV Sedation    CONSENT NEEDED    RADIOFREQUENCY ABLATION, NERVE, MEDIAL BRANCH, LUMBAR, 1 LEVEL Right 7/6/2018    [facility]    RADIOFREQUENCY THERMOCOAGULATION (RFTC)-NERVE-MEDIAN BRANCH-LUMBAR Right 1/23/2015    [facility] at [facility]    RADIOFREQUENCY THERMOCOAGULATION (RFTC)-NERVE-MEDIAN BRANCH-LUMBAR/COOLED Right 10/18/2016    [facility]    REPAIR, HERNIA, INCISIONAL OR VENTRAL, WITHOUT HISTORY OF PRIOR REPAIR w/mesh N/A 11/19/2018    [facility] OR 2ND FLR    s/p laser ou      TONSILLECTOMY      TRIGGER POINT INJECTION (TPI) N/A 8/30/2016    [facility]    TRIGGER POINT INJECTION (TPI) Right 3/2/2016    [facility]    TUBAL LIGATION      UVULOPALATOPHARYNGOPLASTY  1990s       Subjective     Chief Complaint: Weakness  Patient/Family Comments/goals: Pt agreeable to OT POC and goals set in therapy " "    Pain/Comfort:  · Pain Rating 1: 3/10  · Location - Side 1: Bilateral  · Location - Orientation 1: generalized  · Location 1: abdomen  · Pain Addressed 1: Cessation of Activity, Reposition  · Pain Rating Post-Intervention 1: (pt reported of "a little" pain after activity)  · Location - Side 2: Bilateral  · Location - Orientation 2: generalized  · Location 2: abdomen  · Pain Addressed 2: Nurse notified, Reposition, Cessation of Activity    Patients cultural, spiritual, Mosque conflicts given the current situation: none stated    Objective:     Communicated with: EMMANUEL Cobos prior to session.  Patient found with HOB elevated with: all lines intact, call button in reach and RN notified and telemetry, peripheral IV, MARIAELENA drain upon OT entry to room.    General Precautions: Standard, fall   Orthopedic Precautions:N/A   Braces: N/A     Occupational Performance:    Bed Mobility:    · Patient completed Rolling/Turning to Left with  stand by assistance with HOB elevated   · Patient completed Rolling/Turning to Right with stand by assistance with HOB elevated  · Patient completed Scooting/Bridging with contact guard assistance to reach EOB to prepare for functional activities   · Patient completed Supine to Sit with minimum assistance for trunk control    Functional Mobility/Transfers:  · Patient completed Sit <> Stand Transfer with contact guard assistance  with  hand-held assist   · Patient completed Bed <> Chair Transfer using Step Transfer technique with contact guard assistance with hand-held assist  · Functional Mobility: Pt ambulated within room from the bed with no AD and CGA for impaired balance and weakness.    Activities of Daily Living:  · Feeding:  supervision to eat with HOB elevated   · Upper Body Dressing: minimum assistance to don gown as jacket in sitting     Cognitive/Visual Perceptual:  Cognitive/Psychosocial Skills:     -       Oriented to: Person, Place, Time and Situation   -       Follows " Commands/attention:Follows multistep  commands  -       Communication: clear/fluent  -       Memory: No Deficits noted  -       Safety awareness/insight to disability: intact   -       Mood/Affect/Coping skills/emotional control: Appropriate to situation  Visual/Perceptual:      -corrective lenses donned     Physical Exam:  Balance:    -       Sitting- Impaired   Standing- Impaired   Postural examination/scapula alignment:    -       Rounded shoulders  Skin integrity: Visible skin intact  Edema:  None noted  Sensation:    -       Intact  Motor Planning:    -       No coordination deficits noted  Upper Extremity Range of Motion:     -       Right Upper Extremity: WFL  -       Left Upper Extremity: WFL  Upper Extremity Strength:    -       Right Upper Extremity: WFL  -       Left Upper Extremity: WFL    AMPAC 6 Click ADL:  AMPAC Total Score: 20    Treatment & Education:  Educated patient on the following:  - OT POC  - Importance of sitting UIC  - Bed mobility safety   - Functional transfer/ mobility safety  - Self care independence  Education:    Patient left up in chair with all lines intact, call button in reach and RN notified    Assessment:     Taty Max is a 63 y.o. female with a medical diagnosis of Incisional hernia. Pt presented to OT with weakness post surgery requiring increased time for functional activities. Pt tolerated OT session well with minimal c/o increased pain during activity. Pt active before surgery and would benefit from home with no needs at this time. Pt would benefit from skilled OT services to further increase her self care independence to ensure a safe return home.     She presents with the following performance deficits affecting function: weakness, impaired endurance, impaired self care skills, impaired functional mobilty, impaired cardiopulmonary response to activity, gait instability, impaired balance.      Rehab Prognosis: Good; patient would benefit from acute skilled OT services  "to address these deficits and reach maximum level of function.         Clinical Decision Makin.  OT Low:  "Pt evaluation falls under low complexity for evaluation coding due to performance deficits noted in 1-3 areas as stated above and 0 co-morbities affecting current functional status. Data obtained from problem focused assessments. No modifications or assistance was required for completion of evaluation. Only brief occupational profile and history review completed."     Plan:     Patient to be seen 3 x/week to address the above listed problems via self-care/home management, therapeutic activities, therapeutic exercises  · Plan of Care Expires: 18  · Plan of Care Reviewed with: patient    This Plan of care has been discussed with the patient who was involved in its development and understands and is in agreement with the identified goals and treatment plan    GOALS:   Multidisciplinary Problems     Occupational Therapy Goals        Problem: Occupational Therapy Goal    Goal Priority Disciplines Outcome Interventions   Occupational Therapy Goal     OT, PT/OT Ongoing (interventions implemented as appropriate)    Description:  Goals to be met by: 18    Patient will increase functional independence with ADLs by performing:    UE Dressing with Modified [place].  LE Dressing with Modified [place].  Grooming while standing at sink with Modified [place].  Toileting from toilet with Modified [place] for hygiene and clothing management.   Supine to sit with Modified [place] to prepare for functional activities.  Step transfer with Modified [place] to prepare for household mobility to complete self care tasks with no AD.   Toilet transfer to toilet with Modified [place].                      Time Tracking:     OT Date of Treatment: 18  OT Start Time: 858  OT Stop Time: 923  OT Total Time (min): 25 min    Billable Minutes:Evaluation 15  Therapeutic Activity " 10    Telma Espinoza, OT  11/20/2018

## 2018-11-20 NOTE — PLAN OF CARE
Problem: Physical Therapy Goal  Goal: Physical Therapy Goal  PT goals until 11/25/18    1. Pt supine to sit with mod CGA-not met  2. Pt sit to supine with mod independent-not met  3. Pt sit to stand with no AD with mod independent-not met  4. Pt to perform gait 200ft with no AD with supervision.-not met  5. Pt to go up/down curb step with no AD with supervision.-not met    Outcome: Ongoing (interventions implemented as appropriate)  Pt's goals set and pt will benefit from skilled PT services to work towards improved functional mobility including: bed mobility, transfers, up/down step, and gait.   Zhanna Ulloa, PT  11/20/2018

## 2018-11-20 NOTE — PLAN OF CARE
Problem: Patient Care Overview  Goal: Plan of Care Review  Outcome: Ongoing (interventions implemented as appropriate)  POC reviewed w/ pt, verbalized understanding. Pt AAOx4. VSS on 4L NC/CPAP. Pain managed with dilaudid PCA pump. Patient on telemetry, normal sinus. BG monitored AC/HS, with coverage needed. MARIAELENA drain intact and patent. Pt voids per nielsen, with adequate UOP overnight. Nielsen intact and patent. Pt tolerating clear liquid diet with no c/o nausea. Pt slept between care. Frequent rounds made for pt safety. Call light in reach and bed in lowest position. WCTM

## 2018-11-20 NOTE — SUBJECTIVE & OBJECTIVE
Interval History:     No acute events overnight, patient complaining of abdominal pain at the incision site. Otherwise doing well. No gas or BM. Gunter in place    Medications:  Continuous Infusions:  Scheduled Meds:   acetaminophen  650 mg Oral Q6H    enoxaparin  40 mg Subcutaneous Daily    hydroCHLOROthiazide  12.5 mg Oral Daily    ketorolac  30 mg Intravenous Q6H    montelukast  10 mg Oral QHS    polyethylene glycol  17 g Oral Daily    promethazine-codeine 6.25-10 mg/5 ml  5 mL Oral Q8H    senna-docusate 8.6-50 mg  1 tablet Oral BID     PRN Meds:dextrose 50%, dextrose 50%, glucagon (human recombinant), glucose, glucose, insulin aspart U-100, ondansetron, oxyCODONE, oxyCODONE, ramelteon     Review of patient's allergies indicates:   Allergen Reactions    Oxaliplatin Hives    Factive [gemifloxacin] Hives    Statins-hmg-coa reductase inhibitors      Memory w lipitor, muscles for 2 others    Daypro [oxaprozin] Rash    Latex Hives     Adhesives     Objective:     Vital Signs (Most Recent):  Temp: 97 °F (36.1 °C) (11/20/18 0800)  Pulse: 86 (11/20/18 0800)  Resp: 18 (11/20/18 0800)  BP: 136/77 (11/20/18 0800)  SpO2: (!) 92 % (11/20/18 0800) Vital Signs (24h Range):  Temp:  [97 °F (36.1 °C)-98.4 °F (36.9 °C)] 97 °F (36.1 °C)  Pulse:  [] 86  Resp:  [11-22] 18  SpO2:  [87 %-97 %] 92 %  BP: (136-186)/(63-99) 136/77     Weight: 122 kg (268 lb 15.4 oz)  Body mass index is 42.13 kg/m².    Intake/Output - Last 3 Shifts       11/18 0700 - 11/19 0659 11/19 0700 - 11/20 0659 11/20 0700 - 11/21 0659    P.O.  1500     I.V. (mL/kg) 500 (4.1) 1000 (8.2)     IV Piggyback  50     Total Intake(mL/kg) 500 (4.1) 2550 (20.9)     Urine (mL/kg/hr)  1550 (0.5)     Drains  50     Stool  0     Total Output  1600     Net +500 +950            Stool Occurrence  0 x           Physical Exam     General: In no acute distress, well appearance.   CV: RRR, S1, S2 no murmur or gallops   Pulm: non labored breathing, b/l clear breath  sounds.   Abd: soft, slightly distended, tenderness around incision site. MARIAELENA drain with Serous fluid minimal output 50 cc  : Gunter in place with clear urine.   Ext: wwp      Significant Labs:  CBC:   Recent Labs   Lab 11/20/18 0418   WBC 11.63   RBC 3.95*   HGB 11.8*   HCT 36.5*      MCV 92   MCH 29.9   MCHC 32.3     CMP:   Recent Labs   Lab 11/20/18 0418   *   CALCIUM 9.1   ALBUMIN 3.2*   PROT 6.6      K 3.9   CO2 22*      BUN 20   CREATININE 0.9   ALKPHOS 61   ALT 59*   AST 40   BILITOT 0.4

## 2018-11-20 NOTE — PLAN OF CARE
Problem: Occupational Therapy Goal  Goal: Occupational Therapy Goal  Goals to be met by: 11/27/18    Patient will increase functional independence with ADLs by performing:    UE Dressing with Modified [place].  LE Dressing with Modified [place].  Grooming while standing at sink with Modified [place].  Toileting from toilet with Modified [place] for hygiene and clothing management.   Supine to sit with Modified [place] to prepare for functional activities.  Step transfer with Modified [place] to prepare for household mobility to complete self care tasks with no AD.   Toilet transfer to toilet with Modified [place].    Outcome: Ongoing (interventions implemented as appropriate)  OT POC Implemented     Comments: Telma Espinoza OTR/L

## 2018-11-20 NOTE — PLAN OF CARE
Patient lives in a 1 story house w/spouse. Spouse is at her BS. Patient is independent & agile, uses CPAP at home. No needs determined.     [facility] Packet given to patient after informed about it;patient verbalized their understanding.        11/20/18 1230   Discharge Assessment   Assessment Type Discharge Planning Assessment   Confirmed/corrected address and phone number on facesheet? Yes   Assessment information obtained from? Patient;Medical Record   Expected Length of Stay (days) (2)   Communicated expected length of stay with patient/caregiver no  (Per MD)   Prior to hospitilization cognitive status: Alert/Oriented;No Deficits   Prior to hospitalization functional status: Independent;Assistive Equipment   Current cognitive status: Alert/Oriented;No Deficits   Current Functional Status: Independent;Needs Assistance   Facility Arrived From: (N/A)   Lives With spouse   Able to Return to Prior Arrangements yes   Is patient able to care for self after discharge? Yes   Who are your caregiver(s) and their phone number(s)? (Norberto Max Spouse [phone]   )   Patient's perception of discharge disposition home or selfcare   Readmission Within The Last 30 Days no previous admission in last 30 days   Patient currently being followed by outpatient case management? No   Patient currently receives any other outside agency services? No   Equipment Currently Used at Home CPAP   Do you have any problems affording any of your prescribed medications? No   Is the patient taking medications as prescribed? yes   Does the patient have transportation home? Yes   Transportation Available car;family or friend will provide   Dialysis Name and Scheduled days (N/A)   Does the patient receive services at the [facility]? No   Discharge Plan A Home with family   Discharge Plan B Home with family   Patient/Family In Agreement With Plan yes

## 2018-11-20 NOTE — PT/OT/SLP EVAL
"Physical Therapy Evaluation    Patient Name:  Taty Max   MRN:  [id number]    Recommendations:     Discharge Recommendations:  home   Discharge Equipment Recommendations: none   Barriers to discharge: Inaccessible home 1 OLIVER    Assessment:     Taty Max is a 63 y.o. female admitted with a medical diagnosis of Incisional hernia.  She presents with the following impairments/functional limitations:  weakness, gait instability, impaired cardiopulmonary response to activity, pain, impaired functional mobilty . Pt is limited due to SOB and pain. Pt is motivated to progress with mobility.    Rehab Prognosis:  good; patient would benefit from acute skilled PT services to address these deficits and reach maximum level of function.      Recent Surgery: Procedure(s) (LRB):  REPAIR, HERNIA, INCISIONAL OR VENTRAL, WITHOUT HISTORY OF PRIOR REPAIR w/mesh (N/A) 1 Day Post-Op    Plan:     During this hospitalization, patient to be seen 3 x/week to address the above listed problems via gait training, therapeutic activities, therapeutic exercises, neuromuscular re-education  · Plan of Care Expires:  12/20/18   Plan of Care Reviewed with: patient    Subjective     Communicated with nurse prior to session.  Patient found sitting upon PT entry to room, agreeable to evaluation.    "I get SOB when I walk, I just have to take it slow"  Pain/Comfort:  · Pain Rating 1: 4/10  · Location - Orientation 1: generalized  · Location 1: abdomen  · Pain Addressed 1: Reposition, Cessation of Activity  · Pain Rating Post-Intervention 1: 4/10    Patients cultural, spiritual, Jainism conflicts given the current situation: none noted    Living Environment:  Pt lives with  in a 1 story home with 1 small step to enter  Prior to admission, patients level of function was independent.  Patient has the following equipment: none.   Upon discharge, patient will have assistance from .    Objective:     Patient found with: peripheral IV, " telemetry     General Precautions: Standard, fall   Orthopedic Precautions:N/A   Braces: N/A     Exams:  · Cognitive Exam:  Patient is oriented to Person, Place, Time and Situation  · Sensation:    · -       Intact  light/touch above ankles; impaired to lt touch below ankles (inconsistent with responses)  · RLE ROM: WFL except hip flex limited due to pain  · RLE Strength: WFL except hip flex 3-/5  · LLE ROM: WFL except hip flex limited due to pain  · LLE Strength: WFL except hip flex 3-/5    Functional Mobility:  · Transfers:     · Sit to Stand:  contact guard assistance with no AD  · Gait: 80ft with no AD with CGA and 3 rest periods in standing due to SOB. Pt performed gait at slow pace, with flexed trunk, and with decreased step length.    AM-PAC 6 CLICK MOBILITY  Total Score:18     Patient left up in chair with all lines intact, call button in reach, nurse notified and  present.    GOALS:   Multidisciplinary Problems     Physical Therapy Goals        Problem: Physical Therapy Goal    Goal Priority Disciplines Outcome Goal Variances Interventions   Physical Therapy Goal     PT, PT/OT Ongoing (interventions implemented as appropriate)     Description:  PT goals until 11/25/18    1. Pt supine to sit with mod CGA-not met  2. Pt sit to supine with mod independent-not met  3. Pt sit to stand with no AD with mod independent-not met  4. Pt to perform gait 200ft with no AD with supervision.-not met  5. Pt to go up/down curb step with no AD with supervision.-not met                      History:     Past Medical History:   Diagnosis Date    Allergic rhinitis, seasonal 1/28/2013    Anxiety disorder 1/28/2013    Basal cell carcinoma     Bilateral retinal lattice degeneration     Cataract     Colon cancer 2008    s/p resection    DDD (degenerative disc disease), lumbar 11/25/2014    Diabetes mellitus type II, uncontrolled 7/25/2014    High myopia     Horseshoe retinal tear of both eyes     HTN (hypertension)  "1/28/2013    Hypertension     Hypothyroidism 4/29/2014    Lumbar disc disease 7/25/2014    Metabolic syndrome 4/29/2014    Neuropathy due to chemotherapeutic drug 5/22/2017    Osteopenia 5/10/2013    Screening for colorectal cancer 9/11/2015    Normal 6/ 2015---5 yrs    Vitamin D deficiency disease     Vitreous detachment of both eyes        Past Surgical History:   Procedure Laterality Date    ADENOIDECTOMY      ANKLE SURGERY      left     BLOCK-NERVE-MEDIAL BRANCH-LUMBAR Bilateral 1/20/2015    Performed by [facility] at [facility]    BREAST LUMPECTOMY      CATARACT EXTRACTION      CATARACT EXTRACTION BILATERAL W/ ANTERIOR VITRECTOMY      COLECTOMY      s/p reanastemosis    COLON SURGERY      COLONOSCOPY N/A 6/9/2015    [facility]    EYE EXAMINATION UNDER ANESTHESIA W/ RETINAL CRYOTHERAPY AND RETINAL LASER      HERNIA REPAIR      INJECTION-JOINT Right 3/2/2016    Performed by Alicja Duenas [facility] at [facility] [place] [facility]    INJECTION-JOINT Right 12/16/2014    Performed by [facility] at [facility]    INJECTION-STEROID-EPIDURAL-LUMBAR N/A 8/30/2016    Performed by Vashti Poe MD at [facility] [place] [facility]    INJECTION-STEROID-EPIDURAL-LUMBAR N/A 11/3/2015    Performed by [facility]hti Garner MD at [facility] [place] [facility]    INJECTION-STEROID-EPIDURAL-LUMBAR N/A 11/25/2014    [facility] at [facility]    KIDNEY SURGERY      "dilate urethra tube"    NASAL POLYP EXCISION      NASAL SEPTUM SURGERY      RADIOFREQUENCY ABLATION OF LUMBAR MEDIAL BRANCH NERVE AT SINGLE LEVEL Right 7/6/2018    Procedure: RADIOFREQUENCY ABLATION, NERVE, MEDIAL BRANCH, LUMBAR, 1 LEVEL;  Surgeon: Vashti Poe MD;  Location: [facility];  Service: Pain Management;  Laterality: Right;  RIght Thermal RFA @ L3-5  (REPEAT)  [id number]  with IV Sedation    CONSENT NEEDED    RADIOFREQUENCY ABLATION, NERVE, MEDIAL BRANCH, LUMBAR, 1 LEVEL Right " 7/6/2018    [facility]    RADIOFREQUENCY THERMOCOAGULATION (RFTC)-NERVE-MEDIAN BRANCH-LUMBAR Right 1/23/2015    Performed by [facility] at [facility]    RADIOFREQUENCY THERMOCOAGULATION (RFTC)-NERVE-MEDIAN BRANCH-LUMBAR/COOLED Right 10/18/2016    Performed by [facility] at [facility]    REPAIR, HERNIA, INCISIONAL OR VENTRAL, WITHOUT HISTORY OF PRIOR REPAIR w/mesh N/A 11/19/2018    [facility]z [facility]    s/p laser ou      TONSILLECTOMY      TRIGGER POINT INJECTION (TPI) N/A 8/30/2016    Performed by [facility] at [facility]    TRIGGER POINT INJECTION (TPI) Right 3/2/2016    Performed by [facility] at [facility]    TUBAL LIGATION      UVULOPALATOPHARYNGOPLASTY  1990s       Clinical Decision Making:     History  Co-morbidities and personal factors that may impact the plan of care Examination  Body Structures and Functions, activity limitations and participation restrictions that may impact the plan of care Clinical Presentation   Decision Making/ Complexity Score   Co-morbidities:   [] Time since onset of injury / illness / exacerbation  [] Status of current condition  []Patient's cognitive status and safety concerns    [] Multiple Medical Problems (see med hx)  Personal Factors:   [] Patient's age  [] Prior Level of function   [] Patient's home situation (environment and family support)  [] Patient's level of motivation  [] Expected progression of patient      HISTORY:(criteria)    [x] 79703 - no personal factors/history    [] 72742 - has 1-2 personal factor/comorbidity     [] 18008 - has >3 personal factor/comorbidity     Body Regions:  [] Objective examination findings  [] Head     []  Neck  [] Trunk   [] Upper Extremity  [] Lower Extremity    Body Systems:  [] For communication ability, affect, cognition, language, and learning style: the assessment of the ability to make needs  known, consciousness, orientation (person, place, and time), expected emotional /behavioral responses, and learning preferences (eg, learning barriers, education  needs)  [] For the neuromuscular system: a general assessment of gross coordinated movement (eg, balance, gait, locomotion, transfers, and transitions) and motor function  (motor control and motor learning)  [] For the musculoskeletal system: the assessment of gross symmetry, gross range of motion, gross strength, height, and weight  [] For the integumentary system: the assessment of pliability(texture), presence of scar formation, skin color, and skin integrity  [x] For cardiovascular/pulmonary system: the assessment of heart rate, respiratory rate, blood pressure, and edema     Activity limitations:    [] Patient's cognitive status and saf ety concerns          [] Status of current condition      [] Weight bearing restriction  [] Cardiopulmunary Restriction    Participation Restrictions:   [] Goals and goal agreement with the patient     [] Rehab potential (prognosis) and probable outcome      Examination of Body System: (criteria)    [x] 03025 - addressing 1-2 elements    [] 18015 - addressing a total of 3 or more elements     [] 09332 -  Addressing a total of 4 or more elements         Clinical Presentation: (criteria)  Stable - 03959     On examination of body system using standardized tests and measures patient presents with (CHOOSE ONE) elements from any of the following: body structures and functions, activity limitations, and/or participation restrictions.  Leading to a clinical presentation that is considered (CHOOSE ONE)                              Clinical Decision Making  (Eval Complexity):  Low- 75686     Time Tracking:     PT Received On: 11/20/18  PT Start Time: 1128     PT Stop Time: 1151  PT Total Time (min): 23 min     Billable Minutes: Evaluation 23      Zhanna Ulloa, PT  11/20/2018

## 2018-11-20 NOTE — PROGRESS NOTES
[facility]  General Surgery  Progress Note    Subjective:     History of Present Illness:  No notes on file    Post-Op Info:  Procedure(s) (LRB):  REPAIR, HERNIA, INCISIONAL OR VENTRAL, WITHOUT HISTORY OF PRIOR REPAIR w/mesh (N/A)   1 Day Post-Op     Interval History:     No acute events overnight, patient complaining of abdominal pain at the incision site. Otherwise doing well. No gas or BM. Gunter in place    Medications:  Continuous Infusions:  Scheduled Meds:   acetaminophen  650 mg Oral Q6H    enoxaparin  40 mg Subcutaneous Daily    hydroCHLOROthiazide  12.5 mg Oral Daily    ketorolac  30 mg Intravenous Q6H    montelukast  10 mg Oral QHS    polyethylene glycol  17 g Oral Daily    promethazine-codeine 6.25-10 mg/5 ml  5 mL Oral Q8H    senna-docusate 8.6-50 mg  1 tablet Oral BID     PRN Meds:dextrose 50%, dextrose 50%, glucagon (human recombinant), glucose, glucose, insulin aspart U-100, ondansetron, oxyCODONE, oxyCODONE, ramelteon     Review of patient's allergies indicates:   Allergen Reactions    Oxaliplatin Hives    Factive [gemifloxacin] Hives    Statins-hmg-coa reductase inhibitors      Memory w lipitor, muscles for 2 others    Daypro [oxaprozin] Rash    Latex Hives     Adhesives     Objective:     Vital Signs (Most Recent):  Temp: 97 °F (36.1 °C) (11/20/18 0800)  Pulse: 86 (11/20/18 0800)  Resp: 18 (11/20/18 0800)  BP: 136/77 (11/20/18 0800)  SpO2: (!) 92 % (11/20/18 0800) Vital Signs (24h Range):  Temp:  [97 °F (36.1 °C)-98.4 °F (36.9 °C)] 97 °F (36.1 °C)  Pulse:  [] 86  Resp:  [11-22] 18  SpO2:  [87 %-97 %] 92 %  BP: (136-186)/(63-99) 136/77     Weight: 122 kg (268 lb 15.4 oz)  Body mass index is 42.13 kg/m².    Intake/Output - Last 3 Shifts       11/18 0700 - 11/19 0659 11/19 0700 - 11/20 0659 11/20 0700 - 11/21 0659    P.O.  1500     I.V. (mL/kg) 500 (4.1) 1000 (8.2)     IV Piggyback  50     Total Intake(mL/kg) 500 (4.1) 2550 (20.9)     Urine (mL/kg/hr)  1550 (0.5)      Drains  50     Stool  0     Total Output  1600     Net +500 +950            Stool Occurrence  0 x           Physical Exam     General: In no acute distress, well appearance.   CV: RRR, S1, S2 no murmur or gallops   Pulm: non labored breathing, b/l clear breath sounds.   Abd: soft, slightly distended, tenderness around incision site. MARIAELENA drain with Serous fluid minimal output 50 cc  : Gunter in place with clear urine.   Ext: wwp      Significant Labs:  CBC:   Recent Labs   Lab 11/20/18 0418   WBC 11.63   RBC 3.95*   HGB 11.8*   HCT 36.5*      MCV 92   MCH 29.9   MCHC 32.3     CMP:   Recent Labs   Lab 11/20/18 0418   *   CALCIUM 9.1   ALBUMIN 3.2*   PROT 6.6      K 3.9   CO2 22*      BUN 20   CREATININE 0.9   ALKPHOS 61   ALT 59*   AST 40   BILITOT 0.4           Assessment/Plan:     * Incisional hernia    Ms Max is a 64 yo F now POD#1 from Ventral hernia repair with mesh.     - Clear liquid diet for now.   - Gunter out today.   - Pain control with oral medications, off PCA.   - Stop IVF.  - Ambulate, PT/OT           Daly Moreno MD  General Surgery PGY V  Beeper: [phone]

## 2018-11-20 NOTE — PLAN OF CARE
Problem: Patient Care Overview  Goal: Plan of Care Review  Outcome: Ongoing (interventions implemented as appropriate)  Plan of care discussed with pt. Pt verbalizes understanding. Pt tolerating clear liquid diet with no complaints of discomfort or nausea. Pain managed with PRN pain medication. Pt on telemetry, normal sinus rhythm. Pt ambulated in [place]a once with PT/OT. Pt positions independently. Vital signs stable. Pt remains free of falls and injury. No acute events this shift. Will continue to monitor.

## 2018-11-20 NOTE — ASSESSMENT & PLAN NOTE
Ms Max is a 62 yo F now POD#1 from Ventral hernia repair with mesh.     - Clear liquid diet for now.   - Gunter out today.   - Pain control with oral medications, off PCA.   - Stop IVF.  - Ambulate, PT/OT

## 2018-11-21 LAB
ALBUMIN SERPL BCP-MCNC: 3 G/DL
ALP SERPL-CCNC: 63 U/L
ALT SERPL W/O P-5'-P-CCNC: 37 U/L
ANION GAP SERPL CALC-SCNC: 9 MMOL/L
AST SERPL-CCNC: 28 U/L
BASOPHILS # BLD AUTO: 0.03 K/UL
BASOPHILS NFR BLD: 0.3 %
BILIRUB SERPL-MCNC: 0.7 MG/DL
BUN SERPL-MCNC: 16 MG/DL
CALCIUM SERPL-MCNC: 9.2 MG/DL
CHLORIDE SERPL-SCNC: 99 MMOL/L
CO2 SERPL-SCNC: 24 MMOL/L
CREAT SERPL-MCNC: 0.8 MG/DL
DIFFERENTIAL METHOD: ABNORMAL
EOSINOPHIL # BLD AUTO: 0.1 K/UL
EOSINOPHIL NFR BLD: 1.1 %
ERYTHROCYTE [DISTWIDTH] IN BLOOD BY AUTOMATED COUNT: 13.2 %
EST. GFR  (AFRICAN AMERICAN): >60 ML/MIN/1.73 M^2
EST. GFR  (NON AFRICAN AMERICAN): >60 ML/MIN/1.73 M^2
GLUCOSE SERPL-MCNC: 169 MG/DL
HCT VFR BLD AUTO: 34.5 %
HGB BLD-MCNC: 11.3 G/DL
IMM GRANULOCYTES # BLD AUTO: 0.04 K/UL
IMM GRANULOCYTES NFR BLD AUTO: 0.4 %
LYMPHOCYTES # BLD AUTO: 1.1 K/UL
LYMPHOCYTES NFR BLD: 11.6 %
MAGNESIUM SERPL-MCNC: 1.7 MG/DL
MCH RBC QN AUTO: 29.7 PG
MCHC RBC AUTO-ENTMCNC: 32.8 G/DL
MCV RBC AUTO: 91 FL
MONOCYTES # BLD AUTO: 0.7 K/UL
MONOCYTES NFR BLD: 6.9 %
NEUTROPHILS # BLD AUTO: 7.7 K/UL
NEUTROPHILS NFR BLD: 79.7 %
NRBC BLD-RTO: 0 /100 WBC
PLATELET # BLD AUTO: 243 K/UL
PMV BLD AUTO: 10.4 FL
POCT GLUCOSE: 116 MG/DL (ref 70–110)
POCT GLUCOSE: 137 MG/DL (ref 70–110)
POCT GLUCOSE: 146 MG/DL (ref 70–110)
POCT GLUCOSE: 170 MG/DL (ref 70–110)
POTASSIUM SERPL-SCNC: 3.8 MMOL/L
PROT SERPL-MCNC: 6.7 G/DL
RBC # BLD AUTO: 3.8 M/UL
SODIUM SERPL-SCNC: 132 MMOL/L
WBC # BLD AUTO: 9.7 K/UL

## 2018-11-21 PROCEDURE — 25000003 PHARM REV CODE 250: Performed by: SURGERY

## 2018-11-21 PROCEDURE — 63600175 PHARM REV CODE 636 W HCPCS: Performed by: SURGERY

## 2018-11-21 PROCEDURE — 83735 ASSAY OF MAGNESIUM: CPT

## 2018-11-21 PROCEDURE — 85025 COMPLETE CBC W/AUTO DIFF WBC: CPT

## 2018-11-21 PROCEDURE — 36415 COLL VENOUS BLD VENIPUNCTURE: CPT

## 2018-11-21 PROCEDURE — 25000003 PHARM REV CODE 250: Performed by: STUDENT IN AN ORGANIZED HEALTH CARE EDUCATION/TRAINING PROGRAM

## 2018-11-21 PROCEDURE — 80053 COMPREHEN METABOLIC PANEL: CPT

## 2018-11-21 PROCEDURE — 63600175 PHARM REV CODE 636 W HCPCS: Performed by: STUDENT IN AN ORGANIZED HEALTH CARE EDUCATION/TRAINING PROGRAM

## 2018-11-21 PROCEDURE — 20600001 HC STEP DOWN PRIVATE ROOM

## 2018-11-21 PROCEDURE — 97535 SELF CARE MNGMENT TRAINING: CPT

## 2018-11-21 RX ORDER — FUROSEMIDE 10 MG/ML
20 INJECTION INTRAMUSCULAR; INTRAVENOUS ONCE
Status: COMPLETED | OUTPATIENT
Start: 2018-11-21 | End: 2018-11-21

## 2018-11-21 RX ORDER — SIMETHICONE 80 MG
1 TABLET,CHEWABLE ORAL 3 TIMES DAILY PRN
Status: DISCONTINUED | OUTPATIENT
Start: 2018-11-21 | End: 2018-11-22 | Stop reason: HOSPADM

## 2018-11-21 RX ORDER — PANTOPRAZOLE SODIUM 40 MG/1
40 TABLET, DELAYED RELEASE ORAL DAILY
Status: DISCONTINUED | OUTPATIENT
Start: 2018-11-21 | End: 2018-11-22 | Stop reason: HOSPADM

## 2018-11-21 RX ORDER — HYDROCODONE BITARTRATE AND ACETAMINOPHEN 10; 325 MG/1; MG/1
1 TABLET ORAL EVERY 6 HOURS PRN
Qty: 31 TABLET | Refills: 0 | Status: SHIPPED | OUTPATIENT
Start: 2018-11-21 | End: 2019-02-04

## 2018-11-21 RX ORDER — POTASSIUM CHLORIDE 20 MEQ/1
40 TABLET, EXTENDED RELEASE ORAL ONCE
Status: COMPLETED | OUTPATIENT
Start: 2018-11-21 | End: 2018-11-21

## 2018-11-21 RX ORDER — LANOLIN ALCOHOL/MO/W.PET/CERES
400 CREAM (GRAM) TOPICAL ONCE
Status: COMPLETED | OUTPATIENT
Start: 2018-11-21 | End: 2018-11-21

## 2018-11-21 RX ADMIN — PROMETHAZINE HYDROCHLORIDE AND CODEINE PHOSPHATE 5 ML: 10; 6.25 SOLUTION ORAL at 05:11

## 2018-11-21 RX ADMIN — HYDROCHLOROTHIAZIDE 12.5 MG: 12.5 TABLET ORAL at 09:11

## 2018-11-21 RX ADMIN — PANTOPRAZOLE SODIUM 40 MG: 40 TABLET, DELAYED RELEASE ORAL at 12:11

## 2018-11-21 RX ADMIN — MONTELUKAST SODIUM 10 MG: 10 TABLET, FILM COATED ORAL at 09:11

## 2018-11-21 RX ADMIN — ENOXAPARIN SODIUM 40 MG: 100 INJECTION SUBCUTANEOUS at 05:11

## 2018-11-21 RX ADMIN — KETOROLAC TROMETHAMINE 30 MG: 30 INJECTION, SOLUTION INTRAMUSCULAR at 11:11

## 2018-11-21 RX ADMIN — SIMETHICONE CHEW TAB 80 MG 80 MG: 80 TABLET ORAL at 12:11

## 2018-11-21 RX ADMIN — ACETAMINOPHEN 650 MG: 325 TABLET, FILM COATED ORAL at 11:11

## 2018-11-21 RX ADMIN — INSULIN ASPART 1 UNITS: 100 INJECTION, SOLUTION INTRAVENOUS; SUBCUTANEOUS at 09:11

## 2018-11-21 RX ADMIN — MAGNESIUM OXIDE TAB 400 MG (241.3 MG ELEMENTAL MG) 400 MG: 400 (241.3 MG) TAB at 11:11

## 2018-11-21 RX ADMIN — POTASSIUM CHLORIDE 40 MEQ: 1500 TABLET, EXTENDED RELEASE ORAL at 11:11

## 2018-11-21 RX ADMIN — SENNOSIDES AND DOCUSATE SODIUM 1 TABLET: 8.6; 5 TABLET ORAL at 09:11

## 2018-11-21 RX ADMIN — KETOROLAC TROMETHAMINE 30 MG: 30 INJECTION, SOLUTION INTRAMUSCULAR at 05:11

## 2018-11-21 RX ADMIN — PROMETHAZINE HYDROCHLORIDE AND CODEINE PHOSPHATE 5 ML: 10; 6.25 SOLUTION ORAL at 09:11

## 2018-11-21 RX ADMIN — OXYCODONE HYDROCHLORIDE 10 MG: 10 TABLET ORAL at 05:11

## 2018-11-21 RX ADMIN — FUROSEMIDE 20 MG: 10 INJECTION, SOLUTION INTRAMUSCULAR; INTRAVENOUS at 11:11

## 2018-11-21 RX ADMIN — POLYETHYLENE GLYCOL 3350 17 G: 17 POWDER, FOR SOLUTION ORAL at 09:11

## 2018-11-21 RX ADMIN — OXYCODONE HYDROCHLORIDE 10 MG: 10 TABLET ORAL at 09:11

## 2018-11-21 RX ADMIN — ACETAMINOPHEN 650 MG: 325 TABLET, FILM COATED ORAL at 05:11

## 2018-11-21 RX ADMIN — PANTOPRAZOLE SODIUM 40 MG: 40 TABLET, DELAYED RELEASE ORAL at 09:11

## 2018-11-21 RX ADMIN — PROMETHAZINE HYDROCHLORIDE AND CODEINE PHOSPHATE 5 ML: 10; 6.25 SOLUTION ORAL at 01:11

## 2018-11-21 NOTE — PROGRESS NOTES
[facility]  General Surgery  Progress Note    Subjective:     History of Present Illness:  No notes on file    Post-Op Info:  Procedure(s) (LRB):  REPAIR, HERNIA, INCISIONAL OR VENTRAL, WITHOUT HISTORY OF PRIOR REPAIR w/mesh (N/A)   2 Days Post-Op     Interval History:   POD#2 ventral hernia repair with mesh   No acute events overnight. Vitals stable.   Reports abdominal discomfort and nausea. +flatus -bm   On 4L NC (not on home O2 normally), CPAP at night.     Medications:  Continuous Infusions:  Scheduled Meds:   acetaminophen  650 mg Oral Q6H    enoxaparin  40 mg Subcutaneous Daily    hydroCHLOROthiazide  12.5 mg Oral Daily    ketorolac  30 mg Intravenous Q6H    montelukast  10 mg Oral QHS    pantoprazole  40 mg Oral Daily    polyethylene glycol  17 g Oral Daily    promethazine-codeine 6.25-10 mg/5 ml  5 mL Oral Q8H    senna-docusate 8.6-50 mg  1 tablet Oral BID     PRN Meds:dextrose 50%, dextrose 50%, glucagon (human recombinant), glucose, glucose, insulin aspart U-100, ondansetron, oxyCODONE, oxyCODONE, ramelteon, simethicone     Review of patient's allergies indicates:   Allergen Reactions    Oxaliplatin Hives    Factive [gemifloxacin] Hives    Statins-hmg-coa reductase inhibitors      Memory w lipitor, muscles for 2 others    Daypro [oxaprozin] Rash    Latex Hives     Adhesives     Objective:     Vital Signs (Most Recent):  Temp: 98.1 °F (36.7 °C) (11/21/18 1156)  Pulse: 98 (11/21/18 1156)  Resp: 18 (11/21/18 0725)  BP: (!) 161/74 (11/21/18 1156)  SpO2: (!) 92 % (11/21/18 1156) Vital Signs (24h Range):  Temp:  [97.1 °F (36.2 °C)-98.4 °F (36.9 °C)] 98.1 °F (36.7 °C)  Pulse:  [] 98  Resp:  [16-18] 18  SpO2:  [91 %-97 %] 92 %  BP: (148-198)/(74-96) 161/74     Weight: 122 kg (268 lb 15.4 oz)  Body mass index is 42.13 kg/m².    Intake/Output - Last 3 Shifts       11/19 0700 - 11/20 0659 11/20 0700 - 11/21 0659 11/21 0700 - 11/22 0659    P.O. 1500 1000 120    I.V. (mL/kg)  1000 (8.2)      IV Piggyback 50 50     Total Intake(mL/kg) 2550 (20.9) 1050 (8.6) 120 (1)    Urine (mL/kg/hr) 1550 (0.5) 600 (0.2)     Drains 50 140     Stool 0 0     Total Output 1600 740     Net +950 +310 +120           Stool Occurrence 0 x 0 x           Physical Exam       General: In no acute distress, well appearance.   CV: RRR, S1, S2 no murmur or gallops   Pulm: non labored breathing, b/l clear breath sounds.   Abd: soft, slightly distended, tenderness around incision site. MARIAELENA drain with Serous fluid minimal output 120 cc  : Gunter in place with clear urine.   Ext: wwp      Significant Labs:  CBC:   Recent Labs   Lab 11/21/18  0459   WBC 9.70   RBC 3.80*   HGB 11.3*   HCT 34.5*      MCV 91   MCH 29.7   MCHC 32.8     CMP:   Recent Labs   Lab 11/21/18 0459   *   CALCIUM 9.2   ALBUMIN 3.0*   PROT 6.7   *   K 3.8   CO2 24   CL 99   BUN 16   CREATININE 0.8   ALKPHOS 63   ALT 37   AST 28   BILITOT 0.7     KUB 11/21/2018 :There are fluid levels.  There is postoperative change.  There is ileus.  There is no perforation.    CXR 11/21/2018: Heart size is normal.  There is bibasilar edema/infiltrate unchanged from the prior study.  There is mild ileus.      Assessment/Plan:     * Incisional hernia    Ms Max is a 64 yo F now POD#2 from Ventral hernia repair with mesh.     - NPO due to abdominal distention and nausea this morning. Appears to have ileus on KUB this morning. Will advance as tolerater  - Pain control with oral medications  - Wean O2, continue CPAP at night   - Ambulate, PT/OT           Kristine Nunn MD  General Surgery  [facility]

## 2018-11-21 NOTE — NURSING
Dr. George called, notified patient had complaints of indigestion and asking for medication to relieve. Orders received for pantoprazole daily and simethicone PRN TID daily

## 2018-11-21 NOTE — SUBJECTIVE & OBJECTIVE
Interval History:   POD#2 ventral hernia repair with mesh   No acute events overnight. Vitals stable.   Reports abdominal discomfort and nausea. +flatus -bm   On 4L NC (not on home O2 normally), CPAP at night.     Medications:  Continuous Infusions:  Scheduled Meds:   acetaminophen  650 mg Oral Q6H    enoxaparin  40 mg Subcutaneous Daily    hydroCHLOROthiazide  12.5 mg Oral Daily    ketorolac  30 mg Intravenous Q6H    montelukast  10 mg Oral QHS    pantoprazole  40 mg Oral Daily    polyethylene glycol  17 g Oral Daily    promethazine-codeine 6.25-10 mg/5 ml  5 mL Oral Q8H    senna-docusate 8.6-50 mg  1 tablet Oral BID     PRN Meds:dextrose 50%, dextrose 50%, glucagon (human recombinant), glucose, glucose, insulin aspart U-100, ondansetron, oxyCODONE, oxyCODONE, ramelteon, simethicone     Review of patient's allergies indicates:   Allergen Reactions    Oxaliplatin Hives    Factive [gemifloxacin] Hives    Statins-hmg-coa reductase inhibitors      Memory w lipitor, muscles for 2 others    Daypro [oxaprozin] Rash    Latex Hives     Adhesives     Objective:     Vital Signs (Most Recent):  Temp: 98.1 °F (36.7 °C) (11/21/18 1156)  Pulse: 98 (11/21/18 1156)  Resp: 18 (11/21/18 0725)  BP: (!) 161/74 (11/21/18 1156)  SpO2: (!) 92 % (11/21/18 1156) Vital Signs (24h Range):  Temp:  [97.1 °F (36.2 °C)-98.4 °F (36.9 °C)] 98.1 °F (36.7 °C)  Pulse:  [] 98  Resp:  [16-18] 18  SpO2:  [91 %-97 %] 92 %  BP: (148-198)/(74-96) 161/74     Weight: 122 kg (268 lb 15.4 oz)  Body mass index is 42.13 kg/m².    Intake/Output - Last 3 Shifts       11/19 0700 - 11/20 0659 11/20 0700 - 11/21 0659 11/21 0700 - 11/22 0659    P.O. 1500 1000 120    I.V. (mL/kg) 1000 (8.2)      IV Piggyback 50 50     Total Intake(mL/kg) 2550 (20.9) 1050 (8.6) 120 (1)    Urine (mL/kg/hr) 1550 (0.5) 600 (0.2)     Drains 50 140     Stool 0 0     Total Output 1600 740     Net +950 +310 +120           Stool Occurrence 0 x 0 x           Physical Exam        General: In no acute distress, well appearance.   CV: RRR, S1, S2 no murmur or gallops   Pulm: non labored breathing, b/l clear breath sounds.   Abd: soft, slightly distended, tenderness around incision site. MARIAELENA drain with Serous fluid minimal output 120 cc  : Gunter in place with clear urine.   Ext: wwp      Significant Labs:  CBC:   Recent Labs   Lab 11/21/18  0459   WBC 9.70   RBC 3.80*   HGB 11.3*   HCT 34.5*      MCV 91   MCH 29.7   MCHC 32.8     CMP:   Recent Labs   Lab 11/21/18 0459   *   CALCIUM 9.2   ALBUMIN 3.0*   PROT 6.7   *   K 3.8   CO2 24   CL 99   BUN 16   CREATININE 0.8   ALKPHOS 63   ALT 37   AST 28   BILITOT 0.7     KUB 11/21/2018 :There are fluid levels.  There is postoperative change.  There is ileus.  There is no perforation.    CXR 11/21/2018: Heart size is normal.  There is bibasilar edema/infiltrate unchanged from the prior study.  There is mild ileus.

## 2018-11-21 NOTE — PLAN OF CARE
Problem: Patient Care Overview  Goal: Plan of Care Review  Outcome: Ongoing (interventions implemented as appropriate)  POC reviewed with pt. Verbal understanding received. Patient AOX4. Afebrile. 4l nc O2 93%. Patient ambulating to bathroom Patient complaint of general abdominal pain, gas discomfort and indigestion. Patient sleeping in chair. Pt states this is more comfortable. ML intact, no drainage. MARIAELENA leaking at site, sanguenous drainage to bulb. Patient remains free of falls or injury. Call light in reach. RN MARIA TERESA

## 2018-11-21 NOTE — PT/OT/SLP PROGRESS
Occupational Therapy   Treatment    Name: Taty Max  MRN: [id number]  Admitting Diagnosis:  Incisional hernia  2 Days Post-Op    Recommendations:     Discharge Recommendations: home  Discharge Equipment Recommendations:  none  Barriers to discharge:  None    Subjective     Pain/Comfort:  · Pain Rating 1: (did not rate)  · Location - Side 1: Bilateral  · Location - Orientation 1: generalized  · Location 1: abdomen  · Pain Rating Post-Intervention 1: (pt did not rate; pt reported that it hurts the most when attempting to stand )  · Pain Rating 2: (pt did not rate)  · Location - Side 2: Bilateral  · Location - Orientation 2: generalized  · Location 2: abdomen  · Pain Addressed 2: Nurse notified, Cessation of Activity, Reposition    Objective:     Communicated with: RN Portia prior to session.  Patient found sitting UIC with all lines intact, call button in reach and RN notified and MARIAELENA drain, telemetry, and oxygen upon OT entry to room.    General Precautions: Standard, fall   Orthopedic Precautions:N/A   Braces: N/A     Occupational Performance:    Bed Mobility:    · Not assessed 2/2 patient UIC upon OT arrival    Functional Mobility/Transfers:  · Patient completed Sit <> Stand Transfer with independence  with  no assistive device   · Patient completed Toilet Transfer Step Transfer technique with independence with  no AD  · Functional Mobility: Pt ambulated to restroom with no AD and independence with no LOBs or shortness of breath.     Activities of Daily Living:  · Grooming: independence standing at sink to imitate grooming routine  · Toileting: independence for wiping and clothing management      [facility] 6 Click ADL: 24    Treatment & Education:  Educated patient on the following:  - OT POC  - Progress made thus far  - Importance of ambulation with spouse 3x/ day  - Self care independence  - Functional mobility to improve endurance  - Importance of sitting UIC     Patient left up in chair with all lines intact, call  button in reach and RN notified;  present   Education:    Assessment:     Taty Max is a 63 y.o. female with a medical diagnosis of Incisional hernia. Pt presented to OT with improved endurance, functional mobility, and self care performance. Pt tolerated OT session well with no c/o of increased pain with activity. Pt met several goals on this date. Pt reported of no therapy concerns. Pt reported that she and her  already walked down the [place]a twice today. Pt also reported that she has been up using the restroom without assistance today. Encouraged patient to stay active to improve her endurance to resume her normal activities upon returning home. Pt has made significant progress from the initial evaluation on yesterday. Pt no longer requires skilled OT in this setting. Pt would benefit from home with no therapy services.     Plan:   - Patient is being discharged from OT this date 2/2 no functional deficits noted that would affect functional independence.    · Plan of Care Reviewed with: patient, spouse    This Plan of care has been discussed with the patient who was involved in its development and understands and is in agreement with the identified goals and treatment plan    GOALS:   Multidisciplinary Problems     Occupational Therapy Goals     Not on file          Multidisciplinary Problems (Resolved)        Problem: Occupational Therapy Goal    Goal Priority Disciplines Outcome Interventions   Occupational Therapy Goal   (Resolved)     OT, PT/OT Outcome(s) achieved    Description:  Goals to be met by: 11/27/18    Patient will increase functional independence with ADLs by performing:    UE Dressing with Modified [place].- not initiated  LE Dressing with Modified [place].- not initiated  Grooming while standing at sink with Modified [place].- met on 11/21/18  Toileting from toilet with Modified [place] for hygiene and clothing management. - met on 11/21/18  Supine to sit with  Modified [place] to prepare for functional activities. - not met  Step transfer with Modified [place] to prepare for household mobility to complete self care tasks with no AD. - met on 11/21/18  Toilet transfer to toilet with Modified [place].- met on 11/21/18                       Time Tracking:     OT Date of Treatment: 11/21/18  OT Start Time: 1448  OT Stop Time: 1500  OT Total Time (min): 12 min    Billable Minutes:Self Care/Home Management 12    Telma Espinoza, OT  11/21/2018

## 2018-11-21 NOTE — PLAN OF CARE
Problem: Patient Care Overview  Goal: Plan of Care Review  Outcome: Ongoing (interventions implemented as appropriate)  Patient set up in chair all day, went for x-ray and changed to NPO with sips and ice chips after, Ambulatory in room and to bathroom and reports passing gas today, Blood sugars taken and no coverage needed. Abdomen with no drainage or redness. Continue with plan of care.

## 2018-11-21 NOTE — ASSESSMENT & PLAN NOTE
Ms Max is a 64 yo F now POD#2 from Ventral hernia repair with mesh.     - NPO due to abdominal distention and nausea this morning. Appears to have ileus on KUB this morning. Will advance as tolerater  - Pain control with oral medications  - Wean O2, continue CPAP at night   - Ambulate, PT/OT

## 2018-11-21 NOTE — PLAN OF CARE
Problem: Occupational Therapy Goal  Goal: Occupational Therapy Goal  Goals to be met by: 11/27/18    Patient will increase functional independence with ADLs by performing:    UE Dressing with Modified [place].- not initiated  LE Dressing with Modified [place].- not initiated  Grooming while standing at sink with Modified [place].- met on 11/21/18  Toileting from toilet with Modified [place] for hygiene and clothing management. - met on 11/21/18  Supine to sit with Modified [place] to prepare for functional activities. - not met  Step transfer with Modified [place] to prepare for household mobility to complete self care tasks with no AD. - met on 11/21/18  Toilet transfer to toilet with Modified [place].- met on 11/21/18     Outcome: Outcome(s) achieved Date Met: 11/21/18  Pt reached all goals needed to ensure a safe return home. Pt presented with no functional impairments that would impact her ability to complete self care tasks and functional mobility safely.     Comments: Telma Espinoza OTR/L

## 2018-11-22 VITALS
BODY MASS INDEX: 42.21 KG/M2 | DIASTOLIC BLOOD PRESSURE: 81 MMHG | OXYGEN SATURATION: 94 % | TEMPERATURE: 98 F | HEIGHT: 67 IN | RESPIRATION RATE: 16 BRPM | HEART RATE: 112 BPM | WEIGHT: 268.94 LBS | SYSTOLIC BLOOD PRESSURE: 132 MMHG

## 2018-11-22 LAB
ALBUMIN SERPL BCP-MCNC: 2.8 G/DL
ALP SERPL-CCNC: 66 U/L
ALT SERPL W/O P-5'-P-CCNC: 31 U/L
ANION GAP SERPL CALC-SCNC: 9 MMOL/L
AST SERPL-CCNC: 25 U/L
BASOPHILS # BLD AUTO: 0.03 K/UL
BASOPHILS NFR BLD: 0.4 %
BILIRUB SERPL-MCNC: 0.6 MG/DL
BUN SERPL-MCNC: 22 MG/DL
CALCIUM SERPL-MCNC: 9.3 MG/DL
CHLORIDE SERPL-SCNC: 99 MMOL/L
CO2 SERPL-SCNC: 26 MMOL/L
CREAT SERPL-MCNC: 0.9 MG/DL
DIFFERENTIAL METHOD: NORMAL
EOSINOPHIL # BLD AUTO: 0.3 K/UL
EOSINOPHIL NFR BLD: 4.3 %
ERYTHROCYTE [DISTWIDTH] IN BLOOD BY AUTOMATED COUNT: 13.2 %
EST. GFR  (AFRICAN AMERICAN): >60 ML/MIN/1.73 M^2
EST. GFR  (NON AFRICAN AMERICAN): >60 ML/MIN/1.73 M^2
GLUCOSE SERPL-MCNC: 134 MG/DL
HCT VFR BLD AUTO: 37.9 %
HGB BLD-MCNC: 12.5 G/DL
IMM GRANULOCYTES # BLD AUTO: 0.01 K/UL
IMM GRANULOCYTES NFR BLD AUTO: 0.1 %
LYMPHOCYTES # BLD AUTO: 2.2 K/UL
LYMPHOCYTES NFR BLD: 32.4 %
MAGNESIUM SERPL-MCNC: 1.9 MG/DL
MCH RBC QN AUTO: 30.1 PG
MCHC RBC AUTO-ENTMCNC: 33 G/DL
MCV RBC AUTO: 91 FL
MONOCYTES # BLD AUTO: 0.6 K/UL
MONOCYTES NFR BLD: 9.1 %
NEUTROPHILS # BLD AUTO: 3.7 K/UL
NEUTROPHILS NFR BLD: 53.7 %
NRBC BLD-RTO: 0 /100 WBC
PLATELET # BLD AUTO: 289 K/UL
PMV BLD AUTO: 10.2 FL
POCT GLUCOSE: 158 MG/DL (ref 70–110)
POTASSIUM SERPL-SCNC: 4.3 MMOL/L
PROT SERPL-MCNC: 6.6 G/DL
RBC # BLD AUTO: 4.15 M/UL
SODIUM SERPL-SCNC: 134 MMOL/L
WBC # BLD AUTO: 6.82 K/UL

## 2018-11-22 PROCEDURE — G8989 SELF CARE D/C STATUS: HCPCS | Mod: CI

## 2018-11-22 PROCEDURE — 63600175 PHARM REV CODE 636 W HCPCS: Performed by: SURGERY

## 2018-11-22 PROCEDURE — 94761 N-INVAS EAR/PLS OXIMETRY MLT: CPT

## 2018-11-22 PROCEDURE — 27000221 HC OXYGEN, UP TO 24 HOURS

## 2018-11-22 PROCEDURE — 99900035 HC TECH TIME PER 15 MIN (STAT)

## 2018-11-22 PROCEDURE — 25000003 PHARM REV CODE 250: Performed by: SURGERY

## 2018-11-22 PROCEDURE — 36415 COLL VENOUS BLD VENIPUNCTURE: CPT

## 2018-11-22 PROCEDURE — 80053 COMPREHEN METABOLIC PANEL: CPT

## 2018-11-22 PROCEDURE — 25000242 PHARM REV CODE 250 ALT 637 W/ HCPCS: Performed by: STUDENT IN AN ORGANIZED HEALTH CARE EDUCATION/TRAINING PROGRAM

## 2018-11-22 PROCEDURE — 94640 AIRWAY INHALATION TREATMENT: CPT

## 2018-11-22 PROCEDURE — 83735 ASSAY OF MAGNESIUM: CPT

## 2018-11-22 PROCEDURE — 85025 COMPLETE CBC W/AUTO DIFF WBC: CPT

## 2018-11-22 PROCEDURE — 25000003 PHARM REV CODE 250: Performed by: STUDENT IN AN ORGANIZED HEALTH CARE EDUCATION/TRAINING PROGRAM

## 2018-11-22 PROCEDURE — 94660 CPAP INITIATION&MGMT: CPT

## 2018-11-22 RX ORDER — IPRATROPIUM BROMIDE AND ALBUTEROL SULFATE 2.5; .5 MG/3ML; MG/3ML
3 SOLUTION RESPIRATORY (INHALATION)
Status: DISCONTINUED | OUTPATIENT
Start: 2018-11-22 | End: 2018-11-22 | Stop reason: HOSPADM

## 2018-11-22 RX ADMIN — PANTOPRAZOLE SODIUM 40 MG: 40 TABLET, DELAYED RELEASE ORAL at 08:11

## 2018-11-22 RX ADMIN — ACETAMINOPHEN 650 MG: 325 TABLET, FILM COATED ORAL at 05:11

## 2018-11-22 RX ADMIN — KETOROLAC TROMETHAMINE 30 MG: 30 INJECTION, SOLUTION INTRAMUSCULAR at 05:11

## 2018-11-22 RX ADMIN — INSULIN ASPART 2 UNITS: 100 INJECTION, SOLUTION INTRAVENOUS; SUBCUTANEOUS at 07:11

## 2018-11-22 RX ADMIN — HYDROCHLOROTHIAZIDE 12.5 MG: 12.5 TABLET ORAL at 08:11

## 2018-11-22 RX ADMIN — SENNOSIDES AND DOCUSATE SODIUM 1 TABLET: 8.6; 5 TABLET ORAL at 08:11

## 2018-11-22 RX ADMIN — PROMETHAZINE HYDROCHLORIDE AND CODEINE PHOSPHATE 5 ML: 10; 6.25 SOLUTION ORAL at 05:11

## 2018-11-22 RX ADMIN — OXYCODONE HYDROCHLORIDE 10 MG: 10 TABLET ORAL at 09:11

## 2018-11-22 RX ADMIN — IPRATROPIUM BROMIDE AND ALBUTEROL SULFATE 3 ML: .5; 3 SOLUTION RESPIRATORY (INHALATION) at 08:11

## 2018-11-22 NOTE — DISCHARGE SUMMARY
[facility]  General Surgery  Discharge Summary      Patient Name: Taty Max  MRN: [id number]  Admission Date: 11/19/2018  Hospital Length of Stay: 3 days  Discharge Date and Time:  11/22/2018 8:45 AM  Attending Physician: Mariusz Mnior MD   Discharging Provider: Kristine Nunn MD  Primary Care Provider: Fidel Dotson MD    HPI:   Taty Max is a 63 y.o. female with h/o HTN, obesity, and DM (last A1c 6.1)who presents for evaluation of incisional hernia. Prior epigastric hernia repair with mesh prior to 2008. In 2008 was diagnosed with colon cancer and underwent ex lap for colon resection as well as chemotherapy. Reports known hernia of lower aspect of incision, recently during heavy lifting felt a tearing sensation in superior aspect of incision. Now with bulge that increases in size with coughing. Feels better when wears abdominal binder. No obstructive symptoms.  No anticoagulation. Nonsmoker.        Procedure(s) (LRB):  REPAIR, HERNIA, INCISIONAL OR VENTRAL, WITHOUT HISTORY OF PRIOR REPAIR w/mesh (N/A)      Indwelling Lines/Drains at time of discharge:   Lines/Drains/Airways     Drain                 Closed/Suction Drain 11/19/18 0912 Right Abdomen Bulb 19 Fr. 2 days              Hospital Course: Patient underwent incisional hernia repair with mesh on 11/19/2018. Tolerated the procedure well, without complication. Extubated in OR and transferred to recovery. Incision healing well. She was weaned off oxygen. Tolerating diet with no nausea or vomiting. Ambulating and wearing abominable binder. Plan to discharge home today with follow up appointment in 2 weeks.        Consults:     Significant Diagnostic Studies: Labs:   BMP:   Recent Labs   Lab 11/21/18 0459 11/22/18  0455   * 134*   * 134*   K 3.8 4.3   CL 99 99   CO2 24 26   BUN 16 22   CREATININE 0.8 0.9   CALCIUM 9.2 9.3   MG 1.7 1.9   , CMP   Recent Labs   Lab 11/21/18 0459 11/22/18  0455   * 134*   K 3.8  4.3   CL 99 99   CO2 24 26   * 134*   BUN 16 22   CREATININE 0.8 0.9   CALCIUM 9.2 9.3   PROT 6.7 6.6   ALBUMIN 3.0* 2.8*   BILITOT 0.7 0.6   ALKPHOS 63 66   AST 28 25   ALT 37 31   ANIONGAP 9 9   ESTGFRAFRICA >60.0 >60.0   EGFRNONAA >60.0 >60.0    and CBC   Recent Labs   Lab 11/21/18  0459 11/22/18  0455   WBC 9.70 6.82   HGB 11.3* 12.5   HCT 34.5* 37.9    289       Pending Diagnostic Studies:     None        Final Active Diagnoses:    Diagnosis Date Noted POA    PRINCIPAL PROBLEM:  Incisional hernia [K43.2] 11/19/2018 Yes    Morbid obesity [E66.01] 11/19/2018 Yes    Diabetes mellitus type II, uncontrolled [E11.65] 07/25/2014 Yes    HTN (hypertension) [I10] 01/28/2013 Yes      Problems Resolved During this Admission:      Discharged Condition: good    Disposition: Home or Self Care    Follow Up:  Follow-up Information     Mariusz Minor MD On 12/6/2018.    Specialties:  General Surgery, Surgery  Why:  Post-op Appt: 12/06/18 at 08:45 am  Contact information:  [address] [place] [address] [id number] [phone]                 Patient Instructions:      Diet Adult Regular     Lifting restrictions   Order Comments: No heavy lifting (>10lbs) for 6 weeks after surgery.     Other restrictions (specify):   Order Comments: Please wear abdominal binder- can take off when sleeping     Notify your health care provider if you experience any of the following:  increased confusion or weakness     Notify your health care provider if you experience any of the following:  persistent dizziness, light-headedness, or visual disturbances     Notify your health care provider if you experience any of the following:  worsening rash     Notify your health care provider if you experience any of the following:  severe persistent headache     Notify your health care provider if you experience any of the following:  difficulty breathing or increased cough     Notify your health care provider if you experience any of  the following:  redness, tenderness, or signs of infection (pain, swelling, redness, odor or green/yellow discharge around incision site)     Notify your health care provider if you experience any of the following:  severe uncontrolled pain     Notify your health care provider if you experience any of the following:  persistent nausea and vomiting or diarrhea     Notify your health care provider if you experience any of the following:  temperature >100.4     No dressing needed     Activity as tolerated     Shower on day dressing removed (No bath)   Order Comments: Okay to take a shower. Do not soak/submerge incision (aka no bathing, swimming)until cleared in clinic.     Medications:  Reconciled Home Medications:      Medication List      START taking these medications    HYDROcodone-acetaminophen  mg per tablet  Commonly known as:  NORCO  Take 1 tablet by mouth every 6 (six) hours as needed for Pain.        CHANGE how you take these medications    hydroCHLOROthiazide 12.5 MG Tab  Commonly known as:  HYDRODIURIL  Take 1 tablet (12.5 mg total) by mouth once daily.  What changed:  Another medication with the same name was removed. Continue taking this medication, and follow the directions you see here.        CONTINUE taking these medications    * ACCU-CHEK SOFTCLIX LANCETS Misc  Generic drug:  lancets  USE AS DIRECTED     * ONETOUCH DELICA LANCETS 33 gauge Misc  Generic drug:  lancets     albuterol 90 mcg/actuation inhaler  Commonly known as:  PROVENTIL/VENTOLIN HFA  Inhale 1-2 puffs into the lungs every 6 (six) hours as needed for Wheezing.     blood sugar diagnostic Strp  1 strip by Misc.(Non-Drug; Combo Route) route 2 (two) times daily. DISP GLUCOMETER OF CHOICE AND LANCETS AS WELL     cyclobenzaprine 5 MG tablet  Commonly known as:  FLEXERIL  Take 1 tablet (5 mg total) by mouth nightly.     diazePAM 5 MG tablet  Commonly known as:  VALIUM  TAKE ONE TABLET BY MOUTH EVERY NIGHT AT BEDTIME AS NEEDED FOR ANXIETY  AND spasms     fluocinonide 0.05% 0.05 % cream  Commonly known as:  LIDEX  Apply topically 2 (two) times daily.     fluorouracil 5 % cream  Commonly known as:  EFUDEX  Use hs for 2 weeks     fluticasone 50 mcg/actuation nasal spray  Commonly known as:  FLONASE  2 sprays by Each Nare route once daily.     KRILL OIL ORAL  Take 1 tablet by mouth once daily.     meloxicam 7.5 MG tablet  Commonly known as:  MOBIC  TAKE 1 TABLET ONCE DAILY     metaxalone 800 MG tablet  Commonly known as:  SKELAXIN  TAKE 1 TABLET BY MOUTH 4 TIMES DAILY AS NEEDED FOR PAIN     metFORMIN 750 MG 24 hr tablet  Commonly known as:  GLUCOPHAGE-XR  TAKE 1 TABLET EVERY MORNING     montelukast 10 mg tablet  Commonly known as:  SINGULAIR  Take 1 tablet (10 mg total) by mouth every evening.     multivitamin per tablet  Commonly known as:  THERAGRAN  Take 1 tablet by mouth once daily. 1 Tablet Oral Every day     olmesartan 20 MG tablet  Commonly known as:  BENICAR  TAKE 1 TABLET ONCE DAILY     penicillin v potassium 500 MG tablet  Commonly known as:  VEETID  Take 500 mg by mouth 4 (four) times daily. PT IS TAKING FOR DENTAL PURPOSES     tretinoin 0.025 % gel  Commonly known as:  RETIN-A  Apply topically nightly. Gel Topical .  AAA face qhs     zolpidem 5 MG Tab  Commonly known as:  AMBIEN  Take 1 tablet (5 mg total) by mouth nightly. at bedtime.         * This list has 2 medication(s) that are the same as other medications prescribed for you. Read the directions carefully, and ask your doctor or other care provider to review them with you.            STOP taking these medications    traMADol 50 mg tablet  Commonly known as:  ULTRAM          Time spent on the discharge of patient: 30 minutes    Kristine Nunn MD  General Surgery  [facility]

## 2018-11-22 NOTE — HPI
Taty Max is a 63 y.o. female with h/o HTN, obesity, and DM (last A1c 6.1)who presents for evaluation of incisional hernia. Prior epigastric hernia repair with mesh prior to 2008. In 2008 was diagnosed with colon cancer and underwent ex lap for colon resection as well as chemotherapy. Reports known hernia of lower aspect of incision, recently during heavy lifting felt a tearing sensation in superior aspect of incision. Now with bulge that increases in size with coughing. Feels better when wears abdominal binder. No obstructive symptoms.  No anticoagulation. Nonsmoker.

## 2018-11-22 NOTE — HOSPITAL COURSE
Patient underwent incisional hernia repair with mesh on 11/19/2018. Tolerated the procedure well, without complication. Extubated in OR and transferred to recovery. Incision healing well. She was weaned off oxygen. Tolerating diet with no nausea or vomiting. Ambulating and wearing abominable binder. Plan to discharge home today with follow up appointment in 2 weeks.

## 2018-11-22 NOTE — PROGRESS NOTES
Patient being discharged to home. Patient discharge education completed and patient verbalized understanding. Printed copy of prescription given to patient. PIV removed with catheter tip intact. Patient remains free of falls with no distress noted. Patient awaiting transport.

## 2018-11-22 NOTE — PLAN OF CARE
Problem: Patient Care Overview  Goal: Plan of Care Review  Outcome: Ongoing (interventions implemented as appropriate)  Hailee Johnson, RN   Registered Nurse      Plan of Care   Signed                     Problem: Patient Care Overview  Goal: Plan of Care Review  Outcome: Ongoing (interventions implemented as appropriate)  POC reviewed with pt. Verbal understanding received. Patient AOX4. Afebrile. 3 l nc O2 93-96%. Patient ambulating to bathroom and hallway.  Patient complaint of general abdominal pain, gas discomfort. No complaints of Indigestion. Patient states she is passing flatus. Patient sleeping in chair. ML intact, no drainage. MARIAELENA no longer leaking at insertion site, scant sanguenous drainage to bulb. Patient remains free of falls or injury. Call light in reach. RN MARIA TERESA              05:45  Patient called RN to room. Patient now passing gas and had large bowel movement.

## 2018-11-23 ENCOUNTER — TELEPHONE (OUTPATIENT)
Dept: SURGERY | Facility: CLINIC | Age: 63
End: 2018-11-23

## 2018-11-23 NOTE — TELEPHONE ENCOUNTER
Ashley Jimenez   MRN: [id number]    Department:  [facility] Emergency Department   Date of Visit:  6/26/2018           Disclosure     Insurance plans vary and the physician(s) referred by the ER may not be covered by your plan.  Please contact your See previous tel call message.   tell this physician (or your personal doctor if your instructions are to return to your personal doctor) about any new or lasting problems. The primary care or specialist physician will see patients referred from the [facility] Emergency Department.  Adonay Chan

## 2018-11-23 NOTE — TELEPHONE ENCOUNTER
----- Message from Tim Post RN sent at 11/23/2018 10:57 AM CST -----  Mary Jo,        This is a pt of Dr Vidales's who had surgery 11/19/18. She's having problems with nausea/vomiting. Please call  Norberto at [phone] for further details.  Tim

## 2018-11-23 NOTE — PLAN OF CARE
Discharged yesterday.        11/23/18 1428   Final Note   Assessment Type Final Discharge Note   Anticipated Discharge Disposition Home   What phone number can be called within the next 1-3 days to see how you are doing after discharge? ([phone])   Hospital Follow Up  Appt(s) scheduled? Yes   Discharge plans and expectations educations in teach back method with documentation complete? Yes   Right Care Referral Info   Post Acute Recommendation (Incomplete)

## 2018-11-23 NOTE — TELEPHONE ENCOUNTER
----- Message from Stephanie Way sent at 11/23/2018  9:05 AM CST -----  Contact: Patient's   Needs Advice    Reason for call: Caller states that patient is persistently  nauseous and they would ,like to speak to a nurse as they are throwing up        Communication Preference: [phone]

## 2018-11-23 NOTE — PROGRESS NOTES
Physical Therapy Discharge Summary    Name: Taty Max  MRN: [id number]   Principal Problem: Incisional hernia     Patient Discharged from acute Physical Therapy on 11/22/18.  Please refer to prior PT noted date on 11/20/18 for functional status.     Assessment:     Patient appropriate for care in another setting.    Objective:     GOALS:   Multidisciplinary Problems     Physical Therapy Goals        Problem: Physical Therapy Goal    Goal Priority Disciplines Outcome Goal Variances Interventions   Physical Therapy Goal     PT, PT/OT Ongoing (interventions implemented as appropriate)     Description:  PT goals until 11/25/18    1. Pt supine to sit with mod CGA-not met  2. Pt sit to supine with mod independent-not met  3. Pt sit to stand with no AD with mod independent-not met  4. Pt to perform gait 200ft with no AD with supervision.-not met  5. Pt to go up/down curb step with no AD with supervision.-not met                    Goals not met due to pt seen for eval only prior to D/C  Reasons for Discontinuation of Therapy Services  Transfer to alternate level of care.      Plan:     Patient Discharged to: Home no PT services needed.    Zhanna Ulloa, PT  11/23/2018

## 2018-11-23 NOTE — TELEPHONE ENCOUNTER
Returned call and spoke to patient's .  Patient is having bowel movements last one at 7 AM, she is passing gas as well, not able to eat, is drinking fluids.  Informed  that I would contact the resident on call and see if she will prescribe something for the nausea.  Paged Dr Nunn and she returned the page, she will call in something for the nausea.  Called  back and LVM that the prescription would be at the [facility] in [place].

## 2018-11-24 ENCOUNTER — PATIENT OUTREACH (OUTPATIENT)
Dept: ADMINISTRATIVE | Facility: CLINIC | Age: 63
End: 2018-11-24

## 2018-11-24 NOTE — PATIENT INSTRUCTIONS
AVS DISCHARGE SUMMARY - POST-OP  INSTRUCTIONS REVIEWED IN FULL WITH PATIENT.  Patient Instructions:       Diet Adult Regular          Lifting restrictions   Order Comments: No heavy lifting (>10lbs) for 6 weeks after surgery.          Other restrictions (specify):   Order Comments: Please wear abdominal binder- can take off when sleeping      Notify your health care provider if you experience any of the following:  increased confusion or weakness      Notify your health care provider if you experience any of the following:  persistent dizziness, light-headedness, or visual disturbances      Notify your health care provider if you experience any of the following:  worsening rash      Notify your health care provider if you experience any of the following:  severe persistent headache      Notify your health care provider if you experience any of the following:  difficulty breathing or increased cough      Notify your health care provider if you experience any of the following:  redness, tenderness, or signs of infection (pain, swelling, redness, odor or green/yellow discharge around incision site)      Notify your health care provider if you experience any of the following:  severe uncontrolled pain      Notify your health care provider if you experience any of the following:  persistent nausea and vomiting or diarrhea      Notify your health care provider if you experience any of the following:  temperature >100.4      No dressing needed      Activity as tolerated          Shower on day dressing removed (No bath)   Order Comments: Okay to take a shower. Do not soak/submerge incision (aka no bathing, swimming)until cleared in clinic.

## 2018-11-26 NOTE — TELEPHONE ENCOUNTER
Phoned patient to discuss the HCTZ and she has already had it taken care of from [facility].  She is having some diarrhea due to her colon surgery 10 years ago and will begin taking small doses of imodium but will be careful not to get constipated.

## 2018-11-28 NOTE — PT/OT/SLP DISCHARGE
Occupational Therapy Discharge Summary    Taty Max  MRN: [id number]   Principal Problem: Incisional hernia      Patient Discharged from acute Occupational Therapy on 11/22/2018.  Please refer to prior OT note dated 11/21/2018 for functional status.    Assessment:      Patient appropriate for care in another setting.    Objective:     GOALS:   Multidisciplinary Problems     Occupational Therapy Goals     Not on file          Multidisciplinary Problems (Resolved)        Problem: Occupational Therapy Goal    Goal Priority Disciplines Outcome Interventions   Occupational Therapy Goal   (Resolved)     OT, PT/OT Outcome(s) achieved    Description:  Goals to be met by: 11/27/18    Patient will increase functional independence with ADLs by performing:    UE Dressing with Modified [place].- not initiated  LE Dressing with Modified [place].- not initiated  Grooming while standing at sink with Modified [place].- met on 11/21/18  Toileting from toilet with Modified [place] for hygiene and clothing management. - met on 11/21/18  Supine to sit with Modified [place] to prepare for functional activities. - not met  Step transfer with Modified [place] to prepare for household mobility to complete self care tasks with no AD. - met on 11/21/18  Toilet transfer to toilet with Modified [place].- met on 11/21/18                       Reasons for Discontinuation of Therapy Services  Transfer to alternate level of care.      Plan:     Patient Discharged to: Home no OT services needed    Telma Espinoza, OT  11/28/2018

## 2018-11-29 ENCOUNTER — OFFICE VISIT (OUTPATIENT)
Dept: SURGERY | Facility: CLINIC | Age: 63
End: 2018-11-29
Payer: COMMERCIAL

## 2018-11-29 ENCOUNTER — TELEPHONE (OUTPATIENT)
Dept: SURGERY | Facility: CLINIC | Age: 63
End: 2018-11-29

## 2018-11-29 VITALS
DIASTOLIC BLOOD PRESSURE: 69 MMHG | TEMPERATURE: 98 F | BODY MASS INDEX: 42.06 KG/M2 | HEART RATE: 89 BPM | SYSTOLIC BLOOD PRESSURE: 148 MMHG | HEIGHT: 67 IN | WEIGHT: 268 LBS

## 2018-11-29 DIAGNOSIS — K43.2 INCISIONAL HERNIA, WITHOUT OBSTRUCTION OR GANGRENE: Primary | ICD-10-CM

## 2018-11-29 PROCEDURE — 99024 POSTOP FOLLOW-UP VISIT: CPT | Mod: S$GLB,,, | Performed by: SURGERY

## 2018-11-29 PROCEDURE — 99999 PR PBB SHADOW E&M-EST. PATIENT-LVL III: CPT | Mod: PBBFAC,,, | Performed by: SURGERY

## 2018-11-29 NOTE — PROGRESS NOTES
Mrs. Max is a 64yo female who presented on 11/19 for a revision of an incisional hernia with mesh, previous epigastric hernia repair (with mesh) prior to 2008 when she underwent ex lap for colon resection and chemotherapy following colon cancer diagnosis. She did well postoperatively and was discharged on POD 3 (11/22). Since returning home she has been recovering well, had some mild nausea vomiting that has since resolved. This morning while changing the dressing on her wound blood began to leak from the lower 1/3rd of her incision - she called and was told to come to clinic.   On exam her incision is clean and intact, healing appropriately with no signs of infection or dehiscence. Thin serosanguinous fluid was leaking from the wound, 3 staples were removed and the fluid was evacuated. The wound was packed and covered with 4x4's, patient given instructions to change dressing if necessary. Patient will return tomorrow (11/30) to the breast clinic to change the packing and re-examine the wound.     I have personally performed a detailed history and physical examination on this patient. My findings are summarized in the resident's note included in the record.

## 2018-11-30 ENCOUNTER — OFFICE VISIT (OUTPATIENT)
Dept: SURGERY | Facility: CLINIC | Age: 63
End: 2018-11-30
Payer: COMMERCIAL

## 2018-11-30 VITALS — DIASTOLIC BLOOD PRESSURE: 67 MMHG | SYSTOLIC BLOOD PRESSURE: 145 MMHG | HEART RATE: 88 BPM | TEMPERATURE: 99 F

## 2018-11-30 DIAGNOSIS — K43.2 INCISIONAL HERNIA, WITHOUT OBSTRUCTION OR GANGRENE: Primary | ICD-10-CM

## 2018-11-30 PROCEDURE — 99999 PR PBB SHADOW E&M-EST. PATIENT-LVL II: CPT | Mod: PBBFAC,,, | Performed by: SURGERY

## 2018-11-30 PROCEDURE — 99024 POSTOP FOLLOW-UP VISIT: CPT | Mod: S$GLB,,, | Performed by: SURGERY

## 2018-11-30 NOTE — PROGRESS NOTES
Clear drainage from wound at a volume that required three changes yesterday  Wound is clean with no signs of infection  Repacked with a single 4X4 and an ABD pad   will change dressings prn  Will recheck next Thursday  They understand that if problems with home care develop (frequent soaking through to suggest fascial defect or signs of infection) they should come to the ED immediately

## 2018-12-03 ENCOUNTER — PATIENT MESSAGE (OUTPATIENT)
Dept: SURGERY | Facility: CLINIC | Age: 63
End: 2018-12-03

## 2018-12-03 ENCOUNTER — TELEPHONE (OUTPATIENT)
Dept: SURGERY | Facility: CLINIC | Age: 63
End: 2018-12-03

## 2018-12-03 RX ORDER — CYCLOBENZAPRINE HCL 5 MG
5 TABLET ORAL NIGHTLY
Qty: 30 TABLET | Refills: 1 | Status: SHIPPED | OUTPATIENT
Start: 2018-12-03 | End: 2019-02-04 | Stop reason: SDUPTHER

## 2018-12-03 NOTE — TELEPHONE ENCOUNTER
Returned call and discussed patient's progress.  Instructed to keep taking ibuprofen and thyenol for hte fever.  Call us back if it reaches 102.  Continue dressing changes as previously mentioned.  Continue  advancing activity and we will follow up with her on Thursday for her f/u appt.  Norberto, patient;s  verbalized understanding. Patient also has sinus issues and the low grade temp could be from that.

## 2018-12-03 NOTE — TELEPHONE ENCOUNTER
----- Message from Zhanna Coley sent at 12/3/2018  2:00 PM CST -----  Contact: pts    Needs Advice    Reason for call: pts wife is calling to speak with the nurse pts wife said the pt temp at 8:00 am its 99.4 and 2:00 pm this afternoon her temp is 100.8 pt was told to call back to see what else Dr Minor wants to do         Communication Preference: pts  Norberto     Additional Information: can you please call Norberto at [phone]    ADOLPH

## 2018-12-05 ENCOUNTER — TELEPHONE (OUTPATIENT)
Dept: SURGERY | Facility: CLINIC | Age: 63
End: 2018-12-05

## 2018-12-05 NOTE — TELEPHONE ENCOUNTER
----- Message from Rosales Alf sent at 12/5/2018 11:34 AM CST -----  Contact: Brenden/ Elvia Gonzalez          Communication Preference:[phone] [phone]    Additional Information:Caller states he need to confirm if a fax he sent was received.

## 2018-12-05 NOTE — TELEPHONE ENCOUNTER
Returned tana and spoke to Hailee with [employer] to inform her hat we did receive the faxed disability paper work and they will be signed tomorrow.

## 2018-12-06 ENCOUNTER — TELEPHONE (OUTPATIENT)
Dept: SURGERY | Facility: CLINIC | Age: 63
End: 2018-12-06

## 2018-12-06 ENCOUNTER — OFFICE VISIT (OUTPATIENT)
Dept: SURGERY | Facility: CLINIC | Age: 63
End: 2018-12-06
Payer: COMMERCIAL

## 2018-12-06 VITALS
HEART RATE: 119 BPM | TEMPERATURE: 99 F | WEIGHT: 257.06 LBS | HEIGHT: 67 IN | DIASTOLIC BLOOD PRESSURE: 82 MMHG | BODY MASS INDEX: 40.35 KG/M2 | SYSTOLIC BLOOD PRESSURE: 140 MMHG

## 2018-12-06 DIAGNOSIS — J01.11 ACUTE RECURRENT FRONTAL SINUSITIS: Primary | ICD-10-CM

## 2018-12-06 PROCEDURE — 99024 POSTOP FOLLOW-UP VISIT: CPT | Mod: S$GLB,,, | Performed by: SURGERY

## 2018-12-06 PROCEDURE — 99999 PR PBB SHADOW E&M-EST. PATIENT-LVL III: CPT | Mod: PBBFAC,,, | Performed by: SURGERY

## 2018-12-06 RX ORDER — DOXYCYCLINE 100 MG/1
100 CAPSULE ORAL 2 TIMES DAILY
Qty: 20 CAPSULE | Refills: 1 | Status: SHIPPED | OUTPATIENT
Start: 2018-12-06 | End: 2019-02-04

## 2018-12-06 NOTE — PROGRESS NOTES
Returns for wound check  Looks good  Drainage has just about stopped  Discussed a strategy that calls for less packing and cleaning in the shower  Recheck in 2 weeks

## 2018-12-06 NOTE — TELEPHONE ENCOUNTER
Scanned [employer] paperwork to my email and forwarded it to [email or web address] and cc'd to [email or web address].  Also faxed the documents to [phone] with confirmation of transmittal.  Faxed paperwork for [employer] to [phone] with confirmation of transmittal.   Also gave patient a copy of both sets of paperwork for their records.

## 2018-12-20 ENCOUNTER — OFFICE VISIT (OUTPATIENT)
Dept: SURGERY | Facility: CLINIC | Age: 63
End: 2018-12-20
Payer: COMMERCIAL

## 2018-12-20 VITALS — HEIGHT: 67 IN | WEIGHT: 253.75 LBS | BODY MASS INDEX: 39.83 KG/M2

## 2018-12-20 DIAGNOSIS — K43.2 INCISIONAL HERNIA WITHOUT OBSTRUCTION OR GANGRENE: Primary | ICD-10-CM

## 2018-12-20 PROCEDURE — 99024 POSTOP FOLLOW-UP VISIT: CPT | Mod: S$GLB,,, | Performed by: SURGERY

## 2018-12-20 PROCEDURE — 99999 PR PBB SHADOW E&M-EST. PATIENT-LVL III: CPT | Mod: PBBFAC,,, | Performed by: SURGERY

## 2018-12-20 NOTE — PROGRESS NOTES
S/p incisional hernia repair  Doing well  Staples removed  Patient very pleased with result  She has two areas where skin has  but with proper care (osoap and water) she should heal just fine  Will see prn  Back to work 1/7 full duty

## 2018-12-27 ENCOUNTER — PATIENT MESSAGE (OUTPATIENT)
Dept: SURGERY | Facility: CLINIC | Age: 63
End: 2018-12-27

## 2018-12-27 DIAGNOSIS — R52 PAIN: ICD-10-CM

## 2018-12-27 RX ORDER — TRAMADOL HYDROCHLORIDE 50 MG/1
TABLET ORAL
Qty: 60 TABLET | Refills: 0 | Status: SHIPPED | OUTPATIENT
Start: 2018-12-27 | End: 2019-01-15 | Stop reason: SDUPTHER

## 2018-12-27 RX ORDER — ZOLPIDEM TARTRATE 5 MG/1
TABLET ORAL
Qty: 30 TABLET | Refills: 0 | Status: SHIPPED | OUTPATIENT
Start: 2018-12-27 | End: 2019-02-04 | Stop reason: SDUPTHER

## 2018-12-27 NOTE — TELEPHONE ENCOUNTER
Covering for PCP - Sent medication refill to patient's preferred pharmacy on file. [facility] database queried/reviewed

## 2019-01-07 ENCOUNTER — PATIENT MESSAGE (OUTPATIENT)
Dept: SURGERY | Facility: CLINIC | Age: 64
End: 2019-01-07

## 2019-01-07 ENCOUNTER — TELEPHONE (OUTPATIENT)
Dept: SURGERY | Facility: CLINIC | Age: 64
End: 2019-01-07

## 2019-01-07 NOTE — TELEPHONE ENCOUNTER
----- Message from Stephanie Way sent at 1/7/2019  9:01 AM CST -----  Contact: Patient   Needs Advice    Reason for call: Patient states employer has not received return to work information from this office as they should have, and after arriving to work patient was sent home. [facility]        Communication Preference: (Send return to work clearance to: [phone] and ATTN: DIOGENES ZHAO)    Additional Information: please contact the caller at [phone] after completing

## 2019-01-07 NOTE — TELEPHONE ENCOUNTER
Returned call to patient and she received the email from me this morning and will bring a copy to her employer so that she can return to work today.

## 2019-01-10 DIAGNOSIS — R52 PAIN: ICD-10-CM

## 2019-01-10 RX ORDER — TRAMADOL HYDROCHLORIDE 50 MG/1
TABLET ORAL
Qty: 60 TABLET | OUTPATIENT
Start: 2019-01-10

## 2019-01-14 ENCOUNTER — PATIENT MESSAGE (OUTPATIENT)
Dept: FAMILY MEDICINE | Facility: CLINIC | Age: 64
End: 2019-01-14

## 2019-01-15 ENCOUNTER — PATIENT MESSAGE (OUTPATIENT)
Dept: FAMILY MEDICINE | Facility: CLINIC | Age: 64
End: 2019-01-15

## 2019-01-15 DIAGNOSIS — R52 PAIN: ICD-10-CM

## 2019-01-15 RX ORDER — TRAMADOL HYDROCHLORIDE 50 MG/1
50 TABLET ORAL EVERY 6 HOURS PRN
Qty: 60 TABLET | Refills: 0 | Status: CANCELLED | OUTPATIENT
Start: 2019-01-15

## 2019-01-16 RX ORDER — TRAMADOL HYDROCHLORIDE 50 MG/1
50 TABLET ORAL EVERY 6 HOURS PRN
Qty: 60 TABLET | Refills: 0 | Status: SHIPPED | OUTPATIENT
Start: 2019-01-16 | End: 2019-02-04 | Stop reason: SDUPTHER

## 2019-01-17 ENCOUNTER — OFFICE VISIT (OUTPATIENT)
Dept: PAIN MEDICINE | Facility: CLINIC | Age: 64
End: 2019-01-17
Attending: ANESTHESIOLOGY
Payer: COMMERCIAL

## 2019-01-17 VITALS
RESPIRATION RATE: 18 BRPM | SYSTOLIC BLOOD PRESSURE: 165 MMHG | TEMPERATURE: 99 F | HEART RATE: 108 BPM | WEIGHT: 257.94 LBS | HEIGHT: 67 IN | DIASTOLIC BLOOD PRESSURE: 93 MMHG | BODY MASS INDEX: 40.48 KG/M2

## 2019-01-17 DIAGNOSIS — M79.2 NEURALGIA: ICD-10-CM

## 2019-01-17 DIAGNOSIS — G57.11 MERALGIA PARAESTHETICA, RIGHT: ICD-10-CM

## 2019-01-17 DIAGNOSIS — M47.816 LUMBAR SPONDYLOSIS: ICD-10-CM

## 2019-01-17 DIAGNOSIS — E66.01 MORBID OBESITY WITH BMI OF 40.0-44.9, ADULT: ICD-10-CM

## 2019-01-17 DIAGNOSIS — M79.651 PAIN OF RIGHT THIGH: ICD-10-CM

## 2019-01-17 DIAGNOSIS — G89.4 CHRONIC PAIN DISORDER: Primary | ICD-10-CM

## 2019-01-17 PROCEDURE — 3008F PR BODY MASS INDEX (BMI) DOCUMENTED: ICD-10-PCS | Mod: CPTII,S$GLB,, | Performed by: ANESTHESIOLOGY

## 2019-01-17 PROCEDURE — 3080F DIAST BP >= 90 MM HG: CPT | Mod: CPTII,S$GLB,, | Performed by: ANESTHESIOLOGY

## 2019-01-17 PROCEDURE — 3008F BODY MASS INDEX DOCD: CPT | Mod: CPTII,S$GLB,, | Performed by: ANESTHESIOLOGY

## 2019-01-17 PROCEDURE — 99214 OFFICE O/P EST MOD 30 MIN: CPT | Mod: 25,S$GLB,, | Performed by: ANESTHESIOLOGY

## 2019-01-17 PROCEDURE — 76942 PR U/S GUIDANCE FOR NEEDLE GUIDANCE: ICD-10-PCS | Mod: S$GLB,,, | Performed by: ANESTHESIOLOGY

## 2019-01-17 PROCEDURE — 99999 PR PBB SHADOW E&M-EST. PATIENT-LVL IV: ICD-10-PCS | Mod: PBBFAC,,, | Performed by: ANESTHESIOLOGY

## 2019-01-17 PROCEDURE — 99999 PR PBB SHADOW E&M-EST. PATIENT-LVL IV: CPT | Mod: PBBFAC,,, | Performed by: ANESTHESIOLOGY

## 2019-01-17 PROCEDURE — 3077F PR MOST RECENT SYSTOLIC BLOOD PRESSURE >= 140 MM HG: ICD-10-PCS | Mod: CPTII,S$GLB,, | Performed by: ANESTHESIOLOGY

## 2019-01-17 PROCEDURE — 64450 PR NERVE BLOCK INJ, ANES/STEROID, OTHER PERIPHERAL: ICD-10-PCS | Mod: RT,S$GLB,, | Performed by: ANESTHESIOLOGY

## 2019-01-17 PROCEDURE — 76942 ECHO GUIDE FOR BIOPSY: CPT | Mod: S$GLB,,, | Performed by: ANESTHESIOLOGY

## 2019-01-17 PROCEDURE — 99214 PR OFFICE/OUTPT VISIT, EST, LEVL IV, 30-39 MIN: ICD-10-PCS | Mod: 25,S$GLB,, | Performed by: ANESTHESIOLOGY

## 2019-01-17 PROCEDURE — 3077F SYST BP >= 140 MM HG: CPT | Mod: CPTII,S$GLB,, | Performed by: ANESTHESIOLOGY

## 2019-01-17 PROCEDURE — 64450 NJX AA&/STRD OTHER PN/BRANCH: CPT | Mod: RT,S$GLB,, | Performed by: ANESTHESIOLOGY

## 2019-01-17 PROCEDURE — 3080F PR MOST RECENT DIASTOLIC BLOOD PRESSURE >= 90 MM HG: ICD-10-PCS | Mod: CPTII,S$GLB,, | Performed by: ANESTHESIOLOGY

## 2019-01-17 RX ADMIN — BUPIVACAINE HYDROCHLORIDE 22.5 MG: 2.5 INJECTION, SOLUTION EPIDURAL; INFILTRATION; INTRACAUDAL at 05:01

## 2019-01-17 RX ADMIN — BETAMETHASONE SODIUM PHOSPHATE AND BETAMETHASONE ACETATE 6 MG: 3; 3 INJECTION, SUSPENSION INTRA-ARTICULAR; INTRALESIONAL; INTRAMUSCULAR; SOFT TISSUE at 05:01

## 2019-01-17 NOTE — PROGRESS NOTES
"Subjective:      Patient ID: Taty Max is a 63 y.o. female.    Chief Complaint: Follow-up    Referred by: No ref. provider found     HPI:    Ms Max is a 60 yo female who presents today with back and right thigh pain. She complains of pain in her back and numbness in her right thigh. She states her pain is aggravated with bending forward. She had 3 injections at an outside pain clinic Nov-Feb, that helped her left leg pain but increased her right thigh numbness. She describes numbness on her outer right thigh. Her pain is improved with 2 gabapentin, but she gets diarrhea. She has a history of colon cancer with chemotherapy. The pain is described in detail below.    Interval History (10/17/2014):  She returns today for follow up. She reports that the lateral femoral cutaneous nerve block was very helpful for her right-sided thigh pain, numbness, and tingling. She reports recent muscle spasms in her low back but has made it difficult for her to perform her activities at her job. These are relieved with heat and stretching. The topical cream has been helpful for the pain.    Interval History (11/14/2014):  She returns today for follow up. She reports that the trigger point injections were very helpful for her muscle spasms, but they are starting to wear off on the right side. Last Tuesday, she had a severe spasm that caused severe pain in her anterior thigh. She reports that her muscles felt like they were being "shredded." This continues to be intermittently painful. She also has bilateral low back pain that radiates into her lateral thigh. This is similar to the pain she had in the past that was helped by the L5/S1 epidural.    Interval History (12/8/14)  She returns today for follow up.  She reports that the L4-L5 ASHLEY on 11/25/14 has been helpful for the pain, but pain in her right hip/buttocks area persists. She received about 50% relief of her pain from the epidural. The majority of her pain now, she " locates over the right SI joint, in addition to spasms in her lower back on the right side. She takes muscle relaxers at night for the spasms. She takes the pain is more severe after walking for long periods. The right lower extremity pain is much improved since epidural.     Interval History (1/14/2015):  She returns today for follow up.  She reports that the SI joint injection provided 50% relief.  She no longer has the sharp pain and is able to walk farther.  She continues to have significant pain in the AM on that right side.  The cream and the patch have been helpful for the pain.    Interval History (3/23/2016):  She returns today for follow up.  She reports that the most recent SI joint injection and TPIs have been helpful for the pain.  She is able to walk farther and work.  She does her HEP and stretches.    Interval History (9/23/2016):  She returns today for follow up.  She reports that her low back pain has begun to return.  She is also having more right lateral thigh pain.    Interval History (11/10/2016):  She returns today for follow up.  She reports that the RFA helped her back pain.  She is now experiencing increased pain over her right lateral thigh.  She is also having a muscle spasm in her right lower back.    Interval History (1/30/2017):  She returns today for follow up.  She reports that the most recent LFCN block lasted until two weeks ago.  Lidocaine patch over distal anterior thigh was not helpful. Gabapentin, NSAIDs are helpful.  Overall, her pain is improved with not working.    Interval History (2/20/2017):  She returns today for follow up.  She reports that the LFCN block has been helpful for the pain in her lateral thigh, but she is still having pain in her anterior thigh.  Lidocaine patches have been helpful for the pain, but they won't stay on long enough    Interval History (4/30/2018):  She returns today for follow up.  She reports that the LFCN block and the anterior femoral  cutaneous nerve block have been helpful for the pain in her thigh.  Her pain did not return until the beginning of 2018.  Her right sided low back pain is returning as well.    Interval History (8/2/2018):  She returns today for follow up.  She reports that the RFA has been helpful for the pain.  She reports 90% relief from this procedure.  She is happy with her current pain control except that she would like to improve her endurance.    Interval History (1/17/2019):  She returns today for follow up.  She  reports that RFA has been helpful for the pain the back pain. Today she complains today of right thigh pain, anterior and lateral worsened over the past three months. Sharp, burning pain. Light touch aggravates the pain. Had hernia surgery on 11/19/18.    Physical Therapy: Went to [facility] for 6 weeks.  The exercises made it worse, but the stretches were helpful.  She does pelvic tilts, the cat stretch, and the baby pose.    Non-pharmacologic Treatment: chiropractic  · TENS? yes    Pain Medications:   · Currently taking:  meloxicam, tylenol, aspercreme (very helpful)  · Has tried in the past: celebrex, gabapentin, tramadol, flexeril  · Has not tried: Opioids, TCAs    Blood thinners: no    Interventional Therapies:   · L5/S1 ILESI by Dr. Saaverda, most recent in 10/2012: 10 months relief  · Right LFCN block 9/2014: Excellent relief of right thigh pain  · Bilateral lumbar TPIs 10/2014: Excellent relief of muscle pain x 3 weeks  · L4-L5 ILESI on 11/25/14 with 50% relief  · Right SI joint injection 12/2014:  50% relief  · Right SIJ injection and TPIs 3/2016: Excellent relief  · L4/5 ILESI 8/30/2016: Excellent relief of leg pain  · Right L3-5 Cooled RFA 10/2016:  Good relief of sharp back pain  · Right LFCN block 11/10/2016: Good relief until mid January 2017  · 2/2017: Right anterior femoral cutaneous nerve block  · 4/2018: Right anterior femoral cutaneous nerve and right LFCN block: 100% relief of that pain  · 7/2018:  Right L3-5 Thermal RFA: 90% relief     Relevant Surgeries: no    Affecting sleep? yes    Affecting daily activities? yes    Depressive symptoms? no  · SI/HI? 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     Pain Scales  Best: 3/10  Worst: 10/10  Usually: 3/10  Today: 6/10    Low-back Pain   This is a chronic problem. The current episode started more than 1 year ago. The problem occurs intermittently. The problem has been gradually improving since onset. The pain is present in the lumbar spine. The pain is at a severity of 5/10. Exacerbated by: increased activity. Associated symptoms include leg pain. Pertinent negatives include no chest pain, fever, headaches or weight loss. She has tried injection treatment and muscle relaxant for the symptoms. Physical therapy was not tried.    Pain Scales  Best: 3/10  Worst: 10/10  Usually: 8/10  Today: 9/10    Low-back Pain   Associated symptoms include leg pain.   Leg Pain          ROS  Review of Systems   Constitution: Negative for chills, fever, malaise/fatigue, weight gain and weight loss.   HENT: Negative for ear pain, headaches and hoarse voice.    Eyes: Negative for blurred vision, pain and visual disturbance.   Cardiovascular: Negative for chest pain, dyspnea on exertion and irregular heartbeat.   Respiratory: Negative for cough, shortness of breath and wheezing.    Endocrine: Negative for cold intolerance and heat intolerance.   Hematologic/Lymphatic: Negative for adenopathy and bleeding problem. Does not bruise/bleed easily.   Skin: Negative for color change, itching and rash.   Musculoskeletal: Positive for right thigh pain.   Gastrointestinal: Negative for change in bowel habit, diarrhea, hematemesis, hematochezia, melena and vomiting.   Genitourinary: Negative for flank pain, frequency, hematuria and urgency.   Neurological: Negative for difficulty with concentration, dizziness, loss of balance and seizures.    Psychiatric/Behavioral: Negative for altered mental status, depression and suicidal ideas. The patient is not nervous/anxious.    Allergic/Immunologic: Negative for HIV exposure.     Past Medical History:   Diagnosis Date    Allergic rhinitis, seasonal 1/28/2013    Anxiety disorder 1/28/2013    Basal cell carcinoma     Bilateral retinal lattice degeneration     Cataract     Colon cancer 2008    s/p resection    DDD (degenerative disc disease), lumbar 11/25/2014    Diabetes mellitus type II, uncontrolled 7/25/2014    High myopia     History of incisional hernia repair     Horseshoe retinal tear of both eyes     HTN (hypertension) 1/28/2013    Hypertension     Hypothyroidism 4/29/2014    Incisional hernia of anterior abdominal wall without obstruction or gangrene     Lumbar disc disease 7/25/2014    Metabolic syndrome 4/29/2014    Neuropathy due to chemotherapeutic drug 5/22/2017    Osteopenia 5/10/2013    Screening for colorectal cancer 9/11/2015    Normal 6/ 2015---5 yrs    Vitamin D deficiency disease     Vitreous detachment of both eyes        Past Surgical History:   Procedure Laterality Date    ADENOIDECTOMY      ANKLE SURGERY      left     BLOCK-NERVE-MEDIAL BRANCH-LUMBAR Bilateral 1/20/2015    [facility] at [facility]    BREAST LUMPECTOMY      CATARACT EXTRACTION      CATARACT EXTRACTION BILATERAL W/ ANTERIOR VITRECTOMY      COLECTOMY      s/p reanastemosis    COLON SURGERY      COLONOSCOPY N/A 6/9/2015    Performed by Khang [facility]ajera MD at [facility] ([address] [place] [address])    EYE EXAMINATION UNDER ANESTHESIA W/ RETINAL CRYOTHERAPY AND RETINAL LASER      HERNIA REPAIR      INJECTION-JOINT Right 3/2/2016    [facility]formed by [facility]ja [facility]enas [facility] at [facility]    INJECTION-JOINT Right 12/16/2014    [facility]formed by [facility] at [facility]    INJECTION-STEROID-EPIDURAL-LUMBAR N/A 8/30/2016    Performed by [facility] Poe [facility] at [facility]     "INJECTION-STEROID-EPIDURAL-LUMBAR N/A 11/3/2015    [facility]    INJECTION-STEROID-EPIDURAL-LUMBAR N/A 11/25/2014    Performed by [facility] at [facility]    KIDNEY SURGERY      "dilate urethra tube"    NASAL POLYP EXCISION      NASAL SEPTUM SURGERY      RADIOFREQUENCY ABLATION, NERVE, MEDIAL BRANCH, LUMBAR, 1 LEVEL Right 7/6/2018    Performed by [facility] at [facility]    RADIOFREQUENCY THERMOCOAGULATION (RFTC)-NERVE-MEDIAN BRANCH-LUMBAR Right 1/23/2015    Performed by [facility] at [facility]    RADIOFREQUENCY THERMOCOAGULATION (RFTC)-NERVE-MEDIAN BRANCH-LUMBAR/COOLED Right 10/18/2016    Performed by [facility] at [facility]    REPAIR, HERNIA, INCISIONAL OR VENTRAL, WITHOUT HISTORY OF PRIOR REPAIR w/mesh N/A 11/19/2018    [facility] 2ND FLR    s/p laser ou      TONSILLECTOMY      TRIGGER POINT INJECTION (TPI) N/A 8/30/2016    Performed by [facility] at [facility]    TRIGGER POINT INJECTION (TPI) Right 3/2/2016    Performed by [facility]enas [facility] at [facility]    TUBAL LIGATION      UVULOPALATOPHARYNGOPLASTY  1990s       Review of patient's allergies indicates:   Allergen Reactions    Oxaliplatin Hives    Factive [gemifloxacin] Hives    Daypro [oxaprozin] Rash    Latex Hives     Adhesives       Current Outpatient Medications   Medication Sig Dispense Refill    ACCU-CHEK SOFTCLIX LANCETS [facility] USE AS DIRECTED  12    albuterol 90 mcg/actuation inhaler Inhale 1-2 puffs into the lungs every 6 (six) hours as needed for Wheezing. 1 Inhaler 0    blood sugar diagnostic Strp 1 strip by Misc.(Non-Drug; Combo Route) route 2 (two) times daily. DISP GLUCOMETER OF CHOICE AND LANCETS AS WELL 100 strip 12    cyclobenzaprine (FLEXERIL) 5 MG tablet Take 1 tablet (5 mg total) by mouth nightly. 30 tablet 1    diazePAM (VALIUM) 5 MG tablet TAKE ONE TABLET BY MOUTH EVERY " NIGHT AT BEDTIME AS NEEDED FOR ANXIETY AND spasms 30 tablet 3    doxycycline (MONODOX) 100 MG capsule Take 1 capsule (100 mg total) by mouth 2 (two) times daily. 20 capsule 1    fluocinonide 0.05% (LIDEX) 0.05 % cream Apply topically 2 (two) times daily. 30 g 5    fluorouracil (EFUDEX) 5 % cream Use hs for 2 weeks 40 g 3    fluticasone (FLONASE) 50 mcg/actuation nasal spray 2 sprays by Each Nare route once daily. 3 Bottle 12    hydroCHLOROthiazide (HYDRODIURIL) 12.5 MG Tab Take 1 tablet (12.5 mg total) by mouth once daily. 90 tablet 11    HYDROcodone-acetaminophen (NORCO)  mg per tablet Take 1 tablet by mouth every 6 (six) hours as needed for Pain. 31 tablet 0    KRILL OIL ORAL Take 1 tablet by mouth once daily.      meloxicam (MOBIC) 7.5 MG tablet TAKE 1 TABLET ONCE DAILY 90 tablet 3    metaxalone (SKELAXIN) 800 MG tablet TAKE 1 TABLET BY MOUTH 4 TIMES DAILY AS NEEDED FOR PAIN 120 tablet 2    metFORMIN (GLUCOPHAGE-XR) 750 MG 24 hr tablet TAKE 1 TABLET EVERY MORNING 90 tablet 3    montelukast (SINGULAIR) 10 mg tablet Take 1 tablet (10 mg total) by mouth every evening. 90 tablet 3    multivitamin (THERAGRAN) per tablet Take 1 tablet by mouth once daily. 1 Tablet Oral Every day      olmesartan (BENICAR) 20 MG tablet TAKE 1 TABLET ONCE DAILY 90 tablet 3    ondansetron (ZOFRAN) 4 MG tablet Take 1 tablet (4 mg total) by mouth every 8 (eight) hours as needed for Nausea. 15 tablet 0    ONETOUCH DELICA LANCETS 33 gauge Misc       penicillin v potassium (VEETID) 500 MG tablet Take 500 mg by mouth 4 (four) times daily. PT IS TAKING FOR DENTAL PURPOSES      traMADol (ULTRAM) 50 mg tablet Take 1 tablet (50 mg total) by mouth every 6 (six) hours as needed. 60 tablet 0    tretinoin (RETIN-A) 0.025 % gel Apply topically nightly. Gel Topical .  AAA face qhs 45 g 3    zolpidem (AMBIEN) 5 MG Tab TAKE ONE TABLET BY MOUTH nightly AT BEDTIME 30 tablet 0     No current facility-administered medications for this  "visit.        Family History   Problem Relation Age of Onset    Cancer Maternal Grandmother     Hyperlipidemia Mother     Hypertension Mother     Breast cancer Mother     Allergies Father     Allergies Sister     Allergies Brother     Heart disease Maternal Grandfather     Stroke Paternal Grandmother     Colon cancer Neg Hx     Ovarian cancer Neg Hx        Social History     Socioeconomic History    Marital status:      Spouse name: Not on file    Number of children: Not on file    Years of education: Not on file    Highest education level: Not on file   Social Needs    Financial resource strain: Not on file    Food insecurity - worry: Not on file    Food insecurity - inability: Not on file    Transportation needs - medical: Not on file    Transportation needs - non-medical: Not on file   Occupational History     Employer: [employer]   Tobacco Use    Smoking status: Former Smoker     Last attempt to quit: 1977     Years since quittin.5    Smokeless tobacco: Never Used   Substance and Sexual Activity    Alcohol use: Yes     Comment: once per week    Drug use: No    Sexual activity: Yes     Partners: Male     Birth control/protection: Post-menopausal   Other Topics Concern    Are you pregnant or think you may be? Not Asked    Breast-feeding Not Asked   Social History Narrative    Not on file           Objective:      Vitals:    19 1500   BP: (!) 165/93   Pulse: 108   Resp: 18   Temp: 98.5 °F (36.9 °C)   TempSrc: Oral   Weight: 117 kg (257 lb 15 oz)   Height: 5' 7" (1.702 m)   PainSc:   9         Ortho/SPM Exam      GEN: Well developed, well nourished. No acute distress. No pain behavior.  HEENT: No trauma. Mucous membranes moist. Nares patent bilaterally.  PSYCH: Normal affect. Thought content appropriate.  CHEST: Breathing symmetric. No audible wheezing.  ABD: Soft, non-tender, non-distended. ABdominal dressing c/d/i  SKIN: Warm, pink, dry. No rash on exposed " areas.   EXT: No cyanosis, clubbing, or edema. No color change or changes in nail or hair growth.  NEURO/MUSCULOSKELETAL:  Fully alert, oriented, and appropriate. Speech normal pete. No cranial nerve deficits.   Gait: wide base gait, normal pete. negative trendelenburg sign bilaterally.   Motor Strength: 5/5 motor strength throughout lower extremities.   Sensory: Allodynia along anterior thigh of right lower extremity, confirmed with light touch, and over LFCN distribution  Reflexes: 1+ and symmetric throughout. Downgoing Babinski's bilaterally. No clonus or spasticity.  L-Spine: Full ROM with minimal pain on extension.         Imaging:      Result Narrative    DATE OF EXAM: Nov 23 2010     MRI 0013 - MRI SPINE CNL LUM W AND WO CONTR:   [id number]    CLINICAL HISTORY: 724.2 LUMBAGO    PROCEDURE COMMENT: MULTIPLANAR, MULTISEQUENCE MR IMAGES WERE OBTAINED   BEFORE AND AFTER THE INJECTION OF 20 ML OF IV GADOLINIUM.    ICD 9 CODE(S): ()    CPT 4 CODE(S)/MODIFIER(S): ()    RESULTS: NO PRIOR MRIs ARE AVAILABLE FOR COMPARISON.     THERE IS NO EVIDENCE OF MARROW REPLACEMENT PROCESS, INFECTION, OR TUMOR.   NO ABNORMAL AREAS OF ENHANCEMENT ARE NOTED. MULTILEVEL OSSEOUS AND DISC   DEGENERATIVE CHANGE ARE NOTED WHICH WILL DETAILED BELOW. NO PARASPINOUS   SOFT TISSUE ABNORMALITY IS PRESENT. THE CONUS TERMINATES POSTERIOR TO L1.   THE ALIGNMENT IS ADEQUATE. LIMITED EVALUATION OF INTRAABDOMINAL   STRUCTURES REVEALS A 1 CM LEFT RENAL CYST.     AT T10-T11 THERE IS LOSS OF DISC SPACE HEIGHT.    AT T11-T12 THERE IS LOSS OF DISC SPACE HEIGHT.    AT T12-L1 THERE IS NO SIGNIFICANT ABNORMALITY PRESENT. NO CANAL OR   FORAMINAL STENOSIS.     AT L1-L2 THERE IS A MILD DISC BULGE PRESENT WITHOUT CANAL OR FORAMINAL   NARROWING.    AT L2-L3 THERE IS A MILD DISC BULGE PRESENT. THERE IS AN OSTEOPHYTE IN   THE RIGHT POSTERIOR PARACENTRAL REGION AND THIS IS CAUSING MILD NEURAL   FORAMINAL NARROWING.     AT L3-L4 THERE IS DISC BULGE PRESENT WITH  MILD BILATERAL NEURAL FORAMINAL   NARROWING. THERE IS THICKENING OF THE LIGAMENTUM FLAVUM. THERE IS NO   CANAL NARROWING AT THIS LEVEL.     AT L4-L5 THERE IS SIGNIFICANT LOSS OF DISC SPACE HEIGHT AND ENDPLATE   CHANGES. THERE IS MILD CANAL STENOSIS SECONDARY TO LIGAMENTUM FLAVUM   THICKENING AND DISC BULGE.     AT L5-S1 THERE IS A BROAD BASED DISC BULGE PROTRUDING INTO BOTH NEURAL   FORAMINA. THERE IS MASS EFFECT SUPERIORLY ON THE EXITING NERVE ROOTS AT   L5-S1. THERE IS LOSS OF DISC SPACE HEIGHT AT THIS LEVEL AND MILD   ENDPLATE CHANGES.     IMPRESSION: MULTILEVEL OSSEOUS DEGENERATIVE CHANGE WITH MILD IMPINGEMENT   ON THE L5-S1 NERVE ROOT SUPERIORLY BY THE DISC.            Assessment:       Encounter Diagnoses   Name Primary?    Chronic pain disorder Yes    Lumbar spondylosis     Meralgia paraesthetica, right     Neuralgia     Pain of right thigh     Morbid obesity with BMI of 40.0-44.9, adult            Plan:       Taty was seen today for follow-up.    Diagnoses and all orders for this visit:    Chronic pain disorder    Lumbar spondylosis    Meralgia paraesthetica, right    Neuralgia    Pain of right thigh    Morbid obesity with BMI of 40.0-44.9, adult      I discussed the treatment options with her today, including risks, benefits, and alternatives. All available images were reviewed. The patient is aware of the risks and benefits of the medications being prescribed, common side effects, and proper usage.    1. Right sided Lateral femoral cutanoeus and anterior femoral cutaneous nerve blocks done under ultrasound  2. Can repeat right L3-5 thermal RFA PRN  3. Continue current medication regimen- meloxicam and aspercreme  4. Continue pain patch as needed.   5. Continue Low back exercises and stretching maneuvers given for home use.  6. RTC in 6 weeks or sooner if needed    Date of Procedure: 01/24/2019    Procedure: Lateral and Anterior Femoral Cutaneous Nerve Block using US guidance, Right    Referring  Provider: None    Pre-op diagnosis: Right meralgia paresthetica and right anterior thigh neuralgia    Post-op diagnosis: Right meralgia paresthetica and right anterior thigh neuralgia     Physician: Dr. Vashti Poe     Assistant: Dr. Perez    Anesthestia: local    EBL: None    Specimens: None    All medications, allergies, and relevant histories were reviewed. No recent antibiotics or infections.  A time-out was taken to verify the correct patient, procedure, laterality, and appropriate medications/allergies.    Lateral Femoral Cutaneous Nerve Block using US guidance, Right:   The procedure was discussed with the patient including complications of nerve damage, spinal headache, bleeding, infection, and failure of pain relief.      All medications, allergies, and relevant histories were reviewed. No recent antibiotics or infections. A time-out was taken to verify the correct patient, procedure, laterality, and appropriate medications/allergies.      The patient was placed in the supine position. Inguinal area was prepped with chlorhexidine x 3 and draped. Sterile precautions were observed throughout the procedure. After identifying the lateral femoral cutaneous nerve superficial to the sartorious muscle on the right using US guidance, Xylocaine 1% was infiltrated locally 1.5 cm. A 22-gauge B level needle was introduced and advanced to the lateral femoral cutaneous nerve. Stimulation with 0.6 mA reproduced her pain in her right leg.  A 5 mL mixture of 4 cc of bupivacaine 0.25% with 1cc of betamethasone (6mg) was injected around the nerve after repeated negative aspirations. Needle was removed and a Band-Aid dressing applied.       Anterior Femoral Cutaneous Nerve Branches Block using US guidance, right:   The procedure was discussed with the patient including complications of nerve damage, spinal headache, bleeding, infection, and failure of pain relief.      All medications, allergies, and relevant histories were  reviewed. No recent antibiotics or infections. A time-out was taken to verify the correct patient, procedure, laterality, and appropriate medications/allergies.      The patient was placed in the supine position. Anterior thigh was prepped with chlorhexidine x 3 and draped. Sterile precautions were observed throughout the procedure. After identifying the sartorious muscle and the femoral vessels on the right about 1/3-1/2 custodial down the thigh using US guidance, Xylocaine 1% was infiltrated locally 1.5 cm. A 22-gauge B level needle was introduced and advanced to the medial aspect of the sartorious muscle.  A 5 mL mixture of 4 cc of bupivacaine 0.25% with 6 mg betamethasone was injected around the nerve after repeated negative aspirations. Needle was removed and a Band-Aid dressing applied.      The patient tolerated the procedures well without any complications and was released in excellent condition.      The above plan and management options were discussed at length with patient. Patient is in agreement with the above and verbalized understanding. It will be communicated with the consulting physician via electronic record, fax, or mail    Wilfrido Perez MD [facility]-2  Anesthesiology    I have seen the patient with the resident physician.  I have performed my own history and physical exam and we have come up with the above plan.  The patient is in agreement with our plan.    Vashti Poe MD  1/17/2019

## 2019-01-21 ENCOUNTER — PATIENT MESSAGE (OUTPATIENT)
Dept: PAIN MEDICINE | Facility: CLINIC | Age: 64
End: 2019-01-21

## 2019-01-24 RX ORDER — BUPIVACAINE HYDROCHLORIDE 2.5 MG/ML
9 INJECTION, SOLUTION EPIDURAL; INFILTRATION; INTRACAUDAL
Status: COMPLETED | OUTPATIENT
Start: 2019-01-17 | End: 2019-01-17

## 2019-01-24 RX ORDER — BETAMETHASONE SODIUM PHOSPHATE AND BETAMETHASONE ACETATE 3; 3 MG/ML; MG/ML
6 INJECTION, SUSPENSION INTRA-ARTICULAR; INTRALESIONAL; INTRAMUSCULAR; SOFT TISSUE
Status: COMPLETED | OUTPATIENT
Start: 2019-01-17 | End: 2019-01-17

## 2019-02-04 ENCOUNTER — OFFICE VISIT (OUTPATIENT)
Dept: FAMILY MEDICINE | Facility: CLINIC | Age: 64
End: 2019-02-04
Payer: COMMERCIAL

## 2019-02-04 VITALS
TEMPERATURE: 99 F | HEIGHT: 67 IN | DIASTOLIC BLOOD PRESSURE: 76 MMHG | SYSTOLIC BLOOD PRESSURE: 130 MMHG | HEART RATE: 96 BPM | OXYGEN SATURATION: 95 % | BODY MASS INDEX: 40.97 KG/M2 | WEIGHT: 261 LBS

## 2019-02-04 DIAGNOSIS — T45.1X5A NEUROPATHY DUE TO CHEMOTHERAPEUTIC DRUG: ICD-10-CM

## 2019-02-04 DIAGNOSIS — F13.20 SEDATIVE DEPENDENCE: ICD-10-CM

## 2019-02-04 DIAGNOSIS — E03.9 HYPOTHYROIDISM, UNSPECIFIED TYPE: ICD-10-CM

## 2019-02-04 DIAGNOSIS — G89.29 CHRONIC BILATERAL LOW BACK PAIN WITH BILATERAL SCIATICA: ICD-10-CM

## 2019-02-04 DIAGNOSIS — K43.2 INCISIONAL HERNIA, WITHOUT OBSTRUCTION OR GANGRENE: ICD-10-CM

## 2019-02-04 DIAGNOSIS — G47.00 INSOMNIA, UNSPECIFIED TYPE: ICD-10-CM

## 2019-02-04 DIAGNOSIS — M54.42 CHRONIC BILATERAL LOW BACK PAIN WITH BILATERAL SCIATICA: ICD-10-CM

## 2019-02-04 DIAGNOSIS — I10 ESSENTIAL HYPERTENSION: ICD-10-CM

## 2019-02-04 DIAGNOSIS — R52 PAIN: ICD-10-CM

## 2019-02-04 DIAGNOSIS — G62.0 NEUROPATHY DUE TO CHEMOTHERAPEUTIC DRUG: ICD-10-CM

## 2019-02-04 DIAGNOSIS — R79.89 ABNORMAL LIVER FUNCTION TESTS: ICD-10-CM

## 2019-02-04 DIAGNOSIS — M54.41 CHRONIC BILATERAL LOW BACK PAIN WITH BILATERAL SCIATICA: ICD-10-CM

## 2019-02-04 DIAGNOSIS — F11.20 MODERATE TRAMADOL DEPENDENCE: ICD-10-CM

## 2019-02-04 DIAGNOSIS — E66.01 MORBID OBESITY: ICD-10-CM

## 2019-02-04 DIAGNOSIS — E78.5 HYPERLIPIDEMIA, UNSPECIFIED HYPERLIPIDEMIA TYPE: ICD-10-CM

## 2019-02-04 DIAGNOSIS — F39 MOOD DISORDER: ICD-10-CM

## 2019-02-04 PROCEDURE — 99214 OFFICE O/P EST MOD 30 MIN: CPT | Mod: S$GLB,,, | Performed by: INTERNAL MEDICINE

## 2019-02-04 PROCEDURE — 3044F HG A1C LEVEL LT 7.0%: CPT | Mod: CPTII,S$GLB,, | Performed by: INTERNAL MEDICINE

## 2019-02-04 PROCEDURE — 3078F PR MOST RECENT DIASTOLIC BLOOD PRESSURE < 80 MM HG: ICD-10-PCS | Mod: CPTII,S$GLB,, | Performed by: INTERNAL MEDICINE

## 2019-02-04 PROCEDURE — 99214 PR OFFICE/OUTPT VISIT, EST, LEVL IV, 30-39 MIN: ICD-10-PCS | Mod: S$GLB,,, | Performed by: INTERNAL MEDICINE

## 2019-02-04 PROCEDURE — 3075F PR MOST RECENT SYSTOLIC BLOOD PRESS GE 130-139MM HG: ICD-10-PCS | Mod: CPTII,S$GLB,, | Performed by: INTERNAL MEDICINE

## 2019-02-04 PROCEDURE — 3008F BODY MASS INDEX DOCD: CPT | Mod: CPTII,S$GLB,, | Performed by: INTERNAL MEDICINE

## 2019-02-04 PROCEDURE — 3008F PR BODY MASS INDEX (BMI) DOCUMENTED: ICD-10-PCS | Mod: CPTII,S$GLB,, | Performed by: INTERNAL MEDICINE

## 2019-02-04 PROCEDURE — 3078F DIAST BP <80 MM HG: CPT | Mod: CPTII,S$GLB,, | Performed by: INTERNAL MEDICINE

## 2019-02-04 PROCEDURE — 99999 PR PBB SHADOW E&M-EST. PATIENT-LVL III: CPT | Mod: PBBFAC,,, | Performed by: INTERNAL MEDICINE

## 2019-02-04 PROCEDURE — 3075F SYST BP GE 130 - 139MM HG: CPT | Mod: CPTII,S$GLB,, | Performed by: INTERNAL MEDICINE

## 2019-02-04 PROCEDURE — 99999 PR PBB SHADOW E&M-EST. PATIENT-LVL III: ICD-10-PCS | Mod: PBBFAC,,, | Performed by: INTERNAL MEDICINE

## 2019-02-04 PROCEDURE — 3044F PR MOST RECENT HEMOGLOBIN A1C LEVEL <7.0%: ICD-10-PCS | Mod: CPTII,S$GLB,, | Performed by: INTERNAL MEDICINE

## 2019-02-04 RX ORDER — ZOLPIDEM TARTRATE 5 MG/1
5 TABLET ORAL NIGHTLY
Qty: 30 TABLET | Refills: 5 | Status: SHIPPED | OUTPATIENT
Start: 2019-02-04 | End: 2019-07-31 | Stop reason: SDUPTHER

## 2019-02-04 RX ORDER — CYCLOBENZAPRINE HCL 5 MG
5 TABLET ORAL NIGHTLY
Qty: 30 TABLET | Refills: 3 | Status: SHIPPED | OUTPATIENT
Start: 2019-02-04 | End: 2019-04-01 | Stop reason: SDUPTHER

## 2019-02-04 RX ORDER — TRAMADOL HYDROCHLORIDE 50 MG/1
50 TABLET ORAL EVERY 6 HOURS PRN
Qty: 60 TABLET | Refills: 5 | Status: SHIPPED | OUTPATIENT
Start: 2019-02-04 | End: 2019-05-01 | Stop reason: SDUPTHER

## 2019-02-04 RX ORDER — CETIRIZINE HYDROCHLORIDE 10 MG/1
10 TABLET ORAL DAILY
COMMUNITY
Start: 2019-02-04 | End: 2021-01-12

## 2019-02-04 RX ORDER — CYCLOBENZAPRINE HCL 5 MG
5 TABLET ORAL NIGHTLY
Qty: 30 TABLET | Refills: 1 | Status: SHIPPED | OUTPATIENT
Start: 2019-02-04 | End: 2019-02-04 | Stop reason: SDUPTHER

## 2019-02-04 RX ORDER — DIAZEPAM 5 MG/1
TABLET ORAL
Qty: 30 TABLET | Refills: 3 | Status: SHIPPED | OUTPATIENT
Start: 2019-02-04 | End: 2019-05-29 | Stop reason: SDUPTHER

## 2019-02-04 RX ORDER — MELOXICAM 7.5 MG/1
TABLET ORAL
Qty: 180 TABLET | Refills: 3 | Status: SHIPPED | OUTPATIENT
Start: 2019-02-04 | End: 2019-12-02 | Stop reason: SDUPTHER

## 2019-02-04 NOTE — PROGRESS NOTES
Chief complaint Followup on blood pressure,, diabetes, cholesterol    Patient's a 63-year-old white female with controlled diabetes, hypertension and hyperlipidemia.  She did  have labs 2 mo prior to the appointment.  Her A1c had improved from 7.6 back down to her baseline 6.4 and then 6.1, 6.6.  Vitamin D is normal.  HDL 77.  LDL improved from 163 down to 136 and we discussed that still not at goal.  We discussed the benefits of statin therapy in the recommendation that all diabetics be on statin but she is very resistant and reluctant and declines at this time.   .  Her blood pressure at home runs 138/80 we discussed it should be better.  On questioning she actually stopped taking the HCTZ 12.5.  When she started her ankles actually got more swollen and discussed that was coincidental.  We will restart 12.5 rather than increasing to 25 at this point.  Similar we discussed benefits of statin.  Her LDLs about the same as it was 2 years ago.  She had memory problems with Lipitor, 1 pill gave her pain across the lower back and I reassured her that was unlikely to be the statin although the pain went away 2 weeks after she stopped and she was very reluctant to restarted.  We discussed Zetia as an option but she is reluctant.      She does need tramadol for her pain.  The tramadol keeps her up so she would only use it during the day.  She has been having some pain with some nerve damage in the right thigh for which she is seeing Pain Management and got injections.  She also has a bunion in the right foot that is aggravated by walking.  She has restarted her Mobic taking 7.5 mg twice a day we discussed reducing it as soon as she can.  She might follow up with Orthopedics that did her foot surgery in the past but not bunion surgery.  We discussed potential interactions with the use of the Valium Ambien and tramadol but she definitely does use them all separately.  The volumes only taken after a stressful day at work,  Ambien only at night and tramadol during the day.    She does the require Ambien.  We discussed side effects as well as increased risk for the development of dementia possibly although not yet proven.  She also occasionally uses Valium for anxiety we discussed the interactions between all of these and some of the issues she may find at the pharmacy.  The use of each is individual and separate and she keeps it to a minimum        She does occasionally take her tramadol for arthralgias.  She  went to physical therapy and so has some increasing pains.  All these issues reviewed and patient counseled.Total time over 25 minutes with over 50% counseling.    ROS:   CONST:  no other new myalgias arthralgias and no new neurological deficits, no skin rashes     PAST MEDICAL HISTORY:                                                        1.  Allergic rhinitis.                                                       2.  Obstructive sleep apnea status post UPPP and treatment with CPAP.        3.  Chronic sinusitis with sinus surgery x2, Dr. Tucker and Dr. Alston.       4.  Hypertension.                                                            5.  Obesity.                                                                 6.  Colon cancer, status post right hemicolectomy and chemotherapy.          7.  History of iron deficiency anemia.                                       8.  Left superficial vein thrombosis of the arm.                             9.  Insomnia.                                                                10.  Basal cell carcinoma.                                                   11.  Memory issues when taking WelChol.                                      12.  Hyperlipidemia with LDL rise on WelChol. Tried 3 statins -various effects- memory issue made her lose a job                               13.  Hypercalcemia secondary to HCTZ.                                        14.  Lumbar radiculopathy with history of  epidurals -Dr Saavedra                       15.  Chronic cough due to allergies, per pulmonary workup, 2010.           16.  Osteopenia by bone density in .                                     17.  Elevated ALT.                                                           18.  Tubes tied.                                                             19.  Hernia repair.                                                          20.  Lumpectomy, of the breast I presume.   21.  COLONOSCOPY normal 6/15, 5 years  22. Left ankle surgery   23. METABOLIC SYNDROME/DM -A1c  was 6.9  24. Vit D def-    25.  Hypothyroid?,  Placed on [place] Thyroid by Dr. Montgomery??    26.  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  Quit smoking in .  Drinks once a week.                                                                               FAMILY HISTORY:  Father  at 53 in a car accident.  Mom in good health.   Brother 46, a sister is 52.  She states that all members of her family have  Hypertension.                                                                  Vitals as above    Taty was seen today for hyperlipidemia, hypertension and diabetes.    Diagnoses and all orders for this visit:    Uncontrolled type 2 diabetes mellitus without complication, without long-term current use of insulin, relatively stable although A1c did increase to 6.6.  Will have her come back in March or April for labs    Hypothyroidism, unspecified type, euthyroid    Hyperlipidemia, unspecified hyperlipidemia type, reassess in the near future    Abnormal liver function tests, recent ALT okay    Essential hypertension, chronic and stable    Sedative dependence, controlled substance management  "and monitoring and counseling done regarding the issues related to tramadol Ambien and Valium    Insomnia, unspecified type    Chronic bilateral low back pain with bilateral sciatica, occasional tramadol    Neuropathy due to chemotherapeutic drug    Moderate tramadol dependence    Mood disorder    Pain  -     traMADol (ULTRAM) 50 mg tablet; Take 1 tablet (50 mg total) by mouth every 6 (six) hours as needed.    Morbid obesity    Incisional hernia, without obstruction or gangrene, recently repaired and doing well    Other orders  -     zolpidem (AMBIEN) 5 MG Tab; Take 1 tablet (5 mg total) by mouth nightly.  -     diazePAM (VALIUM) 5 MG tablet; TAKE ONE TABLET BY MOUTH EVERY NIGHT AT BEDTIME AS NEEDED FOR ANXIETY AND spasms  -     Discontinue: cyclobenzaprine (FLEXERIL) 5 MG tablet; Take 1 tablet (5 mg total) by mouth nightly.  -     cyclobenzaprine (FLEXERIL) 5 MG tablet; Take 1 tablet (5 mg total) by mouth nightly.  -     meloxicam (MOBIC) 7.5 MG tablet; 1-2 a day as needed              Based on patient's anxiety level after a lengthy discussion today, access to the clinical note I don't think would be therapeutic"////"This note will not be shared with the patient."  "

## 2019-02-08 ENCOUNTER — PATIENT MESSAGE (OUTPATIENT)
Dept: FAMILY MEDICINE | Facility: CLINIC | Age: 64
End: 2019-02-08

## 2019-02-14 ENCOUNTER — PATIENT MESSAGE (OUTPATIENT)
Dept: FAMILY MEDICINE | Facility: CLINIC | Age: 64
End: 2019-02-14

## 2019-03-18 ENCOUNTER — PATIENT MESSAGE (OUTPATIENT)
Dept: FAMILY MEDICINE | Facility: CLINIC | Age: 64
End: 2019-03-18

## 2019-03-20 RX ORDER — LANCETS 33 GAUGE
1 EACH MISCELLANEOUS
Qty: 100 EACH | Refills: 11 | Status: SHIPPED | OUTPATIENT
Start: 2019-03-20

## 2019-03-28 RX ORDER — DEXTROSE 4 G
1 TABLET,CHEWABLE ORAL 2 TIMES DAILY
Qty: 1 EACH | Refills: 1 | Status: SHIPPED | OUTPATIENT
Start: 2019-03-28 | End: 2023-08-09

## 2019-03-28 RX ORDER — DEXTROSE 4 G
1 TABLET,CHEWABLE ORAL 2 TIMES DAILY
Qty: 1 EACH | Refills: 1 | Status: CANCELLED | OUTPATIENT
Start: 2019-03-28 | End: 2020-03-27

## 2019-03-28 NOTE — TELEPHONE ENCOUNTER
----- Message from Jacey Jamison sent at 3/28/2019 10:40 AM CDT -----  Contact: Selina/ [facility]/  [phone]  ref # [id number]  Type: RX Refill Request    Who Called:  [facility]    Refill or New Rx:  Refill    RX Name and Strength: Accu- Chek Guide Meter and Test Strips    How is the patient currently taking it? (ex. 1XDay):  2XDay    Is this a 30 day or 90 day RX:90 day    Preferred Pharmacy with phone number:  [facility]    Local or Mail Order:  Mail Order    Ordering Provider:  Maryann Quintanilla    Would the patient rather a call back or a response [facility]?   Call back    Best Call Back Number:  [phone]    Additional Information:

## 2019-04-01 RX ORDER — CYCLOBENZAPRINE HCL 5 MG
5 TABLET ORAL NIGHTLY
Qty: 30 TABLET | Refills: 12 | Status: SHIPPED | OUTPATIENT
Start: 2019-04-01 | End: 2020-04-29

## 2019-04-05 ENCOUNTER — PATIENT MESSAGE (OUTPATIENT)
Dept: FAMILY MEDICINE | Facility: CLINIC | Age: 64
End: 2019-04-05

## 2019-04-05 DIAGNOSIS — Z85.038 HISTORY OF COLON CANCER: ICD-10-CM

## 2019-04-05 DIAGNOSIS — Z00.00 ROUTINE MEDICAL EXAM: Primary | ICD-10-CM

## 2019-04-09 ENCOUNTER — PATIENT MESSAGE (OUTPATIENT)
Dept: FAMILY MEDICINE | Facility: CLINIC | Age: 64
End: 2019-04-09

## 2019-04-10 ENCOUNTER — PATIENT MESSAGE (OUTPATIENT)
Dept: FAMILY MEDICINE | Facility: CLINIC | Age: 64
End: 2019-04-10

## 2019-04-10 PROBLEM — Z85.038 HISTORY OF COLON CANCER: Status: ACTIVE | Noted: 2019-04-10

## 2019-04-16 ENCOUNTER — PATIENT MESSAGE (OUTPATIENT)
Dept: FAMILY MEDICINE | Facility: CLINIC | Age: 64
End: 2019-04-16

## 2019-04-17 ENCOUNTER — PATIENT MESSAGE (OUTPATIENT)
Dept: FAMILY MEDICINE | Facility: CLINIC | Age: 64
End: 2019-04-17

## 2019-04-17 RX ORDER — LANCETS
1 EACH MISCELLANEOUS 2 TIMES DAILY
Qty: 200 EACH | Refills: 11 | Status: SHIPPED | OUTPATIENT
Start: 2019-04-17 | End: 2023-02-13

## 2019-04-17 NOTE — TELEPHONE ENCOUNTER
Patient contacted by phone about rescheduling mammogram.  New appointment scheduled for 4/22/2019 @ [facility].

## 2019-04-17 NOTE — TELEPHONE ENCOUNTER
----- Message from Carolynn Barrientos sent at 4/16/2019  3:11 PM CDT -----  Contact: self  [phone]  Type:  Patient Returning Call    Who Called: PT     Would the patient rather a call back or a response via My [facility]?  Call back    Best Call Back Number: [phone]    Thanks.......Liliya

## 2019-04-20 ENCOUNTER — LAB VISIT (OUTPATIENT)
Dept: LAB | Facility: HOSPITAL | Age: 64
End: 2019-04-20
Attending: INTERNAL MEDICINE
Payer: COMMERCIAL

## 2019-04-20 DIAGNOSIS — Z00.00 ROUTINE MEDICAL EXAM: ICD-10-CM

## 2019-04-20 DIAGNOSIS — E11.9 TYPE 2 DIABETES MELLITUS WITHOUT COMPLICATION: ICD-10-CM

## 2019-04-20 DIAGNOSIS — Z85.038 HISTORY OF COLON CANCER: ICD-10-CM

## 2019-04-20 LAB
25(OH)D3+25(OH)D2 SERPL-MCNC: 27 NG/ML (ref 30–96)
ALBUMIN SERPL BCP-MCNC: 3.7 G/DL (ref 3.5–5.2)
ALP SERPL-CCNC: 85 U/L (ref 55–135)
ALT SERPL W/O P-5'-P-CCNC: 60 U/L (ref 10–44)
ANION GAP SERPL CALC-SCNC: 12 MMOL/L (ref 8–16)
AST SERPL-CCNC: 52 U/L (ref 10–40)
BASOPHILS # BLD AUTO: 0.05 K/UL (ref 0–0.2)
BASOPHILS NFR BLD: 0.8 % (ref 0–1.9)
BILIRUB SERPL-MCNC: 0.4 MG/DL (ref 0.1–1)
BUN SERPL-MCNC: 16 MG/DL (ref 8–23)
CALCIUM SERPL-MCNC: 10.3 MG/DL (ref 8.7–10.5)
CEA SERPL-MCNC: 3.4 NG/ML (ref 0–5)
CHLORIDE SERPL-SCNC: 103 MMOL/L (ref 95–110)
CHOLEST SERPL-MCNC: 253 MG/DL (ref 120–199)
CHOLEST SERPL-MCNC: 253 MG/DL (ref 120–199)
CHOLEST/HDLC SERPL: 3.7 {RATIO} (ref 2–5)
CHOLEST/HDLC SERPL: 3.7 {RATIO} (ref 2–5)
CO2 SERPL-SCNC: 26 MMOL/L (ref 23–29)
CREAT SERPL-MCNC: 0.8 MG/DL (ref 0.5–1.4)
DIFFERENTIAL METHOD: ABNORMAL
EOSINOPHIL # BLD AUTO: 0.4 K/UL (ref 0–0.5)
EOSINOPHIL NFR BLD: 6.2 % (ref 0–8)
ERYTHROCYTE [DISTWIDTH] IN BLOOD BY AUTOMATED COUNT: 14.9 % (ref 11.5–14.5)
EST. GFR  (AFRICAN AMERICAN): >60 ML/MIN/1.73 M^2
EST. GFR  (NON AFRICAN AMERICAN): >60 ML/MIN/1.73 M^2
ESTIMATED AVG GLUCOSE: 171 MG/DL (ref 68–131)
GLUCOSE SERPL-MCNC: 134 MG/DL (ref 70–110)
HBA1C MFR BLD HPLC: 7.6 % (ref 4–5.6)
HCT VFR BLD AUTO: 40.9 % (ref 37–48.5)
HDLC SERPL-MCNC: 68 MG/DL (ref 40–75)
HDLC SERPL-MCNC: 68 MG/DL (ref 40–75)
HDLC SERPL: 26.9 % (ref 20–50)
HDLC SERPL: 26.9 % (ref 20–50)
HGB BLD-MCNC: 13.3 G/DL (ref 12–16)
IMM GRANULOCYTES # BLD AUTO: 0.01 K/UL (ref 0–0.04)
IMM GRANULOCYTES NFR BLD AUTO: 0.2 % (ref 0–0.5)
LDLC SERPL CALC-MCNC: 132.4 MG/DL (ref 63–159)
LDLC SERPL CALC-MCNC: 132.4 MG/DL (ref 63–159)
LYMPHOCYTES # BLD AUTO: 2.4 K/UL (ref 1–4.8)
LYMPHOCYTES NFR BLD: 39.3 % (ref 18–48)
MCH RBC QN AUTO: 28.6 PG (ref 27–31)
MCHC RBC AUTO-ENTMCNC: 32.5 G/DL (ref 32–36)
MCV RBC AUTO: 88 FL (ref 82–98)
MONOCYTES # BLD AUTO: 0.5 K/UL (ref 0.3–1)
MONOCYTES NFR BLD: 8.2 % (ref 4–15)
NEUTROPHILS # BLD AUTO: 2.7 K/UL (ref 1.8–7.7)
NEUTROPHILS NFR BLD: 45.3 % (ref 38–73)
NONHDLC SERPL-MCNC: 185 MG/DL
NONHDLC SERPL-MCNC: 185 MG/DL
NRBC BLD-RTO: 0 /100 WBC
PLATELET # BLD AUTO: 290 K/UL (ref 150–350)
PMV BLD AUTO: 9.9 FL (ref 9.2–12.9)
POTASSIUM SERPL-SCNC: 4.9 MMOL/L (ref 3.5–5.1)
PROT SERPL-MCNC: 7.6 G/DL (ref 6–8.4)
RBC # BLD AUTO: 4.65 M/UL (ref 4–5.4)
SODIUM SERPL-SCNC: 141 MMOL/L (ref 136–145)
TRIGL SERPL-MCNC: 263 MG/DL (ref 30–150)
TRIGL SERPL-MCNC: 263 MG/DL (ref 30–150)
TSH SERPL DL<=0.005 MIU/L-ACNC: 1.91 UIU/ML (ref 0.4–4)
WBC # BLD AUTO: 6 K/UL (ref 3.9–12.7)

## 2019-04-20 PROCEDURE — 84443 ASSAY THYROID STIM HORMONE: CPT

## 2019-04-20 PROCEDURE — 85025 COMPLETE CBC W/AUTO DIFF WBC: CPT

## 2019-04-20 PROCEDURE — 82378 CARCINOEMBRYONIC ANTIGEN: CPT

## 2019-04-20 PROCEDURE — 83036 HEMOGLOBIN GLYCOSYLATED A1C: CPT

## 2019-04-20 PROCEDURE — 36415 COLL VENOUS BLD VENIPUNCTURE: CPT | Mod: PO

## 2019-04-20 PROCEDURE — 80061 LIPID PANEL: CPT

## 2019-04-20 PROCEDURE — 80053 COMPREHEN METABOLIC PANEL: CPT

## 2019-04-20 PROCEDURE — 82306 VITAMIN D 25 HYDROXY: CPT

## 2019-04-24 RX ORDER — HYDROCHLOROTHIAZIDE 12.5 MG/1
12.5 TABLET ORAL DAILY
Qty: 90 TABLET | Refills: 3 | Status: SHIPPED | OUTPATIENT
Start: 2019-04-24 | End: 2020-03-26

## 2019-04-30 ENCOUNTER — PATIENT MESSAGE (OUTPATIENT)
Dept: FAMILY MEDICINE | Facility: CLINIC | Age: 64
End: 2019-04-30

## 2019-05-01 DIAGNOSIS — R52 PAIN: ICD-10-CM

## 2019-05-03 RX ORDER — TRAMADOL HYDROCHLORIDE 50 MG/1
50 TABLET ORAL EVERY 6 HOURS PRN
Qty: 60 TABLET | Refills: 5 | Status: SHIPPED | OUTPATIENT
Start: 2019-05-03 | End: 2019-07-31 | Stop reason: SDUPTHER

## 2019-05-13 ENCOUNTER — HOSPITAL ENCOUNTER (OUTPATIENT)
Dept: RADIOLOGY | Facility: HOSPITAL | Age: 64
Discharge: HOME | End: 2019-05-13
Attending: INTERNAL MEDICINE
Payer: COMMERCIAL

## 2019-05-13 ENCOUNTER — OFFICE VISIT (OUTPATIENT)
Dept: FAMILY MEDICINE | Facility: CLINIC | Age: 64
End: 2019-05-13
Payer: COMMERCIAL

## 2019-05-13 VITALS
BODY MASS INDEX: 42.53 KG/M2 | DIASTOLIC BLOOD PRESSURE: 80 MMHG | SYSTOLIC BLOOD PRESSURE: 130 MMHG | WEIGHT: 270.94 LBS | OXYGEN SATURATION: 96 % | HEIGHT: 67 IN | TEMPERATURE: 98 F | HEART RATE: 105 BPM

## 2019-05-13 DIAGNOSIS — G89.29 CHRONIC BILATERAL LOW BACK PAIN WITH BILATERAL SCIATICA: ICD-10-CM

## 2019-05-13 DIAGNOSIS — M54.42 CHRONIC BILATERAL LOW BACK PAIN WITH BILATERAL SCIATICA: ICD-10-CM

## 2019-05-13 DIAGNOSIS — F39 MOOD DISORDER: ICD-10-CM

## 2019-05-13 DIAGNOSIS — E78.5 HYPERLIPIDEMIA, UNSPECIFIED HYPERLIPIDEMIA TYPE: ICD-10-CM

## 2019-05-13 DIAGNOSIS — F13.20 SEDATIVE DEPENDENCE: ICD-10-CM

## 2019-05-13 DIAGNOSIS — R79.89 ABNORMAL LIVER FUNCTION TESTS: ICD-10-CM

## 2019-05-13 DIAGNOSIS — I10 ESSENTIAL HYPERTENSION: ICD-10-CM

## 2019-05-13 DIAGNOSIS — E66.01 MORBID OBESITY: ICD-10-CM

## 2019-05-13 DIAGNOSIS — G47.00 INSOMNIA, UNSPECIFIED TYPE: ICD-10-CM

## 2019-05-13 DIAGNOSIS — E03.9 HYPOTHYROIDISM, UNSPECIFIED TYPE: ICD-10-CM

## 2019-05-13 DIAGNOSIS — Z12.39 BREAST CANCER SCREENING: ICD-10-CM

## 2019-05-13 DIAGNOSIS — F11.20 MODERATE TRAMADOL DEPENDENCE: ICD-10-CM

## 2019-05-13 DIAGNOSIS — M54.41 CHRONIC BILATERAL LOW BACK PAIN WITH BILATERAL SCIATICA: ICD-10-CM

## 2019-05-13 PROCEDURE — 77067 SCR MAMMO BI INCL CAD: CPT | Mod: 26,,, | Performed by: RADIOLOGY

## 2019-05-13 PROCEDURE — 3045F PR MOST RECENT HEMOGLOBIN A1C LEVEL 7.0-9.0%: ICD-10-PCS | Mod: CPTII,S$GLB,, | Performed by: INTERNAL MEDICINE

## 2019-05-13 PROCEDURE — 3008F PR BODY MASS INDEX (BMI) DOCUMENTED: ICD-10-PCS | Mod: CPTII,S$GLB,, | Performed by: INTERNAL MEDICINE

## 2019-05-13 PROCEDURE — 77067 SCR MAMMO BI INCL CAD: CPT | Mod: TC

## 2019-05-13 PROCEDURE — 3008F BODY MASS INDEX DOCD: CPT | Mod: CPTII,S$GLB,, | Performed by: INTERNAL MEDICINE

## 2019-05-13 PROCEDURE — 99999 PR PBB SHADOW E&M-EST. PATIENT-LVL IV: ICD-10-PCS | Mod: PBBFAC,,, | Performed by: INTERNAL MEDICINE

## 2019-05-13 PROCEDURE — 3045F PR MOST RECENT HEMOGLOBIN A1C LEVEL 7.0-9.0%: CPT | Mod: CPTII,S$GLB,, | Performed by: INTERNAL MEDICINE

## 2019-05-13 PROCEDURE — 99214 OFFICE O/P EST MOD 30 MIN: CPT | Mod: S$GLB,,, | Performed by: INTERNAL MEDICINE

## 2019-05-13 PROCEDURE — 99214 PR OFFICE/OUTPT VISIT, EST, LEVL IV, 30-39 MIN: ICD-10-PCS | Mod: S$GLB,,, | Performed by: INTERNAL MEDICINE

## 2019-05-13 PROCEDURE — 3079F DIAST BP 80-89 MM HG: CPT | Mod: CPTII,S$GLB,, | Performed by: INTERNAL MEDICINE

## 2019-05-13 PROCEDURE — 3075F PR MOST RECENT SYSTOLIC BLOOD PRESS GE 130-139MM HG: ICD-10-PCS | Mod: CPTII,S$GLB,, | Performed by: INTERNAL MEDICINE

## 2019-05-13 PROCEDURE — 3079F PR MOST RECENT DIASTOLIC BLOOD PRESSURE 80-89 MM HG: ICD-10-PCS | Mod: CPTII,S$GLB,, | Performed by: INTERNAL MEDICINE

## 2019-05-13 PROCEDURE — 3075F SYST BP GE 130 - 139MM HG: CPT | Mod: CPTII,S$GLB,, | Performed by: INTERNAL MEDICINE

## 2019-05-13 PROCEDURE — 77067 MAMMO DIGITAL SCREENING BILAT WITH TOMOSYNTHESIS_CAD: ICD-10-PCS | Mod: 26,,, | Performed by: RADIOLOGY

## 2019-05-13 PROCEDURE — 77063 BREAST TOMOSYNTHESIS BI: CPT | Mod: 26,,, | Performed by: RADIOLOGY

## 2019-05-13 PROCEDURE — 99999 PR PBB SHADOW E&M-EST. PATIENT-LVL IV: CPT | Mod: PBBFAC,,, | Performed by: INTERNAL MEDICINE

## 2019-05-13 PROCEDURE — 77063 MAMMO DIGITAL SCREENING BILAT WITH TOMOSYNTHESIS_CAD: ICD-10-PCS | Mod: 26,,, | Performed by: RADIOLOGY

## 2019-05-13 RX ORDER — AZELASTINE 1 MG/ML
1 SPRAY, METERED NASAL 2 TIMES DAILY
Qty: 90 ML | Refills: 12 | Status: SHIPPED | OUTPATIENT
Start: 2019-05-13 | End: 2020-05-28

## 2019-05-13 RX ORDER — AZELASTINE 1 MG/ML
1 SPRAY, METERED NASAL 2 TIMES DAILY
Qty: 90 ML | Refills: 12 | COMMUNITY
Start: 2019-05-13 | End: 2019-05-13 | Stop reason: SDUPTHER

## 2019-05-13 RX ORDER — FLUTICASONE PROPIONATE 50 MCG
2 SPRAY, SUSPENSION (ML) NASAL DAILY
Qty: 3 BOTTLE | Refills: 12 | Status: SHIPPED | OUTPATIENT
Start: 2019-05-13

## 2019-05-13 RX ORDER — METFORMIN HYDROCHLORIDE 750 MG/1
1500 TABLET, EXTENDED RELEASE ORAL EVERY MORNING
Qty: 180 TABLET | Refills: 3 | Status: SHIPPED | OUTPATIENT
Start: 2019-05-13 | End: 2020-10-06 | Stop reason: SDUPTHER

## 2019-05-13 NOTE — PROGRESS NOTES
Chief complaint Followup on blood pressure,, diabetes, cholesterol    Patient's a 63-year-old white female with controlled diabetes, hypertension and hyperlipidemia.  She did  have labs  prior to the appointment.  Her A1c had improved from 7.6 back down to her baseline 6.4 and then 6.1, 6.6 to 7.6.  We discussed her diet at length.  She is on a pretty a diet but did buy some cookies and has been eating those more so.  She is hesitant to use Januvia having seen some side effects on TV.  She still has not to use Zetia thinking it might have had a listed drug interaction with metformin on the Internet.  She is willing to increase the metformin to 2 pills per day.  We discussed reassessment in 3 months.     Vitamin D is normal.  HDL 77.  LDL improved from 163 down to 136, 134 and we discussed that still not at goal.  We discussed the benefits of statin therapy in the recommendation that all diabetics be on statin but she is very resistant and reluctant and declines at this time.   .  Her blood pressure at home runs 138/80 we discussed it should be better.  On questioning she actually stopped taking the HCTZ 12.5.  When she started her ankles actually got more swollen and discussed that was coincidental.  We restarted 12.5 rather than increasing to 25 at this point.  Similar we discussed benefits of statin.  Her LDLs about the same as it was 2 years ago.  She had memory problems with Lipitor, 1 pill gave her pain across the lower back and I reassured her that was unlikely to be the statin although the pain went away 2 weeks after she stopped and she was very reluctant to restarted.  We discussed Zetia as an option but she is reluctant.      She does need tramadol for her pain.  The tramadol keeps her up so she would only use it during the day.  She has been having some pain with some nerve damage in the right thigh for which she is seeing Pain Management and got injections.  She also has a bunion in the right foot that  is aggravated by walking.  She has restarted her Mobic taking 7.5 mg twice a day we discussed reducing it as soon as she can.  She might follow up with Orthopedics that did her foot surgery in the past but not bunion surgery.  We discussed potential interactions with the use of the Valium Ambien and tramadol but she definitely does use them all separately.  The volumes only taken after a stressful day at work, Ambien only at night and tramadol during the day.    She does the require Ambien.  We discussed side effects as well as increased risk for the development of dementia possibly although not yet proven.  She also occasionally uses Valium for anxiety we discussed the interactions between all of these and some of the issues she may find at the pharmacy.  The use of each is individual and separate and she keeps it to a minimum        She does occasionally take her tramadol for arthralgias.  She  went to physical therapy and so has some increasing pains.  All these issues reviewed and patient counseled.Total time over 25 minutes with over 50% counseling.    ROS:   CONST:  no other new myalgias arthralgias and no new neurological deficits, no skin rashes     PAST MEDICAL HISTORY:                                                        1.  Allergic rhinitis.                                                       2.  Obstructive sleep apnea status post UPPP and treatment with CPAP.        3.  Chronic sinusitis with sinus surgery x2, Dr. Tucker and Dr. Alston.       4.  Hypertension.                                                            5.  Obesity.                                                                 6.  Colon cancer, status post right hemicolectomy and chemotherapy.          7.  History of iron deficiency anemia.                                       8.  Left superficial vein thrombosis of the arm.                             9.  Insomnia.                                                                10.   Basal cell carcinoma.                                                   11.  Memory issues when taking WelChol.                                      12.  Hyperlipidemia with LDL rise on WelChol. Tried 3 statins -various effects- memory issue made her lose a job                               13.  Hypercalcemia secondary to HCTZ.                                        14.  Lumbar radiculopathy with history of epidurals -Dr Saavedra                       15.  Chronic cough due to allergies, per pulmonary workup, 2010.           16.  Osteopenia by bone density in .                                     17.  Elevated ALT.                                                           18.  Tubes tied.                                                             19.  Hernia repair.                                                          20.  Lumpectomy, of the breast I presume.   21.  COLONOSCOPY normal 6/15, 5 years  22. Left ankle surgery   23. METABOLIC SYNDROME/DM -A1c  was 6.9  24. Vit D def-    25.  Hypothyroid?,  Placed on [place] Thyroid by Dr. Montgomery??    26.  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  Quit smoking in .  Drinks once a week.                                                                               FAMILY HISTORY:  Father  at 53 in a car accident.  Mom in good health.   Brother 46, a sister is 52.  She states that all members of her family have  Hypertension.                                                                  Vitals as above    Taty was seen today for medication refill and results.    Diagnoses and all orders for this visit:    Uncontrolled type 2 diabetes mellitus without complication, without long-term current  "use of insulin, counseled at length, increase metformin to 1500 mg a day, she is resistant to add other medications, she will make adjustments in her diet and we will repeat in 3 months.  We did discuss other injectables that could lead to weight loss but she is not interested in that at this time.  We did discuss the diabetes may become dependent upon insulin over time.    Hypothyroidism, unspecified type, TSH normal    Hyperlipidemia, unspecified hyperlipidemia type, LDL still not at goal but she is intolerant previously to statins and she is reluctant to try Zetia at this time    Abnormal liver function tests, ALT up with worsening diabetes    Essential hypertension, chronic and stable    Moderate tramadol dependence, chronic and stable, she does not know if she needs refills at this time since her  handles that and I will be happy to refill her Ambien and tramadol and so forth when needed    Mood disorder    Sedative dependence    Insomnia, unspecified type    Chronic bilateral low back pain with bilateral sciatica    Morbid obesity    Other orders  -     fluticasone propionate (FLONASE) 50 mcg/actuation nasal spray; 2 sprays (100 mcg total) by Each Nare route once daily.  -     azelastine (ASTELIN) 137 mcg (0.1 %) nasal spray; 1 spray (137 mcg total) by Nasal route 2 (two) times daily.  -     metFORMIN (GLUCOPHAGE-XR) 750 MG 24 hr tablet; Take 2 tablets (1,500 mg total) by mouth every morning.              Based on patient's anxiety level after a lengthy discussion today, access to the clinical note I don't think would be therapeutic""This note will not be shared with the patient."  "

## 2019-05-24 ENCOUNTER — OFFICE VISIT (OUTPATIENT)
Dept: OPHTHALMOLOGY | Facility: CLINIC | Age: 64
End: 2019-05-24
Payer: COMMERCIAL

## 2019-05-24 VITALS — HEART RATE: 97 BPM | SYSTOLIC BLOOD PRESSURE: 187 MMHG | DIASTOLIC BLOOD PRESSURE: 93 MMHG

## 2019-05-24 DIAGNOSIS — H33.313 HORSESHOE RETINAL TEAR, BOTH EYES: ICD-10-CM

## 2019-05-24 DIAGNOSIS — H43.813 POSTERIOR VITREOUS DETACHMENT, BOTH EYES: ICD-10-CM

## 2019-05-24 DIAGNOSIS — H35.413 LATTICE DEGENERATION, BILATERAL: Primary | ICD-10-CM

## 2019-05-24 LAB
LEFT EYE DM RETINOPATHY: NEGATIVE
RIGHT EYE DM RETINOPATHY: NEGATIVE

## 2019-05-24 PROCEDURE — 99999 PR PBB SHADOW E&M-EST. PATIENT-LVL III: ICD-10-PCS | Mod: PBBFAC,,, | Performed by: OPHTHALMOLOGY

## 2019-05-24 PROCEDURE — 92226 PR SPECIAL EYE EXAM, SUBSEQUENT: CPT | Mod: RT,S$GLB,, | Performed by: OPHTHALMOLOGY

## 2019-05-24 PROCEDURE — 92014 PR EYE EXAM, EST PATIENT,COMPREHESV: ICD-10-PCS | Mod: S$GLB,,, | Performed by: OPHTHALMOLOGY

## 2019-05-24 PROCEDURE — 92014 COMPRE OPH EXAM EST PT 1/>: CPT | Mod: S$GLB,,, | Performed by: OPHTHALMOLOGY

## 2019-05-24 PROCEDURE — 99999 PR PBB SHADOW E&M-EST. PATIENT-LVL III: CPT | Mod: PBBFAC,,, | Performed by: OPHTHALMOLOGY

## 2019-05-24 PROCEDURE — 92226 PR SPECIAL EYE EXAM, SUBSEQUENT: ICD-10-PCS | Mod: RT,S$GLB,, | Performed by: OPHTHALMOLOGY

## 2019-05-24 NOTE — PROGRESS NOTES
HPI     DLS: 10/7/16 Semaj.      Pt states that she is over due for check also seeing like a clear amebia like floating thing, x about 3 weeks now.  Off and on blurry vision.  No flashes or double vision.       DLS: 5/16/14 -Shima  8/16/13 -Semaj.  Pt has been noticing an increase in floater and some blurriness.  Has hx of retinal tears OU.    A/P    1. HST OU   -S/P laser   -Flat   -RD education    2. Lattice degeneration OU   -Monitor    3. High Myopia OU   -Monitor    4. PVD OU  Floaters noticeable    5. Pseudophakia OU -   S/p Yag OU    6. DM - NO DR  BS/BP/chol control      2 yr. OCT

## 2019-05-31 RX ORDER — DIAZEPAM 5 MG/1
TABLET ORAL
Qty: 30 TABLET | Refills: 3 | Status: SHIPPED | OUTPATIENT
Start: 2019-05-31 | End: 2019-10-29 | Stop reason: SDUPTHER

## 2019-06-10 ENCOUNTER — OFFICE VISIT (OUTPATIENT)
Dept: DERMATOLOGY | Facility: CLINIC | Age: 64
End: 2019-06-10
Payer: COMMERCIAL

## 2019-06-10 VITALS — BODY MASS INDEX: 42.29 KG/M2 | WEIGHT: 270 LBS

## 2019-06-10 DIAGNOSIS — Z85.828 HISTORY OF SKIN CANCER: ICD-10-CM

## 2019-06-10 DIAGNOSIS — L57.0 ACTINIC KERATOSIS: ICD-10-CM

## 2019-06-10 DIAGNOSIS — L81.4 LENTIGINES: ICD-10-CM

## 2019-06-10 DIAGNOSIS — D48.5 NEOPLASM OF UNCERTAIN BEHAVIOR OF SKIN: Primary | ICD-10-CM

## 2019-06-10 PROCEDURE — 3008F BODY MASS INDEX DOCD: CPT | Mod: CPTII,S$GLB,, | Performed by: DERMATOLOGY

## 2019-06-10 PROCEDURE — 11310 SHAVE SKIN LESION 0.5 CM/<: CPT | Mod: S$GLB,,, | Performed by: DERMATOLOGY

## 2019-06-10 PROCEDURE — 17000 PR DESTRUCTION(LASER SURGERY,CRYOSURGERY,CHEMOSURGERY),PREMALIGNANT LESIONS,FIRST LESION: ICD-10-PCS | Mod: 59,S$GLB,, | Performed by: DERMATOLOGY

## 2019-06-10 PROCEDURE — 99213 PR OFFICE/OUTPT VISIT, EST, LEVL III, 20-29 MIN: ICD-10-PCS | Mod: 25,S$GLB,, | Performed by: DERMATOLOGY

## 2019-06-10 PROCEDURE — 99999 PR PBB SHADOW E&M-EST. PATIENT-LVL III: ICD-10-PCS | Mod: PBBFAC,,, | Performed by: DERMATOLOGY

## 2019-06-10 PROCEDURE — 11310 PR SHAV SKIN LES <0.5 CM FACE,FACIAL: ICD-10-PCS | Mod: S$GLB,,, | Performed by: DERMATOLOGY

## 2019-06-10 PROCEDURE — 88305 TISSUE SPECIMEN TO PATHOLOGY, DERMATOLOGY: ICD-10-PCS | Mod: 26,,, | Performed by: PATHOLOGY

## 2019-06-10 PROCEDURE — 17000 DESTRUCT PREMALG LESION: CPT | Mod: 59,S$GLB,, | Performed by: DERMATOLOGY

## 2019-06-10 PROCEDURE — 11300 PR SHAV SKIN LES < 0.5 CM TRUNK,ARM,LEG: ICD-10-PCS | Mod: S$GLB,,, | Performed by: DERMATOLOGY

## 2019-06-10 PROCEDURE — 3008F PR BODY MASS INDEX (BMI) DOCUMENTED: ICD-10-PCS | Mod: CPTII,S$GLB,, | Performed by: DERMATOLOGY

## 2019-06-10 PROCEDURE — 88342 TISSUE SPECIMEN TO PATHOLOGY, DERMATOLOGY: ICD-10-PCS | Mod: 26,,, | Performed by: PATHOLOGY

## 2019-06-10 PROCEDURE — 11300 SHAVE SKIN LESION 0.5 CM/<: CPT | Mod: S$GLB,,, | Performed by: DERMATOLOGY

## 2019-06-10 PROCEDURE — 99213 OFFICE O/P EST LOW 20 MIN: CPT | Mod: 25,S$GLB,, | Performed by: DERMATOLOGY

## 2019-06-10 PROCEDURE — 99999 PR PBB SHADOW E&M-EST. PATIENT-LVL III: CPT | Mod: PBBFAC,,, | Performed by: DERMATOLOGY

## 2019-06-10 PROCEDURE — 88305 TISSUE EXAM BY PATHOLOGIST: CPT | Performed by: PATHOLOGY

## 2019-06-10 PROCEDURE — 88342 IMHCHEM/IMCYTCHM 1ST ANTB: CPT | Mod: 26,,, | Performed by: PATHOLOGY

## 2019-06-10 NOTE — PROGRESS NOTES
Subjective:       Patient ID:  Taty Max is a 64 y.o. female who presents for   Chief Complaint   Patient presents with    Spot     left shoulder, temple, several months.      Spot  - Initial  Affected locations: left shoulder and face  Signs / symptoms: pain  Aggravated by: nothing  Relieving factors/Treatments tried: nothing        Review of Systems   Constitutional: Positive for weight gain. Negative for fever, chills, weight loss, fatigue, night sweats and malaise.   Skin: Positive for activity-related sunscreen use and wears hat. Negative for daily sunscreen use.   Hematologic/Lymphatic: Does not bruise/bleed easily.        Objective:    Physical Exam   Constitutional: She appears well-developed and well-nourished. No distress.   Neurological: She is alert and oriented to person, place, and time. She is not disoriented.   Psychiatric: She has a normal mood and affect.   Skin:   Areas Examined (abnormalities noted in diagram):   Head / Face Inspection Performed  Neck Inspection Performed  Chest / Axilla Inspection Performed  Abdomen Inspection Performed  Back Inspection Performed  RUE Inspected  LUE Inspection Performed                   Diagram Legend     Erythematous scaling macule/papule c/w actinic keratosis       Vascular papule c/w angioma      Pigmented verrucoid papule/plaque c/w seborrheic keratosis      Yellow umbilicated papule c/w sebaceous hyperplasia      Irregularly shaped tan macule c/w lentigo     1-2 mm smooth white papules consistent with Milia      Movable subcutaneous cyst with punctum c/w epidermal inclusion cyst      Subcutaneous movable cyst c/w pilar cyst      Firm pink to brown papule c/w dermatofibroma      Pedunculated fleshy papule(s) c/w skin tag(s)      Evenly pigmented macule c/w junctional nevus     Mildly variegated pigmented, slightly irregular-bordered macule c/w mildly atypical nevus      Flesh colored to evenly pigmented papule c/w intradermal nevus       Pink pearly  papule/plaque c/w basal cell carcinoma      Erythematous hyperkeratotic cursted plaque c/w SCC      Surgical scar with no sign of skin cancer recurrence      Open and closed comedones      Inflammatory papules and pustules      Verrucoid papule consistent consistent with wart     Erythematous eczematous patches and plaques     Dystrophic onycholytic nail with subungual debris c/w onychomycosis     Umbilicated papule    Erythematous-base heme-crusted tan verrucoid plaque consistent with inflamed seborrheic keratosis     Erythematous Silvery Scaling Plaque c/w Psoriasis     See annotation      Assessment / Plan:      Pathology Orders:     Normal Orders This Visit    Tissue Specimen To Pathology, Dermatology     Questions:    Directional Terms:  Other(comment)    Clinical Information:  actinic kertosis    Specific Site:  left upper back    Tissue Specimen To Pathology, Dermatology     Questions:    Directional Terms:  Other(comment)    Clinical Information:  actinic keratosis    Specific Site:  left temple        Neoplasm of uncertain behavior of skin  -     Tissue Specimen To Pathology, Dermatology  -     Tissue Specimen To Pathology, Dermatology  Shave removal procedure note:  Left upper back   Shave removal performed after verbal consent including risk of infection, scar, recurrence, need for additional treatment of site. Area prepped with alcohol, anesthetized 1% lidocaine with epinephrine. Lesional tissue shaved  followed by cautery and hyfrecation x 3. Lesion defect size 3mm No complications. Dressing applied. Wound care explained.              Shave removal procedure note:  Left temple  Shave removal performed after verbal consent including risk of infection, scar, recurrence, need for additional treatment of site. Area prepped with alcohol, anesthetized 1% lidocaine with epinephrine. Lesional tissue shaved  followed by cautery and hyfrecation x 3. Lesion defect size 4mm No complications. Dressing applied. Wound  "care explained.          Actinic keratosis   Cryosurgery Procedure Note  chest  Verbal consent from the patient is obtained and the patient is aware of the precancerous quality and need for treatment of these lesions. Liquid nitrogen cryosurgery is applied to the 1 actinic keratoses, as detailed in the physical exam, to produce a freeze injury.     History of skin cancer  Comments:  bcc right arm, scc left ear    Lentigines  The "ABCD" rules to observe pigmented lesions were reviewed.               Follow up in about 6 months (around 12/10/2019).  "

## 2019-07-31 DIAGNOSIS — R52 PAIN: ICD-10-CM

## 2019-07-31 RX ORDER — TRAMADOL HYDROCHLORIDE 50 MG/1
TABLET ORAL
Qty: 60 TABLET | Refills: 3 | Status: SHIPPED | OUTPATIENT
Start: 2019-07-31 | End: 2019-10-02 | Stop reason: SDUPTHER

## 2019-07-31 RX ORDER — ZOLPIDEM TARTRATE 5 MG/1
TABLET ORAL
Qty: 30 TABLET | Refills: 3 | Status: SHIPPED | OUTPATIENT
Start: 2019-07-31 | End: 2019-12-03 | Stop reason: SDUPTHER

## 2019-09-25 RX ORDER — METFORMIN HYDROCHLORIDE 750 MG/1
TABLET, EXTENDED RELEASE ORAL
Qty: 90 TABLET | Refills: 3 | Status: SHIPPED | OUTPATIENT
Start: 2019-09-25 | End: 2020-02-05 | Stop reason: CLARIF

## 2019-09-25 RX ORDER — OLMESARTAN MEDOXOMIL 20 MG/1
TABLET ORAL
Qty: 90 TABLET | Refills: 3 | Status: SHIPPED | OUTPATIENT
Start: 2019-09-25 | End: 2020-10-08

## 2019-09-25 RX ORDER — MONTELUKAST SODIUM 10 MG/1
TABLET ORAL
Qty: 90 TABLET | Refills: 3 | Status: SHIPPED | OUTPATIENT
Start: 2019-09-25 | End: 2020-10-08

## 2019-09-30 ENCOUNTER — PATIENT MESSAGE (OUTPATIENT)
Dept: FAMILY MEDICINE | Facility: CLINIC | Age: 64
End: 2019-09-30

## 2019-10-02 ENCOUNTER — PATIENT MESSAGE (OUTPATIENT)
Dept: FAMILY MEDICINE | Facility: CLINIC | Age: 64
End: 2019-10-02

## 2019-10-02 DIAGNOSIS — R52 PAIN: ICD-10-CM

## 2019-10-02 RX ORDER — TRAMADOL HYDROCHLORIDE 50 MG/1
TABLET ORAL
Qty: 60 TABLET | Refills: 3 | Status: SHIPPED | OUTPATIENT
Start: 2019-10-02 | End: 2019-12-03 | Stop reason: SDUPTHER

## 2019-10-05 ENCOUNTER — LAB VISIT (OUTPATIENT)
Dept: LAB | Facility: HOSPITAL | Age: 64
End: 2019-10-05
Attending: INTERNAL MEDICINE
Payer: COMMERCIAL

## 2019-10-05 LAB
25(OH)D3+25(OH)D2 SERPL-MCNC: 32 NG/ML (ref 30–96)
ESTIMATED AVG GLUCOSE: 148 MG/DL (ref 68–131)
HBA1C MFR BLD HPLC: 6.8 % (ref 4–5.6)

## 2019-10-05 PROCEDURE — 83036 HEMOGLOBIN GLYCOSYLATED A1C: CPT

## 2019-10-05 PROCEDURE — 36415 COLL VENOUS BLD VENIPUNCTURE: CPT | Mod: PO

## 2019-10-05 PROCEDURE — 82306 VITAMIN D 25 HYDROXY: CPT

## 2019-11-01 RX ORDER — DIAZEPAM 5 MG/1
TABLET ORAL
Qty: 30 TABLET | Refills: 3 | Status: SHIPPED | OUTPATIENT
Start: 2019-11-01 | End: 2020-05-30

## 2019-12-03 DIAGNOSIS — R52 PAIN: ICD-10-CM

## 2019-12-03 RX ORDER — MELOXICAM 7.5 MG/1
TABLET ORAL
Qty: 180 TABLET | Refills: 3 | Status: SHIPPED | OUTPATIENT
Start: 2019-12-03 | End: 2020-12-20 | Stop reason: SDUPTHER

## 2019-12-06 DIAGNOSIS — R52 PAIN: ICD-10-CM

## 2019-12-06 RX ORDER — TRAMADOL HYDROCHLORIDE 50 MG/1
TABLET ORAL
Qty: 60 TABLET | Refills: 3 | Status: ON HOLD | OUTPATIENT
Start: 2019-12-06 | End: 2020-02-12 | Stop reason: HOSPADM

## 2019-12-06 RX ORDER — TRAMADOL HYDROCHLORIDE 50 MG/1
50 TABLET ORAL EVERY 6 HOURS PRN
Qty: 60 TABLET | Refills: 3 | OUTPATIENT
Start: 2019-12-06

## 2019-12-06 RX ORDER — ZOLPIDEM TARTRATE 5 MG/1
5 TABLET ORAL NIGHTLY
Qty: 30 TABLET | Refills: 3 | OUTPATIENT
Start: 2019-12-06

## 2019-12-06 RX ORDER — ZOLPIDEM TARTRATE 5 MG/1
TABLET ORAL
Qty: 30 TABLET | Refills: 3 | Status: SHIPPED | OUTPATIENT
Start: 2019-12-06 | End: 2020-05-30

## 2020-01-03 ENCOUNTER — OFFICE VISIT (OUTPATIENT)
Dept: FAMILY MEDICINE | Facility: CLINIC | Age: 65
End: 2020-01-03
Payer: COMMERCIAL

## 2020-01-03 VITALS
DIASTOLIC BLOOD PRESSURE: 64 MMHG | OXYGEN SATURATION: 96 % | HEIGHT: 67 IN | TEMPERATURE: 98 F | RESPIRATION RATE: 18 BRPM | WEIGHT: 273.25 LBS | HEART RATE: 93 BPM | SYSTOLIC BLOOD PRESSURE: 118 MMHG | BODY MASS INDEX: 42.89 KG/M2

## 2020-01-03 DIAGNOSIS — L30.9 DERMATITIS: Primary | ICD-10-CM

## 2020-01-03 PROCEDURE — 99999 PR PBB SHADOW E&M-EST. PATIENT-LVL V: CPT | Mod: PBBFAC,,, | Performed by: FAMILY MEDICINE

## 2020-01-03 PROCEDURE — 3008F BODY MASS INDEX DOCD: CPT | Mod: CPTII,S$GLB,, | Performed by: FAMILY MEDICINE

## 2020-01-03 PROCEDURE — 99214 PR OFFICE/OUTPT VISIT, EST, LEVL IV, 30-39 MIN: ICD-10-PCS | Mod: S$GLB,,, | Performed by: FAMILY MEDICINE

## 2020-01-03 PROCEDURE — 3074F SYST BP LT 130 MM HG: CPT | Mod: CPTII,S$GLB,, | Performed by: FAMILY MEDICINE

## 2020-01-03 PROCEDURE — 99999 PR PBB SHADOW E&M-EST. PATIENT-LVL V: ICD-10-PCS | Mod: PBBFAC,,, | Performed by: FAMILY MEDICINE

## 2020-01-03 PROCEDURE — 3008F PR BODY MASS INDEX (BMI) DOCUMENTED: ICD-10-PCS | Mod: CPTII,S$GLB,, | Performed by: FAMILY MEDICINE

## 2020-01-03 PROCEDURE — 3078F DIAST BP <80 MM HG: CPT | Mod: CPTII,S$GLB,, | Performed by: FAMILY MEDICINE

## 2020-01-03 PROCEDURE — 99214 OFFICE O/P EST MOD 30 MIN: CPT | Mod: S$GLB,,, | Performed by: FAMILY MEDICINE

## 2020-01-03 PROCEDURE — 3078F PR MOST RECENT DIASTOLIC BLOOD PRESSURE < 80 MM HG: ICD-10-PCS | Mod: CPTII,S$GLB,, | Performed by: FAMILY MEDICINE

## 2020-01-03 PROCEDURE — 3074F PR MOST RECENT SYSTOLIC BLOOD PRESSURE < 130 MM HG: ICD-10-PCS | Mod: CPTII,S$GLB,, | Performed by: FAMILY MEDICINE

## 2020-01-03 RX ORDER — TRIAMCINOLONE ACETONIDE 5 MG/G
OINTMENT TOPICAL 2 TIMES DAILY
Qty: 15 G | Refills: 1 | Status: SHIPPED | OUTPATIENT
Start: 2020-01-03 | End: 2020-12-22 | Stop reason: SDUPTHER

## 2020-01-31 ENCOUNTER — HOSPITAL ENCOUNTER (EMERGENCY)
Facility: HOSPITAL | Age: 65
Discharge: HOME | End: 2020-01-31
Attending: EMERGENCY MEDICINE
Payer: COMMERCIAL

## 2020-01-31 VITALS
HEART RATE: 94 BPM | HEIGHT: 67 IN | BODY MASS INDEX: 42.53 KG/M2 | OXYGEN SATURATION: 99 % | RESPIRATION RATE: 26 BRPM | SYSTOLIC BLOOD PRESSURE: 171 MMHG | DIASTOLIC BLOOD PRESSURE: 77 MMHG | WEIGHT: 271 LBS | TEMPERATURE: 98 F

## 2020-01-31 DIAGNOSIS — M25.561 ACUTE PAIN OF RIGHT KNEE: ICD-10-CM
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
## 2020-01-31 DIAGNOSIS — S52.509A CLOSED FRACTURE DISTAL RADIUS AND ULNA: Primary | ICD-10-CM

## 2020-01-31 DIAGNOSIS — S52.609A CLOSED FRACTURE DISTAL RADIUS AND ULNA: Primary | ICD-10-CM

## 2020-01-31 DIAGNOSIS — M25.532 ACUTE PAIN OF LEFT WRIST: ICD-10-CM

## 2020-01-31 PROCEDURE — 63600175 PHARM REV CODE 636 W HCPCS: Performed by: EMERGENCY MEDICINE

## 2020-01-31 PROCEDURE — 99285 EMERGENCY DEPT VISIT HI MDM: CPT | Mod: 25

## 2020-01-31 PROCEDURE — 96376 TX/PRO/DX INJ SAME DRUG ADON: CPT

## 2020-01-31 PROCEDURE — 96374 THER/PROPH/DIAG INJ IV PUSH: CPT

## 2020-01-31 PROCEDURE — 25000003 PHARM REV CODE 250: Performed by: EMERGENCY MEDICINE

## 2020-01-31 PROCEDURE — 25605 CLTX DST RDL FX/EPHYS SEP W/: CPT | Mod: LT

## 2020-01-31 PROCEDURE — 99152 MOD SED SAME PHYS/QHP 5/>YRS: CPT | Mod: 51

## 2020-01-31 RX ORDER — OXYCODONE AND ACETAMINOPHEN 5; 325 MG/1; MG/1
1 TABLET ORAL EVERY 6 HOURS PRN
Qty: 12 TABLET | Refills: 0 | OUTPATIENT
Start: 2020-01-31 | End: 2020-02-03

## 2020-01-31 RX ORDER — SODIUM CHLORIDE 9 MG/ML
1000 INJECTION, SOLUTION INTRAVENOUS
Status: COMPLETED | OUTPATIENT
Start: 2020-01-31 | End: 2020-01-31

## 2020-01-31 RX ORDER — PROPOFOL 10 MG/ML
70 VIAL (ML) INTRAVENOUS ONCE
Status: DISCONTINUED | OUTPATIENT
Start: 2020-02-01 | End: 2020-01-31

## 2020-01-31 RX ORDER — HYDROMORPHONE HYDROCHLORIDE 2 MG/ML
1 INJECTION, SOLUTION INTRAMUSCULAR; INTRAVENOUS; SUBCUTANEOUS
Status: COMPLETED | OUTPATIENT
Start: 2020-01-31 | End: 2020-01-31

## 2020-01-31 RX ORDER — PROPOFOL 10 MG/ML
INJECTION, EMULSION INTRAVENOUS
Status: DISCONTINUED
Start: 2020-01-31 | End: 2020-02-01 | Stop reason: HOSPADM

## 2020-01-31 RX ORDER — PROPOFOL 10 MG/ML
90 VIAL (ML) INTRAVENOUS ONCE
Status: DISCONTINUED | OUTPATIENT
Start: 2020-02-01 | End: 2020-01-31

## 2020-01-31 RX ORDER — ONDANSETRON 4 MG/1
4 TABLET, ORALLY DISINTEGRATING ORAL
Status: COMPLETED | OUTPATIENT
Start: 2020-01-31 | End: 2020-01-31

## 2020-01-31 RX ORDER — PROPOFOL 10 MG/ML
60 VIAL (ML) INTRAVENOUS ONCE
Status: COMPLETED | OUTPATIENT
Start: 2020-02-01 | End: 2020-01-31

## 2020-01-31 RX ORDER — PROPOFOL 10 MG/ML
30 VIAL (ML) INTRAVENOUS ONCE
Status: COMPLETED | OUTPATIENT
Start: 2020-01-31 | End: 2020-01-31

## 2020-01-31 RX ADMIN — PROPOFOL 30 MG: 10 INJECTION, EMULSION INTRAVENOUS at 10:01

## 2020-01-31 RX ADMIN — SODIUM CHLORIDE 1000 ML: 0.9 INJECTION, SOLUTION INTRAVENOUS at 07:01

## 2020-01-31 RX ADMIN — HYDROMORPHONE HYDROCHLORIDE 1 MG: 2 INJECTION, SOLUTION INTRAMUSCULAR; INTRAVENOUS; SUBCUTANEOUS at 07:01

## 2020-01-31 RX ADMIN — ONDANSETRON 4 MG: 4 TABLET, ORALLY DISINTEGRATING ORAL at 05:01

## 2020-01-31 RX ADMIN — HYDROMORPHONE HYDROCHLORIDE 1 MG: 2 INJECTION, SOLUTION INTRAMUSCULAR; INTRAVENOUS; SUBCUTANEOUS at 05:01

## 2020-01-31 RX ADMIN — PROPOFOL 60 MG: 10 INJECTION, EMULSION INTRAVENOUS at 11:01
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
## 2020-02-02 ENCOUNTER — PATIENT OUTREACH (OUTPATIENT)
Dept: ADMINISTRATIVE | Facility: OTHER | Age: 65
End: 2020-02-02

## 2020-02-03 ENCOUNTER — HOSPITAL ENCOUNTER (EMERGENCY)
Facility: HOSPITAL | Age: 65
Discharge: HOME | End: 2020-02-03
Attending: EMERGENCY MEDICINE
Payer: COMMERCIAL

## 2020-02-03 ENCOUNTER — TELEPHONE (OUTPATIENT)
Dept: ORTHOPEDICS | Facility: CLINIC | Age: 65
End: 2020-02-03

## 2020-02-03 ENCOUNTER — ANCILLARY ORDERS (OUTPATIENT)
Dept: EMERGENCY MEDICINE | Facility: HOSPITAL | Age: 65
End: 2020-02-03

## 2020-02-03 VITALS
TEMPERATURE: 98 F | SYSTOLIC BLOOD PRESSURE: 160 MMHG | OXYGEN SATURATION: 96 % | HEIGHT: 67 IN | DIASTOLIC BLOOD PRESSURE: 77 MMHG | RESPIRATION RATE: 18 BRPM | WEIGHT: 271 LBS | BODY MASS INDEX: 42.53 KG/M2 | HEART RATE: 95 BPM

## 2020-02-03 DIAGNOSIS — S52.501S CLOSED FRACTURE OF DISTAL ENDS OF RIGHT RADIUS AND ULNA, SEQUELA: Primary | ICD-10-CM

## 2020-02-03 DIAGNOSIS — S62.102S WRIST FRACTURE, CLOSED, LEFT, SEQUELA: Primary | ICD-10-CM

## 2020-02-03 DIAGNOSIS — S52.92XA CLOSED FRACTURE OF LEFT RADIUS AND ULNA, INITIAL ENCOUNTER: Primary | ICD-10-CM

## 2020-02-03 DIAGNOSIS — Z01.811 PRE-OP CHEST EXAM: ICD-10-CM

## 2020-02-03 DIAGNOSIS — S52.601S CLOSED FRACTURE OF DISTAL ENDS OF RIGHT RADIUS AND ULNA, SEQUELA: Primary | ICD-10-CM

## 2020-02-03 DIAGNOSIS — T14.8XXA FRACTURE: Primary | ICD-10-CM

## 2020-02-03 DIAGNOSIS — S52.202A CLOSED FRACTURE OF LEFT RADIUS AND ULNA, INITIAL ENCOUNTER: Primary | ICD-10-CM

## 2020-02-03 LAB
ABO + RH BLD: NORMAL
ALBUMIN SERPL BCP-MCNC: 3.8 G/DL (ref 3.5–5.2)
ALP SERPL-CCNC: 72 U/L (ref 55–135)
ALT SERPL W/O P-5'-P-CCNC: 69 U/L (ref 10–44)
ANION GAP SERPL CALC-SCNC: 14 MMOL/L (ref 8–16)
AST SERPL-CCNC: 42 U/L (ref 10–40)
BASOPHILS # BLD AUTO: 0.05 K/UL (ref 0–0.2)
BASOPHILS NFR BLD: 0.7 % (ref 0–1.9)
BILIRUB SERPL-MCNC: 0.4 MG/DL (ref 0.1–1)
BLD GP AB SCN CELLS X3 SERPL QL: NORMAL
BUN SERPL-MCNC: 16 MG/DL (ref 8–23)
CALCIUM SERPL-MCNC: 10.8 MG/DL (ref 8.7–10.5)
CHLORIDE SERPL-SCNC: 101 MMOL/L (ref 95–110)
CO2 SERPL-SCNC: 27 MMOL/L (ref 23–29)
CREAT SERPL-MCNC: 0.8 MG/DL (ref 0.5–1.4)
DIFFERENTIAL METHOD: ABNORMAL
EOSINOPHIL # BLD AUTO: 0.3 K/UL (ref 0–0.5)
EOSINOPHIL NFR BLD: 4.2 % (ref 0–8)
ERYTHROCYTE [DISTWIDTH] IN BLOOD BY AUTOMATED COUNT: 13.4 % (ref 11.5–14.5)
EST. GFR  (AFRICAN AMERICAN): >60 ML/MIN/1.73 M^2
EST. GFR  (NON AFRICAN AMERICAN): >60 ML/MIN/1.73 M^2
ESTIMATED AVG GLUCOSE: 160 MG/DL (ref 68–131)
GLUCOSE SERPL-MCNC: 147 MG/DL (ref 70–110)
HBA1C MFR BLD HPLC: 7.2 % (ref 4–5.6)
HCT VFR BLD AUTO: 42 % (ref 37–48.5)
HGB BLD-MCNC: 13.3 G/DL (ref 12–16)
IMM GRANULOCYTES # BLD AUTO: 0.01 K/UL (ref 0–0.04)
IMM GRANULOCYTES NFR BLD AUTO: 0.1 % (ref 0–0.5)
INR PPP: 0.9 (ref 0.8–1.2)
LYMPHOCYTES # BLD AUTO: 2.7 K/UL (ref 1–4.8)
LYMPHOCYTES NFR BLD: 39.5 % (ref 18–48)
MAGNESIUM SERPL-MCNC: 1.6 MG/DL (ref 1.6–2.6)
MCH RBC QN AUTO: 30 PG (ref 27–31)
MCHC RBC AUTO-ENTMCNC: 31.7 G/DL (ref 32–36)
MCV RBC AUTO: 95 FL (ref 82–98)
MONOCYTES # BLD AUTO: 0.6 K/UL (ref 0.3–1)
MONOCYTES NFR BLD: 8.7 % (ref 4–15)
NEUTROPHILS # BLD AUTO: 3.2 K/UL (ref 1.8–7.7)
NEUTROPHILS NFR BLD: 46.8 % (ref 38–73)
NRBC BLD-RTO: 0 /100 WBC
PHOSPHATE SERPL-MCNC: 3.4 MG/DL (ref 2.7–4.5)
PLATELET # BLD AUTO: 316 K/UL (ref 150–350)
PMV BLD AUTO: 10 FL (ref 9.2–12.9)
POTASSIUM SERPL-SCNC: 4.5 MMOL/L (ref 3.5–5.1)
PREALB SERPL-MCNC: 34 MG/DL (ref 20–43)
PROT SERPL-MCNC: 7.8 G/DL (ref 6–8.4)
PROTHROMBIN TIME: 9.7 SEC (ref 9–12.5)
RBC # BLD AUTO: 4.44 M/UL (ref 4–5.4)
SODIUM SERPL-SCNC: 142 MMOL/L (ref 136–145)
TRANSFERRIN SERPL-MCNC: 345 MG/DL (ref 200–375)
WBC # BLD AUTO: 6.86 K/UL (ref 3.9–12.7)

## 2020-02-03 PROCEDURE — 99285 PR EMERGENCY DEPT VISIT,LEVEL V: ICD-10-PCS | Mod: ,,, | Performed by: EMERGENCY MEDICINE

## 2020-02-03 PROCEDURE — 25000003 PHARM REV CODE 250: Performed by: STUDENT IN AN ORGANIZED HEALTH CARE EDUCATION/TRAINING PROGRAM

## 2020-02-03 PROCEDURE — 84466 ASSAY OF TRANSFERRIN: CPT

## 2020-02-03 PROCEDURE — 85025 COMPLETE CBC W/AUTO DIFF WBC: CPT

## 2020-02-03 PROCEDURE — 83735 ASSAY OF MAGNESIUM: CPT

## 2020-02-03 PROCEDURE — 84134 ASSAY OF PREALBUMIN: CPT

## 2020-02-03 PROCEDURE — 84100 ASSAY OF PHOSPHORUS: CPT

## 2020-02-03 PROCEDURE — 93010 EKG 12-LEAD: ICD-10-PCS | Mod: ,,, | Performed by: INTERNAL MEDICINE

## 2020-02-03 PROCEDURE — 96376 TX/PRO/DX INJ SAME DRUG ADON: CPT

## 2020-02-03 PROCEDURE — 83036 HEMOGLOBIN GLYCOSYLATED A1C: CPT

## 2020-02-03 PROCEDURE — 80053 COMPREHEN METABOLIC PANEL: CPT

## 2020-02-03 PROCEDURE — 86850 RBC ANTIBODY SCREEN: CPT

## 2020-02-03 PROCEDURE — 25605 CLTX DST RDL FX/EPHYS SEP W/: CPT

## 2020-02-03 PROCEDURE — 96374 THER/PROPH/DIAG INJ IV PUSH: CPT | Mod: 59

## 2020-02-03 PROCEDURE — 99285 EMERGENCY DEPT VISIT HI MDM: CPT | Mod: 25

## 2020-02-03 PROCEDURE — 63600175 PHARM REV CODE 636 W HCPCS: Performed by: EMERGENCY MEDICINE

## 2020-02-03 PROCEDURE — 85610 PROTHROMBIN TIME: CPT

## 2020-02-03 PROCEDURE — 63600175 PHARM REV CODE 636 W HCPCS: Performed by: STUDENT IN AN ORGANIZED HEALTH CARE EDUCATION/TRAINING PROGRAM

## 2020-02-03 PROCEDURE — 99285 EMERGENCY DEPT VISIT HI MDM: CPT | Mod: ,,, | Performed by: EMERGENCY MEDICINE

## 2020-02-03 PROCEDURE — 93010 ELECTROCARDIOGRAM REPORT: CPT | Mod: ,,, | Performed by: INTERNAL MEDICINE

## 2020-02-03 PROCEDURE — 93005 ELECTROCARDIOGRAM TRACING: CPT | Mod: 59

## 2020-02-03 RX ORDER — OXYCODONE AND ACETAMINOPHEN 10; 325 MG/1; MG/1
1 TABLET ORAL EVERY 4 HOURS PRN
Qty: 20 TABLET | Refills: 0 | Status: SHIPPED | OUTPATIENT
Start: 2020-02-03 | End: 2020-02-07

## 2020-02-03 RX ORDER — HYDROMORPHONE HYDROCHLORIDE 1 MG/ML
0.5 INJECTION, SOLUTION INTRAMUSCULAR; INTRAVENOUS; SUBCUTANEOUS
Status: COMPLETED | OUTPATIENT
Start: 2020-02-03 | End: 2020-02-03

## 2020-02-03 RX ORDER — HYDROMORPHONE HYDROCHLORIDE 1 MG/ML
0.5 INJECTION, SOLUTION INTRAMUSCULAR; INTRAVENOUS; SUBCUTANEOUS
Status: DISCONTINUED | OUTPATIENT
Start: 2020-02-03 | End: 2020-02-03 | Stop reason: HOSPADM

## 2020-02-03 RX ORDER — LIDOCAINE HYDROCHLORIDE 10 MG/ML
20 INJECTION, SOLUTION EPIDURAL; INFILTRATION; INTRACAUDAL; PERINEURAL
Status: COMPLETED | OUTPATIENT
Start: 2020-02-03 | End: 2020-02-03

## 2020-02-03 RX ORDER — SODIUM CHLORIDE 9 MG/ML
INJECTION, SOLUTION INTRAVENOUS CONTINUOUS
Status: CANCELLED | OUTPATIENT
Start: 2020-02-03

## 2020-02-03 RX ORDER — OXYCODONE AND ACETAMINOPHEN 5; 325 MG/1; MG/1
1 TABLET ORAL EVERY 6 HOURS PRN
Qty: 12 TABLET | Refills: 0 | Status: SHIPPED | OUTPATIENT
Start: 2020-02-03 | End: 2020-02-03 | Stop reason: DRUGHIGH

## 2020-02-03 RX ORDER — LIDOCAINE HYDROCHLORIDE 10 MG/ML
10 INJECTION, SOLUTION EPIDURAL; INFILTRATION; INTRACAUDAL; PERINEURAL ONCE
Status: COMPLETED | OUTPATIENT
Start: 2020-02-03 | End: 2020-02-03

## 2020-02-03 RX ORDER — MUPIROCIN 20 MG/G
OINTMENT TOPICAL
Status: CANCELLED | OUTPATIENT
Start: 2020-02-03

## 2020-02-03 RX ADMIN — LIDOCAINE HYDROCHLORIDE 200 MG: 10 INJECTION, SOLUTION EPIDURAL; INFILTRATION; INTRACAUDAL; PERINEURAL at 12:02

## 2020-02-03 RX ADMIN — HYDROMORPHONE HYDROCHLORIDE 0.5 MG: 1 INJECTION, SOLUTION INTRAMUSCULAR; INTRAVENOUS; SUBCUTANEOUS at 01:02

## 2020-02-03 RX ADMIN — LIDOCAINE HYDROCHLORIDE 100 MG: 10 INJECTION, SOLUTION EPIDURAL; INFILTRATION; INTRACAUDAL; PERINEURAL at 01:02

## 2020-02-03 RX ADMIN — HYDROMORPHONE HYDROCHLORIDE 0.5 MG: 1 INJECTION, SOLUTION INTRAMUSCULAR; INTRAVENOUS; SUBCUTANEOUS at 10:02

## 2020-02-03 NOTE — TELEPHONE ENCOUNTER
----- Message from Eddy Mansfield sent at 2/3/2020  8:16 AM CST -----  Contact: pt @ [phone]  Pt states she's returning Katherine's call
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
## 2020-02-03 NOTE — ED NOTES
Ortho remains at bedside with c-arm. PT in pain at this time. No new orders per MD. RR 18 even and unlabored.

## 2020-02-03 NOTE — TELEPHONE ENCOUNTER
Left voice mail for pt to return my call at her earliest convenience.    After images were reviewed by trauma surgeon, it was suggested that pt go to main campus ED for evaluation and tx.  Will advise pt to be NPO if possible.    Pending return call

## 2020-02-03 NOTE — SUBJECTIVE & OBJECTIVE
"Past Medical History:   Diagnosis Date    Allergic rhinitis, seasonal 1/28/2013    Anxiety disorder 1/28/2013    Basal cell carcinoma     Bilateral retinal lattice degeneration     Cataract     Colon cancer 2008    s/p resection    DDD (degenerative disc disease), lumbar 11/25/2014    Diabetes mellitus type II, uncontrolled 7/25/2014    High myopia     History of colon cancer 4/10/2019    History of incisional hernia repair     Horseshoe retinal tear of both eyes     HTN (hypertension) 1/28/2013    Hypertension     Hypothyroidism 4/29/2014    Incisional hernia of anterior abdominal wall without obstruction or gangrene     Lumbar disc disease 7/25/2014    Metabolic syndrome 4/29/2014    Neuropathy due to chemotherapeutic drug 5/22/2017    Osteopenia 5/10/2013    Screening for colorectal cancer 9/11/2015    Normal 6/ 2015---5 yrs    Vitamin D deficiency disease     Vitreous detachment of both eyes        Past Surgical History:   Procedure Laterality Date    ADENOIDECTOMY      ANKLE SURGERY      left     BREAST LUMPECTOMY      CATARACT EXTRACTION      CATARACT EXTRACTION BILATERAL W/ ANTERIOR VITRECTOMY      COLECTOMY      s/p reanastemosis    COLON SURGERY      EYE EXAMINATION UNDER ANESTHESIA W/ RETINAL CRYOTHERAPY AND RETINAL LASER      HERNIA REPAIR      KIDNEY SURGERY      "dilate urethra tube"    NASAL POLYP EXCISION      NASAL SEPTUM SURGERY      RADIOFREQUENCY ABLATION OF LUMBAR MEDIAL BRANCH NERVE AT SINGLE LEVEL Right 7/6/2018    Procedure: RADIOFREQUENCY ABLATION, NERVE, MEDIAL BRANCH, LUMBAR, 1 LEVEL;  Surgeon: Vashti Poe MD;  Location: [facility];  Service: Pain Management;  Laterality: Right;  RIght Thermal RFA @ L3-5  (REPEAT)  [id number]  with IV Sedation    CONSENT NEEDED    s/p laser ou      TONSILLECTOMY      TUBAL LIGATION      UVULOPALATOPHARYNGOPLASTY  1990s       Review of patient's allergies indicates:   Allergen Reactions    Oxaliplatin Hives "    Factive [gemifloxacin] Hives    Statins-hmg-coa reductase inhibitors      Memory w lipitor, muscles for 2 others    Daypro [oxaprozin] Rash    Latex Hives     Adhesives       Current Facility-Administered Medications   Medication    HYDROmorphone injection 0.5 mg     Current Outpatient Medications   Medication Sig    blood sugar diagnostic (ONETOUCH VERIO) Strp 1 strip by Misc.(Non-Drug; Combo Route) route 2 (two) times daily before meals.    blood sugar diagnostic Strp 1 strip by Misc.(Non-Drug; Combo Route) route 2 (two) times daily. DISP GLUCOMETER OF CHOICE AND LANCETS AS WELL    blood-glucose meter (ACCU-CHEK GUIDE GLUCOSE METER) Misc 1 Units by Misc.(Non-Drug; Combo Route) route 2 (two) times daily.    cyclobenzaprine (FLEXERIL) 5 MG tablet Take 1 tablet (5 mg total) by mouth nightly.    diazePAM (VALIUM) 5 MG tablet TAKE ONE TABLET BY MOUTH EVERY NIGHT AT BEDTIME AS NEEDED FOR ANXIETY AND SPASMS    hydroCHLOROthiazide (HYDRODIURIL) 12.5 MG Tab Take 1 tablet (12.5 mg total) by mouth once daily.    lancets (ACCU-CHEK FASTCLIX LANCET DRUM) Misc 1 lancet by Misc.(Non-Drug; Combo Route) route 2 (two) times daily.    lancets (ONETOUCH DELICA LANCETS) 33 gauge Misc 1 lancet by Misc.(Non-Drug; Combo Route) route 2 (two) times daily before meals.    meloxicam (MOBIC) 7.5 MG tablet TAKE 1 TO 2 TABLETS A DAY  AS NEEDED    metFORMIN (GLUCOPHAGE-XR) 750 MG 24 hr tablet Take 2 tablets (1,500 mg total) by mouth every morning.    metFORMIN (GLUCOPHAGE-XR) 750 MG 24 hr tablet TAKE 1 TABLET EVERY MORNING    montelukast (SINGULAIR) 10 mg tablet TAKE 1 TABLET EVERY EVENING    olmesartan (BENICAR) 20 MG tablet TAKE 1 TABLET ONCE DAILY    oxyCODONE-acetaminophen (PERCOCET) 5-325 mg per tablet Take 1 tablet by mouth every 6 (six) hours as needed for Pain.    traMADol (ULTRAM) 50 mg tablet TAKE ONE TABLET BY MOUTH EVERY 6 HOURS AS NEEDED    zolpidem (AMBIEN) 5 MG Tab TAKE ONE TABLET BY MOUTH EVERY NIGHT AT  "BEDTIME    albuterol 90 mcg/actuation inhaler Inhale 1-2 puffs into the lungs every 6 (six) hours as needed for Wheezing.    azelastine (ASTELIN) 137 mcg (0.1 %) nasal spray 1 spray (137 mcg total) by Nasal route 2 (two) times daily.    cetirizine (ZYRTEC) 10 MG tablet Take 10 mg by mouth once daily.    fluorouracil (EFUDEX) 5 % cream Use hs for 2 weeks    fluticasone propionate (FLONASE) 50 mcg/actuation nasal spray 2 sprays (100 mcg total) by Each Nare route once daily.    KRILL OIL ORAL Take 1 tablet by mouth once daily.    multivitamin (THERAGRAN) per tablet Take 1 tablet by mouth once daily. 1 Tablet Oral Every day    ONETOUCH DELICA LANCETS 33 gauge Misc     triamcinolone (KENALOG) 0.5 % ointment Apply topically 2 (two) times daily.     Family History     Problem Relation (Age of Onset)    Allergies Father, Sister, Brother    Breast cancer Mother    Cancer Maternal Grandmother    Heart disease Maternal Grandfather    Hyperlipidemia Mother    Hypertension Mother    Stroke Paternal Grandmother        Tobacco Use    Smoking status: Former Smoker     Last attempt to quit: 1977     Years since quittin.5    Smokeless tobacco: Never Used   Substance and Sexual Activity    Alcohol use: Yes     Frequency: Monthly or less     Drinks per session: 1 or 2     Binge frequency: Never     Comment: once per week    Drug use: No    Sexual activity: Yes     Partners: Male     Birth control/protection: Post-menopausal     ROSper ED  Objective:     Vital Signs (Most Recent):  Temp: 97.9 °F (36.6 °C) (20 1049)  Pulse: 104 (20 1049)  Resp: 16 (20)  BP: (!) 188/99 (20 1049)  SpO2: 95 % (20 09) Vital Signs (24h Range):  Temp:  [97.9 °F (36.6 °C)-98.2 °F (36.8 °C)] 97.9 °F (36.6 °C)  Pulse:  [] 104  Resp:  [16] 16  SpO2:  [95 %] 95 %  BP: (186-188)/(91-99) 188/99     Weight: 122.9 kg (271 lb)  Height: 5' 7" (170.2 cm)  Body mass index is 42.44 kg/m².    No intake or " output data in the 24 hours ending 20 1455    Ortho/SPM Exam   PE:  Gen:  No acute distress  CV:  Peripherally well-perfused.    Lungs:  Normal respiratory effort.  Head/Neck:  Normocephalic.  Atraumatic.     LUE:  Skin intact, obvious deformity noted  No open wounds/abrasions/laceration  Ecchymoses present to volar radial aspect of wrist  Significant bony TTP about rwist  FROM shoulder, elbow   ROM at wrist limited 2/2 pain  SILT M/U/R  Motor intact AIN/PIN/M/U/R   Cap refill < 2s  2+ RP      Significant Labs: All pertinent labs within the past 24 hours have been reviewed.    Significant Imaging: I have reviewed all pertinent imaging results/findings.     Procedure Note:  Left distal both bone reduction  Patient was explained risks, benefits, and alternatives to treatment and verbalized consent to proceed. Time out was performed and patient name, , site, and procedure were confirmed. 20 cc of 1% lidocaine in 25 gauge needle was used for hematoma block. The patient was placed in finger traps for 30 minutes. Fracture was reduced under fluoroscopy. Sugar tong splint was applied in typical fashion. Post-reduction films were performed and confirmed adequate reduction. Patient tolerated the procedure well.
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
## 2020-02-03 NOTE — TELEPHONE ENCOUNTER
----- Message from Eddy Mansfield sent at 2/3/2020  9:57 AM CST -----  Contact: pt @ [phone]  Pt wanted to let Katherine know she's currently at the ED
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
## 2020-02-04 ENCOUNTER — PES CALL (OUTPATIENT)
Dept: ADMINISTRATIVE | Facility: CLINIC | Age: 65
End: 2020-02-04

## 2020-02-04 ENCOUNTER — HOSPITAL ENCOUNTER (OUTPATIENT)
Dept: RADIOLOGY | Facility: HOSPITAL | Age: 65
Discharge: HOME | End: 2020-02-04
Attending: STUDENT IN AN ORGANIZED HEALTH CARE EDUCATION/TRAINING PROGRAM
Payer: COMMERCIAL

## 2020-02-04 DIAGNOSIS — T14.8XXA FRACTURE: ICD-10-CM

## 2020-02-04 PROCEDURE — 73200 CT UPPER EXTREMITY W/O DYE: CPT | Mod: TC,LT

## 2020-02-04 PROCEDURE — 73200 CT WRIST WITHOUT CONTRAST LEFT: ICD-10-PCS | Mod: 26,LT,, | Performed by: RADIOLOGY

## 2020-02-04 PROCEDURE — 73200 CT UPPER EXTREMITY W/O DYE: CPT | Mod: 26,LT,, | Performed by: RADIOLOGY

## 2020-02-04 RX ORDER — OXYCODONE AND ACETAMINOPHEN 10; 325 MG/1; MG/1
1 TABLET ORAL EVERY 4 HOURS PRN
Qty: 42 TABLET | Refills: 0 | Status: ON HOLD | OUTPATIENT
Start: 2020-02-04 | End: 2020-02-12 | Stop reason: HOSPADM

## 2020-02-06 ENCOUNTER — ANESTHESIA EVENT (OUTPATIENT)
Dept: SURGERY | Facility: HOSPITAL | Age: 65
End: 2020-02-06
Payer: COMMERCIAL

## 2020-02-07 ENCOUNTER — TELEPHONE (OUTPATIENT)
Dept: ORTHOPEDICS | Facility: CLINIC | Age: 65
End: 2020-02-07

## 2020-02-07 NOTE — TELEPHONE ENCOUNTER
Spoke with pt.    Advised NPO after midnight on Sunday    Arrival time of 630 am on Monday at [facility]   Pt verbalized understanding.

## 2020-02-08 ENCOUNTER — PATIENT OUTREACH (OUTPATIENT)
Dept: ADMINISTRATIVE | Facility: OTHER | Age: 65
End: 2020-02-08

## 2020-02-08 NOTE — PROGRESS NOTES
Chart reviewed.   Immunizations: Triggered Imm Registry     Orders placed: n/a  Upcoming appts to satisfy ESE topics: n/a

## 2020-02-10 ENCOUNTER — DOCUMENTATION ONLY (OUTPATIENT)
Dept: ORTHOPEDICS | Facility: CLINIC | Age: 65
End: 2020-02-10

## 2020-02-10 ENCOUNTER — OFFICE VISIT (OUTPATIENT)
Dept: ORTHOPEDICS | Facility: CLINIC | Age: 65
End: 2020-02-10
Payer: COMMERCIAL

## 2020-02-10 ENCOUNTER — ANESTHESIA (OUTPATIENT)
Dept: SURGERY | Facility: HOSPITAL | Age: 65
End: 2020-02-10
Payer: COMMERCIAL

## 2020-02-10 VITALS — BODY MASS INDEX: 42.53 KG/M2 | WEIGHT: 270.94 LBS | HEIGHT: 67 IN

## 2020-02-10 DIAGNOSIS — S52.572A OTHER CLOSED INTRA-ARTICULAR FRACTURE OF DISTAL END OF LEFT RADIUS, INITIAL ENCOUNTER: ICD-10-CM

## 2020-02-10 DIAGNOSIS — S52.202A CLOSED FRACTURE OF LEFT RADIUS AND ULNA, INITIAL ENCOUNTER: Primary | ICD-10-CM

## 2020-02-10 DIAGNOSIS — S52.92XA CLOSED FRACTURE OF LEFT RADIUS AND ULNA, INITIAL ENCOUNTER: Primary | ICD-10-CM

## 2020-02-10 PROBLEM — S52.502A CLOSED FRACTURE OF LEFT DISTAL RADIUS: Status: ACTIVE | Noted: 2020-02-10

## 2020-02-10 PROCEDURE — 99204 OFFICE O/P NEW MOD 45 MIN: CPT | Mod: S$GLB,,, | Performed by: ORTHOPAEDIC SURGERY

## 2020-02-10 PROCEDURE — 99204 PR OFFICE/OUTPT VISIT, NEW, LEVL IV, 45-59 MIN: ICD-10-PCS | Mod: S$GLB,,, | Performed by: ORTHOPAEDIC SURGERY

## 2020-02-10 PROCEDURE — 3008F PR BODY MASS INDEX (BMI) DOCUMENTED: ICD-10-PCS | Mod: CPTII,S$GLB,, | Performed by: ORTHOPAEDIC SURGERY

## 2020-02-10 PROCEDURE — 3008F BODY MASS INDEX DOCD: CPT | Mod: CPTII,S$GLB,, | Performed by: ORTHOPAEDIC SURGERY

## 2020-02-10 PROCEDURE — 99999 PR PBB SHADOW E&M-EST. PATIENT-LVL V: CPT | Mod: PBBFAC,,, | Performed by: ORTHOPAEDIC SURGERY

## 2020-02-10 PROCEDURE — 99999 PR PBB SHADOW E&M-EST. PATIENT-LVL V: ICD-10-PCS | Mod: PBBFAC,,, | Performed by: ORTHOPAEDIC SURGERY

## 2020-02-10 RX ORDER — SODIUM CHLORIDE 9 MG/ML
INJECTION, SOLUTION INTRAVENOUS CONTINUOUS
Status: CANCELLED | OUTPATIENT
Start: 2020-02-10

## 2020-02-10 RX ORDER — MUPIROCIN 20 MG/G
OINTMENT TOPICAL
Status: CANCELLED | OUTPATIENT
Start: 2020-02-10

## 2020-02-10 RX ORDER — LIDOCAINE HYDROCHLORIDE 10 MG/ML
1 INJECTION, SOLUTION EPIDURAL; INFILTRATION; INTRACAUDAL; PERINEURAL ONCE
Status: CANCELLED | OUTPATIENT
Start: 2020-02-10 | End: 2020-02-10
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
## 2020-02-11 ENCOUNTER — TELEPHONE (OUTPATIENT)
Dept: ORTHOPEDICS | Facility: CLINIC | Age: 65
End: 2020-02-11

## 2020-02-11 ENCOUNTER — PATIENT MESSAGE (OUTPATIENT)
Dept: SURGERY | Facility: HOSPITAL | Age: 65
End: 2020-02-11

## 2020-02-11 NOTE — TELEPHONE ENCOUNTER
Called patient in regard to surgery on 2/12/2020 with Dr. Duvall at [facility]. Arrival time for surgery is 7:45 am and she was reminded about being NPO as well as needing a ride home from surgery. She verbalized understanding of all information that was received.     2 week post op has been made.

## 2020-02-12 ENCOUNTER — HOSPITAL ENCOUNTER (OUTPATIENT)
Facility: HOSPITAL | Age: 65
Discharge: HOME | End: 2020-02-12
Attending: ORTHOPAEDIC SURGERY | Admitting: ORTHOPAEDIC SURGERY
Payer: COMMERCIAL

## 2020-02-12 DIAGNOSIS — S52.92XD CLOSED FRACTURE OF LEFT RADIUS AND ULNA WITH ROUTINE HEALING, SUBSEQUENT ENCOUNTER: ICD-10-CM

## 2020-02-12 DIAGNOSIS — S52.502A CLOSED FRACTURE OF LEFT DISTAL RADIUS: Primary | ICD-10-CM

## 2020-02-12 DIAGNOSIS — S52.202D CLOSED FRACTURE OF LEFT RADIUS AND ULNA WITH ROUTINE HEALING, SUBSEQUENT ENCOUNTER: ICD-10-CM

## 2020-02-12 LAB
POCT GLUCOSE: 123 MG/DL (ref 70–110)
POCT GLUCOSE: 138 MG/DL (ref 70–110)

## 2020-02-12 PROCEDURE — 25000003 PHARM REV CODE 250: Performed by: NURSE ANESTHETIST, CERTIFIED REGISTERED

## 2020-02-12 PROCEDURE — 26600 TREAT METACARPAL FRACTURE: CPT | Mod: 51,LT,, | Performed by: ORTHOPAEDIC SURGERY

## 2020-02-12 PROCEDURE — D9220A PRA ANESTHESIA: ICD-10-PCS | Mod: ANES,,, | Performed by: ANESTHESIOLOGY

## 2020-02-12 PROCEDURE — 76942 ECHO GUIDE FOR BIOPSY: CPT | Performed by: ANESTHESIOLOGY

## 2020-02-12 PROCEDURE — 71000033 HC RECOVERY, INTIAL HOUR: Performed by: ORTHOPAEDIC SURGERY

## 2020-02-12 PROCEDURE — 63600175 PHARM REV CODE 636 W HCPCS: Performed by: ORTHOPAEDIC SURGERY

## 2020-02-12 PROCEDURE — D9220A PRA ANESTHESIA: ICD-10-PCS | Mod: CRNA,,, | Performed by: NURSE ANESTHETIST, CERTIFIED REGISTERED

## 2020-02-12 PROCEDURE — 76942 PR U/S GUIDANCE FOR NEEDLE GUIDANCE: ICD-10-PCS | Mod: 26,,, | Performed by: ANESTHESIOLOGY

## 2020-02-12 PROCEDURE — C1769 GUIDE WIRE: HCPCS | Performed by: ORTHOPAEDIC SURGERY

## 2020-02-12 PROCEDURE — 25609 PR OPEN RX DISTAL RADIUS FX, INTRA-ARTICULAR, 3+ FRAG: ICD-10-PCS | Mod: LT,,, | Performed by: ORTHOPAEDIC SURGERY

## 2020-02-12 PROCEDURE — 36000711: Performed by: ORTHOPAEDIC SURGERY

## 2020-02-12 PROCEDURE — 26600 PR CLOSED RX METACARPAL FX: ICD-10-PCS | Mod: 51,LT,, | Performed by: ORTHOPAEDIC SURGERY

## 2020-02-12 PROCEDURE — 99900035 HC TECH TIME PER 15 MIN (STAT)

## 2020-02-12 PROCEDURE — 25000003 PHARM REV CODE 250: Performed by: ANESTHESIOLOGY

## 2020-02-12 PROCEDURE — 64415 NJX AA&/STRD BRCH PLXS IMG: CPT | Performed by: ANESTHESIOLOGY

## 2020-02-12 PROCEDURE — 64415 PR NERVE BLOCK INJ, ANES/STEROID, BRACHIAL PLEXUS, INCL IMAG GUIDANCE: ICD-10-PCS | Mod: 59,LT,, | Performed by: ANESTHESIOLOGY

## 2020-02-12 PROCEDURE — 37000008 HC ANESTHESIA 1ST 15 MINUTES: Performed by: ORTHOPAEDIC SURGERY

## 2020-02-12 PROCEDURE — 37000009 HC ANESTHESIA EA ADD 15 MINS: Performed by: ORTHOPAEDIC SURGERY

## 2020-02-12 PROCEDURE — 94761 N-INVAS EAR/PLS OXIMETRY MLT: CPT

## 2020-02-12 PROCEDURE — D9220A PRA ANESTHESIA: Mod: CRNA,,, | Performed by: NURSE ANESTHETIST, CERTIFIED REGISTERED

## 2020-02-12 PROCEDURE — C1713 ANCHOR/SCREW BN/BN,TIS/BN: HCPCS | Performed by: ORTHOPAEDIC SURGERY

## 2020-02-12 PROCEDURE — 25652 PR OPEN RX ULNAR STYLOID FX: ICD-10-PCS | Mod: 51,LT,, | Performed by: ORTHOPAEDIC SURGERY

## 2020-02-12 PROCEDURE — 82962 GLUCOSE BLOOD TEST: CPT | Performed by: ORTHOPAEDIC SURGERY

## 2020-02-12 PROCEDURE — D9220A PRA ANESTHESIA: Mod: ANES,,, | Performed by: ANESTHESIOLOGY

## 2020-02-12 PROCEDURE — 36000710: Performed by: ORTHOPAEDIC SURGERY

## 2020-02-12 PROCEDURE — 25622 PR CLOSED RX NAVICULAR FX: ICD-10-PCS | Mod: 51,LT,, | Performed by: ORTHOPAEDIC SURGERY

## 2020-02-12 PROCEDURE — 63600175 PHARM REV CODE 636 W HCPCS: Performed by: NURSE PRACTITIONER

## 2020-02-12 PROCEDURE — 64415 NJX AA&/STRD BRCH PLXS IMG: CPT | Mod: 59,LT,, | Performed by: ANESTHESIOLOGY

## 2020-02-12 PROCEDURE — 25609 OPTX DST RD XART FX/EP SEP3+: CPT | Mod: LT,,, | Performed by: ORTHOPAEDIC SURGERY

## 2020-02-12 PROCEDURE — 25652 OPTX ULNAR STYLOID FRACTURE: CPT | Mod: 51,LT,, | Performed by: ORTHOPAEDIC SURGERY

## 2020-02-12 PROCEDURE — 25622 CLTX CARPL SCPHD FX W/O MNPJ: CPT | Mod: 51,LT,, | Performed by: ORTHOPAEDIC SURGERY

## 2020-02-12 PROCEDURE — 27201423 OPTIME MED/SURG SUP & DEVICES STERILE SUPPLY: Performed by: ORTHOPAEDIC SURGERY

## 2020-02-12 PROCEDURE — 63600175 PHARM REV CODE 636 W HCPCS: Performed by: NURSE ANESTHETIST, CERTIFIED REGISTERED

## 2020-02-12 PROCEDURE — 76942 ECHO GUIDE FOR BIOPSY: CPT | Mod: 26,,, | Performed by: ANESTHESIOLOGY

## 2020-02-12 PROCEDURE — 25000003 PHARM REV CODE 250: Performed by: ORTHOPAEDIC SURGERY

## 2020-02-12 PROCEDURE — 71000015 HC POSTOP RECOV 1ST HR: Performed by: ORTHOPAEDIC SURGERY

## 2020-02-12 DEVICE — SCREW 2.3MM X14MM: Type: IMPLANTABLE DEVICE | Site: WRIST | Status: FUNCTIONAL

## 2020-02-12 DEVICE — SCREW BONE LOK CONG 2.3X20MM: Type: IMPLANTABLE DEVICE | Site: WRIST | Status: FUNCTIONAL

## 2020-02-12 DEVICE — SCREW BONE 2.3 X 18MM: Type: IMPLANTABLE DEVICE | Site: WRIST | Status: FUNCTIONAL

## 2020-02-12 DEVICE — SCREW BONE LOK CONG 2.3X22MM: Type: IMPLANTABLE DEVICE | Site: WRIST | Status: FUNCTIONAL

## 2020-02-12 DEVICE — IMPLANTABLE DEVICE: Type: IMPLANTABLE DEVICE | Site: WRIST | Status: FUNCTIONAL

## 2020-02-12 DEVICE — SCREW BNE N LOK HEXLB 3.5X12: Type: IMPLANTABLE DEVICE | Site: WRIST | Status: FUNCTIONAL

## 2020-02-12 DEVICE — PEG LOCKING CORITCAL 2.3X12MM: Type: IMPLANTABLE DEVICE | Site: WRIST | Status: FUNCTIONAL

## 2020-02-12 DEVICE — PLATE BONE ACULOC 2 STANDARD: Type: IMPLANTABLE DEVICE | Site: WRIST | Status: FUNCTIONAL

## 2020-02-12 DEVICE — SCREW BNE LOK HEXLB 3.5X10: Type: IMPLANTABLE DEVICE | Site: WRIST | Status: FUNCTIONAL

## 2020-02-12 DEVICE — SCREW BONE CORTICAL  2.3X10 TI: Type: IMPLANTABLE DEVICE | Site: WRIST | Status: FUNCTIONAL

## 2020-02-12 RX ORDER — SODIUM CHLORIDE 9 MG/ML
INJECTION, SOLUTION INTRAVENOUS CONTINUOUS
Status: DISCONTINUED | OUTPATIENT
Start: 2020-02-12 | End: 2020-02-12 | Stop reason: HOSPADM

## 2020-02-12 RX ORDER — BUPIVACAINE HYDROCHLORIDE AND EPINEPHRINE 5; 5 MG/ML; UG/ML
INJECTION, SOLUTION EPIDURAL; INTRACAUDAL; PERINEURAL
Status: COMPLETED | OUTPATIENT
Start: 2020-02-12 | End: 2020-02-12

## 2020-02-12 RX ORDER — HYDROCODONE BITARTRATE AND ACETAMINOPHEN 10; 325 MG/1; MG/1
1 TABLET ORAL EVERY 4 HOURS PRN
Status: DISCONTINUED | OUTPATIENT
Start: 2020-02-12 | End: 2020-02-12 | Stop reason: HOSPADM

## 2020-02-12 RX ORDER — MUPIROCIN 20 MG/G
OINTMENT TOPICAL
Status: DISCONTINUED | OUTPATIENT
Start: 2020-02-12 | End: 2020-02-12 | Stop reason: HOSPADM

## 2020-02-12 RX ORDER — LABETALOL HYDROCHLORIDE 5 MG/ML
INJECTION, SOLUTION INTRAVENOUS
Status: DISCONTINUED | OUTPATIENT
Start: 2020-02-12 | End: 2020-02-12

## 2020-02-12 RX ORDER — CEFAZOLIN SODIUM 1 G/3ML
2 INJECTION, POWDER, FOR SOLUTION INTRAMUSCULAR; INTRAVENOUS
Status: COMPLETED | OUTPATIENT
Start: 2020-02-12 | End: 2020-02-12

## 2020-02-12 RX ORDER — ACETAMINOPHEN 500 MG
1000 TABLET ORAL
Status: COMPLETED | OUTPATIENT
Start: 2020-02-12 | End: 2020-02-12

## 2020-02-12 RX ORDER — HYDROMORPHONE HYDROCHLORIDE 1 MG/ML
0.2 INJECTION, SOLUTION INTRAMUSCULAR; INTRAVENOUS; SUBCUTANEOUS EVERY 5 MIN PRN
Status: DISCONTINUED | OUTPATIENT
Start: 2020-02-12 | End: 2020-02-12 | Stop reason: HOSPADM

## 2020-02-12 RX ORDER — HYDROCODONE BITARTRATE AND ACETAMINOPHEN 5; 325 MG/1; MG/1
1 TABLET ORAL EVERY 4 HOURS PRN
Status: DISCONTINUED | OUTPATIENT
Start: 2020-02-12 | End: 2020-02-12 | Stop reason: HOSPADM

## 2020-02-12 RX ORDER — LIDOCAINE HYDROCHLORIDE 10 MG/ML
1 INJECTION, SOLUTION EPIDURAL; INFILTRATION; INTRACAUDAL; PERINEURAL ONCE
Status: DISCONTINUED | OUTPATIENT
Start: 2020-02-12 | End: 2020-02-12 | Stop reason: HOSPADM

## 2020-02-12 RX ORDER — MUPIROCIN 20 MG/G
1 OINTMENT TOPICAL 2 TIMES DAILY
Status: DISCONTINUED | OUTPATIENT
Start: 2020-02-12 | End: 2020-02-12 | Stop reason: HOSPADM

## 2020-02-12 RX ORDER — KETAMINE HCL IN 0.9 % NACL 50 MG/5 ML
SYRINGE (ML) INTRAVENOUS
Status: DISCONTINUED | OUTPATIENT
Start: 2020-02-12 | End: 2020-02-12

## 2020-02-12 RX ORDER — PROPOFOL 10 MG/ML
VIAL (ML) INTRAVENOUS CONTINUOUS PRN
Status: DISCONTINUED | OUTPATIENT
Start: 2020-02-12 | End: 2020-02-12

## 2020-02-12 RX ORDER — OXYCODONE AND ACETAMINOPHEN 10; 325 MG/1; MG/1
1 TABLET ORAL EVERY 4 HOURS PRN
Qty: 42 TABLET | Refills: 0 | Status: SHIPPED | OUTPATIENT
Start: 2020-02-12 | End: 2021-01-12

## 2020-02-12 RX ORDER — FENTANYL CITRATE 50 UG/ML
INJECTION, SOLUTION INTRAMUSCULAR; INTRAVENOUS
Status: DISCONTINUED | OUTPATIENT
Start: 2020-02-12 | End: 2020-02-12

## 2020-02-12 RX ORDER — SODIUM CHLORIDE 0.9 % (FLUSH) 0.9 %
10 SYRINGE (ML) INJECTION
Status: DISCONTINUED | OUTPATIENT
Start: 2020-02-12 | End: 2020-02-12 | Stop reason: HOSPADM

## 2020-02-12 RX ORDER — MIDAZOLAM HYDROCHLORIDE 1 MG/ML
0.5 INJECTION INTRAMUSCULAR; INTRAVENOUS
Status: DISCONTINUED | OUTPATIENT
Start: 2020-02-12 | End: 2020-02-12 | Stop reason: HOSPADM

## 2020-02-12 RX ORDER — FENTANYL CITRATE 50 UG/ML
25 INJECTION, SOLUTION INTRAMUSCULAR; INTRAVENOUS EVERY 5 MIN PRN
Status: DISCONTINUED | OUTPATIENT
Start: 2020-02-12 | End: 2020-02-12 | Stop reason: HOSPADM

## 2020-02-12 RX ORDER — MIDAZOLAM HYDROCHLORIDE 1 MG/ML
INJECTION, SOLUTION INTRAMUSCULAR; INTRAVENOUS
Status: DISCONTINUED | OUTPATIENT
Start: 2020-02-12 | End: 2020-02-12

## 2020-02-12 RX ORDER — ONDANSETRON 2 MG/ML
4 INJECTION INTRAMUSCULAR; INTRAVENOUS EVERY 12 HOURS PRN
Status: DISCONTINUED | OUTPATIENT
Start: 2020-02-12 | End: 2020-02-12 | Stop reason: HOSPADM

## 2020-02-12 RX ORDER — LIDOCAINE HYDROCHLORIDE 20 MG/ML
INJECTION INTRAVENOUS
Status: DISCONTINUED | OUTPATIENT
Start: 2020-02-12 | End: 2020-02-12

## 2020-02-12 RX ORDER — ESMOLOL HYDROCHLORIDE 10 MG/ML
INJECTION INTRAVENOUS
Status: DISCONTINUED | OUTPATIENT
Start: 2020-02-12 | End: 2020-02-12

## 2020-02-12 RX ADMIN — LIDOCAINE HYDROCHLORIDE 80 MG: 20 INJECTION, SOLUTION INTRAVENOUS at 10:02

## 2020-02-12 RX ADMIN — Medication 5 MG: at 11:02

## 2020-02-12 RX ADMIN — CEFAZOLIN 2 G: 330 INJECTION, POWDER, FOR SOLUTION INTRAMUSCULAR; INTRAVENOUS at 10:02

## 2020-02-12 RX ADMIN — Medication 5 MG: at 10:02

## 2020-02-12 RX ADMIN — ESMOLOL HYDROCHLORIDE 20 MG: 10 INJECTION INTRAVENOUS at 11:02

## 2020-02-12 RX ADMIN — BUPIVACAINE HYDROCHLORIDE AND EPINEPHRINE BITARTRATE 30 ML: 5; .005 INJECTION, SOLUTION EPIDURAL; INTRACAUDAL; PERINEURAL at 09:02

## 2020-02-12 RX ADMIN — HYDROCODONE BITARTRATE AND ACETAMINOPHEN 1 TABLET: 5; 325 TABLET ORAL at 01:02

## 2020-02-12 RX ADMIN — LABETALOL HCL IV SOLN PREFILLED SYRINGE 20 MG/4ML (5 MG/ML) 5 MG: 20/4 SOLUTION PREFILLED SYRINGE at 11:02

## 2020-02-12 RX ADMIN — MUPIROCIN: 20 OINTMENT TOPICAL at 08:02

## 2020-02-12 RX ADMIN — ACETAMINOPHEN 1000 MG: 500 TABLET ORAL at 08:02

## 2020-02-12 RX ADMIN — FENTANYL CITRATE 25 MCG: 50 INJECTION, SOLUTION INTRAMUSCULAR; INTRAVENOUS at 11:02

## 2020-02-12 RX ADMIN — SODIUM CHLORIDE 1000 ML: 0.9 INJECTION, SOLUTION INTRAVENOUS at 08:02

## 2020-02-12 RX ADMIN — PROPOFOL 100 MCG/KG/MIN: 10 INJECTION, EMULSION INTRAVENOUS at 10:02

## 2020-02-12 RX ADMIN — SODIUM CHLORIDE, SODIUM GLUCONATE, SODIUM ACETATE, POTASSIUM CHLORIDE, MAGNESIUM CHLORIDE, SODIUM PHOSPHATE, DIBASIC, AND POTASSIUM PHOSPHATE: .53; .5; .37; .037; .03; .012; .00082 INJECTION, SOLUTION INTRAVENOUS at 11:02

## 2020-02-12 RX ADMIN — FENTANYL CITRATE 25 MCG: 50 INJECTION, SOLUTION INTRAMUSCULAR; INTRAVENOUS at 10:02

## 2020-02-12 RX ADMIN — MIDAZOLAM HYDROCHLORIDE 2 MG: 1 INJECTION, SOLUTION INTRAMUSCULAR; INTRAVENOUS at 08:02

## 2020-02-12 RX ADMIN — MIDAZOLAM 2 MG: 1 INJECTION INTRAMUSCULAR; INTRAVENOUS at 10:02

## 2020-02-12 NOTE — ANESTHESIA POSTPROCEDURE EVALUATION
Anesthesia Post Evaluation    Patient: Taty Max    Procedure(s) Performed: Procedure(s) (LRB):  ORIF, FRACTURE, RADIUS, DISTAL,  ulna, closed treatment scaphoid fx, ORIF (Left)    Final Anesthesia Type: regional    Patient location during evaluation: PACU  Patient participation: Yes- Able to Participate  Level of consciousness: awake and alert and oriented  Post-procedure vital signs: reviewed and stable  Pain management: adequate  Airway patency: patent    PONV status at discharge: No PONV  Anesthetic complications: no      Cardiovascular status: blood pressure returned to baseline, hemodynamically stable and stable  Respiratory status: unassisted, room air and spontaneous ventilation  Hydration status: euvolemic  Follow-up not needed.          Vitals Value Taken Time   /86 2/12/2020  1:16 PM   Temp 36.2 °C (97.2 °F) 2/12/2020  1:10 PM   Pulse 86 2/12/2020  1:18 PM   Resp 18 2/12/2020  1:18 PM   SpO2 94 % 2/12/2020  1:18 PM   Vitals shown include unvalidated device data.      No case tracking events are documented in the log.      Pain/Pina Score: Pain Rating Prior to Med Admin: 4 (2/12/2020  1:14 PM)  Pain Rating Post Med Admin: 0 (2/12/2020  8:52 AM)

## 2020-02-12 NOTE — PLAN OF CARE
Vital signs stable. Afebrile. Alert, oriented and following commands. Pain controlled with PRN meds. Denies nausea. Tolerating sips of water. Accucheck WNL. Dressing remains CDI and sling in place.  POC reviewed and understanding verbalized.

## 2020-02-12 NOTE — ANESTHESIA PREPROCEDURE EVALUATION
02/12/2020  Pre-operative evaluation for Procedure(s) (LRB):  ORIF, FRACTURE, RADIUS, DISTAL, possible ulna, scaphoid, 2nd and 3rd metacarpal ORIF, possible bridge plate (Left)    Taty Max is a 64 y.o. female hx of colon cancer s/p chemo and surgery, htn, obesity, JEREMY on CPAP, well controlled asthma    Patient Active Problem List   Diagnosis    Hypertension    Anxiety disorder    Vitamin D deficiency disease    HTN (hypertension)    Allergic rhinitis, seasonal    Hyperlipidemia    Osteopenia    Horseshoe retinal tear, both eyes    Lattice degeneration, bilateral    Posterior vitreous detachment, both eyes    Posterior capsular opacification, bilateral    After-cataract, obscuring vision - Both Eyes    Vitreous detachment - Both Eyes    Idiopathic hypertension    High myopia - Both Eyes    Hypothyroidism    Refractive error    Diabetes mellitus type II, uncontrolled    Lumbar disc disease    DDD (degenerative disc disease), lumbar    Lumbar spondylosis    Neuropathy due to chemotherapeutic drug    Chronic bilateral low back pain with bilateral sciatica    Abnormal liver function tests    Incisional hernia    Morbid obesity    History of colon cancer    Closed fracture of left radius and ulna    Closed fracture of left distal radius       Review of patient's allergies indicates:   Allergen Reactions    Oxaliplatin Hives    Factive [gemifloxacin] Hives    Statins-hmg-coa reductase inhibitors      Memory w lipitor, muscles for 2 others    Daypro [oxaprozin] Rash    Latex Hives     Adhesives       No current facility-administered medications on file prior to encounter.      Current Outpatient Medications on File Prior to Encounter   Medication Sig Dispense Refill    azelastine (ASTELIN) 137 mcg (0.1 %) nasal spray 1 spray (137 mcg total) by Nasal route 2 (two) times daily.  90 mL 12    blood sugar diagnostic (ONETOUCH VERIO) Strp 1 strip by Misc.(Non-Drug; Combo Route) route 2 (two) times daily before meals. 100 strip 11    blood sugar diagnostic Strp 1 strip by Misc.(Non-Drug; Combo Route) route 2 (two) times daily. DISP GLUCOMETER OF CHOICE AND LANCETS AS WELL 100 strip 12    blood-glucose meter (ACCU-CHEK GUIDE GLUCOSE METER) Misc 1 Units by Misc.(Non-Drug; Combo Route) route 2 (two) times daily. 1 each 1    cetirizine (ZYRTEC) 10 MG tablet Take 10 mg by mouth once daily.      cyclobenzaprine (FLEXERIL) 5 MG tablet Take 1 tablet (5 mg total) by mouth nightly. 30 tablet 12    diazePAM (VALIUM) 5 MG tablet TAKE ONE TABLET BY MOUTH EVERY NIGHT AT BEDTIME AS NEEDED FOR ANXIETY AND SPASMS 30 tablet 3    fluticasone propionate (FLONASE) 50 mcg/actuation nasal spray 2 sprays (100 mcg total) by Each Nare route once daily. 3 Bottle 12    hydroCHLOROthiazide (HYDRODIURIL) 12.5 MG Tab Take 1 tablet (12.5 mg total) by mouth once daily. 90 tablet 3    KRILL OIL ORAL Take 1 tablet by mouth once daily.      lancets (ONETOUCH DELICA LANCETS) 33 gauge Misc 1 lancet by Misc.(Non-Drug; Combo Route) route 2 (two) times daily before meals. 100 each 11    meloxicam (MOBIC) 7.5 MG tablet TAKE 1 TO 2 TABLETS A DAY  AS NEEDED 180 tablet 3    metFORMIN (GLUCOPHAGE-XR) 750 MG 24 hr tablet Take 2 tablets (1,500 mg total) by mouth every morning. 180 tablet 3    montelukast (SINGULAIR) 10 mg tablet TAKE 1 TABLET EVERY EVENING 90 tablet 3    multivitamin (THERAGRAN) per tablet Take 1 tablet by mouth once daily. 1 Tablet Oral Every day      olmesartan (BENICAR) 20 MG tablet TAKE 1 TABLET ONCE DAILY 90 tablet 3    ONETOUCH DELICA LANCETS 33 gauge Misc       triamcinolone (KENALOG) 0.5 % ointment Apply topically 2 (two) times daily. 15 g 1    zolpidem (AMBIEN) 5 MG Tab TAKE ONE TABLET BY MOUTH EVERY NIGHT AT BEDTIME 30 tablet 3    albuterol 90 mcg/actuation inhaler Inhale 1-2 puffs into the lungs  "every 6 (six) hours as needed for Wheezing. 1 Inhaler 0    fluorouracil (EFUDEX) 5 % cream Use hs for 2 weeks 40 g 3    lancets (ACCU-CHEK FASTCLIX LANCET DRUM) Misc 1 lancet by Misc.(Non-Drug; Combo Route) route 2 (two) times daily. 200 each 11    oxyCODONE-acetaminophen (PERCOCET)  mg per tablet Take 1 tablet by mouth every 4 (four) hours as needed for Pain. 42 tablet 0    traMADol (ULTRAM) 50 mg tablet TAKE ONE TABLET BY MOUTH EVERY 6 HOURS AS NEEDED 60 tablet 3       Past Surgical History:   Procedure Laterality Date    ADENOIDECTOMY      ANKLE SURGERY      left     BREAST LUMPECTOMY      CATARACT EXTRACTION      CATARACT EXTRACTION BILATERAL W/ ANTERIOR VITRECTOMY      COLECTOMY      s/p reanastemosis    COLON SURGERY      EYE EXAMINATION UNDER ANESTHESIA W/ RETINAL CRYOTHERAPY AND RETINAL LASER      HERNIA REPAIR      KIDNEY SURGERY      "dilate urethra tube"    NASAL POLYP EXCISION      NASAL SEPTUM SURGERY      RADIOFREQUENCY ABLATION OF LUMBAR MEDIAL BRANCH NERVE AT SINGLE LEVEL Right 7/6/2018    Procedure: RADIOFREQUENCY ABLATION, NERVE, MEDIAL BRANCH, LUMBAR, 1 LEVEL;  Surgeon: Vashti Poe MD;  Location: [facility];  Service: Pain Management;  Laterality: Right;  RIght Thermal RFA @ L3-5  (REPEAT)  [id number]  with IV Sedation    CONSENT NEEDED    s/p laser ou      TONSILLECTOMY      TUBAL LIGATION      UVULOPALATOPHARYNGOPLASTY  1990s       Social History     Socioeconomic History    Marital status:      Spouse name: Not on file    Number of children: Not on file    Years of education: Not on file    Highest education level: Not on file   Occupational History     Employer: [employer]   Social Needs    Financial resource strain: Not hard at all    Food insecurity:     Worry: Never true     Inability: Never true    Transportation needs:     Medical: No     Non-medical: No   Tobacco Use    Smoking status: Former Smoker     Last attempt to quit: " 1977     Years since quittin.6    Smokeless tobacco: Never Used   Substance and Sexual Activity    Alcohol use: Yes     Frequency: Monthly or less     Drinks per session: 1 or 2     Binge frequency: Never     Comment: once per week    Drug use: No    Sexual activity: Yes     Partners: Male     Birth control/protection: Post-menopausal   Lifestyle    Physical activity:     Days per week: 0 days     Minutes per session: 0 min    Stress: Only a little   Relationships    Social connections:     Talks on phone: More than three times a week     Gets together: Twice a week     Attends [employer]ism service: Not on file     Active member of club or organization: No     Attends meetings of clubs or organizations: Never     Relationship status:    Other Topics Concern    Are you pregnant or think you may be? Not Asked    Breast-feeding Not Asked   Social History Narrative    Not on file         CBC: No results for input(s): WBC, RBC, HGB, HCT, PLT, MCV, MCH, MCHC in the last 72 hours.    CMP: No results for input(s): NA, K, CL, CO2, BUN, CREATININE, GLU, MG, PHOS, CALCIUM, ALBUMIN, PROT, ALKPHOS, ALT, AST, BILITOT in the last 72 hours.    INR  No results for input(s): PT, INR, PROTIME, APTT in the last 72 hours.        Diagnostic Studies:      EKG:  Normal sinus rhythm  Low voltage, precordial leads  Borderline Abnormal ECG  When compared with ECG of 25-OCT-2018 11:51,  No significant change was found  Confirmed by Salvatore Lincoln MD ([id number]) on 2020 1:12:25 PM    2D Echo:  No results found for this or any previous visit.      Anesthesia Evaluation    I have reviewed the Patient Summary Reports.    I have reviewed the Nursing Notes.      Review of Systems  Anesthesia Hx:  No problems with previous Anesthesia  History of prior surgery of interest to airway management or planning: Denies Family Hx of Anesthesia complications.   Denies Personal Hx of Anesthesia complications.   Hematology/Oncology:         --  Denies Anemia:   Cardiovascular:   Hypertension, well controlled    Pulmonary:   Denies COPD. Asthma mild Sleep Apnea, CPAP    Renal/:   Denies Chronic Renal Disease.     Hepatic/GI:   Denies GERD. Denies Liver Disease.    Musculoskeletal:   Arthritis     Neurological:   Denies CVA. Denies Seizures.    Endocrine:   Diabetes Hypothyroidism        Physical Exam  General:  Well nourished, Obesity    Airway/Jaw/Neck:  Airway Findings: Mouth Opening: Normal Tongue: Normal  General Airway Assessment: Adult  Mallampati: III  Improves to II with phonation.  TM Distance: Normal, at least 6 cm  Jaw/Neck Findings:  Neck ROM: Normal ROM      Dental:  Dental Findings: In tact   Chest/Lungs:  Chest/Lungs Findings: Normal Respiratory Rate, Clear to auscultation     Heart/Vascular:  Heart Findings: Rate: Normal  Rhythm: Regular Rhythm  Sounds: Normal        Mental Status:  Mental Status Findings:  Alert and Oriented, Cooperative         Anesthesia Plan  Type of Anesthesia, risks & benefits discussed:  Anesthesia Type:  general, regional  Patient's Preference:   Intra-op Monitoring Plan: standard ASA monitors  Intra-op Monitoring Plan Comments:   Post Op Pain Control Plan: per primary service following discharge from PACU, peripheral nerve block and multimodal analgesia  Post Op Pain Control Plan Comments:   Induction:   IV  Beta Blocker:  Patient is not currently on a Beta-Blocker (No further documentation required).       Informed Consent: Patient understands risks and agrees with Anesthesia plan.  Questions answered. Anesthesia consent signed with patient.  ASA Score: 3     Day of Surgery Review of History & Physical:    H&P update referred to the surgeon.         Ready For Surgery From Anesthesia Perspective.

## 2020-02-12 NOTE — BRIEF OP NOTE
[facility]  Brief Operative Note    Surgery Date: 2/12/2020     Surgeon(s) and Role:     * Garrett Duvall MD - Primary    Assisting Surgeon: None    Pre-op Diagnosis:  Closed fracture of left radius and ulna, initial encounter [S52.92XA, S52.202A]    Post-op Diagnosis:  Post-Op Diagnosis Codes:     * Closed fracture of left radius and ulna, initial encounter [S52.92XA, S52.202A]    Procedure(s) (LRB):  ORIF, FRACTURE, RADIUS, DISTAL,  ulna, closed treatment scaphoid fx, ORIF (Left)    Anesthesia: Regional    Description of the findings of the procedure(s): see op note    Estimated Blood Loss: minimal         Specimens:   Specimen (12h ago, onward)    None            Discharge Note    OUTCOME: Patient tolerated treatment/procedure well without complication and is now ready for discharge.    DISPOSITION: Home or Self Care    FINAL DIAGNOSIS:  Closed fracture of left distal radius    FOLLOWUP: In clinic    DISCHARGE INSTRUCTIONS:    Discharge Procedure Orders   Diet general     Sponge bath only until clinic visit     Call MD for:  temperature >100.4     Call MD for:  persistent nausea and vomiting     Call MD for:  severe uncontrolled pain     Call MD for:  difficulty breathing, headache or visual disturbances     Call MD for:  redness, tenderness, or signs of infection (pain, swelling, redness, odor or green/yellow discharge around incision site)     Call MD for:  hives     Call MD for:  persistent dizziness or light-headedness     Call MD for:  extreme fatigue     Leave dressing on - Keep it clean, dry, and intact until clinic visit     Keep surgical extremity elevated

## 2020-02-12 NOTE — INTERVAL H&P NOTE
The patient has been examined and the H&P has been reviewed:    I concur with the findings and no changes have occurred since H&P was written.     Pt reports her N/T resolved very shortly after switching her splint and she has no N/T today.  We discussed concomitant CTR today vs deference of this procedure.  Since her N/T is resolved and did so shortly after simple splint change, and she has no evidence of acute CTS today we will proceed without CTR.  Pt wishes to proceed.  She understands possible need for bridge plate which she understands which would require removal in OR in a delayed fashion.    The patient has not responded to adequate non operative treatment at this time and/or non operative treatment is not indicated. Thus, the risks, benefits and alternatives to surgery were discussed with the patient in detail.  Specific risks include but are not limited to bleeding, infection, vessel and/or nerve damage, pain, numbness, tingling, compartment syndrome, need for additional surgery, failure to return to pre-injury and/or preoperative functional status, inability to return to work, scar sensitivity, delayed healing, complex regional pain syndrome, weakness, pulley injury, tendon injury, bowstringing, partial and/or incomplete relief of symptoms, persistence of and/or worsening of symptoms, hardware and/or surgical failure, prominent and/or symptomatic hardware possibly necessitating future removal, osteomyelitis, amputation, loss of function, stiffness, rotational malalignment, functional debility, dysfunction, decreased  strength, need for prolonged postoperative rehabilitation, malunion, nonunion, deep venous thrombosis, pulmonary embolism, arthritis and death.  The patient states an understanding and wishes to proceed with surgery.   All questions were answered.  No guarantees were implied or stated.  Written informed consent was obtained.      Anesthesia/Surgery risks, benefits and alternative options  discussed and understood by patient/family.          Active Hospital Problems    Diagnosis  POA    Closed fracture of left distal radius [S52.606X]  Yes      Resolved Hospital Problems   No resolved problems to display.

## 2020-02-12 NOTE — TRANSFER OF CARE
"Anesthesia Transfer of Care Note    Patient: Taty Max    Procedure(s) Performed: Procedure(s) (LRB):  ORIF, FRACTURE, RADIUS, DISTAL,  ulna, closed treatment scaphoid fx, ORIF (Left)    Patient location: PACU    Anesthesia Type: regional    Transport from OR: Transported from OR on room air with adequate spontaneous ventilation    Post pain: adequate analgesia    Post assessment: no apparent anesthetic complications    Post vital signs: stable    Level of consciousness: sedated    Nausea/Vomiting: no nausea/vomiting    Complications: none    Transfer of care protocol was followed      Last vitals:   Visit Vitals  BP (!) 158/75 (BP Location: Right arm, Patient Position: Lying)   Pulse 70   Temp 36.2 °C (97.2 °F) (Temporal)   Resp 17   Ht 5' 7" (1.702 m)   Wt 122.5 kg (270 lb)   LMP  (LMP Unknown)   SpO2 99%   Breastfeeding? No   BMI 42.29 kg/m²     "

## 2020-02-12 NOTE — ANESTHESIA PROCEDURE NOTES
Supraclavicular SS    Patient location during procedure: pre-op    Reason for block: primary anesthetic   Diagnosis: L hand   Start time: 2/12/2020 8:53 AM  Timeout: 2/12/2020 8:52 AM   End time: 2/12/2020 8:59 AM    Staffing  Authorizing Provider: Sarath Parikh Jr., MD  Performing Provider: Sarath Parikh Jr., MD    Preanesthetic Checklist  Completed: patient identified, site marked, surgical consent, pre-op evaluation, timeout performed, IV checked, risks and benefits discussed and monitors and equipment checked  Peripheral Block  Patient position: supine  Prep: ChloraPrep  Patient monitoring: heart rate, cardiac monitor, continuous pulse ox, continuous capnometry and frequent blood pressure checks  Block type: supraclavicular  Laterality: left  Injection technique: single shot  Needle  Needle type: Stimuplex   Needle gauge: 20 G  Needle length: 4 in  Needle localization: anatomical landmarks and ultrasound guidance   -ultrasound image captured on disc.  Assessment  Injection assessment: negative aspiration, negative parasthesia and local visualized surrounding nerve  Paresthesia pain: none  Heart rate change: no  Slow fractionated injection: yes  Additional Notes  VSS.  [facility] RN monitoring vitals throughout procedure.  Patient tolerated procedure well.

## 2020-02-12 NOTE — DISCHARGE INSTRUCTIONS
Discharge Instructions: After Your Surgery  Youve just had surgery. During surgery, you were given medicine called anesthesia to keep you relaxed and free of pain. After surgery, you may have some pain or nausea. This is common. Here are some tips for feeling better and getting well after surgery.     Stay on schedule with your medicine.   Going home  Your healthcare provider will show you how to take care of yourself when you go home. He or she will also answer your questions. Have an adult family member or friend drive you home. For the first 24 hours after your surgery:  · Do not drive or use heavy equipment.  · Do not make important decisions or sign legal papers.  · Do not drink alcohol.  · Have someone stay with you, if needed. He or she can watch for problems and help keep you safe.  Be sure to go to all follow-up visits with your healthcare provider. And rest after your surgery for as long as your healthcare provider tells you to.  Coping with pain  If you have pain after surgery, pain medicine will help you feel better. Take it as told, before pain becomes severe. Also, ask your healthcare provider or pharmacist about other ways to control pain. This might be with heat, ice, or relaxation. And follow any other instructions your surgeon or nurse gives you.  Tips for taking pain medicine  To get the best relief possible, remember these points:  · Pain medicines can upset your stomach. Taking them with a little food may help.  · Most pain relievers taken by mouth need at least 20 to 30 minutes to start to work.  · Taking medicine on a schedule can help you remember to take it. Try to time your medicine so that you can take it before starting an activity. This might be before you get dressed, go for a walk, or sit down for dinner.  · Constipation is a common side effect of pain medicines. Call your healthcare provider before taking any medicines such as laxatives or stool softeners to help ease constipation.  Also ask if you should skip any foods. Drinking lots of fluids and eating foods such as fruits and vegetables that are high in fiber can also help. Remember, do not take laxatives unless your surgeon has prescribed them.  · Drinking alcohol and taking pain medicine can cause dizziness and slow your breathing. It can even be deadly. Do not drink alcohol while taking pain medicine.  · Pain medicine can make you react more slowly to things. Do not drive or run machinery while taking pain medicine.  Your healthcare provider may tell you to take acetaminophen to help ease your pain. Ask him or her how much you are supposed to take each day. Acetaminophen or other pain relievers may interact with your prescription medicines or other over-the-counter (OTC) medicines. Some prescription medicines have acetaminophen and other ingredients. Using both prescription and OTC acetaminophen for pain can cause you to overdose. Read the labels on your OTC medicines with care. This will help you to clearly know the list of ingredients, how much to take, and any warnings. It may also help you not take too much acetaminophen. If you have questions or do not understand the information, ask your pharmacist or healthcare provider to explain it to you before you take the OTC medicine.  Managing nausea  Some people have an upset stomach after surgery. This is often because of anesthesia, pain, or pain medicine, or the stress of surgery. These tips will help you handle nausea and eat healthy foods as you get better. If you were on a special food plan before surgery, ask your healthcare provider if you should follow it while you get better. These tips may help:  · Do not push yourself to eat. Your body will tell you when to eat and how much.  · Start off with clear liquids and soup. They are easier to digest.  · Next try semi-solid foods, such as mashed potatoes, applesauce, and gelatin, as you feel ready.  · Slowly move to solid foods. Dont  eat fatty, rich, or spicy foods at first.  · Do not force yourself to have 3 large meals a day. Instead eat smaller amounts more often.  · Take pain medicines with a small amount of solid food, such as crackers or toast, to avoid nausea.     Call your surgeon if  · You still have pain an hour after taking medicine. The medicine may not be strong enough.  · You feel too sleepy, dizzy, or groggy. The medicine may be too strong.  · You have side effects like nausea, vomiting, or skin changes, such as rash, itching, or hives.       If you have obstructive sleep apnea  You were given anesthesia medicine during surgery to keep you comfortable and free of pain. After surgery, you may have more apnea spells because of this medicine and other medicines you were given. The spells may last longer than usual.   At home:  · Keep using the continuous positive airway pressure (CPAP) device when you sleep. Unless your healthcare provider tells you not to, use it when you sleep, day or night. CPAP is a common device used to treat obstructive sleep apnea.  · Talk with your provider before taking any pain medicine, muscle relaxants, or sedatives. Your provider will tell you about the possible dangers of taking these medicines.  Date Last Reviewed: 12/1/2016 © 2000-2017 [employer]. [address] [place] [address]. All rights reserved. This information is not intended as a substitute for professional medical care. Always follow your healthcare professional's instructions.    Sling    A sling is designed to support your arm in a position of rest. It is used for injuries of the hand, forearm, upper arm, and shoulder.  A shoulder that is immobilized too long can become stiff and lose range of motion. Follow up with your doctor as advised and do not use the sling longer than directed.    Home use:  · Leave the sling in place as long as directed by your doctor. Unless told otherwise, you may remove it when bathing,  "dressing, and when you go to sleep.  · If approved by your health care provider, you can do gentle "pendulum exercises." To do these:  ¨ Remove your sling.  ¨ Stand or sit with your arm vertical and close to your side.  ¨ Relax your shoulder muscles and gently swing the arm forward and back, side to side, and in small circles.  ¨ Do this for about 5 minutes once or twice a day.  There should be only minimal pain with this exercise. If you experience more than minimal discomfort, stop and call your health care provider.  · The sling is adjustable. If it becomes loose, adjust it so that your forearm is horizontal (level with the ground). Your hand should be level with the elbow.  Date Last Reviewed: 9/28/2015 © 2000-2017 [employer]. [address] [place] [address]. All rights reserved. This information is not intended as a substitute for professional medical care. Always follow your healthcare professional's instructions.    Anesthesia: Regional Anesthesia    Youre scheduled for surgery. During surgery, youll receive medicine called anesthesia to keep you comfortable and pain-free. Your surgeon has decided that youll receive regional anesthesia. This sheet tells you what to expect with this type of anesthesia.  What is regional anesthesia?  Regional anesthesia numbs one region of your body. The anesthesia may be given around nerves or into veins in your arms, neck, or legs (nerve block or [place] block). Or it may be sent into the spinal fluid (spinal anesthesia) or into the space just outside the spinal fluid (epidural anesthesia). You may also be given sedatives to help you relax.  Nerve block or Mani block  A small area of the body, such as an arm or leg, can be numbed using a nerve block or [place] block.  · Nerve block. During a nerve block, your skin is numbed. A needle is then inserted near nerves that serve the area to be numbed. Anesthetic is sent through the needle.  · IV regional or " Mani block. For this type of block, an IV line is put into a vein. The blood flow to the area to be numbed is blocked for a short time. Anesthetic is sent through the IV.  Spinal anesthesia  Spinal anesthesia numbs your body from about the waist down.  · Anesthetic is injected into the spinal fluid. This is a substance that surrounds the spinal cord in your spinal column. The anesthetic blocks pain traveling from the body to the brain.  · To receive the anesthetic, your skin is numbed at the injection site on your back.  · A needle is then inserted into the spinal space. Anesthetic is sent into the spinal fluid through the needle.  Epidural anesthesia  Epidural anesthesia is most commonly used during childbirth and may also be used after surgical procedures of the chest, belly, and legs.  · Anesthetic is injected into the epidural space. This is just outside the dural sac which contains the spinal fluid.  · To receive the anesthetic, your skin is numbed at the injection site on your back.  · A needle is then inserted into the epidural space. Anesthetic is sent into the epidural space through the needle.  · A small flexible catheter may be attached to the needle and left in place. This allows for continuous injections or infusions of anesthetic.  Anesthesia tools and medicines that might be near you during your procedure  · Local anesthetic. This medicine is given through a needle numbs one region of your body.  · Electrocardiography leads (electrodes). These are used to record your heart rate and rhythm.  · Blood pressure cuff. A cuff is placed on your arm to keep track of your blood pressure.  · Pulse oximeter. This small clip is placed on the end of the finger. It measures your blood oxygen level.  · Sedatives. These medicines may be given through an IV. They help to relax you and keep you comfortable. You may stay awake or sleep lightly.  · Oxygen. You may be given oxygen through a facemask.  Risks and possible  complications  Regional anesthesia carries some risks. These include:  · Nausea and vomiting  · Headache  · Backache  · Decreased blood pressure  · Allergic reaction to the anesthetic  · Ongoing numbness (rare)  · Irregular heartbeat (rare)  · Cardiac arrest (rare)   Date Last Reviewed: 12/1/2016  © 7199-4660 [employer]. [address] [place] [id number]. All rights reserved. This information is not intended as a substitute for professional medical care. Always follow your healthcare professional's instructions.

## 2020-02-12 NOTE — OP NOTE
DATE OF PROCEDURE: 02/12/2020     SERVICE:  Orthopedic Surgery.     SURGEON:  Garrett Duvall M.D.     FIRST ASSISTANT:  Dianna Claros MD RES     PREOPERATIVE DIAGNOSIS:    1) Intra-articular fracture left distal radius  2) Displaced comminuted fracture left ulnar neck  3) Left scaphoid fracture, distal waist with extension into the distal pole  4) Left 2nd metacarpal base fracture, intra-articular  5) Left 3rd metacarpal base fracture, intra-articular     POSTOPERATIVE DIAGNOSIS:    6) Intra-articular fracture left distal radius  7) Displaced comminuted fracture left ulnar neck  8) Left scaphoid fracture, distal waist with extension into the distal pole  9) Left 2nd metacarpal base fracture, intra-articular  10) Left 3rd metacarpal base fracture, intra-articular     PROCEDURE(S) PERFORMED:    11) Open reduction internal fixation intra-articular left distal radius fracture, 3+ parts  12) Open reduction internal fixation left ulnar neck fracture  13) Closed treatment left scaphoid waist/distal pole fracture  14) Closed treatment left 2nd metacarpal base fracture, intra-articular  15) Closed treatment left 3rd metacarpal base fracture, intra-articular     TOURNIQUET TIME:  109 minutes at 250mmHg.     ESTIMATED BLOOD LOSS:   10 mL.     IMPLANTS:      One-Accu Med standard distal radius plate with a combination of cortical and locking screws    2-Accu Med distal ulnar 3 hole plate with a combination of cortical and locking screws     COMPLICATIONS:  None.     PACKS AND DRAINS:  None.     PATHOLOGIC SPECIMENS:  None.    ANESTHESIA:  Regional mac     IV FLUIDS: Crystalloid.     CONDITION:  Stable.    MICROBIOLOGY: None.    Indications for Procedure:     The patient is a 64-year-old female who presented to the [facility]opedics [facility] after sustaining significant trauma to the left upper extremity.  She was found to have several fractures of the left hand and wrist as her above. She also initially had some numbness and tingling  in the left hand which resolved rapidly after switching her from her initial splint.  We discussed initially a possible carpal tunnel release but palm re-evaluation in the preoperative holding area she noted that her numbness and tingling was completely resolved and had resolved very shortly after having her initial splint taken down.  Thus, plan was made to defer a carpal tunnel release at this time with the understanding that should she redeveloped symptoms this may be necessary in the future.  The patient has not responded to adequate non operative treatment at this time and/or non operative treatment is not indicated. Thus, the risks, benefits and alternatives to surgery were discussed with the patient in detail.  Specific risks include but are not limited to bleeding, infection, vessel and/or nerve damage, pain, numbness, tingling, compartment syndrome, need for additional surgery, failure to return to pre-injury and/or preoperative functional status, inability to return to work, scar sensitivity, delayed healing, complex regional pain syndrome, weakness, pulley injury, tendon injury, bowstringing, partial and/or incomplete relief of symptoms, persistence of and/or worsening of symptoms, hardware and/or surgical failure, prominent and/or symptomatic hardware possibly necessitating future removal, osteomyelitis, amputation, loss of function, stiffness, rotational malalignment, functional debility, dysfunction, decreased  strength, need for prolonged postoperative rehabilitation, malunion, nonunion, deep venous thrombosis, pulmonary embolism, arthritis and death.  The patient states an understanding and wishes to proceed with surgery.   All questions were answered.  No guarantees were implied or stated.  Written informed consent was obtained.    PROCEDURE IN DETAIL:  On the date of the operative intervention, the patient was   evaluated in the preoperative holding area.  With their participation, the left    upper extremity was marked as the operative site.   we then held another discussion about the game plan for today. I explained that she had several fractures, most notably including the distal radius and ulna.  We discussed the need for potential fixation of the radius, ulna, scaphoid, and or metacarpal fractures potentially including a bridge plate. I explained that this would depend on my assessment of intraoperative stability after fixation of the radius and possibly the ulna.  The patient agreed and wanted to proceed.  The patient then underwent regional   anesthesia and was transferred to the Operative Suite.  The patient was then transferred   to the OR table with all superficial bony prominences well padded.  The left   upper extremity was placed on a hand table and then a nonsterile tourniquet was   placed high on the patient's upper arm.  The operative upper extremity was then   prepped and draped in the usual sterile fashion and a timeout was taken and   confirmed by all present to confirm the correct patient, site, procedure and   administration of preoperative antibiotics.  All were in agreement, so I   proceeded.   I placed finger traction to the index and long fingers with approximately 10 lb of weight to facilitate reduction.  The standard FCR approach to the distal radius was   marked out for a length of approximately 6 cm.  Esmarch was utilized for exsanguination.   Tourniquet was then inflated to 250 mmHg.  Skin   incision to the volar aspect of the left distal radius was then made sharply   with a scalpel and dissection was carried down to the flexor carpi radialis   tendon.  The tendon was retracted ulnarly and the fascia deep to this was   incised sharply with a scalpel.  All flexor tendons including the flexor carpi   radialis and flexor pollicis longus were then swept ulnarly and the radial   artery was swept radially.  Great care was taken throughout the duration of the procedure to protect  all neurovascular and tendinous structures.  The pronator quadratus was then identified and   sharply divided at the red-white junction distally and also the radial aspect on   the radius.  A key elevator was then utilized to sweep the pronator quadratus   muscle off the volar surface of the radius.  The fracture site was then readily   identified.  The fracture site was then   cleaned of fracture hematoma and debris. A reduction maneuver was   performed.  I was able to restore length alignment and rotation to the fracture.  An appropriate Acumed volar distal radius plate was then   chosen and provisionally fixed to the radius with 3 K-wires.  Fluoroscopy was   then brought into the field, which verified placement of all hardware as well as   reduction.  Once I was happy with our reduction and placement of the hardware,   I then fixed the plate to the bone with a combination of distal locking screws   and 3 proximal shaft screws in succession.  Fluoroscopy was again utilized to   verify placement of the hardware and maintain the reduction.  Once all hardware was in its final position, I then took final   fluoroscopic x-rays, which again verified maintenance of our reduction and that   all hardware was in appropriate position and extraarticular.  I was pleased with   the reduction and then turned my attention towards the distal ulna.  The fracture was significantly displaced and I was unable to reduce it with closed reduction maneuvers.  I thus made the decision to open and perform open reduction and internal fixation of the left distal ulna.  An approximately 4 cm incision was made overlying the subcutaneous border of the distal ulna.  Dissection was carried down more deeply and the ulnar cutaneous nerve was identified and retracted and protected throughout the duration of the surgery. I then made incision sharply through the deep fashion was able to readily and easily identify the fracture of the distal ulna.  It  was noted to be significantly comminuted.  There is extensive soft tissue interposition in the fracture site and I had to clear this atraumatically to perform a reduction.  Once the fracture site was prepared and cleared of hematoma and soft tissue I then performed a reduction maneuver was able to restore the length alignment rotation of the ulna.  I then utilized a point-to-point and lobster claw reduction clamp to maintain my provisional reduction as I applied an Accu Med distal ulnar plate on the volar surface of the left distal ulna.  This was placed and secured in place with a combination of cortical and locking screws in succession.  Fixation was excellent and stability was as well. The ulnar head fracture component was somewhat comminuted.  Having now completed my fixation of both the distal radius and the ulna,  I performed a Shuck maneuver to stress the distal   radioulnar joint, which I found to be stable.   I then examined the additional scaphoid and metacarpal fractures fluoroscopically and determined that these fractures were nondisplaced and amenable to closed treatment. Stability was excellent and I determined a bridge plate was unnecessary at this time. Plan was made to place a splint onto the left upper extremity to stabilize and treat these fractures closed as well as to stabilize our operative fractures.  The wrist was taken through range   of motion and all hardware was extraarticular and there was no crepitus with   wrist range of motion.   There was full passive pronation and supination of the forearm with no hardware impingement noted. The wound was then copiously irrigated with sterile   saline.  The tourniquet was let down.  There was no significant bleeding, and  hemostasis was achieved with bipolar electrocautery.  The wounds were then closed   in a layered fashion with 3-0 Vicryl suture in the subcutaneous and dermal   tissues followed by 3-0 nylon in a running horizontal mattress fashion  to close the   skin.  Sterile dressings were then applied consisting of Xeroform, 4 x 4s, gauze   and a short-arm volar slab splint.  The patient was then awakened from   anesthesia and returned to the Postanesthesia Care Unit in stable condition.    There were no complications and as the attending surgeon, I was present for and   performed the critical portions of the procedure.     POSTOPERATIVE PLAN FOR THE PATIENT:  The patient will be discharged home in stable condition. They will remain nonweightbearing to the operative upper extremity during   the healing process.  I will re-evaluate the patient in approximately 2 weeks for suture removal and re-evaluation of the postoperative plan.     Please be aware that this note has been generated with the assistance of [facility] voice-to-text.  Please excuse any spelling or grammatical errors.

## 2020-02-13 VITALS
BODY MASS INDEX: 42.38 KG/M2 | TEMPERATURE: 98 F | DIASTOLIC BLOOD PRESSURE: 83 MMHG | SYSTOLIC BLOOD PRESSURE: 152 MMHG | WEIGHT: 270 LBS | RESPIRATION RATE: 18 BRPM | HEIGHT: 67 IN | OXYGEN SATURATION: 97 % | HEART RATE: 80 BPM

## 2020-02-13 LAB — POCT GLUCOSE: <20 MG/DL (ref 70–110)

## 2020-02-20 RX ORDER — HYDROCODONE BITARTRATE AND ACETAMINOPHEN 10; 325 MG/1; MG/1
1 TABLET ORAL EVERY 6 HOURS PRN
Qty: 28 TABLET | Refills: 0 | Status: SHIPPED | OUTPATIENT
Start: 2020-02-20 | End: 2020-03-06

## 2020-02-22 ENCOUNTER — PATIENT MESSAGE (OUTPATIENT)
Dept: SURGERY | Facility: HOSPITAL | Age: 65
End: 2020-02-22

## 2020-02-24 ENCOUNTER — DOCUMENTATION ONLY (OUTPATIENT)
Dept: ORTHOPEDICS | Facility: CLINIC | Age: 65
End: 2020-02-24

## 2020-02-24 ENCOUNTER — HOSPITAL ENCOUNTER (OUTPATIENT)
Dept: RADIOLOGY | Facility: OTHER | Age: 65
Discharge: HOME | End: 2020-02-24
Attending: ORTHOPAEDIC SURGERY
Payer: COMMERCIAL

## 2020-02-24 ENCOUNTER — OFFICE VISIT (OUTPATIENT)
Dept: ORTHOPEDICS | Facility: CLINIC | Age: 65
End: 2020-02-24
Payer: COMMERCIAL

## 2020-02-24 VITALS — HEIGHT: 67 IN | WEIGHT: 270.06 LBS | BODY MASS INDEX: 42.39 KG/M2

## 2020-02-24 DIAGNOSIS — S52.92XA CLOSED FRACTURE OF LEFT RADIUS AND ULNA, INITIAL ENCOUNTER: Primary | ICD-10-CM

## 2020-02-24 DIAGNOSIS — M25.532 LEFT WRIST PAIN: ICD-10-CM

## 2020-02-24 DIAGNOSIS — M25.522 LEFT ELBOW PAIN: ICD-10-CM

## 2020-02-24 DIAGNOSIS — M25.532 LEFT WRIST PAIN: Primary | ICD-10-CM

## 2020-02-24 DIAGNOSIS — S52.202A CLOSED FRACTURE OF LEFT RADIUS AND ULNA, INITIAL ENCOUNTER: Primary | ICD-10-CM

## 2020-02-24 PROCEDURE — 73110 X-RAY EXAM OF WRIST: CPT | Mod: 26,LT,, | Performed by: RADIOLOGY

## 2020-02-24 PROCEDURE — 99999 PR PBB SHADOW E&M-EST. PATIENT-LVL IV: CPT | Mod: PBBFAC,,, | Performed by: ORTHOPAEDIC SURGERY

## 2020-02-24 PROCEDURE — 73080 X-RAY EXAM OF ELBOW: CPT | Mod: TC,FY,LT

## 2020-02-24 PROCEDURE — 99999 PR PBB SHADOW E&M-EST. PATIENT-LVL IV: ICD-10-PCS | Mod: PBBFAC,,, | Performed by: ORTHOPAEDIC SURGERY

## 2020-02-24 PROCEDURE — 73130 X-RAY EXAM OF HAND: CPT | Mod: 26,LT,, | Performed by: RADIOLOGY

## 2020-02-24 PROCEDURE — 73080 X-RAY EXAM OF ELBOW: CPT | Mod: 26,LT,, | Performed by: RADIOLOGY

## 2020-02-24 PROCEDURE — 73110 X-RAY EXAM OF WRIST: CPT | Mod: TC,FY,LT

## 2020-02-24 PROCEDURE — 73080 XR ELBOW COMPLETE 3 VIEW LEFT: ICD-10-PCS | Mod: 26,LT,, | Performed by: RADIOLOGY

## 2020-02-24 PROCEDURE — 73130 X-RAY EXAM OF HAND: CPT | Mod: TC,FY,LT

## 2020-02-24 PROCEDURE — 73130 XR HAND COMPLETE 3 VIEW LEFT: ICD-10-PCS | Mod: 26,LT,, | Performed by: RADIOLOGY

## 2020-02-24 PROCEDURE — 99024 PR POST-OP FOLLOW-UP VISIT: ICD-10-PCS | Mod: S$GLB,,, | Performed by: ORTHOPAEDIC SURGERY

## 2020-02-24 PROCEDURE — 99024 POSTOP FOLLOW-UP VISIT: CPT | Mod: S$GLB,,, | Performed by: ORTHOPAEDIC SURGERY

## 2020-02-24 PROCEDURE — 73110 XR WRIST COMPLETE 3 VIEWS LEFT: ICD-10-PCS | Mod: 26,LT,, | Performed by: RADIOLOGY

## 2020-02-24 RX ORDER — HYDROCODONE BITARTRATE AND ACETAMINOPHEN 7.5; 325 MG/1; MG/1
1 TABLET ORAL EVERY 6 HOURS PRN
Qty: 28 TABLET | Refills: 0 | Status: SHIPPED | OUTPATIENT
Start: 2020-02-24 | End: 2020-03-02

## 2020-02-24 NOTE — PROGRESS NOTES
Taty Max presents for post-operative evaluation.  The patient is now 2 weeks s/p left distal radius and distal ulnar ORIF with closed treatment of the scaphoid and 2nd/3rd  bases.  Overall the patient reports doing well.  The patient reports appropriate postoperative soreness with well controlled overall pain.  She only required 11 pain pills postoperatively and she feels well today.  Denies numbness or tingling.  She does note some left elbow soreness today.    PE:    AA&O x 4.  NAD  HEENT:  NCAT, sclera nonicteric  Lungs:  Respirations are equal and unlabored.  CV:  2+ bilateral upper and lower extremity pulses.  MSK: The wound is healing well with no signs of erythema or warmth.  There is no drainage.  No clinical signs or symptoms of infection are present.  All sutures were removed today. Left upper extremity neurovascular intact.  Fingers with moderate range of motion but some stiffness.  Thumb IP flexion-extension intact.    Imaging:  Status post ORIF left distal radius and ulnar fractures in good alignment with no evidence of complication      A/P: Status post above, doing well  1) Continue with non weight bearing  2) F/U 4 weeks with x-rays out of cast.  X-rays left elbow today on the way out.  3) Call with any questions/concerns in the interim     Please be aware that this note has been generated with the assistance of [facility] voice-to-text.  Please excuse any spelling or grammatical errors.

## 2020-02-27 ENCOUNTER — PATIENT MESSAGE (OUTPATIENT)
Dept: ORTHOPEDICS | Facility: CLINIC | Age: 65
End: 2020-02-27

## 2020-02-27 DIAGNOSIS — S52.202D CLOSED FRACTURE OF LEFT RADIUS AND ULNA WITH ROUTINE HEALING, SUBSEQUENT ENCOUNTER: Primary | ICD-10-CM

## 2020-02-27 DIAGNOSIS — S52.92XD CLOSED FRACTURE OF LEFT RADIUS AND ULNA WITH ROUTINE HEALING, SUBSEQUENT ENCOUNTER: Primary | ICD-10-CM

## 2020-02-28 RX ORDER — HYDROCODONE BITARTRATE AND ACETAMINOPHEN 5; 325 MG/1; MG/1
1 TABLET ORAL EVERY 6 HOURS PRN
Qty: 28 TABLET | Refills: 0 | Status: SHIPPED | OUTPATIENT
Start: 2020-02-28 | End: 2020-03-06

## 2020-03-02 NOTE — ADDENDUM NOTE
Addendum  created 03/02/20 1414 by Sarath Parikh Jr., MD    Diagnosis association updated, Intraprocedure Blocks edited, Sign clinical note

## 2020-03-06 ENCOUNTER — PATIENT MESSAGE (OUTPATIENT)
Dept: ORTHOPEDICS | Facility: CLINIC | Age: 65
End: 2020-03-06

## 2020-03-06 RX ORDER — HYDROCODONE BITARTRATE AND ACETAMINOPHEN 7.5; 325 MG/1; MG/1
1 TABLET ORAL EVERY 12 HOURS PRN
Qty: 28 TABLET | Refills: 0 | Status: SHIPPED | OUTPATIENT
Start: 2020-03-06 | End: 2020-03-20

## 2020-03-17 ENCOUNTER — TELEPHONE (OUTPATIENT)
Dept: ORTHOPEDICS | Facility: CLINIC | Age: 65
End: 2020-03-17

## 2020-03-17 NOTE — TELEPHONE ENCOUNTER
Spoke with patient on the phone in regards to her appointment on 3/23 and explained to her to arrive a little bit earlier to allow time for xrays and temperature screening when she comes in. Pt verbalized understanding.    Arelis Child LPN

## 2020-03-18 ENCOUNTER — PATIENT MESSAGE (OUTPATIENT)
Dept: ADMINISTRATIVE | Facility: OTHER | Age: 65
End: 2020-03-18

## 2020-03-23 ENCOUNTER — HOSPITAL ENCOUNTER (OUTPATIENT)
Dept: RADIOLOGY | Facility: OTHER | Age: 65
Discharge: HOME | End: 2020-03-23
Attending: ORTHOPAEDIC SURGERY
Payer: COMMERCIAL

## 2020-03-23 ENCOUNTER — DOCUMENTATION ONLY (OUTPATIENT)
Dept: REHABILITATION | Facility: HOSPITAL | Age: 65
End: 2020-03-23

## 2020-03-23 ENCOUNTER — OFFICE VISIT (OUTPATIENT)
Dept: ORTHOPEDICS | Facility: CLINIC | Age: 65
End: 2020-03-23
Payer: COMMERCIAL

## 2020-03-23 VITALS
HEIGHT: 67 IN | HEART RATE: 96 BPM | WEIGHT: 270 LBS | SYSTOLIC BLOOD PRESSURE: 157 MMHG | BODY MASS INDEX: 42.38 KG/M2 | DIASTOLIC BLOOD PRESSURE: 97 MMHG

## 2020-03-23 DIAGNOSIS — S52.92XD CLOSED FRACTURE OF LEFT RADIUS AND ULNA WITH ROUTINE HEALING, SUBSEQUENT ENCOUNTER: Primary | ICD-10-CM

## 2020-03-23 DIAGNOSIS — S62.002D CLOSED DISPLACED FRACTURE OF SCAPHOID OF LEFT WRIST WITH ROUTINE HEALING, UNSPECIFIED PORTION OF SCAPHOID, SUBSEQUENT ENCOUNTER: Primary | ICD-10-CM

## 2020-03-23 DIAGNOSIS — S52.202D CLOSED FRACTURE OF LEFT RADIUS AND ULNA WITH ROUTINE HEALING, SUBSEQUENT ENCOUNTER: Primary | ICD-10-CM

## 2020-03-23 DIAGNOSIS — S52.92XA CLOSED FRACTURE OF LEFT RADIUS AND ULNA, INITIAL ENCOUNTER: ICD-10-CM

## 2020-03-23 DIAGNOSIS — S52.202A CLOSED FRACTURE OF LEFT RADIUS AND ULNA, INITIAL ENCOUNTER: ICD-10-CM

## 2020-03-23 PROCEDURE — 73130 X-RAY EXAM OF HAND: CPT | Mod: TC,FY,LT

## 2020-03-23 PROCEDURE — 73130 X-RAY EXAM OF HAND: CPT | Mod: 26,LT,, | Performed by: RADIOLOGY

## 2020-03-23 PROCEDURE — 99999 PR PBB SHADOW E&M-EST. PATIENT-LVL V: CPT | Mod: PBBFAC,,, | Performed by: ORTHOPAEDIC SURGERY

## 2020-03-23 PROCEDURE — 73110 XR WRIST COMPLETE 3 VIEWS LEFT: ICD-10-PCS | Mod: 26,LT,, | Performed by: RADIOLOGY

## 2020-03-23 PROCEDURE — 73130 XR HAND COMPLETE 3 VIEW LEFT: ICD-10-PCS | Mod: 26,LT,, | Performed by: RADIOLOGY

## 2020-03-23 PROCEDURE — 99999 PR PBB SHADOW E&M-EST. PATIENT-LVL V: ICD-10-PCS | Mod: PBBFAC,,, | Performed by: ORTHOPAEDIC SURGERY

## 2020-03-23 PROCEDURE — 73110 X-RAY EXAM OF WRIST: CPT | Mod: TC,FY,LT

## 2020-03-23 PROCEDURE — 73110 X-RAY EXAM OF WRIST: CPT | Mod: 26,LT,, | Performed by: RADIOLOGY

## 2020-03-23 PROCEDURE — 99024 PR POST-OP FOLLOW-UP VISIT: ICD-10-PCS | Mod: S$GLB,,, | Performed by: ORTHOPAEDIC SURGERY

## 2020-03-23 PROCEDURE — 99024 POSTOP FOLLOW-UP VISIT: CPT | Mod: S$GLB,,, | Performed by: ORTHOPAEDIC SURGERY

## 2020-03-23 NOTE — PROGRESS NOTES
Attempted to contact Taty stinson: her appointment time as the clinics hours have changed.  Her phone system would not allow for a message to be left.

## 2020-03-23 NOTE — PROGRESS NOTES
Taty Max presents for post-operative evaluation.  The patient is now 6 weeks s/p left distal radius and distal ulnar ORIF with closed treatment of the scaphoid and 2nd/3rd  bases.  Overall the patient reports doing well.  The patient reports appropriate postoperative soreness with well controlled overall pain.  She only required 11 pain pills postoperatively and she feels well today.  Denies numbness or tingling.  She notes that she is doing very well in her recovery and has been typing and working on finger range of motion as tolerated which has significantly helped her range of motion of the fingers return to essentially normal.  No new complaints today.      PE:    AA&O x 4.  NAD  HEENT:  NCAT, sclera nonicteric  Lungs:  Respirations are equal and unlabored.  CV:  2+ bilateral upper and lower extremity pulses.  MSK: The wound is healing well with no signs of erythema or warmth.  There is no drainage.  No clinical signs or symptoms of infection are present.  Left upper extremity neurovascular intact.  Fingers with full painless range of motion at the MP and IP joints.  Thumb IP flexion-extension intact.  Wrist range of motion gently checked and noted to be within functional range including flexion extension pronation and supination.  This is painless.    Imaging:  Status post ORIF left distal radius and ulnar fractures in good alignment with no evidence of complication      A/P: Status post above, doing well  1) Continue with non weight bearing  2) F/U 6 weeks with new x-rays left hand and wrist.  Will place the patient into a removable wrist brace today which she may remove essentially for hygiene only.  Finger range of motion as tolerated.  Gentle Wrist range of motion acceptable within limits of pain.  Will also have her evaluated by our therapy department to initiate a gentle range of motion program.  I would like her to take it slow given her comminution and the significance of her fractures including  a distal ulnar open reduction internal fixation.  I would like to limit pronation and supination especially.  3) Call with any questions/concerns in the interim     Please be aware that this note has been generated with the assistance of [employer] voice-to-text.  Please excuse any spelling or grammatical errors.

## 2020-03-24 ENCOUNTER — PATIENT MESSAGE (OUTPATIENT)
Dept: FAMILY MEDICINE | Facility: CLINIC | Age: 65
End: 2020-03-24

## 2020-03-24 DIAGNOSIS — R52 PAIN: ICD-10-CM

## 2020-03-25 ENCOUNTER — DOCUMENTATION ONLY (OUTPATIENT)
Dept: REHABILITATION | Facility: HOSPITAL | Age: 65
End: 2020-03-25

## 2020-03-25 ENCOUNTER — CLINICAL SUPPORT (OUTPATIENT)
Dept: REHABILITATION | Facility: HOSPITAL | Age: 65
End: 2020-03-25
Attending: ORTHOPAEDIC SURGERY
Payer: COMMERCIAL

## 2020-03-25 DIAGNOSIS — M25.60 RANGE OF MOTION DEFICIT: ICD-10-CM

## 2020-03-25 DIAGNOSIS — M25.532 WRIST PAIN, LEFT: ICD-10-CM

## 2020-03-25 DIAGNOSIS — S52.202D CLOSED FRACTURE OF LEFT RADIUS AND ULNA WITH ROUTINE HEALING, SUBSEQUENT ENCOUNTER: ICD-10-CM

## 2020-03-25 DIAGNOSIS — R29.898 DECREASED PINCH STRENGTH: ICD-10-CM

## 2020-03-25 DIAGNOSIS — R29.898 DECREASED GRIP STRENGTH OF LEFT HAND: ICD-10-CM

## 2020-03-25 DIAGNOSIS — S52.92XD CLOSED FRACTURE OF LEFT RADIUS AND ULNA WITH ROUTINE HEALING, SUBSEQUENT ENCOUNTER: ICD-10-CM

## 2020-03-25 PROBLEM — M54.41 CHRONIC BILATERAL LOW BACK PAIN WITH BILATERAL SCIATICA: Status: RESOLVED | Noted: 2018-08-27 | Resolved: 2020-03-25

## 2020-03-25 PROBLEM — M54.42 CHRONIC BILATERAL LOW BACK PAIN WITH BILATERAL SCIATICA: Status: RESOLVED | Noted: 2018-08-27 | Resolved: 2020-03-25

## 2020-03-25 PROBLEM — G89.29 CHRONIC BILATERAL LOW BACK PAIN WITH BILATERAL SCIATICA: Status: RESOLVED | Noted: 2018-08-27 | Resolved: 2020-03-25

## 2020-03-25 PROCEDURE — 97165 OT EVAL LOW COMPLEX 30 MIN: CPT | Mod: PN

## 2020-03-25 NOTE — PATIENT INSTRUCTIONS
Extension (Passive)        Keep palm on table, using other hand on top to assist. Raise elbow. Hold _5___ seconds.  Repeat __10__ times. Do __3__ sessions per day.  Activity: Resting hand with palm on hip, move elbow out to side.*    Copyright © I. All rights reserved.   Flexion (Passive)        With elbow resting on pad-ded surface, let wrist drop down. Apply gentle down-rico push with fingers of other hand. Hold __5__ seconds.  Repeat __10__ times. Do __3__ sessions per day..  Activity: Rest chin on back of hand with elbow on firm surface.*    Copyright © I. All rights reserved.   Extension (Active With Finger Extension)        With forearm on table and wrist over edge, lift hand with fingers straight. Hold __3__ seconds, then lift it up and hold 3 seconds  Repeat __10__ times. Do __3__ sessions per day.    Copyright © I. All rights reserved.   Ulnar Deviation (Active)        With fingers curled, bend hand to side at wrist in direction of little finger hold briefly then move toward your thumb and hold briefly.  Repeat __10__ times. Do __3__ sessions per day.      Copyright © I. All rights reserved.   MP / PIP / DIP Composite Flexion (Passive Stretch)        Use other hand to bend each finger at all three joints. Hold _5___ seconds.  Repeat __10__ times. Do __3__ sessions per day.    Copyright © [facility]. All rights reserved.   Flexor Tendon Gliding (Active Hook Fist)        With fingers and knuckles straight, bend middle and tip joints. Do not bend large knuckles.  Repeat __10__ times. Do __3__ sessions per day.    Copyright © I. All rights reserved.   Flexor Tendon Gliding (Active Full Fist)        Straighten all fingers, then make a fist, bending all joints.  Repeat __10__ times. Do __3__ sessions per day.    Copyright © I. All rights reserved.   Flexor Tendon Gliding (Active Straight Fist)        Start with fingers straight. Bend knuckles and middle joints. Keep fingertip joints straight to touch base of  "palm.  Repeat __10__ times. Do __3__ sessions per day.    Copyright © I. All rights reserved.   Abduction / Adduction (Active)        With hand flat on table, spread all fingers apart, then bring them together as close as possible.  Repeat __10__ times. Do __3__ sessions per day.    Copyright © [facility]. All rights reserved.   Opposition (Active)        Touch tip of thumb to nail tip of each finger in turn, making an "O" shape.  Repeat __10__ times. Do __3__ sessions per day..    Copyright © I. All rights reserved.   Palmar Adduction/Abduction (Active)        Move thumb down, away from palm. Move back to rest along palm.  Repeat ____ times. Do ____ sessions per day.  Repeat __10__ times. Do __3__ sessions per day.  Copyright © I. All rights reserved.   Radial Adduction/Abduction (Active)        Move thumb out to side. Move back alongside index finger.  Repeat __10__ times. Do __3__ sessions per day.    Copyright © I. All rights reserved.     "

## 2020-03-25 NOTE — PROGRESS NOTES
OT DOCUMENTATION ONLY NOTE:     Encounter Date: 3/23/2020      Name: Taty Max  Clinic Number: [id number]    Medical Diagnosis: Left Distal Radius fracture with ORIF, left 2nd/3rd MCP fracture, ulnar neck fracture and scaphoid stabilization.     Physician: Dr. LOIS Duvall     Surgical Procedure and Date: 2/12/20 ORIF radius/ulna, closed treatment scaphoid, 2nd and 3rd MCP.     Note as follows:     Lalitha was seen in hand clinic at [facility] [place] with MD for 4 week  post surgical followup. Cast  removed by MD this date.    Patient instructed and provided with written HEP for primary diagnosis  including AROM exercsies for thumb, hook, wave, fist for finger ROM, Active wrist flex, ext, RD, UD, and gentle supination/pronation within pain free range.  Instructed in and reviewed  modality use information for pain, stiffness and swelling and scar management information.  Patient to continue with HEP, modality use and scar management for 2-4 weeks until therapy clinics re-open.  Patient will be scheduled for formal evaluation upon reopening of therapy clinics following COVID-19 outbreak.       APOLLO Ervin, CHT
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
## 2020-03-26 RX ORDER — HYDROCHLOROTHIAZIDE 12.5 MG/1
TABLET ORAL
Qty: 90 TABLET | Refills: 3 | Status: SHIPPED | OUTPATIENT
Start: 2020-03-26 | End: 2021-03-22 | Stop reason: SDUPTHER

## 2020-03-27 RX ORDER — TRAMADOL HYDROCHLORIDE 50 MG/1
TABLET ORAL
Qty: 60 TABLET | Refills: 3 | OUTPATIENT
Start: 2020-03-27

## 2020-03-27 RX ORDER — TRAMADOL HYDROCHLORIDE 50 MG/1
50 TABLET ORAL EVERY 6 HOURS PRN
Qty: 60 TABLET | Refills: 3 | Status: SHIPPED | OUTPATIENT
Start: 2020-03-27 | End: 2020-05-14

## 2020-04-08 ENCOUNTER — CLINICAL SUPPORT (OUTPATIENT)
Dept: REHABILITATION | Facility: HOSPITAL | Age: 65
End: 2020-04-08
Attending: ORTHOPAEDIC SURGERY
Payer: COMMERCIAL

## 2020-04-08 DIAGNOSIS — R29.898 DECREASED PINCH STRENGTH: ICD-10-CM

## 2020-04-08 DIAGNOSIS — M25.532 WRIST PAIN, LEFT: ICD-10-CM

## 2020-04-08 DIAGNOSIS — R29.898 DECREASED GRIP STRENGTH OF LEFT HAND: ICD-10-CM

## 2020-04-08 DIAGNOSIS — M25.60 RANGE OF MOTION DEFICIT: ICD-10-CM

## 2020-04-08 PROCEDURE — 97110 THERAPEUTIC EXERCISES: CPT | Mod: PN

## 2020-04-08 PROCEDURE — 97140 MANUAL THERAPY 1/> REGIONS: CPT | Mod: PN

## 2020-04-08 NOTE — PROGRESS NOTES
Occupational Therapy Treatment Note     Date: 4/8/2020  Name: Taty Max  Clinic Number: [id number]    Therapy Diagnosis:   Encounter Diagnoses   Name Primary?    Range of motion deficit     Wrist pain, left     Decreased  strength of left hand     Decreased pinch strength      Physician: Garrett Duvall MD     Physician Orders: Finger range of motion as tolerated.  Gentle Wrist range of motion acceptable within limits of pain.   would like her to take it slow given her comminution and the significance of her fractures including a distal ulnar open reduction internal fixation.  I would like to limit pronation and supination especially.  Medical Diagnosis: Closed fracture of left radius and ulna with routine healing, subsequent encounter  Surgical Procedure and Date: ORIF distal radius and ulna, 2/12/20  Evaluation Date: 03/25/2020  Insurance Authorization Period Expiration: 12/31/20  Plan of Care Certification Period: 3/25/20 to 6/17/20   Date of Return to MD: 5/4/20     Visit # / Visits authorized: 2 / 20     Time In:  3:00 pm  Time Out: 3:50  pm  Total Billable Time: 50 minutes     Precautions:  Diabetes and cancer   SLOW PROGRESSION and  LIMIT PRONATION AND SUPINATION    Subjective     Pt reports: I am feeling better but still have a pinch in the wrist when I bend it this way(demonstrating UD)  she was compliant with home exercise program given last session.   Response to previous treatment: felt okay  Functional change: None so far    Pain: 1/10  Location: left wrist and thumb      Objective     Taty received the following manual therapy techniques for 10 minutes:   -scar massage manual, round tip tool and dycem- dycem provided for HEP    Taty received therapeutic exercises for 40 minutes including:  -active wrist ext/flexion off wedge fisted and extended fingers x 10 reps each  -active wrist in neutral forearm position fisted hand x 10 reps   -black wrist curl up bar x 5 reps no resistance  -hook,  regular and flat fists x 10 reps each  -thumb opposition to all tips x 10 reps each  -finger abduction x 10 reps each  -thumb palmar abduction x 10 reps  -thumb flexion x 10 reps  -thumb circles x 10 reps each direction  -supination/pronation x 10 reps  -pink foam block squeeze 3 minutes    Kinesiotape applied for swelling of her fingers, education    Home Exercises and Education Provided     Education provided:   -provided pink sponge for initiation of strengthening  - Progress towards goals     Written Home Exercises Provided: Patient instructed to cont prior HEP.  Exercises were reviewed and Taty was able to demonstrate them prior to the end of the session.  Taty demonstrated good  understanding of the HEP provided.   .   See EMR under Patient Instructions for exercises provided 3/25/20.        Assessment     Pt would continue to benefit from skilled OT. Taty with swelling in her left digits, wrist AROM improvements as well as improvement in functional use of hand with typing.     Taty is progressing well towards her goals and there are no updates to goals at this time. Pt prognosis is Good.     Pt will continue to benefit from skilled outpatient occupational therapy to address the deficits listed in the problem list on initial evaluation provide pt/family education and to maximize pt's level of independence in the home and community environment.     Anticipated barriers to occupational therapy: none noted    Pt's spiritual, cultural and educational needs considered and pt agreeable to plan of care and goals.    Goals:   Short term goals met in 6 weeks(5/6/20):   Patient to be IND with HEP and modalities for pain/edema managment.    Increase finger AROM tips to palm to increase functional hand use for ADLs/work/leisure activities.    Increase wrist flexion by 10* to improve self care tasks   Decrease complaints of pain to  2 out of 10 with all functional hand use left hand for ADL/work/leisure activities.    Patient to be IND wiht Orthotic use, wear and care precautions.     Long term goals met by d/c:   Increase AROM of thumb to full opposition to improve ability to perform leisure activities   Increase  strength to 20 lbs. to improve functional grasp for ADLs/work/leisure activities.    Increase pinch 8 psi's to increase IND all self care tasks and jewelry making.    Decrease edema 1 cm to increase joint mobility/flexibility for hand use.     Decrease complaints of pain to 0/10 with all tasks    Plan   Progress with exercises for ROM and slow progress of strengthening as tolerated      APOLLO Love

## 2020-04-13 ENCOUNTER — PATIENT MESSAGE (OUTPATIENT)
Dept: ADMINISTRATIVE | Facility: HOSPITAL | Age: 65
End: 2020-04-13

## 2020-04-14 NOTE — PROGRESS NOTES
Occupational Therapy Treatment Note     Date: 4/15/2020  Name: Taty Max  Clinic Number: [id number]    Therapy Diagnosis:   Encounter Diagnoses   Name Primary?    Range of motion deficit     Wrist pain, left     Decreased  strength of left hand     Decreased pinch strength      Physician: Garrett Duvall MD     Physician Orders: Finger range of motion as tolerated.  Gentle Wrist range of motion acceptable within limits of pain.   Would like her to take it slow given her comminution and the significance of her fractures including a distal ulnar open reduction internal fixation.  I would like to limit pronation and supination especially.  Medical Diagnosis: Closed fracture of left radius and ulna with routine healing, subsequent encounter  Surgical Procedure and Date: ORIF distal radius and ulna, 2/12/20  Evaluation Date: 03/25/2020  Insurance Authorization Period Expiration: 12/31/20  Plan of Care Certification Period: 3/25/20 to 6/17/20   Date of Return to MD: 5/4/20     Visit # / Visits authorized: 3 / 20     Time In:  4:09 pm  Time Out: 4:58 pm  Total Billable Time:  49 minutes     Precautions:  Diabetes and cancer   SLOW PROGRESSION and  LIMIT PRONATION AND SUPINATION  9 weeks postop    Subjective     Pt reports: I reflexive grabbing of door jam with left hand while avoiding stepping on the cats.  She had pain last not but just mild popping in metacarpal region with fisting todya  she was compliant with home exercise program given 3/25/20.   Response to previous treatment: no adverse affects  Functional change: able to hold a cup with my left hand    Pain: 3/10  Location: left wrist and thumb      Objective     Taty received the following manual therapy techniques for 12 minutes:   -scar massage manual, round tip tool and dycem- dycem provided for HEP  -passive stretch of left wrist into flexion/extension for extended time.    Taty received therapeutic exercises for 37 minutes including:  -active  wrist ext/flexion off wedge fisted and extended fingers x 15 reps each  -active wrist UD/RD off wedge x 10 reps   -juxiciser for AROM of left wrist   -hook, regular and flat fists x 10 reps each  -thumb opposition to all tips and 5th MCP head x 10 reps each  -manipulation of round head pegs into and out of pegboard x 20 reps      Home Exercises and Education Provided     Education provided:   -provided additional pink sponge for initiation of strengthening as she misplaced hers  - Progress towards goals     Written Home Exercises Provided: Patient instructed to cont prior HEP.  Exercises were reviewed and Taty was able to demonstrate them prior to the end of the session.  Taty demonstrated good  understanding of the HEP provided.   .   See EMR under Patient Instructions for exercises provided 3/25/20.        Assessment     Pt would continue to benefit from skilled OT. Taty with low level pain and increase in functional use of left hand with tasks within brace.  Improvement in selfcare tasks    Taty is progressing well towards her goals and there are no updates to goals at this time. Pt prognosis is Good.     Pt will continue to benefit from skilled outpatient occupational therapy to address the deficits listed in the problem list on initial evaluation provide pt/family education and to maximize pt's level of independence in the home and community environment.     Anticipated barriers to occupational therapy: none noted    Pt's spiritual, cultural and educational needs considered and pt agreeable to plan of care and goals.    Goals:   Short term goals met in 6 weeks(5/6/20):   Patient to be IND with HEP and modalities for pain/edema managment.    Increase finger AROM tips to palm to increase functional hand use for ADLs/work/leisure activities.    Increase wrist flexion by 10* to improve self care tasks   Decrease complaints of pain to  2 out of 10 with all functional hand use left hand for ADL/work/leisure  activities.   Patient to be IND wiht Orthotic use, wear and care precautions.     Long term goals met by d/c:   Increase AROM of thumb to full opposition to improve ability to perform leisure activities   Increase  strength to 20 lbs. to improve functional grasp for ADLs/work/leisure activities.    Increase pinch 8 psi's to increase IND all self care tasks and jewelry making.    Decrease edema 1 cm to increase joint mobility/flexibility for hand use.     Decrease complaints of pain to 0/10 with all tasks    Plan   Progress with exercises for ROM and slow progress of strengthening as tolerated      APOLLO Love

## 2020-04-15 ENCOUNTER — CLINICAL SUPPORT (OUTPATIENT)
Dept: REHABILITATION | Facility: HOSPITAL | Age: 65
End: 2020-04-15
Attending: ORTHOPAEDIC SURGERY
Payer: COMMERCIAL

## 2020-04-15 DIAGNOSIS — R29.898 DECREASED PINCH STRENGTH: ICD-10-CM

## 2020-04-15 DIAGNOSIS — M25.532 WRIST PAIN, LEFT: ICD-10-CM

## 2020-04-15 DIAGNOSIS — M25.60 RANGE OF MOTION DEFICIT: ICD-10-CM

## 2020-04-15 DIAGNOSIS — R29.898 DECREASED GRIP STRENGTH OF LEFT HAND: ICD-10-CM

## 2020-04-15 PROCEDURE — 97110 THERAPEUTIC EXERCISES: CPT | Mod: PN

## 2020-04-15 PROCEDURE — 97140 MANUAL THERAPY 1/> REGIONS: CPT | Mod: PN

## 2020-04-21 NOTE — PROGRESS NOTES
Occupational Therapy Treatment Note     Date: 4/22/2020  Name: Taty Max  Clinic Number: [id number]    Therapy Diagnosis:   Encounter Diagnoses   Name Primary?    Range of motion deficit     Wrist pain, left     Decreased  strength of left hand     Decreased pinch strength      Physician: Garrett Duvall MD     Physician Orders: Finger range of motion as tolerated.  Gentle Wrist range of motion acceptable within limits of pain.   Would like her to take it slow given her comminution and the significance of her fractures including a distal ulnar open reduction internal fixation.  I would like to limit pronation and supination especially.  Medical Diagnosis: Closed fracture of left radius and ulna with routine healing, subsequent encounter  Surgical Procedure and Date: ORIF distal radius and ulna, 2/12/20  Evaluation Date: 03/25/2020  Insurance Authorization Period Expiration: 12/31/20  Plan of Care Certification Period: 3/25/20 to 6/17/20   Date of Return to MD: 5/4/20     Visit # / Visits authorized:  4/ 20     Time In:  4:10 pm  Time Out: 5:03 pm  Total Billable Time:  57 minutes     Precautions:  Diabetes and cancer   SLOW PROGRESSION and  LIMIT PRONATION AND SUPINATION  9 weeks postop    Subjective     Pt reports: I was able to hold the nozzle for the shower handle.  she was compliant with home exercise program given 3/25/20.   Response to previous treatment: sensitivity of incision  Functional change: dried my bath off after my shower.    Pain: 1-2/10  Location: left wrist and thumb      Objective     Patient received fluidotherapy to left  hand(s) for 10 minutes to increase blood flow, circulation, desensitization, sensory re-education and for pain management.    Taty received the following manual therapy techniques for 10 minutes:   -scar massage manual with dycem  -passive stretch of left wrist into flexion/extension for extended time.    Taty received therapeutic exercises for 37 minutes  including:  -active wrist ext/flexion off wedge fisted and extended fingers x 15 reps each  -1# wrist extension off wedge x 2 sets 10 reps  -ergo gripper 1 red and yellow band x 5 min  -juxiciser for AROM of left wrist x 5 cycles  -hook, regular and flat fists x 10 reps each  -thumb opposition to all tips and 5th MCP head x 10 reps each  -manipulation of round head pegs into and out of pegboard x 20 reps      Home Exercises and Education Provided     Education provided:   -provided additional pink sponge for initiation of strengthening as she misplaced hers  - Progress towards goals     Written Home Exercises Provided: Patient instructed to cont prior HEP.  Exercises were reviewed and Taty was able to demonstrate them prior to the end of the session.  Taty demonstrated good  understanding of the HEP provided.   .   See EMR under Patient Instructions for exercises provided 3/25/20.        Assessment     Pt would continue to benefit from skilled OT. Taty with improvement in functional use of her left hand at home.  Minimal c/o pain reported with at home tasks and in therapy.    Taty is progressing well towards her goals and there are no updates to goals at this time. Pt prognosis is Good.     Pt will continue to benefit from skilled outpatient occupational therapy to address the deficits listed in the problem list on initial evaluation provide pt/family education and to maximize pt's level of independence in the home and community environment.     Anticipated barriers to occupational therapy: none noted    Pt's spiritual, cultural and educational needs considered and pt agreeable to plan of care and goals.    Goals:   Short term goals met in 6 weeks(5/6/20):   Patient to be IND with HEP and modalities for pain/edema managment.    Increase finger AROM tips to palm to increase functional hand use for ADLs/work/leisure activities.    Increase wrist flexion by 10* to improve self care tasks   Decrease complaints of  pain to  2 out of 10 with all functional hand use left hand for ADL/work/leisure activities.   Patient to be IND wiht Orthotic use, wear and care precautions.     Long term goals met by d/c:   Increase AROM of thumb to full opposition to improve ability to perform leisure activities   Increase  strength to 20 lbs. to improve functional grasp for ADLs/work/leisure activities.    Increase pinch 8 psi's to increase IND all self care tasks and jewelry making.    Decrease edema 1 cm to increase joint mobility/flexibility for hand use.     Decrease complaints of pain to 0/10 with all tasks    Plan   Progress with exercises for ROM and slow progress of strengthening as tolerated      APOLLO Love

## 2020-04-22 ENCOUNTER — CLINICAL SUPPORT (OUTPATIENT)
Dept: REHABILITATION | Facility: HOSPITAL | Age: 65
End: 2020-04-22
Attending: ORTHOPAEDIC SURGERY
Payer: COMMERCIAL

## 2020-04-22 DIAGNOSIS — R29.898 DECREASED PINCH STRENGTH: ICD-10-CM

## 2020-04-22 DIAGNOSIS — R29.898 DECREASED GRIP STRENGTH OF LEFT HAND: ICD-10-CM

## 2020-04-22 DIAGNOSIS — M25.532 WRIST PAIN, LEFT: ICD-10-CM

## 2020-04-22 DIAGNOSIS — M25.60 RANGE OF MOTION DEFICIT: ICD-10-CM

## 2020-04-22 PROCEDURE — 97022 WHIRLPOOL THERAPY: CPT | Mod: PN

## 2020-04-22 PROCEDURE — 97140 MANUAL THERAPY 1/> REGIONS: CPT | Mod: PN

## 2020-04-22 PROCEDURE — 97110 THERAPEUTIC EXERCISES: CPT | Mod: PN

## 2020-04-28 NOTE — PROGRESS NOTES
Occupational Therapy Progress Note     Date: 4/29/2020  Name: Taty Max  Clinic Number: [id number]    Therapy Diagnosis:   Encounter Diagnoses   Name Primary?    Range of motion deficit     Wrist pain, left     Decreased  strength of left hand     Decreased pinch strength      Physician: Garrett Duvall MD     Physician Orders: Finger range of motion as tolerated.  Gentle Wrist range of motion acceptable within limits of pain.   Would like her to take it slow given her comminution and the significance of her fractures including a distal ulnar open reduction internal fixation.  I would like to limit pronation and supination especially.  Medical Diagnosis: Closed fracture of left radius and ulna with routine healing, subsequent encounter  Surgical Procedure and Date: ORIF distal radius and ulna, 2/12/20  Evaluation Date: 03/25/2020  Insurance Authorization Period Expiration: 12/31/20  Plan of Care Certification Period: 3/25/20 to 6/17/20   Date of Return to MD: 5/4/20     Visit # / Visits authorized:  5/ 20     Time In:  4:10 pm  Time Out: 5:00  pm  Total Billable Time:  50 minutes     Precautions:  Diabetes and cancer   SLOW PROGRESSION and  LIMIT PRONATION AND SUPINATION  11 weeks postop    Subjective     Pt reports: I was to make spaghetti and sausage so I don't have to cook the rest of the week.   she was compliant with home exercise program given 3/25/20.   Response to previous treatment: sensitivity of incision  Functional change: dried my bath off after my shower.    Pain: 1-2/10  Location: left wrist and thumb      Objective     Patient received fluidotherapy to left  hand(s) for 8 minutes to increase blood flow, circulation, desensitization, sensory re-education and for pain management.    Taty received therapeutic exercises for 42 minutes including:  Re-assessment:  Range of Motion: left Active  3/25/20  (Ext/Flex) Thumb Index Middle Ring Small   MP 0/47 0/61 4/61 13/58 0/44   PIP   0/89 4/88  0/86 0/82   DIP 0/18 0/40 0/45 0/25 0/23     4/29/20  (Ext/Flex) Thumb Index Middle Ring Small   MP 0/53 0/73 0/77 0/70 0/64   PIP   0/93 00/93 0/92 0/83   DIP 0/36 0/60 0/65 0/65 0/64     Thumb Opposition: to index, long and ring                                3/25/20   4/29/20  Palmar Abduction: 45            55  Radial Abduction: 45             55                                         Left       Right   Wrist Ext/Flex: 3/25/20 45/20    80/70                           4/29/20 60/35     Wrist RD/UD: 3/25/20 neutral/20     25/40 4/29/20 30/15  Sup/Pronation: 3/25/20 45/80   60/80                           4/2920   60/80   Strength: ([place] Dynamometer in lbs.) Average 3 trials, Position II           3/25/20    4/29/20  Right: 77#   Left: 9#               34.5#     Pinch Strength: (Pinch Gauge in psi's), Average 3 trials  Key Pinch 3/25/20 R) 13psi's .3  L) 0psi's                   4/29/20                     L) 7.3psi  3pt Pinch 3/25/20  R) 16psi's  L) 0 psi's                  4/29/20                     L) 4.3 psi                  Fine [place] Coordination Tests:   9 hole Peg Test R) 25 seconds 1 drop   L) 35 seconds no drops                                                                   L) 22 second no drops      -2# wrist extension off wedge x 10 reps  -2# UD/RD x 10 reps  -yellow putty removal of 6 small washers  -juxiciser for AROM of left wrist x 10 cycles  -ergo gripper 1 red and yellow band x 5 min      Home Exercises and Education Provided     Education provided:   - Progress towards goals   -continue with use of sponge for strengthening at home    Written Home Exercises Provided: Patient instructed to cont prior HEP.  Exercises were reviewed and Taty was able to demonstrate them prior to the end of the session.  Taty demonstrated good  understanding of the HEP provided.   .   See EMR under Patient Instructions for exercises provided 3/25/20.        Assessment     Pt would  continue to benefit from skilled OT. Taty is demonstrating improvements in AROM,  and pinch strengths.  Improvements in overall functional use of left hand is noted with cooking and household tasks.     Taty is progressing well towards her goals and there are no updates to goals at this time. Pt prognosis is Good.     Pt will continue to benefit from skilled outpatient occupational therapy to address the deficits listed in the problem list on initial evaluation provide pt/family education and to maximize pt's level of independence in the home and community environment.     Anticipated barriers to occupational therapy: none noted    Pt's spiritual, cultural and educational needs considered and pt agreeable to plan of care and goals.    Goals:   Short term goals met in 6 weeks(5/6/20):   Patient to be IND with HEP and modalities for pain/edema management - met and continuous   Increase finger AROM tips to palm to increase functional hand use for ADLs/work/leisure activities-progressing.    Increase wrist flexion by 10* to improve self care tasks- met 4/29/20   Decrease complaints of pain to  2 out of 10 with all functional hand use left hand for ADL/work/leisure activities - met 4/29/20.   Patient to be IND wiht Orthotic use, wear and care precautions met 4/29/20     Long term goals met by d/c:   Increase AROM of thumb to full opposition to improve ability to perform leisure activities-progressing   Increase  strength to 20 lbs. to improve functional grasp for ADLs/work/leisure activities- met 4/29/20.    Increase pinch 8 psi's to increase IND all self care tasks and jewelry making.    Decrease edema 1 cm to increase joint mobility/flexibility for hand use.     Decrease complaints of pain to 0/10 with all tasks    Plan   Progress with exercises for ROM and slowly progress of strengthening as tolerated      APOLLO Love

## 2020-04-29 ENCOUNTER — CLINICAL SUPPORT (OUTPATIENT)
Dept: REHABILITATION | Facility: HOSPITAL | Age: 65
End: 2020-04-29
Attending: ORTHOPAEDIC SURGERY
Payer: COMMERCIAL

## 2020-04-29 DIAGNOSIS — M25.60 RANGE OF MOTION DEFICIT: ICD-10-CM

## 2020-04-29 DIAGNOSIS — R29.898 DECREASED PINCH STRENGTH: ICD-10-CM

## 2020-04-29 DIAGNOSIS — M25.532 WRIST PAIN, LEFT: ICD-10-CM

## 2020-04-29 DIAGNOSIS — R29.898 DECREASED GRIP STRENGTH OF LEFT HAND: ICD-10-CM

## 2020-04-29 PROCEDURE — 97110 THERAPEUTIC EXERCISES: CPT | Mod: PN

## 2020-04-29 PROCEDURE — 97022 WHIRLPOOL THERAPY: CPT | Mod: PN

## 2020-04-29 RX ORDER — CYCLOBENZAPRINE HCL 5 MG
TABLET ORAL
Qty: 30 TABLET | Refills: 2 | Status: SHIPPED | OUTPATIENT
Start: 2020-04-29 | End: 2020-07-21 | Stop reason: SDUPTHER

## 2020-05-04 ENCOUNTER — HOSPITAL ENCOUNTER (OUTPATIENT)
Dept: RADIOLOGY | Facility: OTHER | Age: 65
Discharge: HOME | End: 2020-05-04
Attending: ORTHOPAEDIC SURGERY
Payer: COMMERCIAL

## 2020-05-04 ENCOUNTER — OFFICE VISIT (OUTPATIENT)
Dept: ORTHOPEDICS | Facility: CLINIC | Age: 65
End: 2020-05-04
Payer: COMMERCIAL

## 2020-05-04 VITALS
SYSTOLIC BLOOD PRESSURE: 150 MMHG | DIASTOLIC BLOOD PRESSURE: 91 MMHG | HEART RATE: 105 BPM | BODY MASS INDEX: 42.38 KG/M2 | WEIGHT: 270 LBS | HEIGHT: 67 IN

## 2020-05-04 DIAGNOSIS — S62.002D CLOSED DISPLACED FRACTURE OF SCAPHOID OF LEFT WRIST WITH ROUTINE HEALING, UNSPECIFIED PORTION OF SCAPHOID, SUBSEQUENT ENCOUNTER: Primary | ICD-10-CM

## 2020-05-04 DIAGNOSIS — S62.002D CLOSED DISPLACED FRACTURE OF SCAPHOID OF LEFT WRIST WITH ROUTINE HEALING, UNSPECIFIED PORTION OF SCAPHOID, SUBSEQUENT ENCOUNTER: ICD-10-CM

## 2020-05-04 PROCEDURE — 99999 PR PBB SHADOW E&M-EST. PATIENT-LVL IV: CPT | Mod: PBBFAC,,, | Performed by: ORTHOPAEDIC SURGERY

## 2020-05-04 PROCEDURE — 73110 X-RAY EXAM OF WRIST: CPT | Mod: TC,FY,LT

## 2020-05-04 PROCEDURE — 99024 PR POST-OP FOLLOW-UP VISIT: ICD-10-PCS | Mod: S$GLB,,, | Performed by: ORTHOPAEDIC SURGERY

## 2020-05-04 PROCEDURE — 73110 X-RAY EXAM OF WRIST: CPT | Mod: 26,LT,, | Performed by: RADIOLOGY

## 2020-05-04 PROCEDURE — 73110 XR WRIST COMPLETE 3 VIEWS LEFT: ICD-10-PCS | Mod: 26,LT,, | Performed by: RADIOLOGY

## 2020-05-04 PROCEDURE — 99999 PR PBB SHADOW E&M-EST. PATIENT-LVL IV: ICD-10-PCS | Mod: PBBFAC,,, | Performed by: ORTHOPAEDIC SURGERY

## 2020-05-04 PROCEDURE — 99024 POSTOP FOLLOW-UP VISIT: CPT | Mod: S$GLB,,, | Performed by: ORTHOPAEDIC SURGERY

## 2020-05-04 NOTE — PROGRESS NOTES
Taty Max presents for post-operative evaluation.  The patient is now 11 weeks s/p left distal radius and distal ulnar ORIF with closed treatment of the scaphoid and 2nd/3rd MC bases.  Overall the patient reports doing well.  The patient reports appropriate postoperative soreness with well controlled overall pain.  She not requiring any pain medication and she feels well today.  Denies numbness or tingling.  She notes that she is doing very well in her recovery and has been typing and working on finger range of motion as tolerated which has significantly helped her range of motion of the fingers return to essentially normal today.  No new complaints today.      PE:    AA&O x 4.  NAD  HEENT:  NCAT, sclera nonicteric  Lungs:  Respirations are equal and unlabored.  CV:  2+ bilateral upper and lower extremity pulses.  MSK: The ulnar and radial wounds have completely healed with no signs of erythema or warmth.  There is no drainage.  No clinical signs or symptoms of infection are present.  Left upper extremity neurovascular intact.  Fingers with full painless range of motion at the MP and IP joints.  Thumb IP flexion-extension intact.  Wrist range of motion gently checked and noted to be within functional range and equal to contralateral side including flexion extension pronation and supination.  This is painless.    Imaging:  Status post ORIF left distal radius and ulnar fractures in good alignment with no evidence of complication      A/P: Status post above, doing well  1) Begin WBAT w/out wrist brace  2) F/U 3 months with new x-rays left hand and wrist.  Will place the patient into a removable wrist brace today which she may remove essentially for hygiene only.  Finger range of motion as tolerated.  Gentle Wrist range of motion acceptable within limits of pain.    3) Call with any questions/concerns in the interim     Please be aware that this note has been generated with the assistance of MModal voice-to-text.   Please excuse any spelling or grammatical errors.

## 2020-05-05 NOTE — PROGRESS NOTES
Occupational Therapy Treatment Note/ D/C Summary     Date: 5/6/2020  Name: Taty Max  Clinic Number: [id number]    Therapy Diagnosis:   Encounter Diagnoses   Name Primary?    Range of motion deficit     Wrist pain, left     Decreased  strength of left hand     Decreased pinch strength      Physician: Garrett Duvall MD     Physician Orders: Finger range of motion as tolerated.  Gentle Wrist range of motion acceptable within limits of pain.   Would like her to take it slow given her comminution and the significance of her fractures including a distal ulnar open reduction internal fixation.  I would like to limit pronation and supination especially.  Medical Diagnosis: Closed fracture of left radius and ulna with routine healing, subsequent encounter  Surgical Procedure and Date: ORIF distal radius and ulna, 2/12/20  Evaluation Date: 03/25/2020  Insurance Authorization Period Expiration: 12/31/20  Plan of Care Certification Period: 3/25/20 to 6/17/20   Date of Return to MD: 5/4/20     Visit # / Visits authorized:  6/ 20     Time In:  4:05 pm  Time Out: 4:55 pm  Total Billable Time:  50 minutes     Date of Last visit: 5/6/20  Total Visits Received: 6  Cancelled Visits: 0  No Show Visits: 0    Precautions:  Diabetes and cancer   SLOW PROGRESSION and  LIMIT PRONATION AND SUPINATION  12 weeks postop    Subjective     Pt reports: The doctor was pleased with her progress. I made myself sore with activities at home.  she was compliant with home exercise program given 3/25/20.   Response to previous treatment:  A little sore  Functional change: doing almost everything    Pain: 1/10  Location: left wrist and thumb      Objective     Patient received fluidotherapy to left  hand(s) for 8 minutes to increase blood flow, circulation, desensitization, sensory re-education and for pain management.    Taty received therapeutic exercises for 42 minutes including:  -2# wrist extension off wedge x 15 reps  -2# wrist flexion  off wedge x 15 reps  -hammer twist for supination/pronation x 3 min  -salmon putty for gripping x 4 min                              3pt pinch x 2 min                               Key pinch x 2 min  Edema: Circumferential measurements: as follows:      Proximal Wrist Crease: right 18.3 cm  Left  21.0 cm      Home Exercises and Education Provided     Education provided:   - Progress towards goals     Written Home Exercises Provided: yes  Exercises were reviewed and Taty was able to demonstrate them prior to the end of the session.  Taty demonstrated good  understanding of the HEP provided.   .   See EMR under Patient Instructions for exercises provided 3/25/20 and 5/6/20.        Assessment      Taty is demonstrating improvements in AROM,  and pinch strengths.  Improvements in overall functional use of left hand is noted with cooking and household tasks. She is pleased with her progress and her ability to function at home, with work tasks and with crafts.    Pt's spiritual, cultural and educational needs considered and pt agreeable to plan of care and goals.    Goals:   Short term goals met in 6 weeks(5/6/20):   Patient to be IND with HEP and modalities for pain/edema management - met    Increase finger AROM tips to palm to increase functional hand use for ADLs/work/leisure activities-met 5/6/20    Increase wrist flexion by 10* to improve self care tasks- met 4/29/20   Decrease complaints of pain to  2 out of 10 with all functional hand use left hand for ADL/work/leisure activities - met 4/29/20.   Patient to be IND wiht Orthotic use, wear and care precautions met 4/29/20     Long term goals met by d/c:   Increase AROM of thumb to full opposition to improve ability to perform leisure activities-progressing   Increase  strength to 20 lbs. to improve functional grasp for ADLs/work/leisure activities- met 4/29/20.    Increase pinch 8 psi's to increase IND all self care tasks and jewelry making-has able to  make jewlery.    Decrease edema 1 cm to increase joint mobility/flexibility for hand use - not met     Decrease complaints of pain to 0/10 with all tasks-progressed with most tasks    Discharge reason: Patient requested discharge    Plan   This patient is discharged from Occupational Therapy    APOLLO Love

## 2020-05-06 ENCOUNTER — CLINICAL SUPPORT (OUTPATIENT)
Dept: REHABILITATION | Facility: HOSPITAL | Age: 65
End: 2020-05-06
Attending: ORTHOPAEDIC SURGERY
Payer: COMMERCIAL

## 2020-05-06 DIAGNOSIS — R29.898 DECREASED PINCH STRENGTH: ICD-10-CM

## 2020-05-06 DIAGNOSIS — M25.60 RANGE OF MOTION DEFICIT: ICD-10-CM

## 2020-05-06 DIAGNOSIS — R29.898 DECREASED GRIP STRENGTH OF LEFT HAND: ICD-10-CM

## 2020-05-06 DIAGNOSIS — M25.532 WRIST PAIN, LEFT: ICD-10-CM

## 2020-05-06 PROCEDURE — 97022 WHIRLPOOL THERAPY: CPT | Mod: PN

## 2020-05-06 PROCEDURE — 97110 THERAPEUTIC EXERCISES: CPT | Mod: PN

## 2020-05-06 NOTE — PATIENT INSTRUCTIONS
Pronation / Supination (Resistive)        Hold hammer weighing  16 ounces and rotate palm up and down. Keep elbow flexed at side and wrist straight.  Repeat _20___ times. Do _3___ sessions per week.    Copyright © I. All rights reserved.   Extension (Resistive)        1.With wrist over edge of table, lift __16__ ounces, keeping arm on table surface. Hold _2___ seconds. Lower slowly.  Repeat __15-20__ times. Do __3__ sessions per week.  Activity: Throw a Frisbee.    2.Repeat with palm facing up.    Copyright © I. All rights reserved.    Strengthening (Resistive Putty)        Squeeze putty using thumb and all fingers.  Repeat 2-3 minutes. Do __3__ sessions per week.    Copyright © I. All rights reserved.   Lateral Pinch Strengthening (Resistive Putty)        Squeeze between thumb and side of each finger in turn.  Repeat __2-3__ minutes. Do __3__ sessions per week.  Copyright © I. All rights reserved.     Three Jaw Naveen Pinch Strengthening (Resistive Putty)        Pull putty, using thumb, index and middle fingers.  Repeat __2-3__ minutes. Do __3__ sessions per week.    Copyright © [employer]. All rights reserved.

## 2020-05-07 DIAGNOSIS — E11.9 TYPE 2 DIABETES MELLITUS WITHOUT COMPLICATION: ICD-10-CM

## 2020-05-10 ENCOUNTER — PATIENT MESSAGE (OUTPATIENT)
Dept: ADMINISTRATIVE | Facility: OTHER | Age: 65
End: 2020-05-10

## 2020-05-12 ENCOUNTER — PATIENT MESSAGE (OUTPATIENT)
Dept: ADMINISTRATIVE | Facility: OTHER | Age: 65
End: 2020-05-12

## 2020-05-14 DIAGNOSIS — R52 PAIN: ICD-10-CM

## 2020-05-14 RX ORDER — TRAMADOL HYDROCHLORIDE 50 MG/1
TABLET ORAL
Qty: 60 TABLET | Refills: 3 | Status: SHIPPED | OUTPATIENT
Start: 2020-05-14 | End: 2020-07-08 | Stop reason: SDUPTHER

## 2020-05-25 ENCOUNTER — PATIENT MESSAGE (OUTPATIENT)
Dept: SURGERY | Facility: CLINIC | Age: 65
End: 2020-05-25

## 2020-05-25 DIAGNOSIS — Z85.038 HISTORY OF COLON CANCER: Primary | ICD-10-CM

## 2020-05-26 DIAGNOSIS — Z12.11 SPECIAL SCREENING FOR MALIGNANT NEOPLASMS, COLON: Primary | ICD-10-CM

## 2020-05-26 DIAGNOSIS — U07.1 COVID-19: Primary | ICD-10-CM

## 2020-05-26 RX ORDER — POLYETHYLENE GLYCOL 3350, SODIUM SULFATE ANHYDROUS, SODIUM BICARBONATE, SODIUM CHLORIDE, POTASSIUM CHLORIDE 236; 22.74; 6.74; 5.86; 2.97 G/4L; G/4L; G/4L; G/4L; G/4L
4 POWDER, FOR SOLUTION ORAL ONCE
Qty: 4000 ML | Refills: 0 | Status: SHIPPED | OUTPATIENT
Start: 2020-05-26 | End: 2020-05-26

## 2020-05-28 RX ORDER — AZELASTINE 1 MG/ML
SPRAY, METERED NASAL
Qty: 30 ML | Refills: 38 | Status: SHIPPED | OUTPATIENT
Start: 2020-05-28 | End: 2021-06-07 | Stop reason: SDUPTHER

## 2020-05-30 RX ORDER — DIAZEPAM 5 MG/1
TABLET ORAL
Qty: 30 TABLET | Refills: 3 | Status: SHIPPED | OUTPATIENT
Start: 2020-05-30 | End: 2020-11-11

## 2020-05-30 RX ORDER — ZOLPIDEM TARTRATE 5 MG/1
TABLET ORAL
Qty: 30 TABLET | Refills: 3 | Status: SHIPPED | OUTPATIENT
Start: 2020-05-30 | End: 2020-09-29 | Stop reason: SDUPTHER

## 2020-06-13 ENCOUNTER — CLINICAL SUPPORT (OUTPATIENT)
Dept: URGENT CARE | Facility: CLINIC | Age: 65
End: 2020-06-13
Payer: COMMERCIAL

## 2020-06-13 DIAGNOSIS — Z03.818 ENCOUNTER FOR OBSERVATION FOR SUSPECTED EXPOSURE TO OTHER BIOLOGICAL AGENTS RULED OUT: Primary | ICD-10-CM

## 2020-06-13 PROCEDURE — 99000 SPECIMEN HANDLING OFFICE-LAB: CPT | Mod: S$GLB,,, | Performed by: NURSE PRACTITIONER

## 2020-06-13 PROCEDURE — U0003 INFECTIOUS AGENT DETECTION BY NUCLEIC ACID (DNA OR RNA); SEVERE ACUTE RESPIRATORY SYNDROME CORONAVIRUS 2 (SARS-COV-2) (CORONAVIRUS DISEASE [COVID-19]), AMPLIFIED PROBE TECHNIQUE, MAKING USE OF HIGH THROUGHPUT TECHNOLOGIES AS DESCRIBED BY CMS-2020-01-R: HCPCS

## 2020-06-13 PROCEDURE — 99000 PR SPECIMEN HANDLING,DR OFF->LAB: ICD-10-PCS | Mod: S$GLB,,, | Performed by: NURSE PRACTITIONER

## 2020-06-13 NOTE — PROGRESS NOTES
Subjective:       Patient ID: Taty Max is a 65 y.o. female.    Vitals:  vitals were not taken for this visit.     Chief Complaint: COVID-19 Concerns    Pt here for covid test     ROS    Objective:      Physical Exam      Assessment:       1. COVID-19        Plan:         COVID-19  -     COVID-19 Routine Screening

## 2020-06-14 ENCOUNTER — ANESTHESIA EVENT (OUTPATIENT)
Dept: ENDOSCOPY | Facility: HOSPITAL | Age: 65
End: 2020-06-14
Payer: COMMERCIAL

## 2020-06-15 ENCOUNTER — ANESTHESIA (OUTPATIENT)
Dept: ENDOSCOPY | Facility: HOSPITAL | Age: 65
End: 2020-06-15
Payer: COMMERCIAL

## 2020-06-15 ENCOUNTER — HOSPITAL ENCOUNTER (OUTPATIENT)
Facility: HOSPITAL | Age: 65
Discharge: HOME | End: 2020-06-15
Attending: COLON & RECTAL SURGERY | Admitting: COLON & RECTAL SURGERY
Payer: COMMERCIAL

## 2020-06-15 VITALS
RESPIRATION RATE: 15 BRPM | WEIGHT: 271 LBS | HEART RATE: 78 BPM | HEIGHT: 67 IN | SYSTOLIC BLOOD PRESSURE: 144 MMHG | BODY MASS INDEX: 42.53 KG/M2 | OXYGEN SATURATION: 96 % | TEMPERATURE: 98 F | DIASTOLIC BLOOD PRESSURE: 77 MMHG

## 2020-06-15 DIAGNOSIS — Z85.038 HISTORY OF COLON CANCER: Primary | ICD-10-CM

## 2020-06-15 LAB
GLUCOSE SERPL-MCNC: 145 MG/DL (ref 70–110)
POCT GLUCOSE: 145 MG/DL (ref 70–110)
SARS-COV-2 RDRP RESP QL NAA+PROBE: NEGATIVE
SARS-COV-2 RNA RESP QL NAA+PROBE: NOT DETECTED

## 2020-06-15 PROCEDURE — U0002 COVID-19 LAB TEST NON-CDC: HCPCS

## 2020-06-15 PROCEDURE — E9220 PRA ENDO ANESTHESIA: HCPCS | Mod: ,,, | Performed by: NURSE ANESTHETIST, CERTIFIED REGISTERED

## 2020-06-15 PROCEDURE — 82962 GLUCOSE BLOOD TEST: CPT | Performed by: COLON & RECTAL SURGERY

## 2020-06-15 PROCEDURE — G0105 COLORECTAL SCRN; HI RISK IND: ICD-10-PCS | Mod: ,,, | Performed by: COLON & RECTAL SURGERY

## 2020-06-15 PROCEDURE — 25000003 PHARM REV CODE 250: Performed by: COLON & RECTAL SURGERY

## 2020-06-15 PROCEDURE — 37000008 HC ANESTHESIA 1ST 15 MINUTES: Performed by: COLON & RECTAL SURGERY

## 2020-06-15 PROCEDURE — G0105 COLORECTAL SCRN; HI RISK IND: HCPCS | Performed by: COLON & RECTAL SURGERY

## 2020-06-15 PROCEDURE — 37000009 HC ANESTHESIA EA ADD 15 MINS: Performed by: COLON & RECTAL SURGERY

## 2020-06-15 PROCEDURE — E9220 PRA ENDO ANESTHESIA: ICD-10-PCS | Mod: ,,, | Performed by: NURSE ANESTHETIST, CERTIFIED REGISTERED

## 2020-06-15 PROCEDURE — G0105 COLORECTAL SCRN; HI RISK IND: HCPCS | Mod: ,,, | Performed by: COLON & RECTAL SURGERY

## 2020-06-15 PROCEDURE — 25000003 PHARM REV CODE 250: Performed by: NURSE ANESTHETIST, CERTIFIED REGISTERED

## 2020-06-15 PROCEDURE — 63600175 PHARM REV CODE 636 W HCPCS: Performed by: NURSE ANESTHETIST, CERTIFIED REGISTERED

## 2020-06-15 RX ORDER — SODIUM CHLORIDE 9 MG/ML
INJECTION, SOLUTION INTRAVENOUS CONTINUOUS
Status: DISCONTINUED | OUTPATIENT
Start: 2020-06-15 | End: 2020-06-15 | Stop reason: HOSPADM

## 2020-06-15 RX ORDER — LIDOCAINE HCL/PF 100 MG/5ML
SYRINGE (ML) INTRAVENOUS
Status: DISCONTINUED | OUTPATIENT
Start: 2020-06-15 | End: 2020-06-15

## 2020-06-15 RX ORDER — PROPOFOL 10 MG/ML
VIAL (ML) INTRAVENOUS
Status: DISCONTINUED | OUTPATIENT
Start: 2020-06-15 | End: 2020-06-15

## 2020-06-15 RX ORDER — LABETALOL HYDROCHLORIDE 5 MG/ML
INJECTION, SOLUTION INTRAVENOUS
Status: DISCONTINUED | OUTPATIENT
Start: 2020-06-15 | End: 2020-06-15

## 2020-06-15 RX ORDER — GLYCOPYRROLATE 0.2 MG/ML
INJECTION INTRAMUSCULAR; INTRAVENOUS
Status: DISCONTINUED | OUTPATIENT
Start: 2020-06-15 | End: 2020-06-15

## 2020-06-15 RX ORDER — PROPOFOL 10 MG/ML
VIAL (ML) INTRAVENOUS CONTINUOUS PRN
Status: DISCONTINUED | OUTPATIENT
Start: 2020-06-15 | End: 2020-06-15

## 2020-06-15 RX ADMIN — PROPOFOL 150 MCG/KG/MIN: 10 INJECTION, EMULSION INTRAVENOUS at 08:06

## 2020-06-15 RX ADMIN — GLYCOPYRROLATE 0.2 MG: 0.2 INJECTION, SOLUTION INTRAMUSCULAR; INTRAVENOUS at 08:06

## 2020-06-15 RX ADMIN — SODIUM CHLORIDE: 0.9 INJECTION, SOLUTION INTRAVENOUS at 08:06

## 2020-06-15 RX ADMIN — Medication 100 MG: at 08:06

## 2020-06-15 RX ADMIN — LABETALOL HYDROCHLORIDE 5 MG: 5 INJECTION, SOLUTION INTRAVENOUS at 08:06

## 2020-06-15 RX ADMIN — PROPOFOL 20 MG: 10 INJECTION, EMULSION INTRAVENOUS at 08:06

## 2020-06-15 RX ADMIN — PROPOFOL 80 MG: 10 INJECTION, EMULSION INTRAVENOUS at 08:06

## 2020-06-15 NOTE — H&P
COLONOSCOPY HISTORY AND PHYSICAL EXAM    Procedure : Colonoscopy      INDICATIONS: personal history of colon cancer    Last Colonoscopy:  2015  Findings: no polyps or masses       Past Medical History:   Diagnosis Date    Allergic rhinitis, seasonal 1/28/2013    Anxiety disorder 1/28/2013    Basal cell carcinoma     Bilateral retinal lattice degeneration     Cataract     Colon cancer 2008    s/p resection    DDD (degenerative disc disease), lumbar 11/25/2014    Diabetes mellitus type II, uncontrolled 7/25/2014    High myopia     History of colon cancer 4/10/2019    History of incisional hernia repair     Horseshoe retinal tear of both eyes     HTN (hypertension) 1/28/2013    Hypertension     Hypothyroidism 4/29/2014    Incisional hernia of anterior abdominal wall without obstruction or gangrene     Lumbar disc disease 7/25/2014    Metabolic syndrome 4/29/2014    Neuropathy due to chemotherapeutic drug 5/22/2017    Osteopenia 5/10/2013    Screening for colorectal cancer 9/11/2015    Normal 6/ 2015---5 yrs    Vitamin D deficiency disease     Vitreous detachment of both eyes      Sedation Problems: NO  Family History   Problem Relation Age of Onset    Cancer Maternal Grandmother     Hyperlipidemia Mother     Hypertension Mother     Breast cancer Mother     Allergies Father     Allergies Sister     Allergies Brother     Heart disease Maternal Grandfather     Stroke Paternal Grandmother     Colon cancer Neg Hx     Ovarian cancer Neg Hx      Fam Hx of Sedation Problems: NO  Social History     Socioeconomic History    Marital status:      Spouse name: Not on file    Number of children: Not on file    Years of education: Not on file    Highest education level: Not on file   Occupational History     Employer: [employer]   Social Needs    Financial resource strain: Not hard at all    Food insecurity     Worry: Never true     Inability: Never true    Transportation needs      Medical: No     Non-medical: No   Tobacco Use    Smoking status: Former Smoker     Quit date: 1977     Years since quittin.9    Smokeless tobacco: Never Used   Substance and Sexual Activity    Alcohol use: Yes     Frequency: Monthly or less     Drinks per session: 1 or 2     Binge frequency: Never     Comment: once per week    Drug use: No    Sexual activity: Yes     Partners: Male     Birth control/protection: Post-menopausal   Lifestyle    Physical activity     Days per week: 0 days     Minutes per session: 0 min    Stress: Only a little   Relationships    Social connections     Talks on phone: More than three times a week     Gets together: Twice a week     Attends Taoism service: Not on file     Active member of club or organization: No     Attends meetings of clubs or organizations: Never     Relationship status:    Other Topics Concern    Are you pregnant or think you may be? Not Asked    Breast-feeding Not Asked   Social History Narrative    Not on file       Review of Systems - Negative except   Respiratory ROS: no dyspnea  Cardiovascular ROS: no exertional chest pain  Gastrointestinal ROS: NO abdominal discomfort,  NO rectal bleeding  Musculoskeletal ROS: no muscular pain  Neurological ROS: no recent stroke    Physical Exam:  LMP  (LMP Unknown)   General: no distress  Head: normocephalic  Mallampati Score   Neck: supple, symmetrical, trachea midline  Lungs:  clear to auscultation bilaterally and normal respiratory effort  Heart: regular rate and rhythm and no murmur  Abdomen: soft, non-tender non-distented; bowel sounds normal; no masses,  no organomegaly  Extremities: no cyanosis or edema, or clubbing    ASA:  III    PLAN  COLONOSCOPY.    SedationPlan :MAC    The details of the procedure, the possible need for biopsy or polypectomy and the potential risks including bleeding, perforation, missed polyps were discussed in detail.

## 2020-06-15 NOTE — TRANSFER OF CARE
"Anesthesia Transfer of Care Note    Patient: Taty Max    Procedure(s) Performed: Procedure(s) (LRB):  COLONOSCOPY (N/A)    Patient location: GI    Anesthesia Type: general    Transport from OR: Transported from OR on room air with adequate spontaneous ventilation    Post pain: adequate analgesia    Post assessment: no apparent anesthetic complications and tolerated procedure well    Post vital signs: stable    Level of consciousness: awake, alert and oriented    Nausea/Vomiting: no nausea/vomiting    Complications: none    Transfer of care protocol was followed      Last vitals:   Visit Vitals  BP (!) 111/56   Pulse 94   Temp 36.7 °C (98 °F)   Resp 16   Ht 5' 7" (1.702 m)   Wt 122.9 kg (271 lb)   LMP  (LMP Unknown)   SpO2 96%   Breastfeeding No   BMI 42.44 kg/m²     "

## 2020-06-15 NOTE — ANESTHESIA PREPROCEDURE EVALUATION
06/15/2020  Taty Max is a 65 y.o., female.    Past Medical History:   Diagnosis Date    Allergic rhinitis, seasonal 1/28/2013    Anxiety disorder 1/28/2013    Basal cell carcinoma     Bilateral retinal lattice degeneration     Cataract     Colon cancer 2008    s/p resection    DDD (degenerative disc disease), lumbar 11/25/2014    Diabetes mellitus type II, uncontrolled 7/25/2014    High myopia     History of colon cancer 4/10/2019    History of incisional hernia repair     Horseshoe retinal tear of both eyes     HTN (hypertension) 1/28/2013    Hypertension     Hypothyroidism 4/29/2014    Incisional hernia of anterior abdominal wall without obstruction or gangrene     Lumbar disc disease 7/25/2014    Metabolic syndrome 4/29/2014    Neuropathy due to chemotherapeutic drug 5/22/2017    Osteopenia 5/10/2013    Screening for colorectal cancer 9/11/2015    Normal 6/ 2015---5 yrs    Vitamin D deficiency disease     Vitreous detachment of both eyes      Patient Active Problem List   Diagnosis    Hypertension    Anxiety disorder    Vitamin D deficiency disease    HTN (hypertension)    Allergic rhinitis, seasonal    Hyperlipidemia    Osteopenia    Horseshoe retinal tear, both eyes    Lattice degeneration, bilateral    Posterior vitreous detachment, both eyes    Posterior capsular opacification, bilateral    After-cataract, obscuring vision - Both Eyes    Vitreous detachment - Both Eyes    Idiopathic hypertension    High myopia - Both Eyes    Hypothyroidism    Refractive error    Diabetes mellitus type II, uncontrolled    Lumbar disc disease    DDD (degenerative disc disease), lumbar    Lumbar spondylosis    Neuropathy due to chemotherapeutic drug    Abnormal liver function tests    Incisional hernia    Morbid obesity    History of colon cancer    Closed fracture  "of left radius and ulna    Closed fracture of left distal radius     Past Surgical History:   Procedure Laterality Date    ADENOIDECTOMY      ANKLE SURGERY      left     BREAST LUMPECTOMY      CATARACT EXTRACTION      CATARACT EXTRACTION BILATERAL W/ ANTERIOR VITRECTOMY      COLECTOMY      s/p reanastemosis    COLON SURGERY      EYE EXAMINATION UNDER ANESTHESIA W/ RETINAL CRYOTHERAPY AND RETINAL LASER      HERNIA REPAIR      KIDNEY SURGERY      "dilate urethra tube"    NASAL POLYP EXCISION      NASAL SEPTUM SURGERY      OPEN REDUCTION AND INTERNAL FIXATION (ORIF) OF FRACTURE OF DISTAL RADIUS Left 2/12/2020    Procedure: ORIF, FRACTURE, RADIUS, DISTAL,  ulna, closed treatment scaphoid fx, ORIF;  Surgeon: Garrett Duvall MD;  Location: [facility] OR;  Service: Orthopedics;  Laterality: Left;    RADIOFREQUENCY ABLATION OF LUMBAR MEDIAL BRANCH NERVE AT SINGLE LEVEL Right 7/6/2018    Procedure: RADIOFREQUENCY ABLATION, NERVE, MEDIAL BRANCH, LUMBAR, 1 LEVEL;  Surgeon: Vashti Poe MD;  Location: [facility];  Service: Pain Management;  Laterality: Right;  RIght Thermal RFA @ L3-5  (REPEAT)  [id number]  with IV Sedation    CONSENT NEEDED    s/p laser ou      TONSILLECTOMY      TUBAL LIGATION      UVULOPALATOPHARYNGOPLASTY  1990s       Anesthesia Evaluation    I have reviewed the Patient Summary Reports.      I have reviewed the Medications.     Review of Systems  Anesthesia Hx:  No problems with previous Anesthesia   Denies Personal Hx of Anesthesia complications.   Social:  Former Smoker, Social Alcohol Use    Hematology/Oncology:  Hematology Normal   Oncology Normal     EENT/Dental:EENT/Dental Normal   Cardiovascular:   Exercise tolerance: good Hypertension, well controlled    Pulmonary:  Pulmonary Normal    Renal/:  Renal/ Normal     Hepatic/GI:  Hepatic/GI Normal Bowel Prep.    Musculoskeletal:   Arthritis     Neurological:  Neurology Normal    Endocrine:   Diabetes, well controlled, type 2 " Hypothyroidism    Dermatological:  Skin Normal    Psych:   Psychiatric History          Physical Exam  General:  Well nourished, Obesity    Airway/Jaw/Neck:  Airway Findings: Mouth Opening: Normal Tongue: Normal  General Airway Assessment: Adult  Mallampati: II  Improves to II with phonation.  TM Distance: Normal, at least 6 cm  Jaw/Neck Findings:  Neck ROM: Normal ROM     Eyes/Ears/Nose:  Eyes/Ears/Nose Findings:    Dental:  Dental Findings: In tact   Chest/Lungs:  Chest/Lungs Clear    Heart/Vascular:  Heart Findings: Normal Heart murmur: negative Vascular Findings: Normal    Abdomen:  Abdomen Findings: Normal    Musculoskeletal:  Musculoskeletal Findings: Normal   Skin:  Skin Findings: Normal    Mental Status:  Mental Status Findings: Normal        Anesthesia Plan  Type of Anesthesia, risks & benefits discussed:  Anesthesia Type:  general  Patient's Preference:   Intra-op Monitoring Plan: standard ASA monitors  Intra-op Monitoring Plan Comments:   Post Op Pain Control Plan:   Post Op Pain Control Plan Comments:   Induction:   IV  Beta Blocker:  Patient is not currently on a Beta-Blocker (No further documentation required).       Informed Consent: Patient understands risks and agrees with Anesthesia plan.  Questions answered. Anesthesia consent signed with patient.  ASA Score: 3     Day of Surgery Review of History & Physical:    H&P update referred to the surgeon.         Ready For Surgery From Anesthesia Perspective.

## 2020-06-15 NOTE — DISCHARGE INSTRUCTIONS
Colonoscopy     A camera attached to a flexible tube with a viewing lens is used to take video pictures.     Colonoscopy is a test to view the inside of your lower digestive tract (colon and rectum). Sometimes it can show the last part of the small intestine (ileum). During the test, small pieces of tissue may be removed for testing. This is called a biopsy. Small growths, such as polyps, may also be removed.   Why is colonoscopy done?  The test is done to help look for colon cancer. And it can help find the source of abdominal pain, bleeding, and changes in bowel habits. It may be needed once a year, depending on factors such as your:  · Age  · Health history  · Family health history  · Symptoms  · Results from any prior colonoscopy  Risks and possible complications  These include:  · Bleeding               · A puncture or tear in the colon   · Risks of anesthesia  · A cancer lesion not being seen  Getting ready   To prepare for the test:  · Talk with your healthcare provider about the risks of the test (see below). Also ask your healthcare provider about alternatives to the test.  · Tell your healthcare provider about any medicines you take. Also tell him or her about any health conditions you may have.  · Make sure your rectum and colon are empty for the test. Follow the diet and bowel prep instructions exactly. If you dont, the test may need to be rescheduled.  · Plan for a friend or family member to drive you home after the test.     Colonoscopy provides an inside view of the entire colon.     You may discuss the results with your doctor right away or at a future visit.  During the test   The test is usually done in the hospital on an outpatient basis. This means you go home the same day. The procedure takes about 30 minutes. During that time:  · You are given relaxing (sedating) medicine through an IV line. You may be drowsy, or fully asleep.  · The healthcare provider will first give you a physical exam to  check for anal and rectal problems.  · Then the anus is lubricated and the scope inserted.  · If you are awake, you may have a feeling similar to needing to have a bowel movement. You may also feel pressure as air is pumped into the colon. Its OK to pass gas during the procedure.  · Biopsy, polyp removal, or other treatments may be done during the test.  After the test   You may have gas right after the test. It can help to try to pass it to help prevent later bloating. Your healthcare provider may discuss the results with you right away. Or you may need to schedule a follow-up visit to talk about the results. After the test, you can go back to your normal eating and other activities. You may be tired from the sedation and need to rest for a few hours.  Date Last Reviewed: 11/1/2016 © 2000-2017 [employer] [address] [place] [address], [place] [address]. All rights reserved. This information is not intended as a substitute for professional medical care. Always follow your healthcare professional's instructions.

## 2020-06-15 NOTE — [employer] PATIENT INSTRUCTIONS
Discharge Summary/Instructions after an Endoscopic Procedure  Patient Name: Taty Max  Patient MRN: [id number]  Patient YOB: 1955  Monday, Fabiana 15, 2020  Khang Smiley MD  RESTRICTIONS:  During your procedure today, you received medications for sedation.  These   medications may affect your judgment, balance and coordination.  Therefore,   for 24 hours, you have the following restrictions:   - DO NOT drive a car, operate machinery, make legal/financial decisions,   sign important papers or drink alcohol.    ACTIVITY:  Today: no heavy lifting, straining or running due to procedural   sedation/anesthesia.  The following day: return to full activity including work.  DIET:  Eat and drink normally unless instructed otherwise.     TREATMENT FOR COMMON SIDE EFFECTS:  - Mild abdominal pain, nausea, belching, bloating or excessive gas:  rest,   eat lightly and use a heating pad.  - Sore Throat: treat with throat lozenges and/or gargle with warm salt   water.  - Because air was used during the procedure, expelling large amounts of air   from your rectum or belching is normal.  - If a bowel prep was taken, you may not have a bowel movement for 1-3 days.    This is normal.  SYMPTOMS TO WATCH FOR AND REPORT TO YOUR PHYSICIAN:  1. Abdominal pain or bloating, other than gas cramps.  2. Chest pain.  3. Back pain.  4. Signs of infection such as: chills or fever occurring within 24 hours   after the procedure.  5. Rectal bleeding, which would show as bright red, maroon, or black stools.   (A tablespoon of blood from the rectum is not serious, especially if   hemorrhoids are present.)  6. Vomiting.  7. Weakness or dizziness.  GO DIRECTLY TO THE NEAREST EMERGENCY ROOM IF YOU HAVE ANY OF THE FOLLOWING:      Difficulty breathing              Chills and/or fever over 101 F   Persistent vomiting and/or vomiting blood   Severe abdominal pain   Severe chest pain   Black, tarry stools   Bleeding- more than one tablespoon   Any  other symptom or condition that you feel may need urgent attention  Your doctor recommends these additional instructions:  If any biopsies were taken, your doctors clinic will contact you in 1 to 2   weeks with any results.  - Discharge patient to home (ambulatory).   - Patient has a contact number available for emergencies.  The signs and   symptoms of potential delayed complications were discussed with the   patient.  Return to normal activities tomorrow.  Written discharge   instructions were provided to the patient.   - Resume previous diet.   - Continue present medications.   - Repeat colonoscopy in 3 years for surveillance.  For questions, problems or results please call your physician - Khang Smiley MD at Work:  [phone].  [facility], EMERGENCY ROOM PHONE NUMBER: [phone]  IF A COMPLICATION OR EMERGENCY SITUATION ARISES AND YOU ARE UNABLE TO REACH   YOUR PHYSICIAN - GO DIRECTLY TO THE EMERGENCY ROOM.  Khang Smiley MD  6/15/2020 8:51:03 AM  This report has been verified and signed electronically.  [employer]

## 2020-06-15 NOTE — ANESTHESIA POSTPROCEDURE EVALUATION
Anesthesia Post Evaluation    Patient: Ttay Max    Procedure(s) Performed: Procedure(s) (LRB):  COLONOSCOPY (N/A)    Final Anesthesia Type: general    Patient location during evaluation: PACU  Patient participation: Yes- Able to Participate  Level of consciousness: awake and alert  Post-procedure vital signs: reviewed and stable  Pain management: adequate  Airway patency: patent    PONV status at discharge: No PONV  Anesthetic complications: no      Cardiovascular status: stable  Respiratory status: spontaneous ventilation and room air  Hydration status: euvolemic  Follow-up not needed.          Vitals Value Taken Time   /77 06/15/20 0922   Temp 36.7 °C (98 °F) 06/15/20 0855   Pulse 78 06/15/20 0922   Resp 15 06/15/20 0922   SpO2 96 % 06/15/20 0922         Event Time   Out of Recovery 09:31:17         Pain/Pina Score: Pina Score: 10 (6/15/2020  8:57 AM)

## 2020-06-17 ENCOUNTER — TELEPHONE (OUTPATIENT)
Dept: URGENT CARE | Facility: CLINIC | Age: 65
End: 2020-06-17

## 2020-06-23 ENCOUNTER — TELEPHONE (OUTPATIENT)
Dept: PAIN MEDICINE | Facility: CLINIC | Age: 65
End: 2020-06-23

## 2020-06-23 NOTE — TELEPHONE ENCOUNTER
Patient was contacted and informed that  no longer works for [employer]. Patient stated that she would follow up with someone at [facility] instead

## 2020-06-23 NOTE — TELEPHONE ENCOUNTER
----- Message from Sabina James sent at 6/22/2020  8:02 AM CDT -----  See below:    Appointment Request From: Taty Max    With Provider: Vashti Poe MD [[facility] [id number]]    Preferred Date Range: 6/22/2020 - 6/30/2020    Preferred Times: Any Time    Reason for visit: back pain    Comments:  trigger points shot

## 2020-06-25 ENCOUNTER — DOCUMENTATION ONLY (OUTPATIENT)
Dept: REHABILITATION | Facility: HOSPITAL | Age: 65
End: 2020-06-25

## 2020-06-25 NOTE — PROGRESS NOTES
Taty returned to therapy stating she was called due to orders placed for therapy.  She previously attended therapy from 3/25/20 to 5/6/20.  Her ROM and strength had improved as well as the functional use of her left hand.  She was d/c from OT secondary to meeting therapy goals.      Assessment of her  and pinch strength today as noted below reveals she has improved over the past 6 weeks with  strength and pinch strength without therapeutic intervention.  She is performing all desired tasks at home and is progressing with how much she is able to lift and carrry.             3/25/20    4/29/20    5/25/20  Right: 77#   Left: 9#               34.5#      43.2#     Pinch Strength: (Pinch Gauge in psi's), Average 3 trials  Key Pinch 3/25/20 R) 13psi's .3  L) 0psi's                   4/29/20                     L) 7.3psi                   5/25/20                     L) 8.6 psi  3pt Pinch 3/25/20  R) 16psi's  L) 0 psi's                  4/29/20                     L) 4.3 psi                  5/25/20                     L) 7.0 psi./oi  Taty was instructed to contact this therapist if any questions arise.

## 2020-07-08 DIAGNOSIS — R52 PAIN: ICD-10-CM

## 2020-07-13 RX ORDER — TRAMADOL HYDROCHLORIDE 50 MG/1
50 TABLET ORAL EVERY 6 HOURS PRN
Qty: 60 TABLET | Refills: 3 | Status: SHIPPED | OUTPATIENT
Start: 2020-07-13 | End: 2020-09-09 | Stop reason: SDUPTHER

## 2020-07-21 ENCOUNTER — PATIENT OUTREACH (OUTPATIENT)
Dept: ADMINISTRATIVE | Facility: HOSPITAL | Age: 65
End: 2020-07-21

## 2020-07-21 ENCOUNTER — PATIENT MESSAGE (OUTPATIENT)
Dept: ADMINISTRATIVE | Facility: HOSPITAL | Age: 65
End: 2020-07-21

## 2020-07-21 DIAGNOSIS — S62.002D CLOSED DISPLACED FRACTURE OF SCAPHOID OF LEFT WRIST WITH ROUTINE HEALING, UNSPECIFIED PORTION OF SCAPHOID, SUBSEQUENT ENCOUNTER: Primary | ICD-10-CM

## 2020-07-24 RX ORDER — CYCLOBENZAPRINE HCL 5 MG
TABLET ORAL
Qty: 30 TABLET | Refills: 2 | Status: SHIPPED | OUTPATIENT
Start: 2020-07-24 | End: 2020-10-15

## 2020-07-31 ENCOUNTER — PATIENT OUTREACH (OUTPATIENT)
Dept: ADMINISTRATIVE | Facility: OTHER | Age: 65
End: 2020-07-31

## 2020-07-31 DIAGNOSIS — E11.9 TYPE 2 DIABETES MELLITUS WITHOUT COMPLICATION, WITHOUT LONG-TERM CURRENT USE OF INSULIN: Primary | ICD-10-CM

## 2020-07-31 NOTE — PROGRESS NOTES
Chart reviewed.   Immunizations: Triggered Imm Registry     Orders placed: eye exam   Upcoming appts to satisfy ESE topics: n/a

## 2020-08-03 ENCOUNTER — OFFICE VISIT (OUTPATIENT)
Dept: ORTHOPEDICS | Facility: CLINIC | Age: 65
End: 2020-08-03
Payer: COMMERCIAL

## 2020-08-03 ENCOUNTER — HOSPITAL ENCOUNTER (OUTPATIENT)
Dept: RADIOLOGY | Facility: OTHER | Age: 65
Discharge: HOME | End: 2020-08-03
Attending: ORTHOPAEDIC SURGERY
Payer: COMMERCIAL

## 2020-08-03 VITALS
DIASTOLIC BLOOD PRESSURE: 88 MMHG | HEIGHT: 67 IN | SYSTOLIC BLOOD PRESSURE: 154 MMHG | BODY MASS INDEX: 42.53 KG/M2 | WEIGHT: 271 LBS

## 2020-08-03 DIAGNOSIS — M18.12 ARTHRITIS OF CARPOMETACARPAL (CMC) JOINT OF LEFT THUMB: Primary | ICD-10-CM

## 2020-08-03 DIAGNOSIS — S62.002D CLOSED DISPLACED FRACTURE OF SCAPHOID OF LEFT WRIST WITH ROUTINE HEALING, UNSPECIFIED PORTION OF SCAPHOID, SUBSEQUENT ENCOUNTER: ICD-10-CM

## 2020-08-03 PROCEDURE — 99213 PR OFFICE/OUTPT VISIT, EST, LEVL III, 20-29 MIN: ICD-10-PCS | Mod: S$GLB,,, | Performed by: ORTHOPAEDIC SURGERY

## 2020-08-03 PROCEDURE — 3077F SYST BP >= 140 MM HG: CPT | Mod: CPTII,S$GLB,, | Performed by: ORTHOPAEDIC SURGERY

## 2020-08-03 PROCEDURE — 3079F DIAST BP 80-89 MM HG: CPT | Mod: CPTII,S$GLB,, | Performed by: ORTHOPAEDIC SURGERY

## 2020-08-03 PROCEDURE — 73110 X-RAY EXAM OF WRIST: CPT | Mod: TC,FY,LT

## 2020-08-03 PROCEDURE — 3008F PR BODY MASS INDEX (BMI) DOCUMENTED: ICD-10-PCS | Mod: CPTII,S$GLB,, | Performed by: ORTHOPAEDIC SURGERY

## 2020-08-03 PROCEDURE — 99213 OFFICE O/P EST LOW 20 MIN: CPT | Mod: S$GLB,,, | Performed by: ORTHOPAEDIC SURGERY

## 2020-08-03 PROCEDURE — 99999 PR PBB SHADOW E&M-EST. PATIENT-LVL IV: ICD-10-PCS | Mod: PBBFAC,,, | Performed by: ORTHOPAEDIC SURGERY

## 2020-08-03 PROCEDURE — 73130 XR HAND COMPLETE 3 VIEW LEFT: ICD-10-PCS | Mod: 26,LT,, | Performed by: RADIOLOGY

## 2020-08-03 PROCEDURE — 3008F BODY MASS INDEX DOCD: CPT | Mod: CPTII,S$GLB,, | Performed by: ORTHOPAEDIC SURGERY

## 2020-08-03 PROCEDURE — 73110 X-RAY EXAM OF WRIST: CPT | Mod: 26,LT,, | Performed by: RADIOLOGY

## 2020-08-03 PROCEDURE — 73130 X-RAY EXAM OF HAND: CPT | Mod: TC,FY,LT

## 2020-08-03 PROCEDURE — 73130 X-RAY EXAM OF HAND: CPT | Mod: 26,LT,, | Performed by: RADIOLOGY

## 2020-08-03 PROCEDURE — 3077F PR MOST RECENT SYSTOLIC BLOOD PRESSURE >= 140 MM HG: ICD-10-PCS | Mod: CPTII,S$GLB,, | Performed by: ORTHOPAEDIC SURGERY

## 2020-08-03 PROCEDURE — 73110 XR WRIST COMPLETE 3 VIEWS LEFT: ICD-10-PCS | Mod: 26,LT,, | Performed by: RADIOLOGY

## 2020-08-03 PROCEDURE — 99999 PR PBB SHADOW E&M-EST. PATIENT-LVL IV: CPT | Mod: PBBFAC,,, | Performed by: ORTHOPAEDIC SURGERY

## 2020-08-03 PROCEDURE — 3079F PR MOST RECENT DIASTOLIC BLOOD PRESSURE 80-89 MM HG: ICD-10-PCS | Mod: CPTII,S$GLB,, | Performed by: ORTHOPAEDIC SURGERY

## 2020-08-03 NOTE — PROGRESS NOTES
Taty Max presents for post-operative evaluation.  The patient is now nearly 6 mos s/p left distal radius and distal ulnar ORIF with closed treatment of the scaphoid and 2nd/3rd  bases.  Overall the patient reports doing well.  The patient reports appropriate postoperative soreness with well controlled overall pain.  She reports that she was doing excellently in regards to her previous left distal radius and ulnar ORIF surgeries until this past weekend when she sustained a source hyperextension injury at the thumb.  Since that time she has had some mild pain at the thumb CMC joint which she has had off and on over the years but has not been quite significant.  She returns today for re-evaluation with no other new complaints.  Denies numbness or tingling.    PE:    AA&O x 4.  NAD  HEENT:  NCAT, sclera nonicteric  Lungs:  Respirations are equal and unlabored.  CV:  2+ bilateral upper and lower extremity pulses.  MSK: The ulnar and radial wounds have completely healed with no signs of erythema or warmth.  There is no drainage.  No clinical signs or symptoms of infection are present.  Left upper extremity neurovascular intact.  Fingers with full painless range of motion at the MP and IP joints.  Thumb IP flexion-extension intact.  Wrist range of motion is full to flexion and extension as well as forearm pronation and supination with no restrictions or limitations and this is completely painless.  This is painless.  She does have a positive CMC grind and circumduction test with pain at the CMC joint of the left thumb.    Imaging:  Status post ORIF left distal radius and ulnar fractures in good alignment with no evidence of complication; significant left thumb CMC arthritis      A/P: Status post above, doing well  1) continue weight-bearing as tolerated without restrictions   2) the patient has done exceptionally well in regards to the injuries sustained in her left hand and wrist with her surgical intervention.   She now mainly has thumb CMC arthritis which is only moderately symptomatic after an injury to the left thumb this past weekend.  We discussed treatment options.  For now, she prefers to observe her symptoms for improvement and I will provide her thumb spica brace for some support and stabilization.  Should she wish to further discuss injection and/or surgery for her left thumb CMC joint she may return any time.      3) Call with any questions/concerns in the interim     Please be aware that this note has been generated with the assistance of [employer] voice-to-text.  Please excuse any spelling or grammatical errors.

## 2020-08-21 DIAGNOSIS — E11.9 TYPE 2 DIABETES MELLITUS WITHOUT COMPLICATION: ICD-10-CM

## 2020-08-28 DIAGNOSIS — E11.9 TYPE 2 DIABETES MELLITUS WITHOUT COMPLICATION: ICD-10-CM

## 2020-09-04 DIAGNOSIS — E11.9 TYPE 2 DIABETES MELLITUS WITHOUT COMPLICATION: ICD-10-CM

## 2020-09-08 ENCOUNTER — PATIENT MESSAGE (OUTPATIENT)
Dept: FAMILY MEDICINE | Facility: CLINIC | Age: 65
End: 2020-09-08

## 2020-09-09 DIAGNOSIS — R52 PAIN: ICD-10-CM

## 2020-09-10 RX ORDER — TRAMADOL HYDROCHLORIDE 50 MG/1
50 TABLET ORAL EVERY 6 HOURS PRN
Qty: 60 TABLET | Refills: 3 | Status: SHIPPED | OUTPATIENT
Start: 2020-09-10 | End: 2020-09-29 | Stop reason: SDUPTHER

## 2020-09-12 ENCOUNTER — LAB VISIT (OUTPATIENT)
Dept: LAB | Facility: HOSPITAL | Age: 65
End: 2020-09-12
Attending: INTERNAL MEDICINE
Payer: COMMERCIAL

## 2020-09-12 LAB
25(OH)D3+25(OH)D2 SERPL-MCNC: 32 NG/ML (ref 30–96)
ALBUMIN SERPL BCP-MCNC: 4.2 G/DL (ref 3.5–5.2)
ALP SERPL-CCNC: 77 U/L (ref 55–135)
ALT SERPL W/O P-5'-P-CCNC: 95 U/L (ref 10–44)
ANION GAP SERPL CALC-SCNC: 16 MMOL/L (ref 8–16)
AST SERPL-CCNC: 107 U/L (ref 10–40)
BASOPHILS # BLD AUTO: 0.05 K/UL (ref 0–0.2)
BASOPHILS NFR BLD: 0.7 % (ref 0–1.9)
BILIRUB SERPL-MCNC: 0.3 MG/DL (ref 0.1–1)
BUN SERPL-MCNC: 14 MG/DL (ref 8–23)
CALCIUM SERPL-MCNC: 10.1 MG/DL (ref 8.7–10.5)
CHLORIDE SERPL-SCNC: 100 MMOL/L (ref 95–110)
CHOLEST SERPL-MCNC: 301 MG/DL (ref 120–199)
CHOLEST/HDLC SERPL: 3.8 {RATIO} (ref 2–5)
CO2 SERPL-SCNC: 23 MMOL/L (ref 23–29)
CREAT SERPL-MCNC: 0.8 MG/DL (ref 0.5–1.4)
DIFFERENTIAL METHOD: NORMAL
EOSINOPHIL # BLD AUTO: 0.2 K/UL (ref 0–0.5)
EOSINOPHIL NFR BLD: 3 % (ref 0–8)
ERYTHROCYTE [DISTWIDTH] IN BLOOD BY AUTOMATED COUNT: 13.4 % (ref 11.5–14.5)
EST. GFR  (AFRICAN AMERICAN): >60 ML/MIN/1.73 M^2
EST. GFR  (NON AFRICAN AMERICAN): >60 ML/MIN/1.73 M^2
ESTIMATED AVG GLUCOSE: 177 MG/DL (ref 68–131)
GLUCOSE SERPL-MCNC: 145 MG/DL (ref 70–110)
HBA1C MFR BLD HPLC: 7.8 % (ref 4–5.6)
HCT VFR BLD AUTO: 43.1 % (ref 37–48.5)
HDLC SERPL-MCNC: 79 MG/DL (ref 40–75)
HDLC SERPL: 26.2 % (ref 20–50)
HGB BLD-MCNC: 14 G/DL (ref 12–16)
IMM GRANULOCYTES # BLD AUTO: 0.02 K/UL (ref 0–0.04)
IMM GRANULOCYTES NFR BLD AUTO: 0.3 % (ref 0–0.5)
LDLC SERPL CALC-MCNC: 172.4 MG/DL (ref 63–159)
LYMPHOCYTES # BLD AUTO: 3 K/UL (ref 1–4.8)
LYMPHOCYTES NFR BLD: 40.1 % (ref 18–48)
MCH RBC QN AUTO: 30.4 PG (ref 27–31)
MCHC RBC AUTO-ENTMCNC: 32.5 G/DL (ref 32–36)
MCV RBC AUTO: 94 FL (ref 82–98)
MONOCYTES # BLD AUTO: 0.5 K/UL (ref 0.3–1)
MONOCYTES NFR BLD: 6.9 % (ref 4–15)
NEUTROPHILS # BLD AUTO: 3.6 K/UL (ref 1.8–7.7)
NEUTROPHILS NFR BLD: 49 % (ref 38–73)
NONHDLC SERPL-MCNC: 222 MG/DL
NRBC BLD-RTO: 0 /100 WBC
PLATELET # BLD AUTO: 316 K/UL (ref 150–350)
PMV BLD AUTO: 10.5 FL (ref 9.2–12.9)
POTASSIUM SERPL-SCNC: 4.3 MMOL/L (ref 3.5–5.1)
PROT SERPL-MCNC: 7.8 G/DL (ref 6–8.4)
RBC # BLD AUTO: 4.61 M/UL (ref 4–5.4)
SODIUM SERPL-SCNC: 139 MMOL/L (ref 136–145)
TRIGL SERPL-MCNC: 248 MG/DL (ref 30–150)
TSH SERPL DL<=0.005 MIU/L-ACNC: 1.64 UIU/ML (ref 0.4–4)
WBC # BLD AUTO: 7.36 K/UL (ref 3.9–12.7)

## 2020-09-12 PROCEDURE — 83036 HEMOGLOBIN GLYCOSYLATED A1C: CPT

## 2020-09-12 PROCEDURE — 80061 LIPID PANEL: CPT

## 2020-09-12 PROCEDURE — 80053 COMPREHEN METABOLIC PANEL: CPT

## 2020-09-12 PROCEDURE — 85025 COMPLETE CBC W/AUTO DIFF WBC: CPT

## 2020-09-12 PROCEDURE — 84443 ASSAY THYROID STIM HORMONE: CPT

## 2020-09-12 PROCEDURE — 36415 COLL VENOUS BLD VENIPUNCTURE: CPT | Mod: PO

## 2020-09-12 PROCEDURE — 82306 VITAMIN D 25 HYDROXY: CPT

## 2020-09-15 ENCOUNTER — PATIENT OUTREACH (OUTPATIENT)
Dept: ADMINISTRATIVE | Facility: HOSPITAL | Age: 65
End: 2020-09-15

## 2020-09-16 ENCOUNTER — OFFICE VISIT (OUTPATIENT)
Dept: ORTHOPEDICS | Facility: CLINIC | Age: 65
End: 2020-09-16
Payer: COMMERCIAL

## 2020-09-16 ENCOUNTER — PATIENT OUTREACH (OUTPATIENT)
Dept: ADMINISTRATIVE | Facility: OTHER | Age: 65
End: 2020-09-16

## 2020-09-16 ENCOUNTER — HOSPITAL ENCOUNTER (OUTPATIENT)
Dept: RADIOLOGY | Facility: HOSPITAL | Age: 65
Discharge: HOME | End: 2020-09-16
Attending: ORTHOPAEDIC SURGERY
Payer: COMMERCIAL

## 2020-09-16 DIAGNOSIS — M10.9 ACUTE GOUT INVOLVING TOE OF RIGHT FOOT, UNSPECIFIED CAUSE: ICD-10-CM

## 2020-09-16 DIAGNOSIS — R52 PAIN: Primary | ICD-10-CM

## 2020-09-16 DIAGNOSIS — R52 PAIN: ICD-10-CM

## 2020-09-16 DIAGNOSIS — M20.11 HALLUX VALGUS OF RIGHT FOOT: ICD-10-CM

## 2020-09-16 DIAGNOSIS — L03.115 CELLULITIS OF RIGHT FOOT: Primary | ICD-10-CM

## 2020-09-16 PROCEDURE — 73630 X-RAY EXAM OF FOOT: CPT | Mod: 26,RT,, | Performed by: RADIOLOGY

## 2020-09-16 PROCEDURE — 99203 OFFICE O/P NEW LOW 30 MIN: CPT | Mod: S$GLB,,, | Performed by: ORTHOPAEDIC SURGERY

## 2020-09-16 PROCEDURE — 73630 XR FOOT COMPLETE 3 VIEW RIGHT: ICD-10-PCS | Mod: 26,RT,, | Performed by: RADIOLOGY

## 2020-09-16 PROCEDURE — 99999 PR PBB SHADOW E&M-EST. PATIENT-LVL II: CPT | Mod: PBBFAC,,, | Performed by: ORTHOPAEDIC SURGERY

## 2020-09-16 PROCEDURE — 99203 PR OFFICE/OUTPT VISIT, NEW, LEVL III, 30-44 MIN: ICD-10-PCS | Mod: S$GLB,,, | Performed by: ORTHOPAEDIC SURGERY

## 2020-09-16 PROCEDURE — 99999 PR PBB SHADOW E&M-EST. PATIENT-LVL II: ICD-10-PCS | Mod: PBBFAC,,, | Performed by: ORTHOPAEDIC SURGERY

## 2020-09-16 PROCEDURE — 73630 X-RAY EXAM OF FOOT: CPT | Mod: TC,RT

## 2020-09-16 RX ORDER — CEFADROXIL 1000 MG/1
1 TABLET ORAL 2 TIMES DAILY
Qty: 14 TABLET | Refills: 0 | Status: SHIPPED | OUTPATIENT
Start: 2020-09-16 | End: 2023-02-13

## 2020-09-16 RX ORDER — INDOMETHACIN 50 MG/1
50 CAPSULE ORAL
Qty: 90 CAPSULE | Refills: 0 | Status: SHIPPED | OUTPATIENT
Start: 2020-09-16 | End: 2023-02-13

## 2020-09-16 NOTE — PROGRESS NOTES
Subjective:      Patient ID: Taty Max is a 65 y.o. female.    Chief Complaint:  Pain, swelling, and redness right foot  HPI:  This is a 65-year-old female with diabetes who developed insidious pain and swelling and redness of her right foot which she attributes to a bunion which she has had for a long time.  She states this has progressed over the last 4 days.  She comes in for evaluation.  She does not report any fevers.  She does not report any trauma.  She does not think she was bit by anything.     Past Medical History:   Diagnosis Date    Allergic rhinitis, seasonal 1/28/2013    Anxiety disorder 1/28/2013    Basal cell carcinoma     Bilateral retinal lattice degeneration     Cataract     Colon cancer 2008    s/p resection    DDD (degenerative disc disease), lumbar 11/25/2014    Diabetes mellitus type II, uncontrolled 7/25/2014    High myopia     History of colon cancer 4/10/2019    History of incisional hernia repair     Horseshoe retinal tear of both eyes     HTN (hypertension) 1/28/2013    Hypertension     Hypothyroidism 4/29/2014    Incisional hernia of anterior abdominal wall without obstruction or gangrene     Lumbar disc disease 7/25/2014    Metabolic syndrome 4/29/2014    Neuropathy due to chemotherapeutic drug 5/22/2017    Osteopenia 5/10/2013    Screening for colorectal cancer 9/11/2015    Normal 6/ 2015---5 yrs    Vitamin D deficiency disease     Vitreous detachment of both eyes          ROS:  Negative for chest pain, shortness of breath, fevers, or unexplained weight loss.      Objective:    Ortho Exam     This is a well-developed well-nourished 5 ft 7 in, 260 lb female who walks in in with an antalgic gait but does not appear to be in any significant distress.  Observation of her right foot reveals moderate swelling of the right forefoot with significant erythema.  She has a significant hallux valgus deformity and is especially erythematous and tender over the medial  eminence of the big toe.  She does have some pain on range of motion of the big toe.  There is moderate warmth.  There is no obvious fluctuance.    Imaging:  I ordered and reviewed standing x-ray of her right foot today.  She has a hallux valgus deformity.  There is no sign of any acute bony injury.  There is no evidence of any bony erosions.  Assessment:       1. Cellulitis of right foot    2. Hallux valgus of right foot          Plan:       Recommendation:  This is an unusual presentation to me.  I suspect this could be cellulitis versus some typ of focal inflammation such as gout.  I do not appreciate any obvious structural abnormalities on exam or plain x-ray that would explain the swelling and erythema.  I am going to order some screening lab work including a CBC, C-reactive protein, sed rate, and a serum uric acid.  I will await the results before making any recommendations for medication.      Lab results:  WBC  10,000                       Sed rate 70                       C-reactive protein 62.2                       Serum uric acid 8.5(elevated)     I prescribed a course of indomethacin as well as cefadroxyl to cover for inflammation and infection.  I will for this to her primary care physician.

## 2020-09-29 ENCOUNTER — OFFICE VISIT (OUTPATIENT)
Dept: FAMILY MEDICINE | Facility: CLINIC | Age: 65
End: 2020-09-29
Payer: COMMERCIAL

## 2020-09-29 VITALS
OXYGEN SATURATION: 96 % | BODY MASS INDEX: 43.36 KG/M2 | HEIGHT: 67 IN | SYSTOLIC BLOOD PRESSURE: 138 MMHG | WEIGHT: 276.25 LBS | HEART RATE: 94 BPM | DIASTOLIC BLOOD PRESSURE: 88 MMHG | TEMPERATURE: 99 F

## 2020-09-29 DIAGNOSIS — R79.89 ABNORMAL LIVER FUNCTION TESTS: ICD-10-CM

## 2020-09-29 DIAGNOSIS — E03.9 HYPOTHYROIDISM, UNSPECIFIED TYPE: ICD-10-CM

## 2020-09-29 DIAGNOSIS — F39 MOOD DISORDER: ICD-10-CM

## 2020-09-29 DIAGNOSIS — R01.1 MURMUR: ICD-10-CM

## 2020-09-29 DIAGNOSIS — G89.29 CHRONIC BILATERAL LOW BACK PAIN WITH BILATERAL SCIATICA: ICD-10-CM

## 2020-09-29 DIAGNOSIS — Z85.038 HISTORY OF COLON CANCER: ICD-10-CM

## 2020-09-29 DIAGNOSIS — R52 PAIN: ICD-10-CM

## 2020-09-29 DIAGNOSIS — M54.41 CHRONIC BILATERAL LOW BACK PAIN WITH BILATERAL SCIATICA: ICD-10-CM

## 2020-09-29 DIAGNOSIS — E78.5 HYPERLIPIDEMIA, UNSPECIFIED HYPERLIPIDEMIA TYPE: ICD-10-CM

## 2020-09-29 DIAGNOSIS — I10 ESSENTIAL HYPERTENSION: ICD-10-CM

## 2020-09-29 DIAGNOSIS — E66.01 MORBID OBESITY: ICD-10-CM

## 2020-09-29 DIAGNOSIS — M54.42 CHRONIC BILATERAL LOW BACK PAIN WITH BILATERAL SCIATICA: ICD-10-CM

## 2020-09-29 DIAGNOSIS — F13.20 SEDATIVE DEPENDENCE: ICD-10-CM

## 2020-09-29 DIAGNOSIS — F11.20 MODERATE TRAMADOL DEPENDENCE: ICD-10-CM

## 2020-09-29 DIAGNOSIS — G47.00 INSOMNIA, UNSPECIFIED TYPE: ICD-10-CM

## 2020-09-29 PROCEDURE — 3075F PR MOST RECENT SYSTOLIC BLOOD PRESS GE 130-139MM HG: ICD-10-PCS | Mod: CPTII,S$GLB,, | Performed by: INTERNAL MEDICINE

## 2020-09-29 PROCEDURE — 99999 PR PBB SHADOW E&M-EST. PATIENT-LVL V: CPT | Mod: PBBFAC,,, | Performed by: INTERNAL MEDICINE

## 2020-09-29 PROCEDURE — 3008F BODY MASS INDEX DOCD: CPT | Mod: CPTII,S$GLB,, | Performed by: INTERNAL MEDICINE

## 2020-09-29 PROCEDURE — 99214 PR OFFICE/OUTPT VISIT, EST, LEVL IV, 30-39 MIN: ICD-10-PCS | Mod: S$GLB,,, | Performed by: INTERNAL MEDICINE

## 2020-09-29 PROCEDURE — 3079F DIAST BP 80-89 MM HG: CPT | Mod: CPTII,S$GLB,, | Performed by: INTERNAL MEDICINE

## 2020-09-29 PROCEDURE — 3051F PR MOST RECENT HEMOGLOBIN A1C LEVEL 7.0 - < 8.0%: ICD-10-PCS | Mod: CPTII,S$GLB,, | Performed by: INTERNAL MEDICINE

## 2020-09-29 PROCEDURE — 99999 PR PBB SHADOW E&M-EST. PATIENT-LVL V: ICD-10-PCS | Mod: PBBFAC,,, | Performed by: INTERNAL MEDICINE

## 2020-09-29 PROCEDURE — 3051F HG A1C>EQUAL 7.0%<8.0%: CPT | Mod: CPTII,S$GLB,, | Performed by: INTERNAL MEDICINE

## 2020-09-29 PROCEDURE — 99214 OFFICE O/P EST MOD 30 MIN: CPT | Mod: S$GLB,,, | Performed by: INTERNAL MEDICINE

## 2020-09-29 PROCEDURE — 3079F PR MOST RECENT DIASTOLIC BLOOD PRESSURE 80-89 MM HG: ICD-10-PCS | Mod: CPTII,S$GLB,, | Performed by: INTERNAL MEDICINE

## 2020-09-29 PROCEDURE — 3075F SYST BP GE 130 - 139MM HG: CPT | Mod: CPTII,S$GLB,, | Performed by: INTERNAL MEDICINE

## 2020-09-29 PROCEDURE — 3008F PR BODY MASS INDEX (BMI) DOCUMENTED: ICD-10-PCS | Mod: CPTII,S$GLB,, | Performed by: INTERNAL MEDICINE

## 2020-09-29 RX ORDER — TRAMADOL HYDROCHLORIDE 50 MG/1
50 TABLET ORAL EVERY 6 HOURS PRN
Qty: 60 TABLET | Refills: 3 | Status: SHIPPED | OUTPATIENT
Start: 2020-09-29 | End: 2020-12-07 | Stop reason: SDUPTHER

## 2020-09-29 RX ORDER — ZOLPIDEM TARTRATE 5 MG/1
5 TABLET ORAL NIGHTLY
Qty: 30 TABLET | Refills: 3 | Status: SHIPPED | OUTPATIENT
Start: 2020-09-29 | End: 2021-01-31

## 2020-09-29 NOTE — PROGRESS NOTES
Chief complaint Followup on blood pressure,, diabetes, cholesterol -last seen 5/19 ?    Patient's a 65-year-old white female poorly compliant with follow-up and general recommendations.  We again discussed her high cholesterol but she had memory issues with statins and possibly Welchol.  She absolutely refuses Zetia and we discussed there is an injection as well and she will research but very resistant.  We did discuss her for symptoms and need for treatment of a high cholesterol could well be a heart attack or stroke.  She is well aware of this along with her other risk factors    Diabetes uncontrolled with A1c of 7.8.  She is only on metformin.  We discussed injections, Amaryl and Januvia.  Will try to give her Januvia 50 mg.  We discussed potential for hypoglycemia which would be less with Januvia.  She has not been able to exercise as much lately.  She had an injury and a left wrist surgery.  She has gained a lot of weight.    Apparently has had a murmur all her life and her mother has some bowel problem that required surgery.  Her mom developed memory problems and was told it was due to the valve problem over the years not from the valve surgery which was done percutaneously.  We have no record of her valvular issue so will update echo.    Discussed issues related to Ambien and tramadol and knows refilled.        She does occasionally take her tramadol for arthralgias.  She  went to physical therapy and so has some increasing pains.  All these issues reviewed and patient counseled.Total time over 25 minutes with over 50% counseling.    ROS:   CONST:  no other new myalgias arthralgias and no new neurological deficits, no skin rashes     PAST MEDICAL HISTORY:                                                        1.  Allergic rhinitis.                                                       2.  Obstructive sleep apnea status post UPPP and treatment with CPAP.        3.  Chronic sinusitis with sinus surgery x2,   Garrett and Dr. Alston.       4.  Hypertension.                                                            5.  Obesity.                                                                 6.  Colon cancer, status post right hemicolectomy and chemotherapy.          7.  History of iron deficiency anemia.                                       8.  Left superficial vein thrombosis of the arm.                             9.  Insomnia.                                                                10.  Basal cell carcinoma.                                                   11.  Memory issues when taking WelChol.                                      12.  Hyperlipidemia with LDL rise on WelChol. Tried 3 statins -various effects- memory issue made her lose a job                               13.  Hypercalcemia secondary to HCTZ.                                        14.  Lumbar radiculopathy with history of epidurals -Dr Saavedra                       15.  Chronic cough due to allergies, per pulmonary workup, 7/2010.           16.  Osteopenia by bone density in 2008.                                     17.  Elevated ALT.                                                           18.  Tubes tied.                                                             19.  Hernia repair.                                                          20.  Lumpectomy, of the breast I presume.   21.  COLONOSCOPY normal 6/15,6/20== 5 years  22. Left ankle surgery 2010  23. METABOLIC SYNDROME/DM -A1c 11/11 was 6.9  24. Vit D def- 1/13   25.  Hypothyroid?,  Placed on [place] Thyroid by Dr. Montgomery??    26.  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  Incisional hernia repair 2018                                                                                                            SOCIAL HISTORY:  Works as a pipe .   for 30 years.  Has       prior marriage and no children.  Quit smoking in 1980.  Drinks once a week.                       "                                                         FAMILY HISTORY:  Father  at 53 in a car accident.  Mom in good health.   Brother 46, a sister is 52.  She states that all members of her family have  Hypertension.                                                                  Vitals as above      Taty was seen today for results.    Diagnoses and all orders for this visit:    Uncontrolled type 2 diabetes mellitus without complication, without long-term current use of insulin, continue metformin, improved diet, lose weight, add Januvia, better compliance with follow-up every three months    Hyperlipidemia, unspecified hyperlipidemia type the, again offered Zetia another medications but she declines, discussed heart attack and stroke risk    Abnormal liver function tests, patient does not drink alcohol although AST was higher than ALT.  She has gained weight and diabetes is worse.  Will get ultrasound to document fatty liver.  Prior CT did not mention fatty liver  -     US Abdomen Limited; Future    Essential hypertension, fair control, patient monitors and said it is good for her    Moderate tramadol dependence, med refill    Mood disorder    Hypothyroidism, unspecified type    Morbid obesity    Insomnia, unspecified type, Ambien refilled    Sedative dependence    Chronic bilateral low back pain with bilateral sciatica    History of colon cancer    Pain  -     traMADoL (ULTRAM) 50 mg tablet; Take 1 tablet (50 mg total) by mouth every 6 (six) hours as needed.    Murmur  -     Echo Color Flow Doppler? Yes    Other orders  -     zolpidem (AMBIEN) 5 MG Tab; Take 1 tablet (5 mg total) by mouth nightly. at bedtime.  -     SITagliptin (JANUVIA) 50 MG Tab; Take 1 tablet (50 mg total) by mouth once daily.           Based on patient's anxiety level after a lengthy discussion today, access to the clinical note I don't think would be therapeutic"This note will not be shared with the patient."  "

## 2020-10-05 ENCOUNTER — TELEPHONE (OUTPATIENT)
Dept: FAMILY MEDICINE | Facility: CLINIC | Age: 65
End: 2020-10-05

## 2020-10-05 NOTE — TELEPHONE ENCOUNTER
----- Message from Ari Wang sent at 10/5/2020  4:07 PM CDT -----   Name of Who is Calling:     What is the request in detail:    request new prescription for medication    metFORMIN (GLUCOPHAGE-XR) 750 MG 24 hr tablet  Please contact to further discuss and advise      Can the clinic reply by [facility]: no     What Number to Call Back [facility]  kasi / oswaldo/ [phone]/ or     [facility] - [place], [place] - [address] AT PORTAL TO REGISTERED [facility] SITES

## 2020-10-06 RX ORDER — METFORMIN HYDROCHLORIDE 750 MG/1
1500 TABLET, EXTENDED RELEASE ORAL EVERY MORNING
Qty: 180 TABLET | Refills: 3 | Status: SHIPPED | OUTPATIENT
Start: 2020-10-06 | End: 2020-10-07 | Stop reason: SDUPTHER

## 2020-10-06 NOTE — TELEPHONE ENCOUNTER
----- Message from Nancy James sent at 10/6/2020  9:34 AM CDT -----      Can the clinic reply in [facility]:N        Please refill the medication(s) listed below. Please call the patient when the prescription(s) is ready for  at this phone number         Medication #1 metFORMIN (GLUCOPHAGE-XR) 750 MG 24 hr tablet    Medication #2       Preferred Pharmacy: [facility] - [place], [place] - [address] AT Portal to [facility] Sites [phone] (Phone)  [phone] (Fax)

## 2020-10-07 RX ORDER — METFORMIN HYDROCHLORIDE 750 MG/1
1500 TABLET, EXTENDED RELEASE ORAL EVERY MORNING
Qty: 180 TABLET | Refills: 3 | Status: SHIPPED | OUTPATIENT
Start: 2020-10-07 | End: 2021-12-10

## 2020-10-07 NOTE — TELEPHONE ENCOUNTER
----- Message from Diana Khanaux sent at 10/7/2020 11:50 AM CDT -----  Regarding: [facility]  Type: RX Refill Request    Who Called:  jay-[facility]   Have you contacted your pharmacy: no     Refill or New Rx: refill   RX Name and Strength: metFORMIN (GLUCOPHAGE-XR) 750 MG ER 24hr tablet    Preferred Pharmacy with phone number:[facility] - [place], [place] - [address] AT Portal to [facility] Sites [phone] (Phone)  [phone] (Fax)        Local or Mail Order: local    Ordering Provider: Ehrensing    Would the patient rather a call back or a response via [facility]?  Call   Best Call Back Number: [phone]

## 2020-11-23 ENCOUNTER — HOSPITAL ENCOUNTER (OUTPATIENT)
Dept: CARDIOLOGY | Facility: HOSPITAL | Age: 65
Discharge: HOME | End: 2020-11-23
Attending: INTERNAL MEDICINE
Payer: COMMERCIAL

## 2020-11-23 ENCOUNTER — HOSPITAL ENCOUNTER (OUTPATIENT)
Dept: RADIOLOGY | Facility: HOSPITAL | Age: 65
Discharge: HOME | End: 2020-11-23
Attending: INTERNAL MEDICINE
Payer: COMMERCIAL

## 2020-11-23 DIAGNOSIS — R79.89 ABNORMAL LIVER FUNCTION TESTS: ICD-10-CM

## 2020-11-23 LAB
ASCENDING AORTA: 2.94 CM
AV INDEX (PROSTH): 0.82
AV MEAN GRADIENT: 13 MMHG
AV PEAK GRADIENT: 20 MMHG
AV VALVE AREA: 2.72 CM2
AV VELOCITY RATIO: 0.73
CV ECHO LV RWT: 0.49 CM
DOP CALC AO PEAK VEL: 2.26 M/S
DOP CALC AO VTI: 43.7 CM
DOP CALC LVOT AREA: 3.3 CM2
DOP CALC LVOT DIAMETER: 2.05 CM
DOP CALC LVOT PEAK VEL: 1.66 M/S
DOP CALC LVOT STROKE VOLUME: 118.89 CM3
DOP CALCLVOT PEAK VEL VTI: 36.04 CM
E WAVE DECELERATION TIME: 159.24 MSEC
E/A RATIO: 0.73
E/E' RATIO: 11.43 M/S
ECHO LV POSTERIOR WALL: 1.02 CM (ref 0.6–1.1)
FRACTIONAL SHORTENING: 42 % (ref 28–44)
INTERVENTRICULAR SEPTUM: 1.35 CM (ref 0.6–1.1)
IVRT: 86.51 MSEC
LA MAJOR: 5.3 CM
LA MINOR: 5.77 CM
LA WIDTH: 4.03 CM
LEFT ATRIUM SIZE: 4 CM
LEFT ATRIUM VOLUME: 75.7 CM3
LEFT INTERNAL DIMENSION IN SYSTOLE: 2.41 CM (ref 2.1–4)
LEFT VENTRICLE DIASTOLIC VOLUME: 77.12 ML
LEFT VENTRICLE SYSTOLIC VOLUME: 20.39 ML
LEFT VENTRICULAR INTERNAL DIMENSION IN DIASTOLE: 4.17 CM (ref 3.5–6)
LEFT VENTRICULAR MASS: 173 G
LV LATERAL E/E' RATIO: 11.43 M/S
LV SEPTAL E/E' RATIO: 11.43 M/S
MV PEAK A VEL: 1.09 M/S
MV PEAK E VEL: 0.8 M/S
MV STENOSIS PRESSURE HALF TIME: 54.05 MS
MV VALVE AREA P 1/2 METHOD: 4.07 CM2
PISA TR MAX VEL: 1.2 M/S
PULM VEIN S/D RATIO: 1.79
PV PEAK D VEL: 0.33 M/S
PV PEAK S VEL: 0.59 M/S
PV PEAK VELOCITY: 1.17 CM/S
RA MAJOR: 4.27 CM
RA PRESSURE: 3 MMHG
RA WIDTH: 3.76 CM
RIGHT VENTRICULAR END-DIASTOLIC DIMENSION: 3.39 CM
RV TISSUE DOPPLER FREE WALL SYSTOLIC VELOCITY 1 (APICAL 4 CHAMBER VIEW): 11.14 CM/S
SINUS: 3.03 CM
STJ: 2.27 CM
TDI LATERAL: 0.07 M/S
TDI SEPTAL: 0.07 M/S
TDI: 0.07 M/S
TR MAX PG: 6 MMHG
TRICUSPID ANNULAR PLANE SYSTOLIC EXCURSION: 1.8 CM
TV REST PULMONARY ARTERY PRESSURE: 9 MMHG

## 2020-11-23 PROCEDURE — 76705 US ABDOMEN LIMITED: ICD-10-PCS | Mod: 26,,, | Performed by: RADIOLOGY

## 2020-11-23 PROCEDURE — 93306 ECHO (CUPID ONLY): ICD-10-PCS | Mod: 26,,, | Performed by: INTERNAL MEDICINE

## 2020-11-23 PROCEDURE — 76705 ECHO EXAM OF ABDOMEN: CPT | Mod: 26,,, | Performed by: RADIOLOGY

## 2020-11-23 PROCEDURE — 93306 TTE W/DOPPLER COMPLETE: CPT

## 2020-11-23 PROCEDURE — 93306 TTE W/DOPPLER COMPLETE: CPT | Mod: 26,,, | Performed by: INTERNAL MEDICINE

## 2020-11-23 PROCEDURE — 76705 ECHO EXAM OF ABDOMEN: CPT | Mod: TC

## 2020-11-24 ENCOUNTER — TELEPHONE (OUTPATIENT)
Dept: FAMILY MEDICINE | Facility: CLINIC | Age: 65
End: 2020-11-24

## 2020-11-24 LAB
LEFT EYE DM RETINOPATHY: NEGATIVE
RIGHT EYE DM RETINOPATHY: NEGATIVE

## 2020-11-24 NOTE — TELEPHONE ENCOUNTER
"Gary/ Pharmacist requested a verbal on Patient's tramadol (ULTRAM) 50 mg tablet to state that "greater than a seven day supply of med. is medically necessary."  I gave the verbal.  Gary verbalized understanding.   "

## 2020-12-07 DIAGNOSIS — R52 PAIN: ICD-10-CM

## 2020-12-11 ENCOUNTER — PATIENT OUTREACH (OUTPATIENT)
Dept: ADMINISTRATIVE | Facility: HOSPITAL | Age: 65
End: 2020-12-11

## 2020-12-11 DIAGNOSIS — Z12.4 SCREENING FOR CERVICAL CANCER: Primary | ICD-10-CM

## 2020-12-11 DIAGNOSIS — E11.9 TYPE 2 DIABETES MELLITUS WITHOUT COMPLICATION, WITHOUT LONG-TERM CURRENT USE OF INSULIN: ICD-10-CM

## 2020-12-11 RX ORDER — TRAMADOL HYDROCHLORIDE 50 MG/1
50 TABLET ORAL EVERY 6 HOURS PRN
Qty: 60 TABLET | Refills: 3 | Status: SHIPPED | OUTPATIENT
Start: 2020-12-11 | End: 2021-02-08

## 2020-12-20 ENCOUNTER — PATIENT OUTREACH (OUTPATIENT)
Dept: ADMINISTRATIVE | Facility: OTHER | Age: 65
End: 2020-12-20

## 2020-12-20 NOTE — PROGRESS NOTES
Care Everywhere:  Immunization: updated  Health Maintenance: updated  Media Review: review for outside eye exam report   Legacy Review:   Order placed:   Upcoming appts:  7.31.2020 optometry referral

## 2020-12-21 RX ORDER — MELOXICAM 7.5 MG/1
TABLET ORAL
Qty: 180 TABLET | Refills: 0 | Status: SHIPPED | OUTPATIENT
Start: 2020-12-21 | End: 2021-03-22

## 2020-12-22 ENCOUNTER — OFFICE VISIT (OUTPATIENT)
Dept: DERMATOLOGY | Facility: CLINIC | Age: 65
End: 2020-12-22
Payer: COMMERCIAL

## 2020-12-22 VITALS — BODY MASS INDEX: 43.23 KG/M2 | WEIGHT: 276 LBS

## 2020-12-22 DIAGNOSIS — L30.4 INTERTRIGO: Primary | ICD-10-CM

## 2020-12-22 DIAGNOSIS — L81.4 LENTIGINES: ICD-10-CM

## 2020-12-22 DIAGNOSIS — L82.1 SEBORRHEIC KERATOSES: ICD-10-CM

## 2020-12-22 DIAGNOSIS — Z85.828 HISTORY OF SKIN CANCER: ICD-10-CM

## 2020-12-22 DIAGNOSIS — L91.8 SKIN TAG: ICD-10-CM

## 2020-12-22 PROCEDURE — 99999 PR PBB SHADOW E&M-EST. PATIENT-LVL IV: CPT | Mod: PBBFAC,,, | Performed by: DERMATOLOGY

## 2020-12-22 PROCEDURE — 99213 PR OFFICE/OUTPT VISIT, EST, LEVL III, 20-29 MIN: ICD-10-PCS | Mod: S$GLB,,, | Performed by: DERMATOLOGY

## 2020-12-22 PROCEDURE — 3008F PR BODY MASS INDEX (BMI) DOCUMENTED: ICD-10-PCS | Mod: CPTII,S$GLB,, | Performed by: DERMATOLOGY

## 2020-12-22 PROCEDURE — 3008F BODY MASS INDEX DOCD: CPT | Mod: CPTII,S$GLB,, | Performed by: DERMATOLOGY

## 2020-12-22 PROCEDURE — 99999 PR PBB SHADOW E&M-EST. PATIENT-LVL IV: ICD-10-PCS | Mod: PBBFAC,,, | Performed by: DERMATOLOGY

## 2020-12-22 PROCEDURE — 1126F AMNT PAIN NOTED NONE PRSNT: CPT | Mod: S$GLB,,, | Performed by: DERMATOLOGY

## 2020-12-22 PROCEDURE — 1126F PR PAIN SEVERITY QUANTIFIED, NO PAIN PRESENT: ICD-10-PCS | Mod: S$GLB,,, | Performed by: DERMATOLOGY

## 2020-12-22 PROCEDURE — 99213 OFFICE O/P EST LOW 20 MIN: CPT | Mod: S$GLB,,, | Performed by: DERMATOLOGY

## 2020-12-22 RX ORDER — TRIAMCINOLONE ACETONIDE 1 MG/G
CREAM TOPICAL
Qty: 454 G | Refills: 3 | Status: SHIPPED | OUTPATIENT
Start: 2020-12-22 | End: 2021-07-30

## 2020-12-22 NOTE — PROGRESS NOTES
Subjective:       Patient ID:  Taty Max is a 65 y.o. female who presents for   Chief Complaint   Patient presents with    Spot     left arm    Rash     chest    Skin Check     UBSE     This is a high risk patient here to check for the development of new lesions.one new spot on left upper arm not painful.  No recurrence of the lesions removed last visit.   Now with rash on chest and thighs used argon oil helped some itches.         Review of Systems   Constitutional: Negative for fever, chills, weight loss, weight gain, fatigue, night sweats and malaise.   Skin: Positive for itching, rash, activity-related sunscreen use and wears hat. Negative for daily sunscreen use.   Hematologic/Lymphatic: Does not bruise/bleed easily.        Objective:    Physical Exam   Constitutional: She appears well-developed and well-nourished. No distress.   Neurological: She is alert and oriented to person, place, and time. She is not disoriented.   Psychiatric: She has a normal mood and affect.   Skin:   Areas Examined (abnormalities noted in diagram):   Head / Face Inspection Performed  Neck Inspection Performed  Chest / Axilla Inspection Performed  Abdomen Inspection Performed  Back Inspection Performed  RUE Inspected  LUE Inspection Performed                   Diagram Legend     Erythematous scaling macule/papule c/w actinic keratosis       Vascular papule c/w angioma      Pigmented verrucoid papule/plaque c/w seborrheic keratosis      Yellow umbilicated papule c/w sebaceous hyperplasia      Irregularly shaped tan macule c/w lentigo     1-2 mm smooth white papules consistent with Milia      Movable subcutaneous cyst with punctum c/w epidermal inclusion cyst      Subcutaneous movable cyst c/w pilar cyst      Firm pink to brown papule c/w dermatofibroma      Pedunculated fleshy papule(s) c/w skin tag(s)      Evenly pigmented macule c/w junctional nevus     Mildly variegated pigmented, slightly irregular-bordered macule c/w  "mildly atypical nevus      Flesh colored to evenly pigmented papule c/w intradermal nevus       Pink pearly papule/plaque c/w basal cell carcinoma      Erythematous hyperkeratotic cursted plaque c/w SCC      Surgical scar with no sign of skin cancer recurrence      Open and closed comedones      Inflammatory papules and pustules      Verrucoid papule consistent consistent with wart     Erythematous eczematous patches and plaques     Dystrophic onycholytic nail with subungual debris c/w onychomycosis     Umbilicated papule    Erythematous-base heme-crusted tan verrucoid plaque consistent with inflamed seborrheic keratosis     Erythematous Silvery Scaling Plaque c/w Psoriasis     See annotation      Assessment / Plan:        Intertrigo  -     triamcinolone acetonide 0.1% (KENALOG) 0.1 % cream; Use bid  Dispense: 454 g; Refill: 3 knows only to use for 2-3 weeks then change to hc cream prn  Vinegar and water 1/2 each use as compress bid      Seborrheic keratoses  reassurance      Skin tag  reassurance  Lentigines  The "ABCD" rules to observe pigmented lesions were reviewed.      History of skin cancer  Comments:  bcc right arm, scc left ear  Area(s) of previous NMSC evaluated with no signs of recurrence.    Upper body skin examination performed today including at least 6 points as noted in physical examination. No lesions suspicious for malignancy noted.               Follow up in about 6 months (around 6/22/2021).  "

## 2021-01-12 ENCOUNTER — OFFICE VISIT (OUTPATIENT)
Dept: FAMILY MEDICINE | Facility: CLINIC | Age: 66
End: 2021-01-12
Payer: COMMERCIAL

## 2021-01-12 VITALS
HEIGHT: 67 IN | HEART RATE: 90 BPM | OXYGEN SATURATION: 95 % | BODY MASS INDEX: 42.66 KG/M2 | TEMPERATURE: 99 F | WEIGHT: 271.81 LBS | DIASTOLIC BLOOD PRESSURE: 70 MMHG | SYSTOLIC BLOOD PRESSURE: 130 MMHG

## 2021-01-12 DIAGNOSIS — Z78.9 STATIN INTOLERANCE: ICD-10-CM

## 2021-01-12 DIAGNOSIS — E03.9 HYPOTHYROIDISM, UNSPECIFIED TYPE: ICD-10-CM

## 2021-01-12 DIAGNOSIS — E78.5 HYPERLIPIDEMIA, UNSPECIFIED HYPERLIPIDEMIA TYPE: ICD-10-CM

## 2021-01-12 DIAGNOSIS — I10 ESSENTIAL HYPERTENSION: ICD-10-CM

## 2021-01-12 DIAGNOSIS — F39 MOOD DISORDER: ICD-10-CM

## 2021-01-12 DIAGNOSIS — R79.89 ABNORMAL LIVER FUNCTION TESTS: ICD-10-CM

## 2021-01-12 DIAGNOSIS — E66.01 MORBID OBESITY: ICD-10-CM

## 2021-01-12 DIAGNOSIS — E11.65 UNCONTROLLED TYPE 2 DIABETES MELLITUS WITH HYPERGLYCEMIA: Primary | ICD-10-CM

## 2021-01-12 PROCEDURE — 3075F PR MOST RECENT SYSTOLIC BLOOD PRESS GE 130-139MM HG: ICD-10-PCS | Mod: CPTII,S$GLB,, | Performed by: INTERNAL MEDICINE

## 2021-01-12 PROCEDURE — 99999 PR PBB SHADOW E&M-EST. PATIENT-LVL IV: CPT | Mod: PBBFAC,,, | Performed by: INTERNAL MEDICINE

## 2021-01-12 PROCEDURE — 3008F BODY MASS INDEX DOCD: CPT | Mod: CPTII,S$GLB,, | Performed by: INTERNAL MEDICINE

## 2021-01-12 PROCEDURE — 3078F PR MOST RECENT DIASTOLIC BLOOD PRESSURE < 80 MM HG: ICD-10-PCS | Mod: CPTII,S$GLB,, | Performed by: INTERNAL MEDICINE

## 2021-01-12 PROCEDURE — 99999 PR PBB SHADOW E&M-EST. PATIENT-LVL IV: ICD-10-PCS | Mod: PBBFAC,,, | Performed by: INTERNAL MEDICINE

## 2021-01-12 PROCEDURE — 3078F DIAST BP <80 MM HG: CPT | Mod: CPTII,S$GLB,, | Performed by: INTERNAL MEDICINE

## 2021-01-12 PROCEDURE — 3051F PR MOST RECENT HEMOGLOBIN A1C LEVEL 7.0 - < 8.0%: ICD-10-PCS | Mod: CPTII,S$GLB,, | Performed by: INTERNAL MEDICINE

## 2021-01-12 PROCEDURE — 3008F PR BODY MASS INDEX (BMI) DOCUMENTED: ICD-10-PCS | Mod: CPTII,S$GLB,, | Performed by: INTERNAL MEDICINE

## 2021-01-12 PROCEDURE — 3051F HG A1C>EQUAL 7.0%<8.0%: CPT | Mod: CPTII,S$GLB,, | Performed by: INTERNAL MEDICINE

## 2021-01-12 PROCEDURE — 99214 OFFICE O/P EST MOD 30 MIN: CPT | Mod: S$GLB,,, | Performed by: INTERNAL MEDICINE

## 2021-01-12 PROCEDURE — 99214 PR OFFICE/OUTPT VISIT, EST, LEVL IV, 30-39 MIN: ICD-10-PCS | Mod: S$GLB,,, | Performed by: INTERNAL MEDICINE

## 2021-01-12 PROCEDURE — 3075F SYST BP GE 130 - 139MM HG: CPT | Mod: CPTII,S$GLB,, | Performed by: INTERNAL MEDICINE

## 2021-01-16 ENCOUNTER — LAB VISIT (OUTPATIENT)
Dept: LAB | Facility: HOSPITAL | Age: 66
End: 2021-01-16
Attending: INTERNAL MEDICINE
Payer: COMMERCIAL

## 2021-01-16 DIAGNOSIS — E11.65 UNCONTROLLED TYPE 2 DIABETES MELLITUS WITH HYPERGLYCEMIA: ICD-10-CM

## 2021-01-16 DIAGNOSIS — E78.5 HYPERLIPIDEMIA, UNSPECIFIED HYPERLIPIDEMIA TYPE: ICD-10-CM

## 2021-01-16 LAB
ALBUMIN SERPL BCP-MCNC: 4.1 G/DL (ref 3.5–5.2)
ALP SERPL-CCNC: 75 U/L (ref 55–135)
ALT SERPL W/O P-5'-P-CCNC: 33 U/L (ref 10–44)
ANION GAP SERPL CALC-SCNC: 9 MMOL/L (ref 8–16)
AST SERPL-CCNC: 32 U/L (ref 10–40)
BILIRUB SERPL-MCNC: 0.4 MG/DL (ref 0.1–1)
BUN SERPL-MCNC: 18 MG/DL (ref 8–23)
CALCIUM SERPL-MCNC: 9.7 MG/DL (ref 8.7–10.5)
CHLORIDE SERPL-SCNC: 100 MMOL/L (ref 95–110)
CHOLEST SERPL-MCNC: 271 MG/DL (ref 120–199)
CHOLEST/HDLC SERPL: 4.2 {RATIO} (ref 2–5)
CO2 SERPL-SCNC: 28 MMOL/L (ref 23–29)
CREAT SERPL-MCNC: 0.8 MG/DL (ref 0.5–1.4)
EST. GFR  (AFRICAN AMERICAN): >60 ML/MIN/1.73 M^2
EST. GFR  (NON AFRICAN AMERICAN): >60 ML/MIN/1.73 M^2
ESTIMATED AVG GLUCOSE: 163 MG/DL (ref 68–131)
GLUCOSE SERPL-MCNC: 149 MG/DL (ref 70–110)
HBA1C MFR BLD HPLC: 7.3 % (ref 4–5.6)
HDLC SERPL-MCNC: 64 MG/DL (ref 40–75)
HDLC SERPL: 23.6 % (ref 20–50)
LDLC SERPL CALC-MCNC: 157 MG/DL (ref 63–159)
NONHDLC SERPL-MCNC: 207 MG/DL
POTASSIUM SERPL-SCNC: 4.6 MMOL/L (ref 3.5–5.1)
PROT SERPL-MCNC: 7.9 G/DL (ref 6–8.4)
SODIUM SERPL-SCNC: 137 MMOL/L (ref 136–145)
TRIGL SERPL-MCNC: 250 MG/DL (ref 30–150)

## 2021-01-16 PROCEDURE — 80061 LIPID PANEL: CPT

## 2021-01-16 PROCEDURE — 80053 COMPREHEN METABOLIC PANEL: CPT

## 2021-01-16 PROCEDURE — 83036 HEMOGLOBIN GLYCOSYLATED A1C: CPT

## 2021-01-16 PROCEDURE — 36415 COLL VENOUS BLD VENIPUNCTURE: CPT | Mod: PO

## 2021-02-09 ENCOUNTER — PATIENT MESSAGE (OUTPATIENT)
Dept: FAMILY MEDICINE | Facility: CLINIC | Age: 66
End: 2021-02-09

## 2021-02-09 DIAGNOSIS — R52 PAIN: ICD-10-CM

## 2021-02-11 RX ORDER — TRAMADOL HYDROCHLORIDE 50 MG/1
50 TABLET ORAL EVERY 6 HOURS PRN
Qty: 60 TABLET | Refills: 1 | Status: SHIPPED | OUTPATIENT
Start: 2021-02-11 | End: 2021-03-10

## 2021-03-22 ENCOUNTER — PATIENT MESSAGE (OUTPATIENT)
Dept: FAMILY MEDICINE | Facility: CLINIC | Age: 66
End: 2021-03-22

## 2021-03-22 ENCOUNTER — PATIENT MESSAGE (OUTPATIENT)
Dept: ORTHOPEDICS | Facility: CLINIC | Age: 66
End: 2021-03-22

## 2021-03-22 DIAGNOSIS — R52 PAIN: ICD-10-CM

## 2021-03-22 RX ORDER — HYDROCHLOROTHIAZIDE 12.5 MG/1
12.5 TABLET ORAL DAILY
Qty: 90 TABLET | Refills: 3 | Status: SHIPPED | OUTPATIENT
Start: 2021-03-22 | End: 2022-02-22 | Stop reason: SDUPTHER

## 2021-03-25 ENCOUNTER — PATIENT MESSAGE (OUTPATIENT)
Dept: FAMILY MEDICINE | Facility: CLINIC | Age: 66
End: 2021-03-25

## 2021-03-25 DIAGNOSIS — R52 PAIN: ICD-10-CM

## 2021-03-26 ENCOUNTER — TELEPHONE (OUTPATIENT)
Dept: FAMILY MEDICINE | Facility: CLINIC | Age: 66
End: 2021-03-26

## 2021-03-26 DIAGNOSIS — R52 PAIN: ICD-10-CM

## 2021-03-26 RX ORDER — TRAMADOL HYDROCHLORIDE 50 MG/1
50 TABLET ORAL EVERY 6 HOURS PRN
Qty: 60 TABLET | Refills: 1 | Status: SHIPPED | OUTPATIENT
Start: 2021-03-26 | End: 2021-04-22

## 2021-04-12 ENCOUNTER — PATIENT MESSAGE (OUTPATIENT)
Dept: ADMINISTRATIVE | Facility: HOSPITAL | Age: 66
End: 2021-04-12

## 2021-05-17 ENCOUNTER — PATIENT MESSAGE (OUTPATIENT)
Dept: FAMILY MEDICINE | Facility: CLINIC | Age: 66
End: 2021-05-17

## 2021-05-17 DIAGNOSIS — Z12.31 ENCOUNTER FOR SCREENING MAMMOGRAM FOR MALIGNANT NEOPLASM OF BREAST: Primary | ICD-10-CM

## 2021-05-25 ENCOUNTER — PATIENT OUTREACH (OUTPATIENT)
Dept: ADMINISTRATIVE | Facility: OTHER | Age: 66
End: 2021-05-25

## 2021-05-28 ENCOUNTER — OFFICE VISIT (OUTPATIENT)
Dept: PULMONOLOGY | Facility: CLINIC | Age: 66
End: 2021-05-28
Payer: COMMERCIAL

## 2021-05-28 ENCOUNTER — HOSPITAL ENCOUNTER (OUTPATIENT)
Dept: RADIOLOGY | Facility: HOSPITAL | Age: 66
Discharge: HOME | End: 2021-05-28
Attending: INTERNAL MEDICINE
Payer: COMMERCIAL

## 2021-05-28 VITALS
HEART RATE: 108 BPM | DIASTOLIC BLOOD PRESSURE: 90 MMHG | SYSTOLIC BLOOD PRESSURE: 153 MMHG | OXYGEN SATURATION: 96 % | TEMPERATURE: 98 F | HEIGHT: 67 IN | BODY MASS INDEX: 41.69 KG/M2 | WEIGHT: 265.63 LBS

## 2021-05-28 DIAGNOSIS — G47.33 OSA ON CPAP: Primary | ICD-10-CM

## 2021-05-28 DIAGNOSIS — Z12.31 ENCOUNTER FOR SCREENING MAMMOGRAM FOR MALIGNANT NEOPLASM OF BREAST: ICD-10-CM

## 2021-05-28 PROCEDURE — 99203 PR OFFICE/OUTPT VISIT, NEW, LEVL III, 30-44 MIN: ICD-10-PCS | Mod: S$GLB,,, | Performed by: NURSE PRACTITIONER

## 2021-05-28 PROCEDURE — 77067 SCR MAMMO BI INCL CAD: CPT | Mod: TC,PO

## 2021-05-28 PROCEDURE — 1125F PR PAIN SEVERITY QUANTIFIED, PAIN PRESENT: ICD-10-PCS | Mod: S$GLB,,, | Performed by: NURSE PRACTITIONER

## 2021-05-28 PROCEDURE — 99203 OFFICE O/P NEW LOW 30 MIN: CPT | Mod: S$GLB,,, | Performed by: NURSE PRACTITIONER

## 2021-05-28 PROCEDURE — 1101F PR PT FALLS ASSESS DOC 0-1 FALLS W/OUT INJ PAST YR: ICD-10-PCS | Mod: CPTII,S$GLB,, | Performed by: NURSE PRACTITIONER

## 2021-05-28 PROCEDURE — 3008F BODY MASS INDEX DOCD: CPT | Mod: CPTII,S$GLB,, | Performed by: NURSE PRACTITIONER

## 2021-05-28 PROCEDURE — 3288F FALL RISK ASSESSMENT DOCD: CPT | Mod: CPTII,S$GLB,, | Performed by: NURSE PRACTITIONER

## 2021-05-28 PROCEDURE — 77063 MAMMO DIGITAL SCREENING BILAT WITH TOMO: ICD-10-PCS | Mod: 26,,, | Performed by: RADIOLOGY

## 2021-05-28 PROCEDURE — 77063 BREAST TOMOSYNTHESIS BI: CPT | Mod: 26,,, | Performed by: RADIOLOGY

## 2021-05-28 PROCEDURE — 99999 PR PBB SHADOW E&M-EST. PATIENT-LVL V: CPT | Mod: PBBFAC,,, | Performed by: NURSE PRACTITIONER

## 2021-05-28 PROCEDURE — 99999 PR PBB SHADOW E&M-EST. PATIENT-LVL V: ICD-10-PCS | Mod: PBBFAC,,, | Performed by: NURSE PRACTITIONER

## 2021-05-28 PROCEDURE — 1125F AMNT PAIN NOTED PAIN PRSNT: CPT | Mod: S$GLB,,, | Performed by: NURSE PRACTITIONER

## 2021-05-28 PROCEDURE — 3288F PR FALLS RISK ASSESSMENT DOCUMENTED: ICD-10-PCS | Mod: CPTII,S$GLB,, | Performed by: NURSE PRACTITIONER

## 2021-05-28 PROCEDURE — 77067 MAMMO DIGITAL SCREENING BILAT WITH TOMO: ICD-10-PCS | Mod: 26,,, | Performed by: RADIOLOGY

## 2021-05-28 PROCEDURE — 77067 SCR MAMMO BI INCL CAD: CPT | Mod: 26,,, | Performed by: RADIOLOGY

## 2021-05-28 PROCEDURE — 3008F PR BODY MASS INDEX (BMI) DOCUMENTED: ICD-10-PCS | Mod: CPTII,S$GLB,, | Performed by: NURSE PRACTITIONER

## 2021-05-28 PROCEDURE — 1101F PT FALLS ASSESS-DOCD LE1/YR: CPT | Mod: CPTII,S$GLB,, | Performed by: NURSE PRACTITIONER

## 2021-05-30 PROBLEM — G47.33 OSA ON CPAP: Status: ACTIVE | Noted: 2021-05-30

## 2021-06-07 RX ORDER — AZELASTINE 1 MG/ML
SPRAY, METERED NASAL
Qty: 30 ML | Refills: 12 | Status: SHIPPED | OUTPATIENT
Start: 2021-06-07

## 2021-06-21 RX ORDER — CYCLOBENZAPRINE HCL 5 MG
TABLET ORAL
Qty: 30 TABLET | Refills: 12 | Status: SHIPPED | OUTPATIENT
Start: 2021-06-21 | End: 2021-08-12 | Stop reason: SDUPTHER

## 2021-06-23 RX ORDER — MELOXICAM 7.5 MG/1
TABLET ORAL
Qty: 180 TABLET | Refills: 12 | Status: SHIPPED | OUTPATIENT
Start: 2021-06-23 | End: 2022-05-17

## 2021-07-02 ENCOUNTER — PATIENT OUTREACH (OUTPATIENT)
Dept: ADMINISTRATIVE | Facility: OTHER | Age: 66
End: 2021-07-02

## 2021-07-19 DIAGNOSIS — R52 PAIN: ICD-10-CM

## 2021-07-20 RX ORDER — TRAMADOL HYDROCHLORIDE 50 MG/1
50 TABLET ORAL EVERY 6 HOURS PRN
Qty: 60 TABLET | Refills: 0 | Status: SHIPPED | OUTPATIENT
Start: 2021-07-20 | End: 2021-07-22 | Stop reason: SDUPTHER

## 2021-07-22 ENCOUNTER — PATIENT MESSAGE (OUTPATIENT)
Dept: FAMILY MEDICINE | Facility: CLINIC | Age: 66
End: 2021-07-22

## 2021-07-22 DIAGNOSIS — R52 PAIN: ICD-10-CM

## 2021-07-22 RX ORDER — TRAMADOL HYDROCHLORIDE 50 MG/1
50 TABLET ORAL EVERY 6 HOURS PRN
Qty: 60 TABLET | Refills: 0 | Status: SHIPPED | OUTPATIENT
Start: 2021-07-22 | End: 2021-08-06

## 2021-07-27 ENCOUNTER — OFFICE VISIT (OUTPATIENT)
Dept: PULMONOLOGY | Facility: CLINIC | Age: 66
End: 2021-07-27
Payer: COMMERCIAL

## 2021-07-27 VITALS
HEART RATE: 91 BPM | DIASTOLIC BLOOD PRESSURE: 92 MMHG | TEMPERATURE: 99 F | WEIGHT: 267.44 LBS | OXYGEN SATURATION: 96 % | HEIGHT: 67 IN | BODY MASS INDEX: 41.98 KG/M2 | SYSTOLIC BLOOD PRESSURE: 160 MMHG

## 2021-07-27 DIAGNOSIS — G47.33 OSA ON CPAP: Primary | ICD-10-CM

## 2021-07-27 PROCEDURE — 3077F SYST BP >= 140 MM HG: CPT | Mod: CPTII,S$GLB,, | Performed by: NURSE PRACTITIONER

## 2021-07-27 PROCEDURE — 3080F DIAST BP >= 90 MM HG: CPT | Mod: CPTII,S$GLB,, | Performed by: NURSE PRACTITIONER

## 2021-07-27 PROCEDURE — 3288F PR FALLS RISK ASSESSMENT DOCUMENTED: ICD-10-PCS | Mod: CPTII,S$GLB,, | Performed by: NURSE PRACTITIONER

## 2021-07-27 PROCEDURE — 3051F HG A1C>EQUAL 7.0%<8.0%: CPT | Mod: CPTII,S$GLB,, | Performed by: NURSE PRACTITIONER

## 2021-07-27 PROCEDURE — 1159F MED LIST DOCD IN RCRD: CPT | Mod: CPTII,S$GLB,, | Performed by: NURSE PRACTITIONER

## 2021-07-27 PROCEDURE — 3080F PR MOST RECENT DIASTOLIC BLOOD PRESSURE >= 90 MM HG: ICD-10-PCS | Mod: CPTII,S$GLB,, | Performed by: NURSE PRACTITIONER

## 2021-07-27 PROCEDURE — 99999 PR PBB SHADOW E&M-EST. PATIENT-LVL V: CPT | Mod: PBBFAC,,, | Performed by: NURSE PRACTITIONER

## 2021-07-27 PROCEDURE — 1159F PR MEDICATION LIST DOCUMENTED IN MEDICAL RECORD: ICD-10-PCS | Mod: CPTII,S$GLB,, | Performed by: NURSE PRACTITIONER

## 2021-07-27 PROCEDURE — 99999 PR PBB SHADOW E&M-EST. PATIENT-LVL V: ICD-10-PCS | Mod: PBBFAC,,, | Performed by: NURSE PRACTITIONER

## 2021-07-27 PROCEDURE — 1101F PT FALLS ASSESS-DOCD LE1/YR: CPT | Mod: CPTII,S$GLB,, | Performed by: NURSE PRACTITIONER

## 2021-07-27 PROCEDURE — 1101F PR PT FALLS ASSESS DOC 0-1 FALLS W/OUT INJ PAST YR: ICD-10-PCS | Mod: CPTII,S$GLB,, | Performed by: NURSE PRACTITIONER

## 2021-07-27 PROCEDURE — 1125F AMNT PAIN NOTED PAIN PRSNT: CPT | Mod: CPTII,S$GLB,, | Performed by: NURSE PRACTITIONER

## 2021-07-27 PROCEDURE — 3008F PR BODY MASS INDEX (BMI) DOCUMENTED: ICD-10-PCS | Mod: CPTII,S$GLB,, | Performed by: NURSE PRACTITIONER

## 2021-07-27 PROCEDURE — 3288F FALL RISK ASSESSMENT DOCD: CPT | Mod: CPTII,S$GLB,, | Performed by: NURSE PRACTITIONER

## 2021-07-27 PROCEDURE — 1125F PR PAIN SEVERITY QUANTIFIED, PAIN PRESENT: ICD-10-PCS | Mod: CPTII,S$GLB,, | Performed by: NURSE PRACTITIONER

## 2021-07-27 PROCEDURE — 3051F PR MOST RECENT HEMOGLOBIN A1C LEVEL 7.0 - < 8.0%: ICD-10-PCS | Mod: CPTII,S$GLB,, | Performed by: NURSE PRACTITIONER

## 2021-07-27 PROCEDURE — 99213 OFFICE O/P EST LOW 20 MIN: CPT | Mod: S$GLB,,, | Performed by: NURSE PRACTITIONER

## 2021-07-27 PROCEDURE — 3077F PR MOST RECENT SYSTOLIC BLOOD PRESSURE >= 140 MM HG: ICD-10-PCS | Mod: CPTII,S$GLB,, | Performed by: NURSE PRACTITIONER

## 2021-07-27 PROCEDURE — 3008F BODY MASS INDEX DOCD: CPT | Mod: CPTII,S$GLB,, | Performed by: NURSE PRACTITIONER

## 2021-07-27 PROCEDURE — 99213 PR OFFICE/OUTPT VISIT, EST, LEVL III, 20-29 MIN: ICD-10-PCS | Mod: S$GLB,,, | Performed by: NURSE PRACTITIONER

## 2021-08-02 ENCOUNTER — PATIENT OUTREACH (OUTPATIENT)
Dept: ADMINISTRATIVE | Facility: HOSPITAL | Age: 66
End: 2021-08-02

## 2021-08-02 ENCOUNTER — PATIENT MESSAGE (OUTPATIENT)
Dept: PULMONOLOGY | Facility: CLINIC | Age: 66
End: 2021-08-02

## 2021-08-02 DIAGNOSIS — M81.0 OSTEOPOROSIS, UNSPECIFIED OSTEOPOROSIS TYPE, UNSPECIFIED PATHOLOGICAL FRACTURE PRESENCE: ICD-10-CM

## 2021-08-02 DIAGNOSIS — E11.9 TYPE 2 DIABETES MELLITUS WITHOUT COMPLICATION, WITHOUT LONG-TERM CURRENT USE OF INSULIN: Primary | ICD-10-CM

## 2021-08-04 ENCOUNTER — PATIENT MESSAGE (OUTPATIENT)
Dept: ADMINISTRATIVE | Facility: HOSPITAL | Age: 66
End: 2021-08-04

## 2021-08-07 ENCOUNTER — LAB VISIT (OUTPATIENT)
Dept: LAB | Facility: HOSPITAL | Age: 66
End: 2021-08-07
Attending: INTERNAL MEDICINE
Payer: COMMERCIAL

## 2021-08-07 DIAGNOSIS — E11.9 TYPE 2 DIABETES MELLITUS WITHOUT COMPLICATION, WITHOUT LONG-TERM CURRENT USE OF INSULIN: ICD-10-CM

## 2021-08-07 LAB
ESTIMATED AVG GLUCOSE: 151 MG/DL (ref 68–131)
HBA1C MFR BLD: 6.9 % (ref 4–5.6)

## 2021-08-07 PROCEDURE — 36415 COLL VENOUS BLD VENIPUNCTURE: CPT | Mod: PO | Performed by: INTERNAL MEDICINE

## 2021-08-07 PROCEDURE — 83036 HEMOGLOBIN GLYCOSYLATED A1C: CPT | Performed by: INTERNAL MEDICINE

## 2021-08-09 ENCOUNTER — PATIENT MESSAGE (OUTPATIENT)
Dept: FAMILY MEDICINE | Facility: CLINIC | Age: 66
End: 2021-08-09

## 2021-08-09 DIAGNOSIS — R52 PAIN: ICD-10-CM

## 2021-08-09 RX ORDER — TRAMADOL HYDROCHLORIDE 50 MG/1
50 TABLET ORAL EVERY 6 HOURS PRN
Qty: 60 TABLET | Refills: 5 | Status: SHIPPED | OUTPATIENT
Start: 2021-08-09 | End: 2021-10-28

## 2021-08-12 ENCOUNTER — OFFICE VISIT (OUTPATIENT)
Dept: FAMILY MEDICINE | Facility: CLINIC | Age: 66
End: 2021-08-12
Payer: COMMERCIAL

## 2021-08-12 ENCOUNTER — PATIENT MESSAGE (OUTPATIENT)
Dept: PULMONOLOGY | Facility: CLINIC | Age: 66
End: 2021-08-12

## 2021-08-12 VITALS
DIASTOLIC BLOOD PRESSURE: 88 MMHG | WEIGHT: 266.31 LBS | HEIGHT: 67 IN | HEART RATE: 94 BPM | TEMPERATURE: 99 F | OXYGEN SATURATION: 95 % | BODY MASS INDEX: 41.8 KG/M2 | SYSTOLIC BLOOD PRESSURE: 152 MMHG

## 2021-08-12 DIAGNOSIS — I10 ESSENTIAL HYPERTENSION: ICD-10-CM

## 2021-08-12 DIAGNOSIS — E03.9 HYPOTHYROIDISM, UNSPECIFIED TYPE: ICD-10-CM

## 2021-08-12 DIAGNOSIS — G47.00 INSOMNIA, UNSPECIFIED TYPE: ICD-10-CM

## 2021-08-12 DIAGNOSIS — F11.20 MODERATE TRAMADOL DEPENDENCE: ICD-10-CM

## 2021-08-12 DIAGNOSIS — T45.1X5A NEUROPATHY DUE TO CHEMOTHERAPEUTIC DRUG: ICD-10-CM

## 2021-08-12 DIAGNOSIS — F39 MOOD DISORDER: ICD-10-CM

## 2021-08-12 DIAGNOSIS — G89.29 CHRONIC BILATERAL LOW BACK PAIN WITH BILATERAL SCIATICA: ICD-10-CM

## 2021-08-12 DIAGNOSIS — G62.0 NEUROPATHY DUE TO CHEMOTHERAPEUTIC DRUG: ICD-10-CM

## 2021-08-12 DIAGNOSIS — E66.01 MORBID OBESITY: ICD-10-CM

## 2021-08-12 DIAGNOSIS — R79.89 ABNORMAL LIVER FUNCTION TESTS: ICD-10-CM

## 2021-08-12 DIAGNOSIS — Z78.9 STATIN INTOLERANCE: ICD-10-CM

## 2021-08-12 DIAGNOSIS — E11.65 UNCONTROLLED TYPE 2 DIABETES MELLITUS WITH HYPERGLYCEMIA: Primary | ICD-10-CM

## 2021-08-12 DIAGNOSIS — M54.41 CHRONIC BILATERAL LOW BACK PAIN WITH BILATERAL SCIATICA: ICD-10-CM

## 2021-08-12 DIAGNOSIS — F13.20 SEDATIVE DEPENDENCE: ICD-10-CM

## 2021-08-12 DIAGNOSIS — M54.42 CHRONIC BILATERAL LOW BACK PAIN WITH BILATERAL SCIATICA: ICD-10-CM

## 2021-08-12 PROCEDURE — 3288F FALL RISK ASSESSMENT DOCD: CPT | Mod: CPTII,S$GLB,, | Performed by: INTERNAL MEDICINE

## 2021-08-12 PROCEDURE — 99999 PR PBB SHADOW E&M-EST. PATIENT-LVL IV: CPT | Mod: PBBFAC,,, | Performed by: INTERNAL MEDICINE

## 2021-08-12 PROCEDURE — 99214 OFFICE O/P EST MOD 30 MIN: CPT | Mod: S$GLB,,, | Performed by: INTERNAL MEDICINE

## 2021-08-12 PROCEDURE — 3288F PR FALLS RISK ASSESSMENT DOCUMENTED: ICD-10-PCS | Mod: CPTII,S$GLB,, | Performed by: INTERNAL MEDICINE

## 2021-08-12 PROCEDURE — 3044F HG A1C LEVEL LT 7.0%: CPT | Mod: CPTII,S$GLB,, | Performed by: INTERNAL MEDICINE

## 2021-08-12 PROCEDURE — 1159F PR MEDICATION LIST DOCUMENTED IN MEDICAL RECORD: ICD-10-PCS | Mod: CPTII,S$GLB,, | Performed by: INTERNAL MEDICINE

## 2021-08-12 PROCEDURE — 99214 PR OFFICE/OUTPT VISIT, EST, LEVL IV, 30-39 MIN: ICD-10-PCS | Mod: S$GLB,,, | Performed by: INTERNAL MEDICINE

## 2021-08-12 PROCEDURE — 1101F PT FALLS ASSESS-DOCD LE1/YR: CPT | Mod: CPTII,S$GLB,, | Performed by: INTERNAL MEDICINE

## 2021-08-12 PROCEDURE — 1101F PR PT FALLS ASSESS DOC 0-1 FALLS W/OUT INJ PAST YR: ICD-10-PCS | Mod: CPTII,S$GLB,, | Performed by: INTERNAL MEDICINE

## 2021-08-12 PROCEDURE — 3079F DIAST BP 80-89 MM HG: CPT | Mod: CPTII,S$GLB,, | Performed by: INTERNAL MEDICINE

## 2021-08-12 PROCEDURE — 1159F MED LIST DOCD IN RCRD: CPT | Mod: CPTII,S$GLB,, | Performed by: INTERNAL MEDICINE

## 2021-08-12 PROCEDURE — 3044F PR MOST RECENT HEMOGLOBIN A1C LEVEL <7.0%: ICD-10-PCS | Mod: CPTII,S$GLB,, | Performed by: INTERNAL MEDICINE

## 2021-08-12 PROCEDURE — 3079F PR MOST RECENT DIASTOLIC BLOOD PRESSURE 80-89 MM HG: ICD-10-PCS | Mod: CPTII,S$GLB,, | Performed by: INTERNAL MEDICINE

## 2021-08-12 PROCEDURE — 3008F PR BODY MASS INDEX (BMI) DOCUMENTED: ICD-10-PCS | Mod: CPTII,S$GLB,, | Performed by: INTERNAL MEDICINE

## 2021-08-12 PROCEDURE — 3077F PR MOST RECENT SYSTOLIC BLOOD PRESSURE >= 140 MM HG: ICD-10-PCS | Mod: CPTII,S$GLB,, | Performed by: INTERNAL MEDICINE

## 2021-08-12 PROCEDURE — 3077F SYST BP >= 140 MM HG: CPT | Mod: CPTII,S$GLB,, | Performed by: INTERNAL MEDICINE

## 2021-08-12 PROCEDURE — 3008F BODY MASS INDEX DOCD: CPT | Mod: CPTII,S$GLB,, | Performed by: INTERNAL MEDICINE

## 2021-08-12 PROCEDURE — 99999 PR PBB SHADOW E&M-EST. PATIENT-LVL IV: ICD-10-PCS | Mod: PBBFAC,,, | Performed by: INTERNAL MEDICINE

## 2021-08-12 RX ORDER — CYCLOBENZAPRINE HCL 5 MG
5 TABLET ORAL NIGHTLY
Qty: 30 TABLET | Refills: 12 | Status: SHIPPED | OUTPATIENT
Start: 2021-08-12 | End: 2021-08-12 | Stop reason: SDUPTHER

## 2021-08-12 RX ORDER — CYCLOBENZAPRINE HCL 5 MG
5 TABLET ORAL 3 TIMES DAILY PRN
Qty: 90 TABLET | Refills: 12 | Status: SHIPPED | OUTPATIENT
Start: 2021-08-12 | End: 2022-07-08

## 2021-08-19 ENCOUNTER — TELEPHONE (OUTPATIENT)
Dept: FAMILY MEDICINE | Facility: CLINIC | Age: 66
End: 2021-08-19

## 2021-09-04 ENCOUNTER — TELEPHONE (OUTPATIENT)
Dept: NEUROSURGERY | Facility: CLINIC | Age: 66
End: 2021-09-04

## 2021-09-07 ENCOUNTER — TELEPHONE (OUTPATIENT)
Dept: NEUROSURGERY | Facility: CLINIC | Age: 66
End: 2021-09-07

## 2021-09-07 DIAGNOSIS — M54.9 BACK PAIN, UNSPECIFIED BACK LOCATION, UNSPECIFIED BACK PAIN LATERALITY, UNSPECIFIED CHRONICITY: Primary | ICD-10-CM

## 2021-09-10 ENCOUNTER — TELEPHONE (OUTPATIENT)
Dept: NEUROSURGERY | Facility: CLINIC | Age: 66
End: 2021-09-10

## 2021-09-13 ENCOUNTER — TELEPHONE (OUTPATIENT)
Dept: NEUROSURGERY | Facility: CLINIC | Age: 66
End: 2021-09-13

## 2021-09-13 ENCOUNTER — HOSPITAL ENCOUNTER (OUTPATIENT)
Dept: RADIOLOGY | Facility: HOSPITAL | Age: 66
Discharge: HOME | End: 2021-09-13
Attending: NURSE PRACTITIONER
Payer: COMMERCIAL

## 2021-09-13 ENCOUNTER — OFFICE VISIT (OUTPATIENT)
Dept: NEUROSURGERY | Facility: CLINIC | Age: 66
End: 2021-09-13
Payer: COMMERCIAL

## 2021-09-13 VITALS
HEART RATE: 103 BPM | DIASTOLIC BLOOD PRESSURE: 95 MMHG | SYSTOLIC BLOOD PRESSURE: 171 MMHG | TEMPERATURE: 97 F | BODY MASS INDEX: 41.57 KG/M2 | WEIGHT: 264.88 LBS | HEIGHT: 67 IN

## 2021-09-13 DIAGNOSIS — M54.9 BACK PAIN, UNSPECIFIED BACK LOCATION, UNSPECIFIED BACK PAIN LATERALITY, UNSPECIFIED CHRONICITY: ICD-10-CM

## 2021-09-13 DIAGNOSIS — M54.40 BILATERAL LOW BACK PAIN WITH SCIATICA, SCIATICA LATERALITY UNSPECIFIED, UNSPECIFIED CHRONICITY: Primary | ICD-10-CM

## 2021-09-13 PROCEDURE — 3066F PR DOCUMENTATION OF TREATMENT FOR NEPHROPATHY: ICD-10-PCS | Mod: CPTII,S$GLB,, | Performed by: STUDENT IN AN ORGANIZED HEALTH CARE EDUCATION/TRAINING PROGRAM

## 2021-09-13 PROCEDURE — 99203 PR OFFICE/OUTPT VISIT, NEW, LEVL III, 30-44 MIN: ICD-10-PCS | Mod: S$GLB,,, | Performed by: STUDENT IN AN ORGANIZED HEALTH CARE EDUCATION/TRAINING PROGRAM

## 2021-09-13 PROCEDURE — 3288F FALL RISK ASSESSMENT DOCD: CPT | Mod: CPTII,S$GLB,, | Performed by: STUDENT IN AN ORGANIZED HEALTH CARE EDUCATION/TRAINING PROGRAM

## 2021-09-13 PROCEDURE — 99999 PR PBB SHADOW E&M-EST. PATIENT-LVL V: CPT | Mod: PBBFAC,,, | Performed by: STUDENT IN AN ORGANIZED HEALTH CARE EDUCATION/TRAINING PROGRAM

## 2021-09-13 PROCEDURE — 3077F SYST BP >= 140 MM HG: CPT | Mod: CPTII,S$GLB,, | Performed by: STUDENT IN AN ORGANIZED HEALTH CARE EDUCATION/TRAINING PROGRAM

## 2021-09-13 PROCEDURE — 72110 XR LUMBAR SPINE AP AND LAT WITH FLEX/EXT: ICD-10-PCS | Mod: 26,,, | Performed by: RADIOLOGY

## 2021-09-13 PROCEDURE — 1159F MED LIST DOCD IN RCRD: CPT | Mod: CPTII,S$GLB,, | Performed by: STUDENT IN AN ORGANIZED HEALTH CARE EDUCATION/TRAINING PROGRAM

## 2021-09-13 PROCEDURE — 3080F DIAST BP >= 90 MM HG: CPT | Mod: CPTII,S$GLB,, | Performed by: STUDENT IN AN ORGANIZED HEALTH CARE EDUCATION/TRAINING PROGRAM

## 2021-09-13 PROCEDURE — 3061F PR NEG MICROALBUMINURIA RESULT DOCUMENTED/REVIEW: ICD-10-PCS | Mod: CPTII,S$GLB,, | Performed by: STUDENT IN AN ORGANIZED HEALTH CARE EDUCATION/TRAINING PROGRAM

## 2021-09-13 PROCEDURE — 3077F PR MOST RECENT SYSTOLIC BLOOD PRESSURE >= 140 MM HG: ICD-10-PCS | Mod: CPTII,S$GLB,, | Performed by: STUDENT IN AN ORGANIZED HEALTH CARE EDUCATION/TRAINING PROGRAM

## 2021-09-13 PROCEDURE — 4010F ACE/ARB THERAPY RXD/TAKEN: CPT | Mod: CPTII,S$GLB,, | Performed by: STUDENT IN AN ORGANIZED HEALTH CARE EDUCATION/TRAINING PROGRAM

## 2021-09-13 PROCEDURE — 72110 X-RAY EXAM L-2 SPINE 4/>VWS: CPT | Mod: TC

## 2021-09-13 PROCEDURE — 99999 PR PBB SHADOW E&M-EST. PATIENT-LVL V: ICD-10-PCS | Mod: PBBFAC,,, | Performed by: STUDENT IN AN ORGANIZED HEALTH CARE EDUCATION/TRAINING PROGRAM

## 2021-09-13 PROCEDURE — 3066F NEPHROPATHY DOC TX: CPT | Mod: CPTII,S$GLB,, | Performed by: STUDENT IN AN ORGANIZED HEALTH CARE EDUCATION/TRAINING PROGRAM

## 2021-09-13 PROCEDURE — 3288F PR FALLS RISK ASSESSMENT DOCUMENTED: ICD-10-PCS | Mod: CPTII,S$GLB,, | Performed by: STUDENT IN AN ORGANIZED HEALTH CARE EDUCATION/TRAINING PROGRAM

## 2021-09-13 PROCEDURE — 99203 OFFICE O/P NEW LOW 30 MIN: CPT | Mod: S$GLB,,, | Performed by: STUDENT IN AN ORGANIZED HEALTH CARE EDUCATION/TRAINING PROGRAM

## 2021-09-13 PROCEDURE — 1159F PR MEDICATION LIST DOCUMENTED IN MEDICAL RECORD: ICD-10-PCS | Mod: CPTII,S$GLB,, | Performed by: STUDENT IN AN ORGANIZED HEALTH CARE EDUCATION/TRAINING PROGRAM

## 2021-09-13 PROCEDURE — 72110 X-RAY EXAM L-2 SPINE 4/>VWS: CPT | Mod: 26,,, | Performed by: RADIOLOGY

## 2021-09-13 PROCEDURE — 3044F PR MOST RECENT HEMOGLOBIN A1C LEVEL <7.0%: ICD-10-PCS | Mod: CPTII,S$GLB,, | Performed by: STUDENT IN AN ORGANIZED HEALTH CARE EDUCATION/TRAINING PROGRAM

## 2021-09-13 PROCEDURE — 3008F PR BODY MASS INDEX (BMI) DOCUMENTED: ICD-10-PCS | Mod: CPTII,S$GLB,, | Performed by: STUDENT IN AN ORGANIZED HEALTH CARE EDUCATION/TRAINING PROGRAM

## 2021-09-13 PROCEDURE — 1101F PT FALLS ASSESS-DOCD LE1/YR: CPT | Mod: CPTII,S$GLB,, | Performed by: STUDENT IN AN ORGANIZED HEALTH CARE EDUCATION/TRAINING PROGRAM

## 2021-09-13 PROCEDURE — 3008F BODY MASS INDEX DOCD: CPT | Mod: CPTII,S$GLB,, | Performed by: STUDENT IN AN ORGANIZED HEALTH CARE EDUCATION/TRAINING PROGRAM

## 2021-09-13 PROCEDURE — 4010F PR ACE/ARB THEARPY RXD/TAKEN: ICD-10-PCS | Mod: CPTII,S$GLB,, | Performed by: STUDENT IN AN ORGANIZED HEALTH CARE EDUCATION/TRAINING PROGRAM

## 2021-09-13 PROCEDURE — 3061F NEG MICROALBUMINURIA REV: CPT | Mod: CPTII,S$GLB,, | Performed by: STUDENT IN AN ORGANIZED HEALTH CARE EDUCATION/TRAINING PROGRAM

## 2021-09-13 PROCEDURE — 3080F PR MOST RECENT DIASTOLIC BLOOD PRESSURE >= 90 MM HG: ICD-10-PCS | Mod: CPTII,S$GLB,, | Performed by: STUDENT IN AN ORGANIZED HEALTH CARE EDUCATION/TRAINING PROGRAM

## 2021-09-13 PROCEDURE — 1101F PR PT FALLS ASSESS DOC 0-1 FALLS W/OUT INJ PAST YR: ICD-10-PCS | Mod: CPTII,S$GLB,, | Performed by: STUDENT IN AN ORGANIZED HEALTH CARE EDUCATION/TRAINING PROGRAM

## 2021-09-13 PROCEDURE — 3044F HG A1C LEVEL LT 7.0%: CPT | Mod: CPTII,S$GLB,, | Performed by: STUDENT IN AN ORGANIZED HEALTH CARE EDUCATION/TRAINING PROGRAM

## 2021-09-15 ENCOUNTER — PATIENT MESSAGE (OUTPATIENT)
Dept: NEUROSURGERY | Facility: CLINIC | Age: 66
End: 2021-09-15

## 2021-09-20 ENCOUNTER — HOSPITAL ENCOUNTER (OUTPATIENT)
Dept: RADIOLOGY | Facility: HOSPITAL | Age: 66
Discharge: HOME | End: 2021-09-20
Attending: STUDENT IN AN ORGANIZED HEALTH CARE EDUCATION/TRAINING PROGRAM
Payer: COMMERCIAL

## 2021-09-20 DIAGNOSIS — M54.40 BILATERAL LOW BACK PAIN WITH SCIATICA, SCIATICA LATERALITY UNSPECIFIED, UNSPECIFIED CHRONICITY: ICD-10-CM

## 2021-09-20 PROCEDURE — 72148 MRI LUMBAR SPINE WITHOUT CONTRAST: ICD-10-PCS | Mod: 26,,, | Performed by: RADIOLOGY

## 2021-09-20 PROCEDURE — 72082 X-RAY EXAM ENTIRE SPI 2/3 VW: CPT | Mod: 26,,, | Performed by: RADIOLOGY

## 2021-09-20 PROCEDURE — 72148 MRI LUMBAR SPINE W/O DYE: CPT | Mod: 26,,, | Performed by: RADIOLOGY

## 2021-09-20 PROCEDURE — 72082 X-RAY EXAM ENTIRE SPI 2/3 VW: CPT | Mod: TC

## 2021-09-20 PROCEDURE — 72148 MRI LUMBAR SPINE W/O DYE: CPT | Mod: TC

## 2021-09-20 PROCEDURE — 72082 XR SCOLIOSIS COMPLETE: ICD-10-PCS | Mod: 26,,, | Performed by: RADIOLOGY

## 2021-09-21 ENCOUNTER — HOSPITAL ENCOUNTER (OUTPATIENT)
Dept: RADIOLOGY | Facility: CLINIC | Age: 66
Discharge: HOME | End: 2021-09-21
Attending: INTERNAL MEDICINE
Payer: COMMERCIAL

## 2021-09-21 DIAGNOSIS — M81.0 OSTEOPOROSIS, UNSPECIFIED OSTEOPOROSIS TYPE, UNSPECIFIED PATHOLOGICAL FRACTURE PRESENCE: ICD-10-CM

## 2021-09-21 PROCEDURE — 77080 DXA BONE DENSITY AXIAL: CPT | Mod: TC,PO

## 2021-09-21 PROCEDURE — 77080 DEXA BONE DENSITY SPINE HIP: ICD-10-PCS | Mod: 26,,, | Performed by: INTERNAL MEDICINE

## 2021-09-21 PROCEDURE — 77080 DXA BONE DENSITY AXIAL: CPT | Mod: 26,,, | Performed by: INTERNAL MEDICINE

## 2021-09-27 ENCOUNTER — OFFICE VISIT (OUTPATIENT)
Dept: NEUROSURGERY | Facility: CLINIC | Age: 66
End: 2021-09-27
Payer: COMMERCIAL

## 2021-09-27 VITALS
SYSTOLIC BLOOD PRESSURE: 187 MMHG | DIASTOLIC BLOOD PRESSURE: 120 MMHG | HEART RATE: 92 BPM | HEIGHT: 67 IN | BODY MASS INDEX: 41.49 KG/M2 | TEMPERATURE: 98 F

## 2021-09-27 DIAGNOSIS — M54.40 BILATERAL LOW BACK PAIN WITH SCIATICA, SCIATICA LATERALITY UNSPECIFIED, UNSPECIFIED CHRONICITY: Primary | ICD-10-CM

## 2021-09-27 PROCEDURE — 3066F PR DOCUMENTATION OF TREATMENT FOR NEPHROPATHY: ICD-10-PCS | Mod: CPTII,S$GLB,, | Performed by: STUDENT IN AN ORGANIZED HEALTH CARE EDUCATION/TRAINING PROGRAM

## 2021-09-27 PROCEDURE — 99999 PR PBB SHADOW E&M-EST. PATIENT-LVL V: CPT | Mod: PBBFAC,,, | Performed by: STUDENT IN AN ORGANIZED HEALTH CARE EDUCATION/TRAINING PROGRAM

## 2021-09-27 PROCEDURE — 3061F NEG MICROALBUMINURIA REV: CPT | Mod: CPTII,S$GLB,, | Performed by: STUDENT IN AN ORGANIZED HEALTH CARE EDUCATION/TRAINING PROGRAM

## 2021-09-27 PROCEDURE — 1101F PR PT FALLS ASSESS DOC 0-1 FALLS W/OUT INJ PAST YR: ICD-10-PCS | Mod: CPTII,S$GLB,, | Performed by: STUDENT IN AN ORGANIZED HEALTH CARE EDUCATION/TRAINING PROGRAM

## 2021-09-27 PROCEDURE — 1159F MED LIST DOCD IN RCRD: CPT | Mod: CPTII,S$GLB,, | Performed by: STUDENT IN AN ORGANIZED HEALTH CARE EDUCATION/TRAINING PROGRAM

## 2021-09-27 PROCEDURE — 99213 OFFICE O/P EST LOW 20 MIN: CPT | Mod: S$GLB,,, | Performed by: STUDENT IN AN ORGANIZED HEALTH CARE EDUCATION/TRAINING PROGRAM

## 2021-09-27 PROCEDURE — 4010F PR ACE/ARB THEARPY RXD/TAKEN: ICD-10-PCS | Mod: CPTII,S$GLB,, | Performed by: STUDENT IN AN ORGANIZED HEALTH CARE EDUCATION/TRAINING PROGRAM

## 2021-09-27 PROCEDURE — 3066F NEPHROPATHY DOC TX: CPT | Mod: CPTII,S$GLB,, | Performed by: STUDENT IN AN ORGANIZED HEALTH CARE EDUCATION/TRAINING PROGRAM

## 2021-09-27 PROCEDURE — 3077F SYST BP >= 140 MM HG: CPT | Mod: CPTII,S$GLB,, | Performed by: STUDENT IN AN ORGANIZED HEALTH CARE EDUCATION/TRAINING PROGRAM

## 2021-09-27 PROCEDURE — 3044F PR MOST RECENT HEMOGLOBIN A1C LEVEL <7.0%: ICD-10-PCS | Mod: CPTII,S$GLB,, | Performed by: STUDENT IN AN ORGANIZED HEALTH CARE EDUCATION/TRAINING PROGRAM

## 2021-09-27 PROCEDURE — 1159F PR MEDICATION LIST DOCUMENTED IN MEDICAL RECORD: ICD-10-PCS | Mod: CPTII,S$GLB,, | Performed by: STUDENT IN AN ORGANIZED HEALTH CARE EDUCATION/TRAINING PROGRAM

## 2021-09-27 PROCEDURE — 3080F PR MOST RECENT DIASTOLIC BLOOD PRESSURE >= 90 MM HG: ICD-10-PCS | Mod: CPTII,S$GLB,, | Performed by: STUDENT IN AN ORGANIZED HEALTH CARE EDUCATION/TRAINING PROGRAM

## 2021-09-27 PROCEDURE — 3044F HG A1C LEVEL LT 7.0%: CPT | Mod: CPTII,S$GLB,, | Performed by: STUDENT IN AN ORGANIZED HEALTH CARE EDUCATION/TRAINING PROGRAM

## 2021-09-27 PROCEDURE — 3288F FALL RISK ASSESSMENT DOCD: CPT | Mod: CPTII,S$GLB,, | Performed by: STUDENT IN AN ORGANIZED HEALTH CARE EDUCATION/TRAINING PROGRAM

## 2021-09-27 PROCEDURE — 3077F PR MOST RECENT SYSTOLIC BLOOD PRESSURE >= 140 MM HG: ICD-10-PCS | Mod: CPTII,S$GLB,, | Performed by: STUDENT IN AN ORGANIZED HEALTH CARE EDUCATION/TRAINING PROGRAM

## 2021-09-27 PROCEDURE — 99999 PR PBB SHADOW E&M-EST. PATIENT-LVL V: ICD-10-PCS | Mod: PBBFAC,,, | Performed by: STUDENT IN AN ORGANIZED HEALTH CARE EDUCATION/TRAINING PROGRAM

## 2021-09-27 PROCEDURE — 99213 PR OFFICE/OUTPT VISIT, EST, LEVL III, 20-29 MIN: ICD-10-PCS | Mod: S$GLB,,, | Performed by: STUDENT IN AN ORGANIZED HEALTH CARE EDUCATION/TRAINING PROGRAM

## 2021-09-27 PROCEDURE — 3008F PR BODY MASS INDEX (BMI) DOCUMENTED: ICD-10-PCS | Mod: CPTII,S$GLB,, | Performed by: STUDENT IN AN ORGANIZED HEALTH CARE EDUCATION/TRAINING PROGRAM

## 2021-09-27 PROCEDURE — 4010F ACE/ARB THERAPY RXD/TAKEN: CPT | Mod: CPTII,S$GLB,, | Performed by: STUDENT IN AN ORGANIZED HEALTH CARE EDUCATION/TRAINING PROGRAM

## 2021-09-27 PROCEDURE — 3080F DIAST BP >= 90 MM HG: CPT | Mod: CPTII,S$GLB,, | Performed by: STUDENT IN AN ORGANIZED HEALTH CARE EDUCATION/TRAINING PROGRAM

## 2021-09-27 PROCEDURE — 3061F PR NEG MICROALBUMINURIA RESULT DOCUMENTED/REVIEW: ICD-10-PCS | Mod: CPTII,S$GLB,, | Performed by: STUDENT IN AN ORGANIZED HEALTH CARE EDUCATION/TRAINING PROGRAM

## 2021-09-27 PROCEDURE — 3288F PR FALLS RISK ASSESSMENT DOCUMENTED: ICD-10-PCS | Mod: CPTII,S$GLB,, | Performed by: STUDENT IN AN ORGANIZED HEALTH CARE EDUCATION/TRAINING PROGRAM

## 2021-09-27 PROCEDURE — 1101F PT FALLS ASSESS-DOCD LE1/YR: CPT | Mod: CPTII,S$GLB,, | Performed by: STUDENT IN AN ORGANIZED HEALTH CARE EDUCATION/TRAINING PROGRAM

## 2021-09-27 PROCEDURE — 3008F BODY MASS INDEX DOCD: CPT | Mod: CPTII,S$GLB,, | Performed by: STUDENT IN AN ORGANIZED HEALTH CARE EDUCATION/TRAINING PROGRAM

## 2021-10-06 ENCOUNTER — OFFICE VISIT (OUTPATIENT)
Dept: PAIN MEDICINE | Facility: CLINIC | Age: 66
End: 2021-10-06
Payer: COMMERCIAL

## 2021-10-06 ENCOUNTER — PATIENT MESSAGE (OUTPATIENT)
Dept: NEUROSURGERY | Facility: CLINIC | Age: 66
End: 2021-10-06

## 2021-10-06 VITALS
SYSTOLIC BLOOD PRESSURE: 173 MMHG | HEIGHT: 67 IN | HEART RATE: 104 BPM | WEIGHT: 265.19 LBS | TEMPERATURE: 98 F | BODY MASS INDEX: 41.62 KG/M2 | OXYGEN SATURATION: 95 % | DIASTOLIC BLOOD PRESSURE: 82 MMHG

## 2021-10-06 DIAGNOSIS — M51.36 DDD (DEGENERATIVE DISC DISEASE), LUMBAR: ICD-10-CM

## 2021-10-06 DIAGNOSIS — M51.9 LUMBAR DISC DISEASE: ICD-10-CM

## 2021-10-06 DIAGNOSIS — M47.816 LUMBAR SPONDYLOSIS: Primary | ICD-10-CM

## 2021-10-06 PROCEDURE — 3008F BODY MASS INDEX DOCD: CPT | Mod: CPTII,S$GLB,, | Performed by: PAIN MEDICINE

## 2021-10-06 PROCEDURE — 3079F DIAST BP 80-89 MM HG: CPT | Mod: CPTII,S$GLB,, | Performed by: PAIN MEDICINE

## 2021-10-06 PROCEDURE — 3044F HG A1C LEVEL LT 7.0%: CPT | Mod: CPTII,S$GLB,, | Performed by: PAIN MEDICINE

## 2021-10-06 PROCEDURE — 1159F PR MEDICATION LIST DOCUMENTED IN MEDICAL RECORD: ICD-10-PCS | Mod: CPTII,S$GLB,, | Performed by: PAIN MEDICINE

## 2021-10-06 PROCEDURE — 99999 PR PBB SHADOW E&M-EST. PATIENT-LVL V: ICD-10-PCS | Mod: PBBFAC,,, | Performed by: PAIN MEDICINE

## 2021-10-06 PROCEDURE — 3066F NEPHROPATHY DOC TX: CPT | Mod: CPTII,S$GLB,, | Performed by: PAIN MEDICINE

## 2021-10-06 PROCEDURE — 3044F PR MOST RECENT HEMOGLOBIN A1C LEVEL <7.0%: ICD-10-PCS | Mod: CPTII,S$GLB,, | Performed by: PAIN MEDICINE

## 2021-10-06 PROCEDURE — 99214 PR OFFICE/OUTPT VISIT, EST, LEVL IV, 30-39 MIN: ICD-10-PCS | Mod: S$GLB,,, | Performed by: PAIN MEDICINE

## 2021-10-06 PROCEDURE — 3079F PR MOST RECENT DIASTOLIC BLOOD PRESSURE 80-89 MM HG: ICD-10-PCS | Mod: CPTII,S$GLB,, | Performed by: PAIN MEDICINE

## 2021-10-06 PROCEDURE — 1101F PT FALLS ASSESS-DOCD LE1/YR: CPT | Mod: CPTII,S$GLB,, | Performed by: PAIN MEDICINE

## 2021-10-06 PROCEDURE — 4010F PR ACE/ARB THEARPY RXD/TAKEN: ICD-10-PCS | Mod: CPTII,S$GLB,, | Performed by: PAIN MEDICINE

## 2021-10-06 PROCEDURE — 3008F PR BODY MASS INDEX (BMI) DOCUMENTED: ICD-10-PCS | Mod: CPTII,S$GLB,, | Performed by: PAIN MEDICINE

## 2021-10-06 PROCEDURE — 3077F PR MOST RECENT SYSTOLIC BLOOD PRESSURE >= 140 MM HG: ICD-10-PCS | Mod: CPTII,S$GLB,, | Performed by: PAIN MEDICINE

## 2021-10-06 PROCEDURE — 3077F SYST BP >= 140 MM HG: CPT | Mod: CPTII,S$GLB,, | Performed by: PAIN MEDICINE

## 2021-10-06 PROCEDURE — 1160F PR REVIEW ALL MEDS BY PRESCRIBER/CLIN PHARMACIST DOCUMENTED: ICD-10-PCS | Mod: CPTII,S$GLB,, | Performed by: PAIN MEDICINE

## 2021-10-06 PROCEDURE — 1160F RVW MEDS BY RX/DR IN RCRD: CPT | Mod: CPTII,S$GLB,, | Performed by: PAIN MEDICINE

## 2021-10-06 PROCEDURE — 1101F PR PT FALLS ASSESS DOC 0-1 FALLS W/OUT INJ PAST YR: ICD-10-PCS | Mod: CPTII,S$GLB,, | Performed by: PAIN MEDICINE

## 2021-10-06 PROCEDURE — 99214 OFFICE O/P EST MOD 30 MIN: CPT | Mod: S$GLB,,, | Performed by: PAIN MEDICINE

## 2021-10-06 PROCEDURE — 3061F NEG MICROALBUMINURIA REV: CPT | Mod: CPTII,S$GLB,, | Performed by: PAIN MEDICINE

## 2021-10-06 PROCEDURE — 4010F ACE/ARB THERAPY RXD/TAKEN: CPT | Mod: CPTII,S$GLB,, | Performed by: PAIN MEDICINE

## 2021-10-06 PROCEDURE — 3288F PR FALLS RISK ASSESSMENT DOCUMENTED: ICD-10-PCS | Mod: CPTII,S$GLB,, | Performed by: PAIN MEDICINE

## 2021-10-06 PROCEDURE — 1159F MED LIST DOCD IN RCRD: CPT | Mod: CPTII,S$GLB,, | Performed by: PAIN MEDICINE

## 2021-10-06 PROCEDURE — 3066F PR DOCUMENTATION OF TREATMENT FOR NEPHROPATHY: ICD-10-PCS | Mod: CPTII,S$GLB,, | Performed by: PAIN MEDICINE

## 2021-10-06 PROCEDURE — 99999 PR PBB SHADOW E&M-EST. PATIENT-LVL V: CPT | Mod: PBBFAC,,, | Performed by: PAIN MEDICINE

## 2021-10-06 PROCEDURE — 3061F PR NEG MICROALBUMINURIA RESULT DOCUMENTED/REVIEW: ICD-10-PCS | Mod: CPTII,S$GLB,, | Performed by: PAIN MEDICINE

## 2021-10-06 PROCEDURE — 3288F FALL RISK ASSESSMENT DOCD: CPT | Mod: CPTII,S$GLB,, | Performed by: PAIN MEDICINE

## 2021-10-18 ENCOUNTER — PATIENT MESSAGE (OUTPATIENT)
Dept: ADMINISTRATIVE | Facility: HOSPITAL | Age: 66
End: 2021-10-18
Payer: COMMERCIAL

## 2021-10-18 ENCOUNTER — PATIENT MESSAGE (OUTPATIENT)
Dept: FAMILY MEDICINE | Facility: CLINIC | Age: 66
End: 2021-10-18
Payer: COMMERCIAL

## 2021-10-18 DIAGNOSIS — I10 ESSENTIAL HYPERTENSION: ICD-10-CM

## 2021-10-18 DIAGNOSIS — E11.65 UNCONTROLLED TYPE 2 DIABETES MELLITUS WITH HYPERGLYCEMIA: Primary | ICD-10-CM

## 2021-10-18 DIAGNOSIS — E03.9 HYPOTHYROIDISM, UNSPECIFIED TYPE: ICD-10-CM

## 2021-10-29 ENCOUNTER — TELEPHONE (OUTPATIENT)
Dept: PAIN MEDICINE | Facility: CLINIC | Age: 66
End: 2021-10-29
Payer: COMMERCIAL

## 2021-11-01 ENCOUNTER — TELEPHONE (OUTPATIENT)
Dept: PAIN MEDICINE | Facility: CLINIC | Age: 66
End: 2021-11-01
Payer: COMMERCIAL

## 2021-11-05 ENCOUNTER — HOSPITAL ENCOUNTER (OUTPATIENT)
Facility: HOSPITAL | Age: 66
Discharge: HOME | End: 2021-11-05
Attending: PAIN MEDICINE | Admitting: PAIN MEDICINE
Payer: COMMERCIAL

## 2021-11-05 VITALS
RESPIRATION RATE: 19 BRPM | DIASTOLIC BLOOD PRESSURE: 88 MMHG | SYSTOLIC BLOOD PRESSURE: 162 MMHG | HEART RATE: 99 BPM | OXYGEN SATURATION: 96 % | TEMPERATURE: 98 F

## 2021-11-05 DIAGNOSIS — M47.816 LUMBAR SPONDYLOSIS: Primary | ICD-10-CM

## 2021-11-05 PROCEDURE — 64493 PR INJ DX/THER AGNT PARAVERT FACET JOINT,IMG GUIDE,LUMBAR/SAC,1ST LVL: ICD-10-PCS | Mod: 50,,, | Performed by: PAIN MEDICINE

## 2021-11-05 PROCEDURE — 64495 INJ PARAVERT F JNT L/S 3 LEV: CPT | Mod: 50 | Performed by: PAIN MEDICINE

## 2021-11-05 PROCEDURE — 64494 INJ PARAVERT F JNT L/S 2 LEV: CPT | Mod: 50 | Performed by: PAIN MEDICINE

## 2021-11-05 PROCEDURE — 25000003 PHARM REV CODE 250: Performed by: PAIN MEDICINE

## 2021-11-05 PROCEDURE — 64493 INJ PARAVERT F JNT L/S 1 LEV: CPT | Mod: 50 | Performed by: PAIN MEDICINE

## 2021-11-05 PROCEDURE — 64494 PR INJ DX/THER AGNT PARAVERT FACET JOINT,IMG GUIDE,LUMBAR/SAC, 2ND LEVEL: ICD-10-PCS | Mod: 50,,, | Performed by: PAIN MEDICINE

## 2021-11-05 PROCEDURE — 64494 INJ PARAVERT F JNT L/S 2 LEV: CPT | Mod: 50,,, | Performed by: PAIN MEDICINE

## 2021-11-05 PROCEDURE — 64493 INJ PARAVERT F JNT L/S 1 LEV: CPT | Mod: 50,,, | Performed by: PAIN MEDICINE

## 2021-11-05 RX ORDER — LIDOCAINE HYDROCHLORIDE 20 MG/ML
INJECTION, SOLUTION INFILTRATION; PERINEURAL
Status: DISCONTINUED
Start: 2021-11-05 | End: 2021-11-05 | Stop reason: HOSPADM

## 2021-11-05 RX ORDER — LIDOCAINE HYDROCHLORIDE 20 MG/ML
10 INJECTION, SOLUTION INFILTRATION; PERINEURAL ONCE
Status: COMPLETED | OUTPATIENT
Start: 2021-11-05 | End: 2021-11-05

## 2021-11-05 RX ORDER — BUPIVACAINE HYDROCHLORIDE 2.5 MG/ML
INJECTION, SOLUTION EPIDURAL; INFILTRATION; INTRACAUDAL
Status: DISCONTINUED
Start: 2021-11-05 | End: 2021-11-05 | Stop reason: HOSPADM

## 2021-11-05 RX ORDER — BUPIVACAINE HYDROCHLORIDE 2.5 MG/ML
10 INJECTION, SOLUTION EPIDURAL; INFILTRATION; INTRACAUDAL ONCE
Status: COMPLETED | OUTPATIENT
Start: 2021-11-05 | End: 2021-11-05

## 2021-11-10 ENCOUNTER — PATIENT OUTREACH (OUTPATIENT)
Dept: ADMINISTRATIVE | Facility: OTHER | Age: 66
End: 2021-11-10
Payer: COMMERCIAL

## 2021-11-11 ENCOUNTER — TELEPHONE (OUTPATIENT)
Dept: PAIN MEDICINE | Facility: CLINIC | Age: 66
End: 2021-11-11
Payer: COMMERCIAL

## 2021-11-11 DIAGNOSIS — M47.816 LUMBAR SPONDYLOSIS: Primary | ICD-10-CM

## 2021-11-12 ENCOUNTER — OFFICE VISIT (OUTPATIENT)
Dept: DERMATOLOGY | Facility: CLINIC | Age: 66
End: 2021-11-12
Payer: COMMERCIAL

## 2021-11-12 VITALS — BODY MASS INDEX: 41.5 KG/M2 | WEIGHT: 265 LBS

## 2021-11-12 DIAGNOSIS — L82.1 SEBORRHEIC KERATOSES: ICD-10-CM

## 2021-11-12 DIAGNOSIS — Z85.828 HISTORY OF SKIN CANCER: ICD-10-CM

## 2021-11-12 DIAGNOSIS — L81.4 LENTIGINES: ICD-10-CM

## 2021-11-12 DIAGNOSIS — L85.3 XEROSIS CUTIS: ICD-10-CM

## 2021-11-12 DIAGNOSIS — L21.9 SEBORRHEIC DERMATITIS: ICD-10-CM

## 2021-11-12 DIAGNOSIS — D48.5 NEOPLASM OF UNCERTAIN BEHAVIOR OF SKIN: Primary | ICD-10-CM

## 2021-11-12 PROCEDURE — 4010F ACE/ARB THERAPY RXD/TAKEN: CPT | Mod: CPTII,S$GLB,, | Performed by: DERMATOLOGY

## 2021-11-12 PROCEDURE — 3008F PR BODY MASS INDEX (BMI) DOCUMENTED: ICD-10-PCS | Mod: CPTII,S$GLB,, | Performed by: DERMATOLOGY

## 2021-11-12 PROCEDURE — 3044F PR MOST RECENT HEMOGLOBIN A1C LEVEL <7.0%: ICD-10-PCS | Mod: CPTII,S$GLB,, | Performed by: DERMATOLOGY

## 2021-11-12 PROCEDURE — 1159F PR MEDICATION LIST DOCUMENTED IN MEDICAL RECORD: ICD-10-PCS | Mod: CPTII,S$GLB,, | Performed by: DERMATOLOGY

## 2021-11-12 PROCEDURE — 1101F PT FALLS ASSESS-DOCD LE1/YR: CPT | Mod: CPTII,S$GLB,, | Performed by: DERMATOLOGY

## 2021-11-12 PROCEDURE — 11306 PR SHAV SKIN LES 0.6-1.0 CM REMAINDER BODY: ICD-10-PCS | Mod: S$GLB,,, | Performed by: DERMATOLOGY

## 2021-11-12 PROCEDURE — 3044F HG A1C LEVEL LT 7.0%: CPT | Mod: CPTII,S$GLB,, | Performed by: DERMATOLOGY

## 2021-11-12 PROCEDURE — 99999 PR PBB SHADOW E&M-EST. PATIENT-LVL IV: ICD-10-PCS | Mod: PBBFAC,,, | Performed by: DERMATOLOGY

## 2021-11-12 PROCEDURE — 3061F PR NEG MICROALBUMINURIA RESULT DOCUMENTED/REVIEW: ICD-10-PCS | Mod: CPTII,S$GLB,, | Performed by: DERMATOLOGY

## 2021-11-12 PROCEDURE — 1101F PR PT FALLS ASSESS DOC 0-1 FALLS W/OUT INJ PAST YR: ICD-10-PCS | Mod: CPTII,S$GLB,, | Performed by: DERMATOLOGY

## 2021-11-12 PROCEDURE — 99213 OFFICE O/P EST LOW 20 MIN: CPT | Mod: 25,S$GLB,, | Performed by: DERMATOLOGY

## 2021-11-12 PROCEDURE — 3288F PR FALLS RISK ASSESSMENT DOCUMENTED: ICD-10-PCS | Mod: CPTII,S$GLB,, | Performed by: DERMATOLOGY

## 2021-11-12 PROCEDURE — 3066F PR DOCUMENTATION OF TREATMENT FOR NEPHROPATHY: ICD-10-PCS | Mod: CPTII,S$GLB,, | Performed by: DERMATOLOGY

## 2021-11-12 PROCEDURE — 88305 TISSUE EXAM BY PATHOLOGIST: CPT | Performed by: PATHOLOGY

## 2021-11-12 PROCEDURE — 1160F PR REVIEW ALL MEDS BY PRESCRIBER/CLIN PHARMACIST DOCUMENTED: ICD-10-PCS | Mod: CPTII,S$GLB,, | Performed by: DERMATOLOGY

## 2021-11-12 PROCEDURE — 1160F RVW MEDS BY RX/DR IN RCRD: CPT | Mod: CPTII,S$GLB,, | Performed by: DERMATOLOGY

## 2021-11-12 PROCEDURE — 11305 SHAVE SKIN LESION 0.5 CM/<: CPT | Mod: 59,S$GLB,, | Performed by: DERMATOLOGY

## 2021-11-12 PROCEDURE — 11306 SHAVE SKIN LESION 0.6-1.0 CM: CPT | Mod: S$GLB,,, | Performed by: DERMATOLOGY

## 2021-11-12 PROCEDURE — 3061F NEG MICROALBUMINURIA REV: CPT | Mod: CPTII,S$GLB,, | Performed by: DERMATOLOGY

## 2021-11-12 PROCEDURE — 88305 TISSUE EXAM BY PATHOLOGIST: ICD-10-PCS | Mod: 26,,, | Performed by: PATHOLOGY

## 2021-11-12 PROCEDURE — 1159F MED LIST DOCD IN RCRD: CPT | Mod: CPTII,S$GLB,, | Performed by: DERMATOLOGY

## 2021-11-12 PROCEDURE — 3066F NEPHROPATHY DOC TX: CPT | Mod: CPTII,S$GLB,, | Performed by: DERMATOLOGY

## 2021-11-12 PROCEDURE — 3008F BODY MASS INDEX DOCD: CPT | Mod: CPTII,S$GLB,, | Performed by: DERMATOLOGY

## 2021-11-12 PROCEDURE — 3288F FALL RISK ASSESSMENT DOCD: CPT | Mod: CPTII,S$GLB,, | Performed by: DERMATOLOGY

## 2021-11-12 PROCEDURE — 99999 PR PBB SHADOW E&M-EST. PATIENT-LVL IV: CPT | Mod: PBBFAC,,, | Performed by: DERMATOLOGY

## 2021-11-12 PROCEDURE — 99213 PR OFFICE/OUTPT VISIT, EST, LEVL III, 20-29 MIN: ICD-10-PCS | Mod: 25,S$GLB,, | Performed by: DERMATOLOGY

## 2021-11-12 PROCEDURE — 4010F PR ACE/ARB THEARPY RXD/TAKEN: ICD-10-PCS | Mod: CPTII,S$GLB,, | Performed by: DERMATOLOGY

## 2021-11-12 PROCEDURE — 88305 TISSUE EXAM BY PATHOLOGIST: CPT | Mod: 26,,, | Performed by: PATHOLOGY

## 2021-11-12 PROCEDURE — 11305 PR SHAV SKIN LES <0.5 CM REMAINDER BODY: ICD-10-PCS | Mod: 59,S$GLB,, | Performed by: DERMATOLOGY

## 2021-11-17 ENCOUNTER — PATIENT MESSAGE (OUTPATIENT)
Dept: DERMATOLOGY | Facility: CLINIC | Age: 66
End: 2021-11-17
Payer: COMMERCIAL

## 2021-11-17 LAB
FINAL PATHOLOGIC DIAGNOSIS: NORMAL
GROSS: NORMAL
Lab: NORMAL
MICROSCOPIC EXAM: NORMAL

## 2021-12-02 ENCOUNTER — TELEPHONE (OUTPATIENT)
Dept: PAIN MEDICINE | Facility: CLINIC | Age: 66
End: 2021-12-02
Payer: COMMERCIAL

## 2021-12-07 ENCOUNTER — PATIENT MESSAGE (OUTPATIENT)
Dept: FAMILY MEDICINE | Facility: CLINIC | Age: 66
End: 2021-12-07
Payer: COMMERCIAL

## 2021-12-07 DIAGNOSIS — R52 PAIN: ICD-10-CM

## 2021-12-09 DIAGNOSIS — R52 PAIN: ICD-10-CM

## 2021-12-10 ENCOUNTER — HOSPITAL ENCOUNTER (OUTPATIENT)
Facility: HOSPITAL | Age: 66
Discharge: HOME | End: 2021-12-10
Attending: PAIN MEDICINE | Admitting: PAIN MEDICINE
Payer: COMMERCIAL

## 2021-12-10 VITALS
HEART RATE: 86 BPM | OXYGEN SATURATION: 95 % | RESPIRATION RATE: 20 BRPM | SYSTOLIC BLOOD PRESSURE: 153 MMHG | DIASTOLIC BLOOD PRESSURE: 84 MMHG | TEMPERATURE: 98 F

## 2021-12-10 DIAGNOSIS — M47.816 LUMBAR SPONDYLOSIS: Primary | ICD-10-CM

## 2021-12-10 PROCEDURE — 64494 INJ PARAVERT F JNT L/S 2 LEV: CPT | Mod: 50,,, | Performed by: PAIN MEDICINE

## 2021-12-10 PROCEDURE — 64493 INJ PARAVERT F JNT L/S 1 LEV: CPT | Mod: 50 | Performed by: PAIN MEDICINE

## 2021-12-10 PROCEDURE — 64495 INJ PARAVERT F JNT L/S 3 LEV: CPT | Mod: 50 | Performed by: PAIN MEDICINE

## 2021-12-10 PROCEDURE — 64494 PR INJ DX/THER AGNT PARAVERT FACET JOINT,IMG GUIDE,LUMBAR/SAC, 2ND LEVEL: ICD-10-PCS | Mod: 50,,, | Performed by: PAIN MEDICINE

## 2021-12-10 PROCEDURE — 64493 INJ PARAVERT F JNT L/S 1 LEV: CPT | Mod: 50,,, | Performed by: PAIN MEDICINE

## 2021-12-10 PROCEDURE — 82962 GLUCOSE BLOOD TEST: CPT | Performed by: PAIN MEDICINE

## 2021-12-10 PROCEDURE — 25000003 PHARM REV CODE 250: Performed by: PAIN MEDICINE

## 2021-12-10 PROCEDURE — 64494 INJ PARAVERT F JNT L/S 2 LEV: CPT | Mod: 50 | Performed by: PAIN MEDICINE

## 2021-12-10 PROCEDURE — 64493 PR INJ DX/THER AGNT PARAVERT FACET JOINT,IMG GUIDE,LUMBAR/SAC,1ST LVL: ICD-10-PCS | Mod: 50,,, | Performed by: PAIN MEDICINE

## 2021-12-10 RX ORDER — BUPIVACAINE HYDROCHLORIDE 2.5 MG/ML
INJECTION, SOLUTION EPIDURAL; INFILTRATION; INTRACAUDAL
Status: DISCONTINUED
Start: 2021-12-10 | End: 2021-12-10 | Stop reason: HOSPADM

## 2021-12-10 RX ORDER — METFORMIN HYDROCHLORIDE 750 MG/1
1500 TABLET, EXTENDED RELEASE ORAL EVERY MORNING
Qty: 180 TABLET | Refills: 3 | Status: SHIPPED | OUTPATIENT
Start: 2021-12-10 | End: 2023-08-09 | Stop reason: SDUPTHER

## 2021-12-10 RX ORDER — LIDOCAINE HYDROCHLORIDE 20 MG/ML
10 INJECTION, SOLUTION INFILTRATION; PERINEURAL ONCE
Status: COMPLETED | OUTPATIENT
Start: 2021-12-10 | End: 2021-12-10

## 2021-12-10 RX ORDER — LIDOCAINE HYDROCHLORIDE 20 MG/ML
INJECTION, SOLUTION INFILTRATION; PERINEURAL
Status: DISCONTINUED
Start: 2021-12-10 | End: 2021-12-10 | Stop reason: HOSPADM

## 2021-12-10 RX ORDER — BUPIVACAINE HYDROCHLORIDE 2.5 MG/ML
10 INJECTION, SOLUTION EPIDURAL; INFILTRATION; INTRACAUDAL ONCE
Status: COMPLETED | OUTPATIENT
Start: 2021-12-10 | End: 2021-12-10

## 2021-12-10 RX ORDER — TRAMADOL HYDROCHLORIDE 50 MG/1
50 TABLET ORAL EVERY 6 HOURS PRN
Qty: 120 TABLET | Refills: 1 | Status: SHIPPED | OUTPATIENT
Start: 2021-12-10 | End: 2022-02-03 | Stop reason: SDUPTHER

## 2021-12-10 RX ORDER — TRAMADOL HYDROCHLORIDE 50 MG/1
50 TABLET ORAL EVERY 6 HOURS PRN
Qty: 60 TABLET | Refills: 0 | OUTPATIENT
Start: 2021-12-10

## 2021-12-14 ENCOUNTER — TELEPHONE (OUTPATIENT)
Dept: PAIN MEDICINE | Facility: CLINIC | Age: 66
End: 2021-12-14
Payer: COMMERCIAL

## 2021-12-14 DIAGNOSIS — M47.816 LUMBAR SPONDYLOSIS: Primary | ICD-10-CM

## 2021-12-15 ENCOUNTER — PATIENT MESSAGE (OUTPATIENT)
Dept: ADMINISTRATIVE | Facility: HOSPITAL | Age: 66
End: 2021-12-15
Payer: COMMERCIAL

## 2021-12-23 ENCOUNTER — PATIENT MESSAGE (OUTPATIENT)
Dept: FAMILY MEDICINE | Facility: CLINIC | Age: 66
End: 2021-12-23
Payer: COMMERCIAL

## 2021-12-27 ENCOUNTER — LAB VISIT (OUTPATIENT)
Dept: LAB | Facility: HOSPITAL | Age: 66
End: 2021-12-27
Attending: INTERNAL MEDICINE
Payer: COMMERCIAL

## 2021-12-27 DIAGNOSIS — I10 ESSENTIAL HYPERTENSION: ICD-10-CM

## 2021-12-27 DIAGNOSIS — E03.9 HYPOTHYROIDISM, UNSPECIFIED TYPE: ICD-10-CM

## 2021-12-27 DIAGNOSIS — E11.65 UNCONTROLLED TYPE 2 DIABETES MELLITUS WITH HYPERGLYCEMIA: ICD-10-CM

## 2021-12-27 LAB
25(OH)D3+25(OH)D2 SERPL-MCNC: 52 NG/ML (ref 30–96)
ALBUMIN SERPL BCP-MCNC: 3.8 G/DL (ref 3.5–5.2)
ALP SERPL-CCNC: 78 U/L (ref 55–135)
ALT SERPL W/O P-5'-P-CCNC: 25 U/L (ref 10–44)
ANION GAP SERPL CALC-SCNC: 12 MMOL/L (ref 8–16)
AST SERPL-CCNC: 22 U/L (ref 10–40)
BASOPHILS # BLD AUTO: 0.07 K/UL (ref 0–0.2)
BASOPHILS NFR BLD: 1.1 % (ref 0–1.9)
BILIRUB SERPL-MCNC: 0.3 MG/DL (ref 0.1–1)
BUN SERPL-MCNC: 19 MG/DL (ref 8–23)
CALCIUM SERPL-MCNC: 9.9 MG/DL (ref 8.7–10.5)
CHLORIDE SERPL-SCNC: 100 MMOL/L (ref 95–110)
CHOLEST SERPL-MCNC: 319 MG/DL (ref 120–199)
CHOLEST/HDLC SERPL: 4.3 {RATIO} (ref 2–5)
CO2 SERPL-SCNC: 23 MMOL/L (ref 23–29)
CREAT SERPL-MCNC: 0.8 MG/DL (ref 0.5–1.4)
DIFFERENTIAL METHOD: ABNORMAL
EOSINOPHIL # BLD AUTO: 0.2 K/UL (ref 0–0.5)
EOSINOPHIL NFR BLD: 3.4 % (ref 0–8)
ERYTHROCYTE [DISTWIDTH] IN BLOOD BY AUTOMATED COUNT: 14.1 % (ref 11.5–14.5)
EST. GFR  (AFRICAN AMERICAN): >60 ML/MIN/1.73 M^2
EST. GFR  (NON AFRICAN AMERICAN): >60 ML/MIN/1.73 M^2
ESTIMATED AVG GLUCOSE: 160 MG/DL (ref 68–131)
GLUCOSE SERPL-MCNC: 145 MG/DL (ref 70–110)
HBA1C MFR BLD: 7.2 % (ref 4–5.6)
HCT VFR BLD AUTO: 40.6 % (ref 37–48.5)
HDLC SERPL-MCNC: 74 MG/DL (ref 40–75)
HDLC SERPL: 23.2 % (ref 20–50)
HGB BLD-MCNC: 12.8 G/DL (ref 12–16)
IMM GRANULOCYTES # BLD AUTO: 0.01 K/UL (ref 0–0.04)
IMM GRANULOCYTES NFR BLD AUTO: 0.2 % (ref 0–0.5)
LDLC SERPL CALC-MCNC: 191.4 MG/DL (ref 63–159)
LYMPHOCYTES # BLD AUTO: 1.8 K/UL (ref 1–4.8)
LYMPHOCYTES NFR BLD: 28.4 % (ref 18–48)
MCH RBC QN AUTO: 28.4 PG (ref 27–31)
MCHC RBC AUTO-ENTMCNC: 31.5 G/DL (ref 32–36)
MCV RBC AUTO: 90 FL (ref 82–98)
MONOCYTES # BLD AUTO: 0.5 K/UL (ref 0.3–1)
MONOCYTES NFR BLD: 7.8 % (ref 4–15)
NEUTROPHILS # BLD AUTO: 3.7 K/UL (ref 1.8–7.7)
NEUTROPHILS NFR BLD: 59.1 % (ref 38–73)
NONHDLC SERPL-MCNC: 245 MG/DL
NRBC BLD-RTO: 0 /100 WBC
PLATELET # BLD AUTO: 358 K/UL (ref 150–450)
PMV BLD AUTO: 10.2 FL (ref 9.2–12.9)
POTASSIUM SERPL-SCNC: 4.1 MMOL/L (ref 3.5–5.1)
PROT SERPL-MCNC: 7.8 G/DL (ref 6–8.4)
RBC # BLD AUTO: 4.5 M/UL (ref 4–5.4)
SODIUM SERPL-SCNC: 135 MMOL/L (ref 136–145)
TRIGL SERPL-MCNC: 268 MG/DL (ref 30–150)
TSH SERPL DL<=0.005 MIU/L-ACNC: 1.86 UIU/ML (ref 0.4–4)
WBC # BLD AUTO: 6.17 K/UL (ref 3.9–12.7)

## 2021-12-27 PROCEDURE — 80053 COMPREHEN METABOLIC PANEL: CPT | Performed by: INTERNAL MEDICINE

## 2021-12-27 PROCEDURE — 36415 COLL VENOUS BLD VENIPUNCTURE: CPT | Mod: PO | Performed by: INTERNAL MEDICINE

## 2021-12-27 PROCEDURE — 80061 LIPID PANEL: CPT | Performed by: INTERNAL MEDICINE

## 2021-12-27 PROCEDURE — 85025 COMPLETE CBC W/AUTO DIFF WBC: CPT | Performed by: INTERNAL MEDICINE

## 2021-12-27 PROCEDURE — 83036 HEMOGLOBIN GLYCOSYLATED A1C: CPT | Performed by: INTERNAL MEDICINE

## 2021-12-27 PROCEDURE — 84443 ASSAY THYROID STIM HORMONE: CPT | Performed by: INTERNAL MEDICINE

## 2021-12-27 PROCEDURE — 82306 VITAMIN D 25 HYDROXY: CPT | Performed by: INTERNAL MEDICINE

## 2022-01-03 ENCOUNTER — PATIENT MESSAGE (OUTPATIENT)
Dept: ENDOSCOPY | Facility: HOSPITAL | Age: 67
End: 2022-01-03
Payer: COMMERCIAL

## 2022-01-06 ENCOUNTER — TELEPHONE (OUTPATIENT)
Dept: PAIN MEDICINE | Facility: CLINIC | Age: 67
End: 2022-01-06
Payer: COMMERCIAL

## 2022-01-06 NOTE — TELEPHONE ENCOUNTER
Reviewed pre-procedure instructions with Mrs. Posey, as well as provided arrival time of 1315p for 1/14/2022.  All questions answered- verbalized understanding.

## 2022-01-11 ENCOUNTER — PATIENT MESSAGE (OUTPATIENT)
Dept: ENDOSCOPY | Facility: HOSPITAL | Age: 67
End: 2022-01-11
Payer: COMMERCIAL

## 2022-01-27 ENCOUNTER — TELEPHONE (OUTPATIENT)
Dept: PAIN MEDICINE | Facility: CLINIC | Age: 67
End: 2022-01-27
Payer: COMMERCIAL

## 2022-01-27 DIAGNOSIS — Z01.818 PRE-OP TESTING: Primary | ICD-10-CM

## 2022-01-27 NOTE — TELEPHONE ENCOUNTER
Reviewed pre-procedure instructions with  Taty, as well as provided arrival time of 1245p for 2/4/22.  All questions answered- verbalized understanding.    Pt opted for drive thru COVID testing on 2/1/2022 at [facility].

## 2022-02-01 ENCOUNTER — TELEPHONE (OUTPATIENT)
Dept: PAIN MEDICINE | Facility: CLINIC | Age: 67
End: 2022-02-01
Payer: COMMERCIAL

## 2022-02-01 DIAGNOSIS — Z01.818 PRE-OP TESTING: Primary | ICD-10-CM

## 2022-02-01 NOTE — TELEPHONE ENCOUNTER
Informed Ms. Taty that the drive thru COVID testing in from of [facility] will be closing.  I will out an order in for her to have a rapid test done upon checking in Friday.  New arrival time of 1215 given.

## 2022-02-03 ENCOUNTER — OFFICE VISIT (OUTPATIENT)
Dept: FAMILY MEDICINE | Facility: CLINIC | Age: 67
End: 2022-02-03
Payer: COMMERCIAL

## 2022-02-03 VITALS
WEIGHT: 257.5 LBS | BODY MASS INDEX: 40.42 KG/M2 | OXYGEN SATURATION: 98 % | TEMPERATURE: 98 F | SYSTOLIC BLOOD PRESSURE: 138 MMHG | RESPIRATION RATE: 16 BRPM | DIASTOLIC BLOOD PRESSURE: 74 MMHG | HEIGHT: 67 IN | HEART RATE: 98 BPM

## 2022-02-03 DIAGNOSIS — E78.5 HYPERLIPIDEMIA, UNSPECIFIED HYPERLIPIDEMIA TYPE: ICD-10-CM

## 2022-02-03 DIAGNOSIS — R52 PAIN: ICD-10-CM

## 2022-02-03 DIAGNOSIS — G62.0 NEUROPATHY DUE TO CHEMOTHERAPEUTIC DRUG: ICD-10-CM

## 2022-02-03 DIAGNOSIS — F13.20 SEDATIVE DEPENDENCE: ICD-10-CM

## 2022-02-03 DIAGNOSIS — F39 MOOD DISORDER: ICD-10-CM

## 2022-02-03 DIAGNOSIS — G89.29 CHRONIC BILATERAL LOW BACK PAIN WITH BILATERAL SCIATICA: ICD-10-CM

## 2022-02-03 DIAGNOSIS — G47.33 OSA ON CPAP: ICD-10-CM

## 2022-02-03 DIAGNOSIS — E03.9 HYPOTHYROIDISM, UNSPECIFIED TYPE: ICD-10-CM

## 2022-02-03 DIAGNOSIS — M54.41 CHRONIC BILATERAL LOW BACK PAIN WITH BILATERAL SCIATICA: ICD-10-CM

## 2022-02-03 DIAGNOSIS — I10 ESSENTIAL HYPERTENSION: ICD-10-CM

## 2022-02-03 DIAGNOSIS — Z78.9 STATIN INTOLERANCE: ICD-10-CM

## 2022-02-03 DIAGNOSIS — T45.1X5A NEUROPATHY DUE TO CHEMOTHERAPEUTIC DRUG: ICD-10-CM

## 2022-02-03 DIAGNOSIS — F11.20 MODERATE TRAMADOL DEPENDENCE: ICD-10-CM

## 2022-02-03 DIAGNOSIS — Z85.038 HISTORY OF COLON CANCER: ICD-10-CM

## 2022-02-03 DIAGNOSIS — E66.01 MORBID OBESITY: ICD-10-CM

## 2022-02-03 DIAGNOSIS — G47.00 INSOMNIA, UNSPECIFIED TYPE: ICD-10-CM

## 2022-02-03 DIAGNOSIS — R79.89 ABNORMAL LIVER FUNCTION TESTS: ICD-10-CM

## 2022-02-03 DIAGNOSIS — E11.65 UNCONTROLLED TYPE 2 DIABETES MELLITUS WITH HYPERGLYCEMIA: Primary | ICD-10-CM

## 2022-02-03 DIAGNOSIS — M54.42 CHRONIC BILATERAL LOW BACK PAIN WITH BILATERAL SCIATICA: ICD-10-CM

## 2022-02-03 PROCEDURE — 99214 OFFICE O/P EST MOD 30 MIN: CPT | Mod: S$GLB,,, | Performed by: INTERNAL MEDICINE

## 2022-02-03 PROCEDURE — 3008F BODY MASS INDEX DOCD: CPT | Mod: CPTII,S$GLB,, | Performed by: INTERNAL MEDICINE

## 2022-02-03 PROCEDURE — 3078F PR MOST RECENT DIASTOLIC BLOOD PRESSURE < 80 MM HG: ICD-10-PCS | Mod: CPTII,S$GLB,, | Performed by: INTERNAL MEDICINE

## 2022-02-03 PROCEDURE — 99214 PR OFFICE/OUTPT VISIT, EST, LEVL IV, 30-39 MIN: ICD-10-PCS | Mod: S$GLB,,, | Performed by: INTERNAL MEDICINE

## 2022-02-03 PROCEDURE — 1125F PR PAIN SEVERITY QUANTIFIED, PAIN PRESENT: ICD-10-PCS | Mod: CPTII,S$GLB,, | Performed by: INTERNAL MEDICINE

## 2022-02-03 PROCEDURE — 99999 PR PBB SHADOW E&M-EST. PATIENT-LVL V: CPT | Mod: PBBFAC,,, | Performed by: INTERNAL MEDICINE

## 2022-02-03 PROCEDURE — 1125F AMNT PAIN NOTED PAIN PRSNT: CPT | Mod: CPTII,S$GLB,, | Performed by: INTERNAL MEDICINE

## 2022-02-03 PROCEDURE — 1159F PR MEDICATION LIST DOCUMENTED IN MEDICAL RECORD: ICD-10-PCS | Mod: CPTII,S$GLB,, | Performed by: INTERNAL MEDICINE

## 2022-02-03 PROCEDURE — 3075F SYST BP GE 130 - 139MM HG: CPT | Mod: CPTII,S$GLB,, | Performed by: INTERNAL MEDICINE

## 2022-02-03 PROCEDURE — 3078F DIAST BP <80 MM HG: CPT | Mod: CPTII,S$GLB,, | Performed by: INTERNAL MEDICINE

## 2022-02-03 PROCEDURE — 3075F PR MOST RECENT SYSTOLIC BLOOD PRESS GE 130-139MM HG: ICD-10-PCS | Mod: CPTII,S$GLB,, | Performed by: INTERNAL MEDICINE

## 2022-02-03 PROCEDURE — 3008F PR BODY MASS INDEX (BMI) DOCUMENTED: ICD-10-PCS | Mod: CPTII,S$GLB,, | Performed by: INTERNAL MEDICINE

## 2022-02-03 PROCEDURE — 1159F MED LIST DOCD IN RCRD: CPT | Mod: CPTII,S$GLB,, | Performed by: INTERNAL MEDICINE

## 2022-02-03 PROCEDURE — 99999 PR PBB SHADOW E&M-EST. PATIENT-LVL V: ICD-10-PCS | Mod: PBBFAC,,, | Performed by: INTERNAL MEDICINE

## 2022-02-03 PROCEDURE — 3051F PR MOST RECENT HEMOGLOBIN A1C LEVEL 7.0 - < 8.0%: ICD-10-PCS | Mod: CPTII,S$GLB,, | Performed by: INTERNAL MEDICINE

## 2022-02-03 PROCEDURE — 3051F HG A1C>EQUAL 7.0%<8.0%: CPT | Mod: CPTII,S$GLB,, | Performed by: INTERNAL MEDICINE

## 2022-02-03 RX ORDER — TRAMADOL HYDROCHLORIDE 50 MG/1
50 TABLET ORAL EVERY 6 HOURS PRN
Qty: 120 TABLET | Refills: 3 | Status: SHIPPED | OUTPATIENT
Start: 2022-02-03 | End: 2022-05-26

## 2022-02-03 NOTE — PROGRESS NOTES
Chief complaint Followup on blood pressure,, diabetes, cholesterol -last seen 8/21    Patient's a 66 year-old white female poorly compliant with follow-up and general recommendations.   She is here to get her tramadol refilled.  Typically takes three per day.  I will send some with refills to the pharmacy.  She is scheduled to get some facet injections so she is pursuing pain management to try make her overall pain go away.  She occasionally takes the Valium for spasm but says she does not need a refill of that today.    We again discussed her high cholesterol but she had memory issues with statins and possibly Welchol.  She absolutely refuses Zetia and we discussed there is an injection as well and she will research but very resistant.  I reassured her that Zetia is completely different from the statins and she again will consider but was not open to starting it.  We did discuss her for symptoms and need for treatment of a high cholesterol could well be a heart attack or stroke.  She is well aware of this along with her other risk factors.  She has had a lot of medications give her side effects.  She took Januvia and had a lot of left lower back pain very consistent with lumbar strain and the symptoms persisted even stopping Januvia and she had to get some massage which helps and discussed it is probably a pulled muscle but she is insistent to believe it is Januvia and she read that could do that somewhere but reassured her it is not a common side effect from Januvia.    Diabetes uncontrolled with A1c of 7.8, 6.9, 7.2 Dec.  She was only on metformin.  We discussed injections, Amaryl and Januvia.  We gave her Januvia 50 mg as above.  We discussed potential for hypoglycemia which would be less with Januvia.  She has not been able to exercise as much lately.  She had an injury and a left wrist surgery.  She has gained a lot of weight.  Currently weight is stable which is good.  Her A1c went up to 7.8 then 6.9, 7.2 and  she was working at home eating a lot of mm but for the past year she has improved her diet significantly eating much better and her sugars are running normal so hopefully she will have improvement.  We discussed her elevated ALT due to diabetes in the weight and we can expected to still be elevated.  She feels she cannot lose weight having gone to the gym twice a day when working in the past with not able to lose any weight.  She also has some hernia issue so does not planned on going back to the gym.        She does occasionally take a Flexeril and is having more aches and pains and may want to try taking a low-dose Flexeril during the day so I provided her with a refills.    LDL 190s -refuses statin, etc        She does occasionally take her tramadol for arthralgias.      ROS:   CONST:  no other new myalgias arthralgias and no new neurological deficits, no skin rashes     PAST MEDICAL HISTORY:                                                        1.  Allergic rhinitis.                                                       2.  Obstructive sleep apnea status post UPPP and treatment with CPAP.        3.  Chronic sinusitis with sinus surgery x2, Dr. Tucker and Dr. Alston.       4.  Hypertension.                                                            5.  Obesity.                                                                 6.  Colon cancer, status post right hemicolectomy and chemotherapy.          7.  History of iron deficiency anemia.                                       8.  Left superficial vein thrombosis of the arm.                             9.  Insomnia.                                                                10.  Basal cell carcinoma.                                                   11.  Memory issues when taking WelChol.                                      12.  Hyperlipidemia with LDL rise on WelChol. Tried 3 statins -various effects- memory issue made her lose a job                                13.  Hypercalcemia secondary to HCTZ.                                        14.  Lumbar radiculopathy with history of epidurals -Dr Saavedra - analia Lewis                      15.  Chronic cough due to allergies, per pulmonary workup, 2010.           16.  Osteopenia by bone density in .                                     17.  Elevated ALT.                                                           18.  Tubes tied.                                                             19.  Hernia repair.                                                          20.  Lumpectomy, of the breast I presume.   21.  COLONOSCOPY normal 6/15,== 5 years  22. Left ankle surgery   23. METABOLIC SYNDROME/DM -A1c  was 6.9  24. Vit D def-    25.  Hypothyroid?,  Placed on [place] Thyroid by Dr. Montgomery??    26.  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  Quit smoking in .  Drinks once a week.                                                                               FAMILY HISTORY:  Father  at 53 in a car accident.  Mom in good health.   Brother 46, a sister is 52.  She states that all members of her family have  Hypertension.                                                                  Vitals as above, exam otherwise deferred      Diagnoses and all orders for this visit:    Uncontrolled type 2 diabetes mellitus with hyperglycemia, still uncontrolled but generally stable and she admits did eating things he should and.  If she makes some improvement the diabetes will obviously improved and she can get some weight off.    Essential hypertension, fair control, monitor at home    Hypothyroidism, unspecified type, euthyroid    Abnormal liver function tests,  improved back to normal    Morbid obesity    Mood disorder, chronic and stable    Statin intolerance, still declines medication even Zetia    Moderate tramadol dependence    Insomnia, unspecified type, still requiring medications    Sedative dependence    Chronic bilateral low back pain with bilateral sciatica, followed by pain management as well    Neuropathy due to chemotherapeutic drug    Hyperlipidemia, unspecified hyperlipidemia type, high risk for atherosclerosis but she believe she will not get any blockages    History of colon cancer    JEREMY on CPAP  -     CPAP/BIPAP SUPPLIES    Pain  -     traMADoL (ULTRAM) 50 mg tablet; Take 1 tablet (50 mg total) by mouth every 6 (six) hours as needed.

## 2022-02-04 ENCOUNTER — HOSPITAL ENCOUNTER (OUTPATIENT)
Facility: HOSPITAL | Age: 67
Discharge: HOME | End: 2022-02-04
Attending: PAIN MEDICINE | Admitting: PAIN MEDICINE
Payer: COMMERCIAL

## 2022-02-04 VITALS
OXYGEN SATURATION: 96 % | SYSTOLIC BLOOD PRESSURE: 177 MMHG | TEMPERATURE: 97 F | HEART RATE: 88 BPM | DIASTOLIC BLOOD PRESSURE: 82 MMHG | RESPIRATION RATE: 18 BRPM

## 2022-02-04 DIAGNOSIS — M47.816 LUMBAR SPONDYLOSIS: Primary | ICD-10-CM

## 2022-02-04 LAB
CTP QC/QA: YES
SARS-COV-2 AG RESP QL IA.RAPID: NEGATIVE

## 2022-02-04 PROCEDURE — 99152 MOD SED SAME PHYS/QHP 5/>YRS: CPT | Performed by: PAIN MEDICINE

## 2022-02-04 PROCEDURE — 64636 PR DESTROY L/S FACET JNT ADDL: ICD-10-PCS | Mod: LT,,, | Performed by: PAIN MEDICINE

## 2022-02-04 PROCEDURE — 64636 DESTROY L/S FACET JNT ADDL: CPT | Mod: LT,,, | Performed by: PAIN MEDICINE

## 2022-02-04 PROCEDURE — 64636 DESTROY L/S FACET JNT ADDL: CPT | Performed by: PAIN MEDICINE

## 2022-02-04 PROCEDURE — 64635 DESTROY LUMB/SAC FACET JNT: CPT | Performed by: PAIN MEDICINE

## 2022-02-04 PROCEDURE — 64635 PR DESTROY LUMB/SAC FACET JNT: ICD-10-PCS | Mod: LT,,, | Performed by: PAIN MEDICINE

## 2022-02-04 PROCEDURE — 64635 DESTROY LUMB/SAC FACET JNT: CPT | Mod: LT,,, | Performed by: PAIN MEDICINE

## 2022-02-04 PROCEDURE — 25000003 PHARM REV CODE 250: Performed by: PAIN MEDICINE

## 2022-02-04 PROCEDURE — 63600175 PHARM REV CODE 636 W HCPCS: Performed by: PAIN MEDICINE

## 2022-02-04 RX ORDER — LIDOCAINE HYDROCHLORIDE 20 MG/ML
INJECTION, SOLUTION INFILTRATION; PERINEURAL
Status: DISCONTINUED
Start: 2022-02-04 | End: 2022-02-04 | Stop reason: HOSPADM

## 2022-02-04 RX ORDER — MIDAZOLAM HYDROCHLORIDE 1 MG/ML
INJECTION INTRAMUSCULAR; INTRAVENOUS
Status: DISCONTINUED
Start: 2022-02-04 | End: 2022-02-04 | Stop reason: HOSPADM

## 2022-02-04 RX ORDER — BUPIVACAINE HYDROCHLORIDE 2.5 MG/ML
10 INJECTION, SOLUTION EPIDURAL; INFILTRATION; INTRACAUDAL ONCE
Status: COMPLETED | OUTPATIENT
Start: 2022-02-04 | End: 2022-02-04

## 2022-02-04 RX ORDER — LIDOCAINE HYDROCHLORIDE 20 MG/ML
10 INJECTION, SOLUTION INFILTRATION; PERINEURAL ONCE
Status: COMPLETED | OUTPATIENT
Start: 2022-02-04 | End: 2022-02-04

## 2022-02-04 RX ORDER — TRIAMCINOLONE ACETONIDE 40 MG/ML
INJECTION, SUSPENSION INTRA-ARTICULAR; INTRAMUSCULAR
Status: DISCONTINUED
Start: 2022-02-04 | End: 2022-02-04 | Stop reason: HOSPADM

## 2022-02-04 RX ORDER — FENTANYL CITRATE 50 UG/ML
100 INJECTION, SOLUTION INTRAMUSCULAR; INTRAVENOUS
Status: DISCONTINUED | OUTPATIENT
Start: 2022-02-04 | End: 2022-02-04 | Stop reason: HOSPADM

## 2022-02-04 RX ORDER — TRIAMCINOLONE ACETONIDE 40 MG/ML
40 INJECTION, SUSPENSION INTRA-ARTICULAR; INTRAMUSCULAR ONCE
Status: COMPLETED | OUTPATIENT
Start: 2022-02-04 | End: 2022-02-04

## 2022-02-04 RX ORDER — MIDAZOLAM HYDROCHLORIDE 1 MG/ML
5 INJECTION INTRAMUSCULAR; INTRAVENOUS
Status: DISCONTINUED | OUTPATIENT
Start: 2022-02-04 | End: 2022-02-04 | Stop reason: HOSPADM

## 2022-02-04 RX ORDER — FENTANYL CITRATE 50 UG/ML
INJECTION, SOLUTION INTRAMUSCULAR; INTRAVENOUS
Status: DISCONTINUED
Start: 2022-02-04 | End: 2022-02-04 | Stop reason: HOSPADM

## 2022-02-04 RX ORDER — BUPIVACAINE HYDROCHLORIDE 2.5 MG/ML
INJECTION, SOLUTION EPIDURAL; INFILTRATION; INTRACAUDAL
Status: DISCONTINUED
Start: 2022-02-04 | End: 2022-02-04 | Stop reason: HOSPADM

## 2022-02-04 RX ORDER — SODIUM CHLORIDE 9 MG/ML
INJECTION, SOLUTION INTRAVENOUS CONTINUOUS
Status: DISCONTINUED | OUTPATIENT
Start: 2022-02-04 | End: 2022-02-04 | Stop reason: HOSPADM

## 2022-02-04 NOTE — DISCHARGE INSTRUCTIONS

## 2022-02-04 NOTE — DISCHARGE SUMMARY
[facility] - Endoscopy  Discharge Note  Short Stay    Procedure(s) (LRB):  Left L3, L4, L5 Radiofrequency Thermocoagulation of Medial Branches (Left)    OUTCOME: Patient tolerated treatment/procedure well without complication and is now ready for discharge.    DISPOSITION: Home or Self Care    FINAL DIAGNOSIS:  <principal problem not specified>    FOLLOWUP: In clinic    DISCHARGE INSTRUCTIONS:  No discharge procedures on file.       Discharge Diagnosis:Lumbar spondylosis [M47.816]  Condition on Discharge: Stable.  Diet on Discharge: Same as before.  Activity: as per instruction sheet.  Discharge to: Home with a responsible adult.  Follow up: as per Discharge instructions

## 2022-02-04 NOTE — OP NOTE
LUMBAR Medial Branch Radiofrequency Ablation Under Fluoroscopy  Time-out taken to identify patient and procedure side prior to starting the procedure.   I attest that I have reviewed the patient's home medications prior to the procedure and no contraindication have been identified. I  re-evaluated the patient after the patient was positioned for the procedure in the procedure room immediately before the procedural time-out. The vital signs are current and represent the current state of the patient which has not significantly changed since the preprocedure assessment.                         Date of Service: 02/04/2022    PCP: Fidel Dotson MD    Referring Physician:                                                PROCEDURE:  left L3, L4 and L5 medial branch radiofrequency ablation    REASON FOR PROCEDURE: Lumbar spondylosis [M47.816]    INDICATIONS:  Patient has completed 2 separate medial branch blocks at the specified levels.  Patient reported at least 80% relief of pain/symptoms for the expected duration of local anesthetic used.    PHYSICIAN: Giovani Lewis MD  ASSISTANTS: None    MEDICATIONS INJECTED:  0.5 ml of Kenalog 40mg/ml, and Bupivacaine 0.25% 10ml. Of that, 1.5-3ml injected per level before & after the ablation.    LOCAL ANESTHETIC USED: Xylocaine 2% 9ml with Sodium Bicarbonate 1ml.  3ml each site.  SEDATION MEDICATIONS: Versed 1 mg and fentanyl 50 mcg IV.  Conscious sedation provided by MD and monitored by RN.     ESTIMATED BLOOD LOSS:  None.    COMPLICATIONS:  None.    TECHNIQUE:   Laying in a prone position, the patient was prepped and draped in the usual sterile fashion using ChloraPrep and fenestrated drape.  The level was determined under fluoroscopic guidance.  Local anesthetic was given by going down to the hub of the 27-gauge 1.25in needle and raising a wheel.  The 20-gauge needle was introduced to the anatomic local of the median branch at the lateral mass utilizing live fluoroscopy at  each level stated above. Motor stimulation done to confirm no motor nerve ablation takes place up to 2 Volt 2Hz..  Sensory stimulation done to detect similarities in pain location 1.5 Volts 50Hz.. Medication was then injected slowly.  Ablation then done per level utilizing Halyard radiofrequency generator 80°C for 90 seconds. The patient tolerated the procedure well.         The patient was monitored after the procedure.  Patient was given post procedure and discharge instructions to follow at home.  We will see the patient back in two weeks or the patient may call to inform of status. The patient was discharged in a stable condition

## 2022-02-04 NOTE — PLAN OF CARE
Procedure and recovery complete. Band aid x 3 to left lower back. No redness, swelling or drainage noted. No c/o pain or discomfort. No acute distress noted.

## 2022-02-04 NOTE — H&P (VIEW-ONLY)
Subjective:     Patient ID: Taty Max is a 66 y.o. female    Chief Complaint: Low back pain    Referred by: Giovani Lewis Jr*      HPI:    Taty Max is a 66 y.o. female who presents today for left L3, L4 and L5 RFA. She denies any changes in the quality or location of the pain.        Review of Systems   Constitutional: Negative for activity change, appetite change, chills, fatigue, fever and unexpected weight change.   HENT: Negative for hearing loss.    Eyes: Negative for visual disturbance.   Respiratory: Negative for chest tightness and shortness of breath.    Cardiovascular: Negative for chest pain.   Gastrointestinal: Negative for abdominal pain, constipation, diarrhea, nausea and vomiting.   Genitourinary: Negative for difficulty urinating.   Musculoskeletal: Positive for back pain, gait problem and myalgias. Negative for neck pain.   Skin: Negative for rash.   Neurological: Negative for dizziness, weakness, light-headedness, numbness and headaches.   Psychiatric/Behavioral: Positive for sleep disturbance. Negative for hallucinations and suicidal ideas. The patient is not nervous/anxious.        Past Medical History:   Diagnosis Date    Allergic rhinitis, seasonal 1/28/2013    Anxiety disorder 1/28/2013    Basal cell carcinoma     Bilateral retinal lattice degeneration     Cataract     Colon cancer 2008    s/p resection    DDD (degenerative disc disease), lumbar 11/25/2014    Diabetes mellitus type II, uncontrolled 7/25/2014    High myopia     History of colon cancer 4/10/2019    History of incisional hernia repair     Horseshoe retinal tear of both eyes     HTN (hypertension) 1/28/2013    Hypertension     Hypothyroidism 4/29/2014    Incisional hernia of anterior abdominal wall without obstruction or gangrene     Lumbar disc disease 7/25/2014    Metabolic syndrome 4/29/2014    Neuropathy due to chemotherapeutic drug 5/22/2017    Osteopenia 5/10/2013    Screening for  "colorectal cancer 9/11/2015    Normal 6/ 2015---5 yrs    Vitamin D deficiency disease     Vitreous detachment of both eyes        Past Surgical History:   Procedure Laterality Date    ADENOIDECTOMY      ANKLE SURGERY      left     BREAST LUMPECTOMY      CATARACT EXTRACTION      CATARACT EXTRACTION BILATERAL W/ ANTERIOR VITRECTOMY      COLECTOMY      s/p reanastemosis    COLON SURGERY      COLONOSCOPY N/A 6/15/2020    Procedure: COLONOSCOPY;  Surgeon: FRANCES Smiley MD;  [facility]  Service: Endoscopy;  Laterality: N/A;  covid testing 6/13    EPIDURAL STEROID INJECTION Bilateral 11/5/2021    Procedure: Bilateral L3, L4, L5 Medial Branch Blocks;  Surgeon: Giovani Lewis Jr., MD;  Location: [facility];  Service: Pain Management;  Laterality: Bilateral;  Arrive @ 1100; No ATC; Check BG    EPIDURAL STEROID INJECTION Bilateral 12/10/2021    Procedure: Bilateral L3, L4, L5 Medial Brach Blocks (#2);  Surgeon: Giovani Lewis Jr., MD;  Location: [facility];  Service: Pain Management;  Laterality: Bilateral;  Arrive @ 1130; No ATC; Check BG    EYE EXAMINATION UNDER ANESTHESIA W/ RETINAL CRYOTHERAPY AND RETINAL LASER      HERNIA REPAIR      KIDNEY SURGERY      "dilate urethra tube"    NASAL POLYP EXCISION      NASAL SEPTUM SURGERY      OPEN REDUCTION AND INTERNAL FIXATION (ORIF) OF FRACTURE OF DISTAL RADIUS Left 2/12/2020    Procedure: ORIF, FRACTURE, RADIUS, DISTAL,  ulna, closed treatment scaphoid fx, ORIF;  Surgeon: Garrett Duvall MD;  Location: [facility];  Service: Orthopedics;  Laterality: Left;    RADIOFREQUENCY ABLATION OF LUMBAR MEDIAL BRANCH NERVE AT SINGLE LEVEL Right 7/6/2018    Procedure: RADIOFREQUENCY ABLATION, NERVE, MEDIAL BRANCH, LUMBAR, 1 LEVEL;  Surgeon: Vashti Poe MD;  Location: [facility];  Service: Pain Management;  Laterality: Right;  RIght Thermal RFA @ L3-5  (REPEAT)  [id number]  with IV Sedation    CONSENT NEEDED    s/p laser ou      " TONSILLECTOMY      TUBAL LIGATION      UVULOPALATOPHARYNGOPLASTY         Social History     Socioeconomic History    Marital status:    Occupational History     Employer: [employer]   Tobacco Use    Smoking status: Former Smoker     Quit date: 1977     Years since quittin.5    Smokeless tobacco: Never Used   Substance and Sexual Activity    Alcohol use: Yes     Comment: once per week    Drug use: No    Sexual activity: Yes     Partners: Male     Birth control/protection: Post-menopausal       Review of patient's allergies indicates:   Allergen Reactions    Oxaliplatin Hives    Factive [gemifloxacin] Hives    Statins-hmg-coa reductase inhibitors      Memory w lipitor, muscles for 2 others    Daypro [oxaprozin] Rash    Latex Hives     Adhesives       No current facility-administered medications on file prior to encounter.     Current Outpatient Medications on File Prior to Encounter   Medication Sig Dispense Refill    azelastine (ASTELIN) 137 mcg (0.1 %) nasal spray INSTILL ONE SPRAY IN EACH NOSTRIL TWICE DAILY 30 mL 12    cyclobenzaprine (FLEXERIL) 5 MG tablet Take 1 tablet (5 mg total) by mouth 3 (three) times daily as needed for Muscle spasms. 90 tablet 12    hydroCHLOROthiazide (HYDRODIURIL) 12.5 MG Tab Take 1 tablet (12.5 mg total) by mouth once daily. 90 tablet 3    KRILL OIL ORAL Take 1 tablet by mouth once daily.      meloxicam (MOBIC) 7.5 MG tablet TAKE 1 TO 2 TABLETS DAILY  AS NEEDED 180 tablet 12    montelukast (SINGULAIR) 10 mg tablet TAKE 1 TABLET EVERY EVENING 90 tablet 3    multivitamin (THERAGRAN) per tablet Take 1 tablet by mouth once daily. 1 Tablet Oral Every day      olmesartan (BENICAR) 20 MG tablet TAKE 1 TABLET ONCE DAILY 90 tablet 3    zolpidem (AMBIEN) 5 MG Tab TAKE 1 TABLET BY MOUTH EVERYDAY AT BEDTIME 30 tablet 5    albuterol 90 mcg/actuation inhaler Inhale 1-2 puffs into the lungs every 6 (six) hours as needed for Wheezing. 1 Inhaler 0     blood sugar diagnostic (ONETOUCH VERIO) Strp 1 strip by Misc.(Non-Drug; Combo Route) route 2 (two) times daily before meals. 100 strip 11    blood sugar diagnostic Strp 1 strip by Misc.(Non-Drug; Combo Route) route 2 (two) times daily. DISP GLUCOMETER OF CHOICE AND LANCETS AS WELL 100 strip 12    blood-glucose meter (ACCU-CHEK GUIDE GLUCOSE METER) Misc 1 Units by Misc.(Non-Drug; Combo Route) route 2 (two) times daily. 1 each 1    cefadroxil (DURICEF) 1 gram tablet Take 1 tablet (1 g total) by mouth 2 (two) times daily. 14 tablet 0    diazePAM (VALIUM) 5 MG tablet TAKE ONE TABLET BY MOUTH EVERY NIGHT AT BEDTIME AS NEEDED FOR ANXIETY AND SPASMS 30 tablet 5    fluorouracil (EFUDEX) 5 % cream Use hs for 2 weeks (Patient not taking: Reported on 2/3/2022) 40 g 3    fluticasone propionate (FLONASE) 50 mcg/actuation nasal spray 2 sprays (100 mcg total) by Each Nare route once daily. (Patient not taking: Reported on 2/3/2022) 3 Bottle 12    indomethacin (INDOCIN) 50 MG capsule Take 1 capsule (50 mg total) by mouth 3 (three) times daily with meals. Take regularly 3 times a day for 10 days and then on an as-needed basis 90 capsule 0    lancets (ACCU-CHEK FASTCLIX LANCET DRUM) Misc 1 lancet by Misc.(Non-Drug; Combo Route) route 2 (two) times daily. 200 each 11    lancets (ONETOUCH DELICA LANCETS) 33 gauge Misc 1 lancet by Misc.(Non-Drug; Combo Route) route 2 (two) times daily before meals. 100 each 11    metFORMIN (GLUCOPHAGE-XR) 750 MG ER 24hr tablet TAKE 2 TABLETS EVERY       MORNING 180 tablet 3    metFORMIN (GLUCOPHAGE-XR) 750 MG ER 24hr tablet Take 2 tablets (1,500 mg total) by mouth every morning. 180 tablet 3    ONETOUCH DELICA LANCETS 33 gauge Misc       triamcinolone acetonide 0.1% (KENALOG) 0.1 % cream USE TWICE A  g 3       Objective:      /70 (BP Location: Left arm, Patient Position: Lying)   Pulse 89   Temp 98.3 °F (36.8 °C) (Oral)   Resp 18   LMP  (LMP Unknown)   SpO2 95%      Exam:  AAOx3 NAD  Mood and affect normal  Memory and language intact  RRR  CTAB        Assessment:       Lumbar spondylosis      Plan:         Taty Max is a 66 y.o. female with lumbar facet pain.    Proceed with RFA as planned

## 2022-02-04 NOTE — H&P
Subjective:     Patient ID: Taty Max is a 66 y.o. female    Chief Complaint: Low back pain    Referred by: Giovani Lewis Jr*      HPI:    Taty Max is a 66 y.o. female who presents today for left L3, L4 and L5 RFA. She denies any changes in the quality or location of the pain.        Review of Systems   Constitutional: Negative for activity change, appetite change, chills, fatigue, fever and unexpected weight change.   HENT: Negative for hearing loss.    Eyes: Negative for visual disturbance.   Respiratory: Negative for chest tightness and shortness of breath.    Cardiovascular: Negative for chest pain.   Gastrointestinal: Negative for abdominal pain, constipation, diarrhea, nausea and vomiting.   Genitourinary: Negative for difficulty urinating.   Musculoskeletal: Positive for back pain, gait problem and myalgias. Negative for neck pain.   Skin: Negative for rash.   Neurological: Negative for dizziness, weakness, light-headedness, numbness and headaches.   Psychiatric/Behavioral: Positive for sleep disturbance. Negative for hallucinations and suicidal ideas. The patient is not nervous/anxious.        Past Medical History:   Diagnosis Date    Allergic rhinitis, seasonal 1/28/2013    Anxiety disorder 1/28/2013    Basal cell carcinoma     Bilateral retinal lattice degeneration     Cataract     Colon cancer 2008    s/p resection    DDD (degenerative disc disease), lumbar 11/25/2014    Diabetes mellitus type II, uncontrolled 7/25/2014    High myopia     History of colon cancer 4/10/2019    History of incisional hernia repair     Horseshoe retinal tear of both eyes     HTN (hypertension) 1/28/2013    Hypertension     Hypothyroidism 4/29/2014    Incisional hernia of anterior abdominal wall without obstruction or gangrene     Lumbar disc disease 7/25/2014    Metabolic syndrome 4/29/2014    Neuropathy due to chemotherapeutic drug 5/22/2017    Osteopenia 5/10/2013    Screening for  "colorectal cancer 9/11/2015    Normal 6/ 2015---5 yrs    Vitamin D deficiency disease     Vitreous detachment of both eyes        Past Surgical History:   Procedure Laterality Date    ADENOIDECTOMY      ANKLE SURGERY      left     BREAST LUMPECTOMY      CATARACT EXTRACTION      CATARACT EXTRACTION BILATERAL W/ ANTERIOR VITRECTOMY      COLECTOMY      s/p reanastemosis    COLON SURGERY      COLONOSCOPY N/A 6/15/2020    Procedure: COLONOSCOPY;  Surgeon: FRANCES Smiley MD;  [facility]  Service: Endoscopy;  Laterality: N/A;  covid testing 6/13    EPIDURAL STEROID INJECTION Bilateral 11/5/2021    Procedure: Bilateral L3, L4, L5 Medial Branch Blocks;  Surgeon: Giovani Lewis Jr., MD;  Location: [facility];  Service: Pain Management;  Laterality: Bilateral;  Arrive @ 1100; No ATC; Check BG    EPIDURAL STEROID INJECTION Bilateral 12/10/2021    Procedure: Bilateral L3, L4, L5 Medial Brach Blocks (#2);  Surgeon: Giovani Lewis Jr., MD;  Location: [facility];  Service: Pain Management;  Laterality: Bilateral;  Arrive @ 1130; No ATC; Check BG    EYE EXAMINATION UNDER ANESTHESIA W/ RETINAL CRYOTHERAPY AND RETINAL LASER      HERNIA REPAIR      KIDNEY SURGERY      "dilate urethra tube"    NASAL POLYP EXCISION      NASAL SEPTUM SURGERY      OPEN REDUCTION AND INTERNAL FIXATION (ORIF) OF FRACTURE OF DISTAL RADIUS Left 2/12/2020    Procedure: ORIF, FRACTURE, RADIUS, DISTAL,  ulna, closed treatment scaphoid fx, ORIF;  Surgeon: Garrett Duvall MD;  Location: [facility];  Service: Orthopedics;  Laterality: Left;    RADIOFREQUENCY ABLATION OF LUMBAR MEDIAL BRANCH NERVE AT SINGLE LEVEL Right 7/6/2018    Procedure: RADIOFREQUENCY ABLATION, NERVE, MEDIAL BRANCH, LUMBAR, 1 LEVEL;  Surgeon: Vashti Poe MD;  Location: [facility];  Service: Pain Management;  Laterality: Right;  RIght Thermal RFA @ L3-5  (REPEAT)  [id number]  with IV Sedation    CONSENT NEEDED    s/p laser ou      " TONSILLECTOMY      TUBAL LIGATION      UVULOPALATOPHARYNGOPLASTY         Social History     Socioeconomic History    Marital status:    Occupational History     Employer: [employer]   Tobacco Use    Smoking status: Former Smoker     Quit date: 1977     Years since quittin.5    Smokeless tobacco: Never Used   Substance and Sexual Activity    Alcohol use: Yes     Comment: once per week    Drug use: No    Sexual activity: Yes     Partners: Male     Birth control/protection: Post-menopausal       Review of patient's allergies indicates:   Allergen Reactions    Oxaliplatin Hives    Factive [gemifloxacin] Hives    Statins-hmg-coa reductase inhibitors      Memory w lipitor, muscles for 2 others    Daypro [oxaprozin] Rash    Latex Hives     Adhesives       No current facility-administered medications on file prior to encounter.     Current Outpatient Medications on File Prior to Encounter   Medication Sig Dispense Refill    azelastine (ASTELIN) 137 mcg (0.1 %) nasal spray INSTILL ONE SPRAY IN EACH NOSTRIL TWICE DAILY 30 mL 12    cyclobenzaprine (FLEXERIL) 5 MG tablet Take 1 tablet (5 mg total) by mouth 3 (three) times daily as needed for Muscle spasms. 90 tablet 12    hydroCHLOROthiazide (HYDRODIURIL) 12.5 MG Tab Take 1 tablet (12.5 mg total) by mouth once daily. 90 tablet 3    KRILL OIL ORAL Take 1 tablet by mouth once daily.      meloxicam (MOBIC) 7.5 MG tablet TAKE 1 TO 2 TABLETS DAILY  AS NEEDED 180 tablet 12    montelukast (SINGULAIR) 10 mg tablet TAKE 1 TABLET EVERY EVENING 90 tablet 3    multivitamin (THERAGRAN) per tablet Take 1 tablet by mouth once daily. 1 Tablet Oral Every day      olmesartan (BENICAR) 20 MG tablet TAKE 1 TABLET ONCE DAILY 90 tablet 3    zolpidem (AMBIEN) 5 MG Tab TAKE 1 TABLET BY MOUTH EVERYDAY AT BEDTIME 30 tablet 5    albuterol 90 mcg/actuation inhaler Inhale 1-2 puffs into the lungs every 6 (six) hours as needed for Wheezing. 1 Inhaler 0     blood sugar diagnostic (ONETOUCH VERIO) Strp 1 strip by Misc.(Non-Drug; Combo Route) route 2 (two) times daily before meals. 100 strip 11    blood sugar diagnostic Strp 1 strip by Misc.(Non-Drug; Combo Route) route 2 (two) times daily. DISP GLUCOMETER OF CHOICE AND LANCETS AS WELL 100 strip 12    blood-glucose meter (ACCU-CHEK GUIDE GLUCOSE METER) Misc 1 Units by Misc.(Non-Drug; Combo Route) route 2 (two) times daily. 1 each 1    cefadroxil (DURICEF) 1 gram tablet Take 1 tablet (1 g total) by mouth 2 (two) times daily. 14 tablet 0    diazePAM (VALIUM) 5 MG tablet TAKE ONE TABLET BY MOUTH EVERY NIGHT AT BEDTIME AS NEEDED FOR ANXIETY AND SPASMS 30 tablet 5    fluorouracil (EFUDEX) 5 % cream Use hs for 2 weeks (Patient not taking: Reported on 2/3/2022) 40 g 3    fluticasone propionate (FLONASE) 50 mcg/actuation nasal spray 2 sprays (100 mcg total) by Each Nare route once daily. (Patient not taking: Reported on 2/3/2022) 3 Bottle 12    indomethacin (INDOCIN) 50 MG capsule Take 1 capsule (50 mg total) by mouth 3 (three) times daily with meals. Take regularly 3 times a day for 10 days and then on an as-needed basis 90 capsule 0    lancets (ACCU-CHEK FASTCLIX LANCET DRUM) Misc 1 lancet by Misc.(Non-Drug; Combo Route) route 2 (two) times daily. 200 each 11    lancets (ONETOUCH DELICA LANCETS) 33 gauge Misc 1 lancet by Misc.(Non-Drug; Combo Route) route 2 (two) times daily before meals. 100 each 11    metFORMIN (GLUCOPHAGE-XR) 750 MG ER 24hr tablet TAKE 2 TABLETS EVERY       MORNING 180 tablet 3    metFORMIN (GLUCOPHAGE-XR) 750 MG ER 24hr tablet Take 2 tablets (1,500 mg total) by mouth every morning. 180 tablet 3    ONETOUCH DELICA LANCETS 33 gauge Misc       triamcinolone acetonide 0.1% (KENALOG) 0.1 % cream USE TWICE A  g 3       Objective:      /70 (BP Location: Left arm, Patient Position: Lying)   Pulse 89   Temp 98.3 °F (36.8 °C) (Oral)   Resp 18   LMP  (LMP Unknown)   SpO2 95%      Exam:  AAOx3 NAD  Mood and affect normal  Memory and language intact  RRR  CTAB        Assessment:       Lumbar spondylosis      Plan:         Taty Max is a 66 y.o. female with lumbar facet pain.    Proceed with RFA as planned

## 2022-02-07 ENCOUNTER — TELEPHONE (OUTPATIENT)
Dept: PAIN MEDICINE | Facility: CLINIC | Age: 67
End: 2022-02-07
Payer: COMMERCIAL

## 2022-02-07 DIAGNOSIS — M47.816 LUMBAR SPONDYLOSIS: Primary | ICD-10-CM

## 2022-02-07 LAB — POCT GLUCOSE: 122 MG/DL (ref 70–110)

## 2022-02-17 ENCOUNTER — TELEPHONE (OUTPATIENT)
Dept: PAIN MEDICINE | Facility: CLINIC | Age: 67
End: 2022-02-17
Payer: COMMERCIAL

## 2022-02-17 NOTE — TELEPHONE ENCOUNTER
Reviewed pre-procedure instructions with Mrs. Posey, as well as provided arrival time of 1045a for 2/25/2022.  All questions answered- verbalized understanding.

## 2022-02-18 NOTE — TELEPHONE ENCOUNTER
No new care gaps identified.  Powered by [employer]. Reference number: [id number].   2/18/2022 5:26:40 PM CST

## 2022-02-22 RX ORDER — HYDROCHLOROTHIAZIDE 12.5 MG/1
TABLET ORAL
Qty: 90 TABLET | Refills: 3 | Status: SHIPPED | OUTPATIENT
Start: 2022-02-22 | End: 2023-01-19 | Stop reason: SDUPTHER

## 2022-02-25 ENCOUNTER — HOSPITAL ENCOUNTER (OUTPATIENT)
Facility: HOSPITAL | Age: 67
Discharge: HOME | End: 2022-02-25
Attending: PAIN MEDICINE | Admitting: PAIN MEDICINE
Payer: COMMERCIAL

## 2022-02-25 VITALS
SYSTOLIC BLOOD PRESSURE: 159 MMHG | DIASTOLIC BLOOD PRESSURE: 76 MMHG | TEMPERATURE: 98 F | HEART RATE: 86 BPM | OXYGEN SATURATION: 97 % | RESPIRATION RATE: 18 BRPM

## 2022-02-25 DIAGNOSIS — M47.816 LUMBAR SPONDYLOSIS: Primary | ICD-10-CM

## 2022-02-25 PROCEDURE — 64636 DESTROY L/S FACET JNT ADDL: CPT | Mod: RT,,, | Performed by: PAIN MEDICINE

## 2022-02-25 PROCEDURE — 64635 DESTROY LUMB/SAC FACET JNT: CPT | Mod: RT,,, | Performed by: PAIN MEDICINE

## 2022-02-25 PROCEDURE — 63600175 PHARM REV CODE 636 W HCPCS: Performed by: PAIN MEDICINE

## 2022-02-25 PROCEDURE — 64636 PR DESTROY L/S FACET JNT ADDL: ICD-10-PCS | Mod: RT,,, | Performed by: PAIN MEDICINE

## 2022-02-25 PROCEDURE — 25000003 PHARM REV CODE 250: Performed by: PAIN MEDICINE

## 2022-02-25 PROCEDURE — 64636 DESTROY L/S FACET JNT ADDL: CPT | Performed by: PAIN MEDICINE

## 2022-02-25 PROCEDURE — 64635 PR DESTROY LUMB/SAC FACET JNT: ICD-10-PCS | Mod: RT,,, | Performed by: PAIN MEDICINE

## 2022-02-25 PROCEDURE — 64635 DESTROY LUMB/SAC FACET JNT: CPT | Performed by: PAIN MEDICINE

## 2022-02-25 PROCEDURE — 99152 MOD SED SAME PHYS/QHP 5/>YRS: CPT | Performed by: PAIN MEDICINE

## 2022-02-25 RX ORDER — LIDOCAINE HYDROCHLORIDE 20 MG/ML
INJECTION, SOLUTION INFILTRATION; PERINEURAL
Status: DISCONTINUED
Start: 2022-02-25 | End: 2022-02-25 | Stop reason: HOSPADM

## 2022-02-25 RX ORDER — FENTANYL CITRATE 50 UG/ML
100 INJECTION, SOLUTION INTRAMUSCULAR; INTRAVENOUS
Status: DISCONTINUED | OUTPATIENT
Start: 2022-02-25 | End: 2022-02-25 | Stop reason: HOSPADM

## 2022-02-25 RX ORDER — LIDOCAINE HYDROCHLORIDE 20 MG/ML
10 INJECTION, SOLUTION INFILTRATION; PERINEURAL ONCE
Status: COMPLETED | OUTPATIENT
Start: 2022-02-25 | End: 2022-02-25

## 2022-02-25 RX ORDER — SODIUM CHLORIDE 9 MG/ML
INJECTION, SOLUTION INTRAVENOUS CONTINUOUS
Status: DISCONTINUED | OUTPATIENT
Start: 2022-02-25 | End: 2022-02-25 | Stop reason: HOSPADM

## 2022-02-25 RX ORDER — BUPIVACAINE HYDROCHLORIDE 2.5 MG/ML
10 INJECTION, SOLUTION EPIDURAL; INFILTRATION; INTRACAUDAL ONCE
Status: COMPLETED | OUTPATIENT
Start: 2022-02-25 | End: 2022-02-25

## 2022-02-25 RX ORDER — MIDAZOLAM HYDROCHLORIDE 1 MG/ML
INJECTION INTRAMUSCULAR; INTRAVENOUS
Status: DISCONTINUED
Start: 2022-02-25 | End: 2022-02-25 | Stop reason: HOSPADM

## 2022-02-25 RX ORDER — FENTANYL CITRATE 50 UG/ML
INJECTION, SOLUTION INTRAMUSCULAR; INTRAVENOUS
Status: DISCONTINUED
Start: 2022-02-25 | End: 2022-02-25 | Stop reason: HOSPADM

## 2022-02-25 RX ORDER — MIDAZOLAM HYDROCHLORIDE 1 MG/ML
5 INJECTION INTRAMUSCULAR; INTRAVENOUS
Status: DISCONTINUED | OUTPATIENT
Start: 2022-02-25 | End: 2022-02-25 | Stop reason: HOSPADM

## 2022-02-25 RX ORDER — TRIAMCINOLONE ACETONIDE 40 MG/ML
40 INJECTION, SUSPENSION INTRA-ARTICULAR; INTRAMUSCULAR ONCE
Status: COMPLETED | OUTPATIENT
Start: 2022-02-25 | End: 2022-02-25

## 2022-02-25 RX ORDER — BUPIVACAINE HYDROCHLORIDE 2.5 MG/ML
INJECTION, SOLUTION EPIDURAL; INFILTRATION; INTRACAUDAL
Status: DISCONTINUED
Start: 2022-02-25 | End: 2022-02-25 | Stop reason: HOSPADM

## 2022-02-25 RX ORDER — SODIUM CHLORIDE, SODIUM LACTATE, POTASSIUM CHLORIDE, CALCIUM CHLORIDE 600; 310; 30; 20 MG/100ML; MG/100ML; MG/100ML; MG/100ML
INJECTION, SOLUTION INTRAVENOUS CONTINUOUS
Status: DISCONTINUED | OUTPATIENT
Start: 2022-02-25 | End: 2022-02-25 | Stop reason: HOSPADM

## 2022-02-25 RX ORDER — TRIAMCINOLONE ACETONIDE 40 MG/ML
INJECTION, SUSPENSION INTRA-ARTICULAR; INTRAMUSCULAR
Status: DISCONTINUED
Start: 2022-02-25 | End: 2022-02-25 | Stop reason: HOSPADM

## 2022-02-25 RX ADMIN — SODIUM CHLORIDE, SODIUM LACTATE, POTASSIUM CHLORIDE, AND CALCIUM CHLORIDE: .6; .31; .03; .02 INJECTION, SOLUTION INTRAVENOUS at 11:02

## 2022-02-25 NOTE — OP NOTE
LUMBAR Medial Branch Radiofrequency Ablation Under Fluoroscopy  Time-out taken to identify patient and procedure side prior to starting the procedure.   I attest that I have reviewed the patient's home medications prior to the procedure and no contraindication have been identified. I  re-evaluated the patient after the patient was positioned for the procedure in the procedure room immediately before the procedural time-out. The vital signs are current and represent the current state of the patient which has not significantly changed since the preprocedure assessment.                         Date of Service: 02/25/2022    PCP: Fidel Dotson MD    Referring Physician:                                                PROCEDURE:  right L3, L4 and L5 medial branch radiofrequency ablation    REASON FOR PROCEDURE: Lumbar spondylosis [M47.816]    INDICATIONS:  Patient has completed 2 separate medial branch blocks at the specified levels.  Patient reported at least 80% relief of pain/symptoms for the expected duration of local anesthetic used.    PHYSICIAN: Giovani Lewis MD  ASSISTANTS: None    MEDICATIONS INJECTED:  0.5 ml of Kenalog 40mg/ml, and Bupivacaine 0.25% 10ml. Of that, 1.5-3ml injected per level before & after the ablation.    LOCAL ANESTHETIC USED: Xylocaine 2%  3ml each site.  SEDATION MEDICATIONS: Versed 1 mg and fentanyl 50 mcg IV.  Conscious sedation provided by MD and monitored by RN.  (See nurse documentation and case log for sedation time)      ESTIMATED BLOOD LOSS:  None.    COMPLICATIONS:  None.    TECHNIQUE:   Laying in a prone position, the patient was prepped and draped in the usual sterile fashion using ChloraPrep and fenestrated drape.  The level was determined under fluoroscopic guidance.  Local anesthetic was given by going down to the hub of the 27-gauge 1.25in needle and raising a wheel.  The 20-gauge needle was introduced to the anatomic local of the median branch at the lateral mass  utilizing live fluoroscopy at each level stated above. Motor stimulation done to confirm no motor nerve ablation takes place up to 2 Volt 2Hz..  Sensory stimulation done to detect similarities in pain location 1.5 Volts 50Hz.. Medication was then injected slowly.  Ablation then done per level utilizing Halyard radiofrequency generator 80°C for 90 seconds. The patient tolerated the procedure well.         The patient was monitored after the procedure.  Patient was given post procedure and discharge instructions to follow at home.  We will see the patient back in two weeks or the patient may call to inform of status. The patient was discharged in a stable condition

## 2022-02-25 NOTE — DISCHARGE SUMMARY
[facility] - Endoscopy  Discharge Note  Short Stay    Procedure(s) (LRB):  Right L3, L4, L5 Radiofrequency Thermocoagulation of Medial Branches (Right)    OUTCOME: Patient tolerated treatment/procedure well without complication and is now ready for discharge.    DISPOSITION: Home or Self Care    FINAL DIAGNOSIS:  <principal problem not specified>    FOLLOWUP: In clinic    DISCHARGE INSTRUCTIONS:  No discharge procedures on file.       Discharge Diagnosis:Lumbar spondylosis [M47.816]  Condition on Discharge: Stable.  Diet on Discharge: Same as before.  Activity: as per instruction sheet.  Discharge to: Home with a responsible adult.  Follow up: as per Discharge instructions

## 2022-02-25 NOTE — INTERVAL H&P NOTE
The patient has been examined and the H&P has been reviewed:    I concur with the findings and no changes have occurred since H&P was written.    Procedure risks, benefits and alternative options discussed and understood by patient/family.    AAOx3 NAD  Mood and affect normal  Memory and language intact  RRR  CTAB        There are no hospital problems to display for this patient.

## 2022-02-25 NOTE — DISCHARGE INSTRUCTIONS

## 2022-03-02 LAB — POCT GLUCOSE: 122 MG/DL (ref 70–110)

## 2022-03-15 ENCOUNTER — OFFICE VISIT (OUTPATIENT)
Dept: PAIN MEDICINE | Facility: CLINIC | Age: 67
End: 2022-03-15
Payer: COMMERCIAL

## 2022-03-15 VITALS
WEIGHT: 260.13 LBS | SYSTOLIC BLOOD PRESSURE: 184 MMHG | BODY MASS INDEX: 40.75 KG/M2 | DIASTOLIC BLOOD PRESSURE: 86 MMHG | HEART RATE: 95 BPM | OXYGEN SATURATION: 97 %

## 2022-03-15 DIAGNOSIS — G57.11 MERALGIA PARESTHETICA OF RIGHT SIDE: ICD-10-CM

## 2022-03-15 DIAGNOSIS — M47.816 LUMBAR SPONDYLOSIS: Primary | ICD-10-CM

## 2022-03-15 DIAGNOSIS — M51.36 DDD (DEGENERATIVE DISC DISEASE), LUMBAR: ICD-10-CM

## 2022-03-15 PROCEDURE — 1126F AMNT PAIN NOTED NONE PRSNT: CPT | Mod: CPTII,S$GLB,, | Performed by: NURSE PRACTITIONER

## 2022-03-15 PROCEDURE — 1159F MED LIST DOCD IN RCRD: CPT | Mod: CPTII,S$GLB,, | Performed by: NURSE PRACTITIONER

## 2022-03-15 PROCEDURE — 99213 OFFICE O/P EST LOW 20 MIN: CPT | Mod: S$GLB,,, | Performed by: NURSE PRACTITIONER

## 2022-03-15 PROCEDURE — 99999 PR PBB SHADOW E&M-EST. PATIENT-LVL V: CPT | Mod: PBBFAC,,, | Performed by: NURSE PRACTITIONER

## 2022-03-15 PROCEDURE — 1126F PR PAIN SEVERITY QUANTIFIED, NO PAIN PRESENT: ICD-10-PCS | Mod: CPTII,S$GLB,, | Performed by: NURSE PRACTITIONER

## 2022-03-15 PROCEDURE — 3077F SYST BP >= 140 MM HG: CPT | Mod: CPTII,S$GLB,, | Performed by: NURSE PRACTITIONER

## 2022-03-15 PROCEDURE — 3079F PR MOST RECENT DIASTOLIC BLOOD PRESSURE 80-89 MM HG: ICD-10-PCS | Mod: CPTII,S$GLB,, | Performed by: NURSE PRACTITIONER

## 2022-03-15 PROCEDURE — 3008F BODY MASS INDEX DOCD: CPT | Mod: CPTII,S$GLB,, | Performed by: NURSE PRACTITIONER

## 2022-03-15 PROCEDURE — 1101F PR PT FALLS ASSESS DOC 0-1 FALLS W/OUT INJ PAST YR: ICD-10-PCS | Mod: CPTII,S$GLB,, | Performed by: NURSE PRACTITIONER

## 2022-03-15 PROCEDURE — 3008F PR BODY MASS INDEX (BMI) DOCUMENTED: ICD-10-PCS | Mod: CPTII,S$GLB,, | Performed by: NURSE PRACTITIONER

## 2022-03-15 PROCEDURE — 4010F PR ACE/ARB THEARPY RXD/TAKEN: ICD-10-PCS | Mod: CPTII,S$GLB,, | Performed by: NURSE PRACTITIONER

## 2022-03-15 PROCEDURE — 3288F PR FALLS RISK ASSESSMENT DOCUMENTED: ICD-10-PCS | Mod: CPTII,S$GLB,, | Performed by: NURSE PRACTITIONER

## 2022-03-15 PROCEDURE — 4010F ACE/ARB THERAPY RXD/TAKEN: CPT | Mod: CPTII,S$GLB,, | Performed by: NURSE PRACTITIONER

## 2022-03-15 PROCEDURE — 1160F RVW MEDS BY RX/DR IN RCRD: CPT | Mod: CPTII,S$GLB,, | Performed by: NURSE PRACTITIONER

## 2022-03-15 PROCEDURE — 1101F PT FALLS ASSESS-DOCD LE1/YR: CPT | Mod: CPTII,S$GLB,, | Performed by: NURSE PRACTITIONER

## 2022-03-15 PROCEDURE — 3077F PR MOST RECENT SYSTOLIC BLOOD PRESSURE >= 140 MM HG: ICD-10-PCS | Mod: CPTII,S$GLB,, | Performed by: NURSE PRACTITIONER

## 2022-03-15 PROCEDURE — 99999 PR PBB SHADOW E&M-EST. PATIENT-LVL V: ICD-10-PCS | Mod: PBBFAC,,, | Performed by: NURSE PRACTITIONER

## 2022-03-15 PROCEDURE — 1159F PR MEDICATION LIST DOCUMENTED IN MEDICAL RECORD: ICD-10-PCS | Mod: CPTII,S$GLB,, | Performed by: NURSE PRACTITIONER

## 2022-03-15 PROCEDURE — 99213 PR OFFICE/OUTPT VISIT, EST, LEVL III, 20-29 MIN: ICD-10-PCS | Mod: S$GLB,,, | Performed by: NURSE PRACTITIONER

## 2022-03-15 PROCEDURE — 1160F PR REVIEW ALL MEDS BY PRESCRIBER/CLIN PHARMACIST DOCUMENTED: ICD-10-PCS | Mod: CPTII,S$GLB,, | Performed by: NURSE PRACTITIONER

## 2022-03-15 PROCEDURE — 3288F FALL RISK ASSESSMENT DOCD: CPT | Mod: CPTII,S$GLB,, | Performed by: NURSE PRACTITIONER

## 2022-03-15 PROCEDURE — 3079F DIAST BP 80-89 MM HG: CPT | Mod: CPTII,S$GLB,, | Performed by: NURSE PRACTITIONER

## 2022-03-15 NOTE — PROGRESS NOTES
Subjective:     Patient ID: Taty Max is a 66 y.o. female    Chief Complaint: Follow-up, Low-back Pain, and Leg Pain (Right leg pain, cramps in left thigh anteriorly )      Referred by: No ref. provider found      HPI:    Interval History NP (3/15/2022):  Mrs Max presents for follow up of left then right L3,4,5 RFAs. She states 100% lower back pain resolution and pleased with results. She does however mention R thigh numbness/tingling/pain. She states this is exact pain which would resolved with anterior femoral cutaneous nerve  Block in past performed by Dr Poe. She denies newer areas of pain, denies neurological changes. She is currently prescribed flexeril 5mg, mobic 7.5mg and Tramadol 50mg PRN pain by PCP intermittently but states does not like taking medications and would rather treat pain complaints interventionally. She denies SE of medications.  Denies perineal paresthesias, bowel/bladder dysfunction.      Initial Encounter (10/6/21):  Taty Max is a 66 y.o. female who presents today with chronic bilateral low back pain right worse than left.  This pain has been present for years and has been treated in the past with lumbar radiofrequency ablations.  The most recent these was done in July of 2018. She reports very good relief from this procedure for over 1 year.  She states the pain has gradually returned after this.  Time.  Currently her pain is located across the lower lumbar region.  The pain will radiate to the bilateral hips/thighs.  She denies any associated numbness or tingling with this pain.  She denies any weakness or bowel bladder dysfunction.  She has completed physical therapy in the past.  Her most recent course of physical therapy resulted in abdominal hernia that required repair.  Patient has a history of colon cancer and hernia repair was more complex as result.  She is reluctant to attend additional physical therapy for fear of additional damage to her abdominal wall.  The  pain is constant worse with activity.  She is interested in repeat radiofrequency ablations if possible..   This pain is described in detail below.    Physical Therapy:  Yes.    Non-pharmacologic Treatment:  Rest helps         · TENS?  No    Pain Medications:         · Currently taking:  Flexeril, tramadol, meloxicam    · Has tried in the past:  Tylenol    · Has not tried:   TCAs, SNRIs, anticonvulsants, topical creams    Blood thinners:  None    Interventional Therapies:   · L5/S1 ILESI by Dr. Saavedra, most recent in 10/2012: 10 months relief  · Right LFCN block 9/2014: Excellent relief of right thigh pain  · Bilateral lumbar TPIs 10/2014: Excellent relief of muscle pain x 3 weeks  · L4-L5 ILESI on 11/25/14 with 50% relief  · Right SI joint injection 12/2014:  50% relief  · Right SIJ injection and TPIs 3/2016: Excellent relief  · L4/5 ILESI 8/30/2016: Excellent relief of leg pain  · Right L3-5 Cooled RFA 10/2016:  Good relief of sharp back pain  · Right LFCN block 11/10/2016: Good relief until mid January 2017  · 2/2017: Right anterior femoral cutaneous nerve block  · 4/2018: Right anterior femoral cutaneous nerve and right LFCN block: 100% relief of that pain  · 7/2018: Right L3-5 Thermal RFA: 90% relief\  · 2/4/22: Left L3,4,5 % resolution  · 2/25/22 Right L3,4,5 % resolution.     Relevant Surgeries:  None    Affecting sleep?  Yes    Affecting daily activities? yes    Depressive symptoms? no          · SI/HI? No    Work status: Employed    Pain Scores:    Best:       0/10  Worst:     3/10  Usually:   0/10  Today:    0/10    Review of Systems   Constitutional: Negative for activity change, appetite change, chills, fatigue, fever and unexpected weight change.   HENT: Negative for hearing loss.    Eyes: Negative for visual disturbance.   Respiratory: Negative for chest tightness and shortness of breath.    Cardiovascular: Negative for chest pain.   Gastrointestinal: Negative for abdominal pain,  constipation, diarrhea, nausea and vomiting.   Genitourinary: Negative for difficulty urinating.   Musculoskeletal: Positive for back pain, gait problem and myalgias. Negative for neck pain.   Skin: Negative for rash.   Neurological: Positive for numbness. Negative for dizziness, weakness, light-headedness and headaches.   Psychiatric/Behavioral: Positive for sleep disturbance. Negative for hallucinations and suicidal ideas. The patient is not nervous/anxious.        Past Medical History:   Diagnosis Date    Allergic rhinitis, seasonal 1/28/2013    Anxiety disorder 1/28/2013    Basal cell carcinoma     Bilateral retinal lattice degeneration     Cataract     Colon cancer 2008    s/p resection    DDD (degenerative disc disease), lumbar 11/25/2014    Diabetes mellitus type II, uncontrolled 7/25/2014    High myopia     History of colon cancer 4/10/2019    History of incisional hernia repair     Horseshoe retinal tear of both eyes     HTN (hypertension) 1/28/2013    Hypertension     Hypothyroidism 4/29/2014    Incisional hernia of anterior abdominal wall without obstruction or gangrene     Lumbar disc disease 7/25/2014    Metabolic syndrome 4/29/2014    Neuropathy due to chemotherapeutic drug 5/22/2017    Osteopenia 5/10/2013    Screening for colorectal cancer 9/11/2015    Normal 6/ 2015---5 yrs    Vitamin D deficiency disease     Vitreous detachment of both eyes        Past Surgical History:   Procedure Laterality Date    ADENOIDECTOMY      ANKLE SURGERY      left     BREAST LUMPECTOMY      CATARACT EXTRACTION      CATARACT EXTRACTION BILATERAL W/ ANTERIOR VITRECTOMY      COLECTOMY      s/p reanastemosis    COLON SURGERY      COLONOSCOPY N/A 6/15/2020    Procedure: COLONOSCOPY;  Surgeon: FRANCES Smiley MD;  Location: [address];  Service: Endoscopy;  Laterality: N/A;  covid testing 6/13    EPIDURAL STEROID INJECTION Bilateral 11/5/2021    Procedure: Bilateral L3, L4, L5 Medial  "Branch Blocks;  Surgeon: Giovani Lewis Jr., MD;  Location: [facility];  Service: Pain Management;  Laterality: Bilateral;  Arrive @ 1100; No ATC; Check BG    EPIDURAL STEROID INJECTION Bilateral 12/10/2021    Procedure: Bilateral L3, L4, L5 Medial Brach Blocks (#2);  Surgeon: Giovani Lewis Jr., MD;  Location: [facility];  Service: Pain Management;  Laterality: Bilateral;  Arrive @ 1130; No ATC; Check BG    EPIDURAL STEROID INJECTION Left 2/4/2022    Procedure: Left L3, L4, L5 Radiofrequency Thermocoagulation of Medial Branches;  Surgeon: Giovani Lewis Jr., MD;  Location: [facility];  Service: Pain Management;  Laterality: Left;  Arrive @ 1215; No ATC; Check BG; Rapid COVID    EPIDURAL STEROID INJECTION Right 2/25/2022    Procedure: Right L3, L4, L5 Radiofrequency Thermocoagulation of Medial Branches;  Surgeon: Giovani Lewis Jr., MD;  Location: [facility];  Service: Pain Management;  Laterality: Right;  @1045  No ATC  Check BG  Last time 1 & 50    EYE EXAMINATION UNDER ANESTHESIA W/ RETINAL CRYOTHERAPY AND RETINAL LASER      HERNIA REPAIR      KIDNEY SURGERY      "dilate urethra tube"    NASAL POLYP EXCISION      NASAL SEPTUM SURGERY      OPEN REDUCTION AND INTERNAL FIXATION (ORIF) OF FRACTURE OF DISTAL RADIUS Left 2/12/2020    Procedure: ORIF, FRACTURE, RADIUS, DISTAL,  ulna, closed treatment scaphoid fx, ORIF;  Surgeon: Garrett uDvall MD;  Location: [facility];  Service: Orthopedics;  Laterality: Left;    RADIOFREQUENCY ABLATION OF LUMBAR MEDIAL BRANCH NERVE AT SINGLE LEVEL Right 7/6/2018    Procedure: RADIOFREQUENCY ABLATION, NERVE, MEDIAL BRANCH, LUMBAR, 1 LEVEL;  Surgeon: Vashti Poe MD;  Location: [facility];  Service: Pain Management;  Laterality: Right;  RIght Thermal RFA @ L3-5  (REPEAT)  [id number]  with IV Sedation    CONSENT NEEDED    s/p laser ou      TONSILLECTOMY      TUBAL LIGATION      UVULOPALATOPHARYNGOPLASTY  1990s       Social History     Socioeconomic " History    Marital status:    Occupational History     Employer: [employer]   Tobacco Use    Smoking status: Former Smoker     Quit date: 1977     Years since quittin.6    Smokeless tobacco: Never Used   Substance and Sexual Activity    Alcohol use: Yes     Comment: once per week    Drug use: No    Sexual activity: Yes     Partners: Male     Birth control/protection: Post-menopausal       Review of patient's allergies indicates:   Allergen Reactions    Oxaliplatin Hives    Factive [gemifloxacin] Hives    Statins-hmg-coa reductase inhibitors      Memory w lipitor, muscles for 2 others    Daypro [oxaprozin] Rash    Latex Hives     Adhesives       Current Outpatient Medications on File Prior to Visit   Medication Sig Dispense Refill    azelastine (ASTELIN) 137 mcg (0.1 %) nasal spray INSTILL ONE SPRAY IN EACH NOSTRIL TWICE DAILY 30 mL 12    blood sugar diagnostic (ONETOUCH VERIO) Strp 1 strip by Misc.(Non-Drug; Combo Route) route 2 (two) times daily before meals. 100 strip 11    blood sugar diagnostic Strp 1 strip by Misc.(Non-Drug; Combo Route) route 2 (two) times daily. DISP GLUCOMETER OF CHOICE AND LANCETS AS WELL 100 strip 12    cefadroxil (DURICEF) 1 gram tablet Take 1 tablet (1 g total) by mouth 2 (two) times daily. 14 tablet 0    cyclobenzaprine (FLEXERIL) 5 MG tablet Take 1 tablet (5 mg total) by mouth 3 (three) times daily as needed for Muscle spasms. 90 tablet 12    diazePAM (VALIUM) 5 MG tablet TAKE ONE TABLET BY MOUTH EVERY NIGHT AT BEDTIME AS NEEDED FOR ANXIETY AND SPASMS 30 tablet 5    fluorouracil (EFUDEX) 5 % cream Use hs for 2 weeks 40 g 3    fluticasone propionate (FLONASE) 50 mcg/actuation nasal spray 2 sprays (100 mcg total) by Each Nare route once daily. 3 Bottle 12    hydroCHLOROthiazide (HYDRODIURIL) 12.5 MG Tab TAKE 1 TABLET ONCE DAILY 90 tablet 3    indomethacin (INDOCIN) 50 MG capsule Take 1 capsule (50 mg total) by mouth 3 (three) times daily with  meals. Take regularly 3 times a day for 10 days and then on an as-needed basis 90 capsule 0    KRILL OIL ORAL Take 1 tablet by mouth once daily.      lancets (ACCU-CHEK FASTCLIX LANCET DRUM) Misc 1 lancet by Misc.(Non-Drug; Combo Route) route 2 (two) times daily. 200 each 11    lancets (ONETOUCH DELICA LANCETS) 33 gauge Misc 1 lancet by Misc.(Non-Drug; Combo Route) route 2 (two) times daily before meals. 100 each 11    meloxicam (MOBIC) 7.5 MG tablet TAKE 1 TO 2 TABLETS DAILY  AS NEEDED 180 tablet 12    metFORMIN (GLUCOPHAGE-XR) 750 MG ER 24hr tablet TAKE 2 TABLETS EVERY       MORNING 180 tablet 3    metFORMIN (GLUCOPHAGE-XR) 750 MG ER 24hr tablet Take 2 tablets (1,500 mg total) by mouth every morning. 180 tablet 3    montelukast (SINGULAIR) 10 mg tablet TAKE 1 TABLET EVERY EVENING 90 tablet 3    multivitamin (THERAGRAN) per tablet Take 1 tablet by mouth once daily. 1 Tablet Oral Every day      olmesartan (BENICAR) 20 MG tablet TAKE 1 TABLET ONCE DAILY 90 tablet 3    ONETOUCH DELICA LANCETS 33 gauge Misc       traMADoL (ULTRAM) 50 mg tablet Take 1 tablet (50 mg total) by mouth every 6 (six) hours as needed. 120 tablet 3    triamcinolone acetonide 0.1% (KENALOG) 0.1 % cream USE TWICE A  g 3    zolpidem (AMBIEN) 5 MG Tab TAKE 1 TABLET BY MOUTH EVERYDAY AT BEDTIME 30 tablet 5    albuterol 90 mcg/actuation inhaler Inhale 1-2 puffs into the lungs every 6 (six) hours as needed for Wheezing. 1 Inhaler 0    blood-glucose meter (ACCU-CHEK GUIDE GLUCOSE METER) Misc 1 Units by Misc.(Non-Drug; Combo Route) route 2 (two) times daily. 1 each 1     No current facility-administered medications on file prior to visit.       Objective:      BP (!) 184/86 (BP Location: Left arm, Patient Position: Sitting, BP Method: Medium (Automatic))   Pulse 95   Wt 118 kg (260 lb 2.4 oz)   LMP  (LMP Unknown)   SpO2 97%   BMI 40.75 kg/m²     GEN:  Well developed, well nourished.  No acute distress.   HEENT:  No trauma.   Mucous membranes moist.  Nares patent bilaterally.  PSYCH: Normal affect. Thought content appropriate.  CHEST:  Breathing symmetric.  No audible wheezing.  ABD: Soft, non-distended.  SKIN:  Warm, pink, dry.  No rash on exposed areas.    EXT:  No cyanosis, clubbing, or edema.  No color change or changes in nail or hair growth.  NEURO/MUSCULOSKELETAL:  Fully alert, oriented, and appropriate. Speech normal pete. No cranial nerve deficits.   Gait: Antalgic.  No focal motor deficits.       Imaging:    Narrative & Impression    EXAMINATION:  XR LUMBAR SPINE AP AND LAT WITH FLEX/EXT     CLINICAL HISTORY:  LOWER BACK PAIN;  Dorsalgia, unspecified     TECHNIQUE:  AP and lateral views as well as lateral flexion and extension images are performed through the lumbar spine.     COMPARISON:  01/11/2012     FINDINGS:  Bones appear somewhat demineralized.  Mild curvature of the lumbar spine on the frontal view, convex toward the right.  The lateral views demonstrate slight retrolisthesis of L2 with respect L3.  This does not change significantly with flexion or extension.  Vertebral body heights are somewhat difficult to evaluate at the upper lumbar region.  Vertebral body heights appear to be adequately maintained.  Moderate hypertrophic spurring, disc space narrowing and vacuum phenomenon at the L4-5 and L5-S1 levels.  No definite evidence of spondylolysis.  No definite evidence of acute fracture or osseous destruction.  Pedicles appear to be intact.  Aortoiliac atherosclerotic calcification without evidence of aneurysm.     Impression:     Suboptimal visualization of the upper lumbar vertebral bodies.  No definite evidence of acute abnormality.  DJD as detailed above.  If clinically indicated, CT scan of the lumbar spine may be considered for further evaluation.     This report was flagged in Epic as abnormal.        Electronically signed by: Shashi Alcantara MD  Date:                                            09/13/2021  Time:                                            13:51       Narrative & Impression    EXAMINATION:  MRI LUMBAR SPINE WITHOUT CONTRAST     CLINICAL HISTORY:  DDD; Lumbago with sciatica, unspecified side     TECHNIQUE:  Multiplanar, multisequence MR images were acquired from the thoracolumbar junction to the sacrum without the administration of contrast.     COMPARISON:  Lumbar spine 09/13/2021.     FINDINGS:  Alignment: Minimal grade 1 retrolisthesis of L1 on L2.  Remaining lumbar spine maintains normal sagittal alignment.     Vertebrae: Vertebral body heights are maintained without evidence for fracture or marrow infiltrative process.  Multilevel endplate degenerative change most prominent at L1-L2 and L4-L5.     Discs: Severe multilevel intervertebral disc space narrowing and disc desiccation throughout the lumbar spine.     Cord: Distal cord maintains normal signal intensity and contour.  Conus terminates at approximately L2 level.     Degenerative findings:     T12-L1: No spinal canal stenosis or neural foraminal narrowing.  Fluid in the right facet joint as well as a small T2 hyperintense focus at the left facet joint which may represent a facet joint cyst (axial series 13, image 12).     L1-L2: Minimal grade 1 retrolisthesis of L1 on L2, mild circumferential disc bulge, and moderate bilateral facet joint hypertrophy which contributes to mild bilateral neural foraminal narrowing.  Fluid in the facet joints, right greater than left.     L2-L3: Circumferential disc bulge, moderate bilateral facet joint hypertrophy, and ligamentum flavum hypertrophy with advanced cystic degenerative change surrounding the left facet joint.  No spinal canal stenosis or neural foraminal narrowing at this level.  Fluid in the right facet joint.     L3-L4: Circumferential disc bulge, moderate bilateral facet joint hypertrophy, and ligamentum flavum hypertrophy with no spinal canal stenosis or or neural foraminal narrowing.  Fluid in the right  facet joint.     L4-L5: Circumferential disc bulge, severe bilateral facet joint hypertrophy, and ligamentum flavum hypertrophy contributing to mild spinal canal stenosis.  No neural foraminal narrowing.  Fluid in the left facet joint.     L5-S1: Circumferential disc bulge, severe bilateral neural foraminal narrowing, and ligamentum flavum hypertrophy contributing to mild spinal canal stenosis and mild right neural foraminal narrowing.     Paraspinal muscles & soft tissues: Mild atrophy of the paraspinal musculature.     Sacroiliac joints are unremarkable.     Small T2 hyperintense focus in the midline sacrum, likely representing a Tarlov cyst.     Partially visualized intra-abdominal organs are unremarkable.     Impression:     Multilevel lumbar spondylosis as detailed above, worst at L5-S1 contributing to mild spinal canal stenosis as well as mild right neural foraminal narrowing.     Minimal grade 1 retrolisthesis of L1 on L2.     Advanced degenerative change in the left facet joint at L2-L3, as well as fluid and multiple facet joints throughout the lumbar spine.  Findings are nonspecific and may represent active inflammatory change.  Correlation advised.     Other findings as above.     Electronically signed by resident: Pancho Nayak  Date:                                            09/21/2021  Time:                                           09:02     Electronically signed by: Tyrone Jacques MD  Date:                                            09/21/2021  Time:                                           10:44         Assessment:       Encounter Diagnoses   Name Primary?    Lumbar spondylosis Yes    Meralgia paresthetica of right side     DDD (degenerative disc disease), lumbar          Plan:       Taty was seen today for follow-up, low-back pain and leg pain.    Diagnoses and all orders for this visit:    Lumbar spondylosis    Meralgia paresthetica of right side    DDD (degenerative disc disease),  hanna Max is a 66 y.o. female with chronic bilateral low back pain which is 100% resolved with Left the Right L3,4,5 RFAs recently. She has had Right thigh pain returning recently with prior Hx of anterior femoral cutaneous nerve  Block resolving this pain and would like to repeat procedure.     -Prior records reviewed  - 100% Resolution of lower back pain by Left then Right L3,4,5 RFA, discussed can repeat as needed   - s/f anterior femoral cutaneous nerve  Block in office under US guidance  - Can continue medication prescribed by PCP including Mobic, Flexeril, and Tramadol PRN  - I have stressed the importance of physical activity and a home exercise plan to help with pain and improve health.  - Patient can continue with medications for now since they are providing benefits, using them appropriately, and without side effects.  - Counseled patient regarding the importance of physical therapy.  - RTC for in office procedure and 2 weeks after procedure           This note was created by combination of typed  and M-Modal dictation. Transcription and phonetic errors may be present.  If there are any questions, please contact me.

## 2022-03-16 ENCOUNTER — TELEPHONE (OUTPATIENT)
Dept: PAIN MEDICINE | Facility: CLINIC | Age: 67
End: 2022-03-16
Payer: COMMERCIAL

## 2022-03-16 NOTE — TELEPHONE ENCOUNTER
Ms. Taty would like an afternoon appt for the clinic inj under U/S.  Scheduled 4/18/2022 at 1:45p.

## 2022-03-16 NOTE — TELEPHONE ENCOUNTER
----- Message from Javan Young NP sent at 3/16/2022  8:00 AM CDT -----  Can we get her scheduled for anterior femoral cutaneous nerve  block in office under US guidance please. Thanks, Javan

## 2022-04-18 ENCOUNTER — TELEPHONE (OUTPATIENT)
Dept: PAIN MEDICINE | Facility: CLINIC | Age: 67
End: 2022-04-18
Payer: COMMERCIAL

## 2022-04-18 NOTE — TELEPHONE ENCOUNTER
NA/LVM for patient to inform her that her appointment for 4/18 had to be r/s because of Dr Lewis having a family emergency. Her new appointment is 5/4 at 10:15AM. This was left in the VM as well as the clinic number for her to call with any questions.

## 2022-05-04 ENCOUNTER — PROCEDURE VISIT (OUTPATIENT)
Dept: PAIN MEDICINE | Facility: CLINIC | Age: 67
End: 2022-05-04
Payer: COMMERCIAL

## 2022-05-04 VITALS
SYSTOLIC BLOOD PRESSURE: 178 MMHG | BODY MASS INDEX: 41.64 KG/M2 | DIASTOLIC BLOOD PRESSURE: 94 MMHG | TEMPERATURE: 98 F | OXYGEN SATURATION: 97 % | WEIGHT: 265.88 LBS | HEART RATE: 100 BPM

## 2022-05-04 DIAGNOSIS — G57.11 MERALGIA PARAESTHETICA, RIGHT: ICD-10-CM

## 2022-05-04 PROCEDURE — 76942 ECHO GUIDE FOR BIOPSY: CPT | Mod: S$GLB,,, | Performed by: PAIN MEDICINE

## 2022-05-04 PROCEDURE — 64450 PR NERVE BLOCK INJ, ANES/STEROID, OTHER PERIPHERAL: ICD-10-PCS | Mod: RT,S$GLB,, | Performed by: PAIN MEDICINE

## 2022-05-04 PROCEDURE — 76942 PR U/S GUIDANCE FOR NEEDLE GUIDANCE: ICD-10-PCS | Mod: S$GLB,,, | Performed by: PAIN MEDICINE

## 2022-05-04 PROCEDURE — 64450 NJX AA&/STRD OTHER PN/BRANCH: CPT | Mod: RT,S$GLB,, | Performed by: PAIN MEDICINE

## 2022-05-04 NOTE — PROCEDURES
Procedures     Date of Procedure: 05/04/2022    Procedure: Right lateral femoral cutaneous nerve block using ultrasound guidance      Pre-op diagnosis: Right meralgia paresthetica    Post-op diagnosis: Right meralgia paresthetica    Physician: Dr. Giovani Lewis    Assistant: None    Anesthestia:  Local    EBL: None    Specimens: None      right Lateral Femoral Cutaneous Nerve Block using US guidance:   The procedure was discussed with the patient including complications of nerve damage, bleeding, infection, and failure of pain relief.     All medications, allergies, and relevant histories were reviewed. No recent antibiotics or infections. A time-out was taken to verify the correct patient, procedure, laterality, and appropriate medications/allergies.     The patient was placed in the supine position. Inguinal area was prepped with chlorhexidine and draped. Sterile precautions were observed throughout the procedure. After identifying the lateral femoral cutaneous nerve superficial to the sartorious muscle on the right using US guidance, Xylocaine 1% was infiltrated locally 1.5 inches. A 22-gauge B level needle was introduced and advanced to the lateral femoral cutaneous nerve.   5 mL of a mixture of 9cc of bupivacaine 0.5%, 6 mg of betamethasone was injected around the nerve after repeated negative aspirations. Needle was removed and a Band-Aid dressing applied.         The patient tolerated the procedure well without any complications and was released in excellent condition.    Future Management:   If helpful, can repeat as needed.    Follow up 4 weeks

## 2022-05-10 ENCOUNTER — PATIENT OUTREACH (OUTPATIENT)
Dept: ADMINISTRATIVE | Facility: OTHER | Age: 67
End: 2022-05-10
Payer: COMMERCIAL

## 2022-05-11 ENCOUNTER — OFFICE VISIT (OUTPATIENT)
Dept: DERMATOLOGY | Facility: CLINIC | Age: 67
End: 2022-05-11
Payer: COMMERCIAL

## 2022-05-11 VITALS — BODY MASS INDEX: 41.5 KG/M2 | WEIGHT: 265 LBS

## 2022-05-11 DIAGNOSIS — L82.1 SEBORRHEIC KERATOSES: ICD-10-CM

## 2022-05-11 DIAGNOSIS — Z85.828 HISTORY OF SKIN CANCER: ICD-10-CM

## 2022-05-11 DIAGNOSIS — L81.4 LENTIGINES: ICD-10-CM

## 2022-05-11 DIAGNOSIS — L30.4 INTERTRIGO: Primary | ICD-10-CM

## 2022-05-11 PROCEDURE — 99213 PR OFFICE/OUTPT VISIT, EST, LEVL III, 20-29 MIN: ICD-10-PCS | Mod: 25,S$GLB,, | Performed by: DERMATOLOGY

## 2022-05-11 PROCEDURE — 3008F BODY MASS INDEX DOCD: CPT | Mod: CPTII,S$GLB,, | Performed by: DERMATOLOGY

## 2022-05-11 PROCEDURE — 17110 PR DESTRUCTION BENIGN LESIONS UP TO 14: ICD-10-PCS | Mod: S$GLB,,, | Performed by: DERMATOLOGY

## 2022-05-11 PROCEDURE — 3008F PR BODY MASS INDEX (BMI) DOCUMENTED: ICD-10-PCS | Mod: CPTII,S$GLB,, | Performed by: DERMATOLOGY

## 2022-05-11 PROCEDURE — 99999 PR PBB SHADOW E&M-EST. PATIENT-LVL V: ICD-10-PCS | Mod: PBBFAC,,, | Performed by: DERMATOLOGY

## 2022-05-11 PROCEDURE — 1126F PR PAIN SEVERITY QUANTIFIED, NO PAIN PRESENT: ICD-10-PCS | Mod: CPTII,S$GLB,, | Performed by: DERMATOLOGY

## 2022-05-11 PROCEDURE — 1159F MED LIST DOCD IN RCRD: CPT | Mod: CPTII,S$GLB,, | Performed by: DERMATOLOGY

## 2022-05-11 PROCEDURE — 99999 PR PBB SHADOW E&M-EST. PATIENT-LVL V: CPT | Mod: PBBFAC,,, | Performed by: DERMATOLOGY

## 2022-05-11 PROCEDURE — 1101F PT FALLS ASSESS-DOCD LE1/YR: CPT | Mod: CPTII,S$GLB,, | Performed by: DERMATOLOGY

## 2022-05-11 PROCEDURE — 1160F PR REVIEW ALL MEDS BY PRESCRIBER/CLIN PHARMACIST DOCUMENTED: ICD-10-PCS | Mod: CPTII,S$GLB,, | Performed by: DERMATOLOGY

## 2022-05-11 PROCEDURE — 3288F FALL RISK ASSESSMENT DOCD: CPT | Mod: CPTII,S$GLB,, | Performed by: DERMATOLOGY

## 2022-05-11 PROCEDURE — 1160F RVW MEDS BY RX/DR IN RCRD: CPT | Mod: CPTII,S$GLB,, | Performed by: DERMATOLOGY

## 2022-05-11 PROCEDURE — 4010F ACE/ARB THERAPY RXD/TAKEN: CPT | Mod: CPTII,S$GLB,, | Performed by: DERMATOLOGY

## 2022-05-11 PROCEDURE — 99213 OFFICE O/P EST LOW 20 MIN: CPT | Mod: 25,S$GLB,, | Performed by: DERMATOLOGY

## 2022-05-11 PROCEDURE — 1101F PR PT FALLS ASSESS DOC 0-1 FALLS W/OUT INJ PAST YR: ICD-10-PCS | Mod: CPTII,S$GLB,, | Performed by: DERMATOLOGY

## 2022-05-11 PROCEDURE — 1159F PR MEDICATION LIST DOCUMENTED IN MEDICAL RECORD: ICD-10-PCS | Mod: CPTII,S$GLB,, | Performed by: DERMATOLOGY

## 2022-05-11 PROCEDURE — 4010F PR ACE/ARB THEARPY RXD/TAKEN: ICD-10-PCS | Mod: CPTII,S$GLB,, | Performed by: DERMATOLOGY

## 2022-05-11 PROCEDURE — 1126F AMNT PAIN NOTED NONE PRSNT: CPT | Mod: CPTII,S$GLB,, | Performed by: DERMATOLOGY

## 2022-05-11 PROCEDURE — 3288F PR FALLS RISK ASSESSMENT DOCUMENTED: ICD-10-PCS | Mod: CPTII,S$GLB,, | Performed by: DERMATOLOGY

## 2022-05-11 PROCEDURE — 17110 DESTRUCTION B9 LES UP TO 14: CPT | Mod: S$GLB,,, | Performed by: DERMATOLOGY

## 2022-05-11 RX ORDER — ECONAZOLE NITRATE 10 MG/G
CREAM TOPICAL
Qty: 30 G | Refills: 2 | Status: SHIPPED | OUTPATIENT
Start: 2022-05-11 | End: 2023-02-13

## 2022-05-11 NOTE — PROGRESS NOTES
Subjective:       Patient ID:  Taty Max is a 66 y.o. female who presents for   Chief Complaint   Patient presents with    Spot     chest    Rash     abdomen     New spot on mid chest which is irritated, no recurrence of bcc removed last visit. Has intertrigo for over a year using vinegar qd and tac cream once a week has flared recently.       Review of Systems   Constitutional: Negative for fever, chills, weight loss, weight gain, fatigue, night sweats and malaise.   Skin: Positive for itching, rash and wears hat. Negative for daily sunscreen use and activity-related sunscreen use.   Hematologic/Lymphatic: Does not bruise/bleed easily.        Objective:    Physical Exam   Constitutional: She appears well-developed and well-nourished. No distress.   Neurological: She is alert and oriented to person, place, and time. She is not disoriented.   Psychiatric: She has a normal mood and affect.   Skin:   Areas Examined (abnormalities noted in diagram):   Head / Face Inspection Performed  Neck Inspection Performed  Chest / Axilla Inspection Performed  Abdomen Inspection Performed  Back Inspection Performed  RUE Inspected  LUE Inspection Performed                   Diagram Legend     Erythematous scaling macule/papule c/w actinic keratosis       Vascular papule c/w angioma      Pigmented verrucoid papule/plaque c/w seborrheic keratosis      Yellow umbilicated papule c/w sebaceous hyperplasia      Irregularly shaped tan macule c/w lentigo     1-2 mm smooth white papules consistent with Milia      Movable subcutaneous cyst with punctum c/w epidermal inclusion cyst      Subcutaneous movable cyst c/w pilar cyst      Firm pink to brown papule c/w dermatofibroma      Pedunculated fleshy papule(s) c/w skin tag(s)      Evenly pigmented macule c/w junctional nevus     Mildly variegated pigmented, slightly irregular-bordered macule c/w mildly atypical nevus      Flesh colored to evenly pigmented papule c/w intradermal nevus  "      Pink pearly papule/plaque c/w basal cell carcinoma      Erythematous hyperkeratotic cursted plaque c/w SCC      Surgical scar with no sign of skin cancer recurrence      Open and closed comedones      Inflammatory papules and pustules      Verrucoid papule consistent consistent with wart     Erythematous eczematous patches and plaques     Dystrophic onycholytic nail with subungual debris c/w onychomycosis     Umbilicated papule    Erythematous-base heme-crusted tan verrucoid plaque consistent with inflamed seborrheic keratosis     Erythematous Silvery Scaling Plaque c/w Psoriasis     See annotation      Assessment / Plan:        Intertrigo  -     econazole nitrate 1 % cream; Use bid  Dispense: 30 g; Refill: 2  Cont tac cream weekly  Vinegar and water 1/2 each  bid      Seborrheic keratoses  Cryosurgery procedure note:    Verbal consent from the patient is obtained including, but not limited to, risk of hypopigmentation/hyperpigmentation, scar, recurrence of lesion. Liquid nitrogen cryosurgery is applied to 1 lesion mid ches to produce a freeze injury,  is instructed to call if lesions do not completely resolve.      History of skin cancers  Area(s) of previous NMSC evaluated with no signs of recurrence.   No lesions suspicious for malignancy noted.    Recommend daily sun protection/avoidance and use of at least SPF 30, broad spectrum sunscreen (OTC drug).       Lentigines  The "ABCD" rules to observe pigmented lesions were reviewed.  sunscreen             Follow up in about 6 months (around 11/11/2022).  "

## 2022-05-17 RX ORDER — MELOXICAM 7.5 MG/1
TABLET ORAL
Qty: 180 TABLET | Refills: 3 | Status: SHIPPED | OUTPATIENT
Start: 2022-05-17 | End: 2023-08-09 | Stop reason: SDUPTHER

## 2022-05-24 DIAGNOSIS — R52 PAIN: ICD-10-CM

## 2022-05-24 NOTE — TELEPHONE ENCOUNTER
Care Due:                  Date            Visit Type   Department     Provider  --------------------------------------------------------------------------------                                [facility]                              FOLLOWUP/OF  MED/ INTERNAL  Fidel Balderrama  Last Visit: 02-      [facility]ICE VISIT   MED/ PEDS      [place]  Next Visit: None Scheduled  None         None Found                                                            Last  Test          Frequency    Reason                     Performed    Due Date  --------------------------------------------------------------------------------    HBA1C.......  6 months...  metFORMIN................  12- 06-    [employer] Embedded Care Gaps. Reference number: [id number]. 5/24/2022   4:13:59 AM CDT

## 2022-05-26 RX ORDER — TRAMADOL HYDROCHLORIDE 50 MG/1
TABLET ORAL
Qty: 120 TABLET | Refills: 3 | Status: SHIPPED | OUTPATIENT
Start: 2022-05-26 | End: 2022-09-14

## 2022-05-31 NOTE — TELEPHONE ENCOUNTER
No new care gaps identified.  [facility] Embedded Care Gaps. Reference number: [id number]. 5/30/2022   11:53:22 PM CDT

## 2022-06-02 RX ORDER — ZOLPIDEM TARTRATE 5 MG/1
5 TABLET ORAL NIGHTLY PRN
Qty: 30 TABLET | Refills: 5 | Status: SHIPPED | OUTPATIENT
Start: 2022-06-02 | End: 2023-01-19 | Stop reason: SDUPTHER

## 2022-06-26 ENCOUNTER — TELEPHONE (OUTPATIENT)
Dept: FAMILY MEDICINE | Facility: CLINIC | Age: 67
End: 2022-06-26
Payer: COMMERCIAL

## 2022-06-26 DIAGNOSIS — E11.65 UNCONTROLLED TYPE 2 DIABETES MELLITUS WITH HYPERGLYCEMIA: Primary | ICD-10-CM

## 2022-06-27 NOTE — TELEPHONE ENCOUNTER
----- Message from Luisatere Chahal sent at 6/25/2022  9:56 AM CDT -----  Please call pt at [phone] she needs to get a order in for her A-1 C blood    
Order in  
Pt was called to schedule an appointment. Pt states she is retiring and she will not have no insurance after the 1st. Pt was offered to come in on a Saturday, but the pt states that her insurance will be cancelled by then.  
.

## 2022-09-19 ENCOUNTER — PATIENT MESSAGE (OUTPATIENT)
Dept: FAMILY MEDICINE | Facility: CLINIC | Age: 67
End: 2022-09-19
Payer: COMMERCIAL

## 2022-09-19 DIAGNOSIS — R52 PAIN: ICD-10-CM

## 2022-09-19 NOTE — TELEPHONE ENCOUNTER
No new care gaps identified.  [facility] Embedded Care Gaps. Reference number: [id number]. 9/19/2022   10:44:22 AM SABAT

## 2022-09-22 RX ORDER — TRAMADOL HYDROCHLORIDE 50 MG/1
TABLET ORAL
Qty: 120 TABLET | Refills: 5 | Status: SHIPPED | OUTPATIENT
Start: 2022-09-22 | End: 2023-02-20 | Stop reason: SDUPTHER

## 2022-09-25 ENCOUNTER — PATIENT MESSAGE (OUTPATIENT)
Dept: ADMINISTRATIVE | Facility: HOSPITAL | Age: 67
End: 2022-09-25
Payer: COMMERCIAL

## 2022-10-10 ENCOUNTER — PATIENT MESSAGE (OUTPATIENT)
Dept: ADMINISTRATIVE | Facility: HOSPITAL | Age: 67
End: 2022-10-10
Payer: COMMERCIAL

## 2022-12-25 ENCOUNTER — PATIENT MESSAGE (OUTPATIENT)
Dept: ADMINISTRATIVE | Facility: HOSPITAL | Age: 67
End: 2022-12-25
Payer: COMMERCIAL

## 2023-01-09 ENCOUNTER — PATIENT MESSAGE (OUTPATIENT)
Dept: ADMINISTRATIVE | Facility: HOSPITAL | Age: 68
End: 2023-01-09
Payer: MEDICARE

## 2023-01-12 ENCOUNTER — PATIENT MESSAGE (OUTPATIENT)
Dept: FAMILY MEDICINE | Facility: CLINIC | Age: 68
End: 2023-01-12
Payer: MEDICARE

## 2023-01-13 ENCOUNTER — PATIENT MESSAGE (OUTPATIENT)
Dept: FAMILY MEDICINE | Facility: CLINIC | Age: 68
End: 2023-01-13
Payer: MEDICARE

## 2023-01-13 ENCOUNTER — TELEPHONE (OUTPATIENT)
Dept: FAMILY MEDICINE | Facility: CLINIC | Age: 68
End: 2023-01-13
Payer: MEDICARE

## 2023-01-13 DIAGNOSIS — E11.65 UNCONTROLLED TYPE 2 DIABETES MELLITUS WITH HYPERGLYCEMIA: Primary | ICD-10-CM

## 2023-01-13 DIAGNOSIS — E03.9 HYPOTHYROIDISM, UNSPECIFIED TYPE: ICD-10-CM

## 2023-01-13 NOTE — TELEPHONE ENCOUNTER
----- Message from Lisette Hdz MA sent at 1/13/2023 10:40 AM CST -----  Type:  Patient Returning Call    Who Called: Self    Who Left Message for Patient: DARWIN MILLER    Does the patient know what this is regarding?:no    Would the patient rather a call back or a response via [facility]? yes    Best Call Back Number: [phone] (home)

## 2023-01-13 NOTE — TELEPHONE ENCOUNTER
Attempted to call patient lvm to call clinic back  , patient scheduled for :ab on January 17 2023 as requestef

## 2023-01-17 ENCOUNTER — LAB VISIT (OUTPATIENT)
Dept: LAB | Facility: HOSPITAL | Age: 68
End: 2023-01-17
Attending: INTERNAL MEDICINE
Payer: MEDICARE

## 2023-01-17 DIAGNOSIS — E11.65 UNCONTROLLED TYPE 2 DIABETES MELLITUS WITH HYPERGLYCEMIA: ICD-10-CM

## 2023-01-17 LAB
ESTIMATED AVG GLUCOSE: 148 MG/DL (ref 68–131)
HBA1C MFR BLD: 6.8 % (ref 4–5.6)

## 2023-01-17 PROCEDURE — 83036 HEMOGLOBIN GLYCOSYLATED A1C: CPT | Performed by: INTERNAL MEDICINE

## 2023-01-17 PROCEDURE — 36415 COLL VENOUS BLD VENIPUNCTURE: CPT | Mod: PO | Performed by: INTERNAL MEDICINE

## 2023-01-18 ENCOUNTER — PATIENT MESSAGE (OUTPATIENT)
Dept: FAMILY MEDICINE | Facility: CLINIC | Age: 68
End: 2023-01-18
Payer: MEDICARE

## 2023-01-19 ENCOUNTER — PATIENT MESSAGE (OUTPATIENT)
Dept: FAMILY MEDICINE | Facility: CLINIC | Age: 68
End: 2023-01-19
Payer: MEDICARE

## 2023-01-23 ENCOUNTER — LAB VISIT (OUTPATIENT)
Dept: LAB | Facility: HOSPITAL | Age: 68
End: 2023-01-23
Attending: INTERNAL MEDICINE
Payer: MEDICARE

## 2023-01-23 DIAGNOSIS — E11.65 UNCONTROLLED TYPE 2 DIABETES MELLITUS WITH HYPERGLYCEMIA: ICD-10-CM

## 2023-01-23 DIAGNOSIS — E03.9 HYPOTHYROIDISM, UNSPECIFIED TYPE: ICD-10-CM

## 2023-01-23 LAB
ALBUMIN SERPL BCP-MCNC: 4.1 G/DL (ref 3.5–5.2)
ALP SERPL-CCNC: 68 U/L (ref 55–135)
ALT SERPL W/O P-5'-P-CCNC: 36 U/L (ref 10–44)
ANION GAP SERPL CALC-SCNC: 14 MMOL/L (ref 8–16)
AST SERPL-CCNC: 35 U/L (ref 10–40)
BASOPHILS # BLD AUTO: 0.08 K/UL (ref 0–0.2)
BASOPHILS NFR BLD: 1.2 % (ref 0–1.9)
BILIRUB SERPL-MCNC: 0.5 MG/DL (ref 0.1–1)
BUN SERPL-MCNC: 16 MG/DL (ref 8–23)
CALCIUM SERPL-MCNC: 10 MG/DL (ref 8.7–10.5)
CHLORIDE SERPL-SCNC: 102 MMOL/L (ref 95–110)
CHOLEST SERPL-MCNC: 298 MG/DL (ref 120–199)
CHOLEST/HDLC SERPL: 5 {RATIO} (ref 2–5)
CO2 SERPL-SCNC: 22 MMOL/L (ref 23–29)
CREAT SERPL-MCNC: 0.8 MG/DL (ref 0.5–1.4)
DIFFERENTIAL METHOD: ABNORMAL
EOSINOPHIL # BLD AUTO: 0.3 K/UL (ref 0–0.5)
EOSINOPHIL NFR BLD: 4.2 % (ref 0–8)
ERYTHROCYTE [DISTWIDTH] IN BLOOD BY AUTOMATED COUNT: 12.4 % (ref 11.5–14.5)
EST. GFR  (NO RACE VARIABLE): >60 ML/MIN/1.73 M^2
GLUCOSE SERPL-MCNC: 179 MG/DL (ref 70–110)
HCT VFR BLD AUTO: 42.3 % (ref 37–48.5)
HDLC SERPL-MCNC: 60 MG/DL (ref 40–75)
HDLC SERPL: 20.1 % (ref 20–50)
HGB BLD-MCNC: 13.4 G/DL (ref 12–16)
IMM GRANULOCYTES # BLD AUTO: 0.02 K/UL (ref 0–0.04)
IMM GRANULOCYTES NFR BLD AUTO: 0.3 % (ref 0–0.5)
LDLC SERPL CALC-MCNC: 172.6 MG/DL (ref 63–159)
LYMPHOCYTES # BLD AUTO: 2.4 K/UL (ref 1–4.8)
LYMPHOCYTES NFR BLD: 35.8 % (ref 18–48)
MCH RBC QN AUTO: 29.8 PG (ref 27–31)
MCHC RBC AUTO-ENTMCNC: 31.7 G/DL (ref 32–36)
MCV RBC AUTO: 94 FL (ref 82–98)
MONOCYTES # BLD AUTO: 0.5 K/UL (ref 0.3–1)
MONOCYTES NFR BLD: 8 % (ref 4–15)
NEUTROPHILS # BLD AUTO: 3.4 K/UL (ref 1.8–7.7)
NEUTROPHILS NFR BLD: 50.5 % (ref 38–73)
NONHDLC SERPL-MCNC: 238 MG/DL
NRBC BLD-RTO: 0 /100 WBC
PLATELET # BLD AUTO: 305 K/UL (ref 150–450)
PMV BLD AUTO: 10.4 FL (ref 9.2–12.9)
POTASSIUM SERPL-SCNC: 4.3 MMOL/L (ref 3.5–5.1)
PROT SERPL-MCNC: 7.8 G/DL (ref 6–8.4)
RBC # BLD AUTO: 4.49 M/UL (ref 4–5.4)
SODIUM SERPL-SCNC: 138 MMOL/L (ref 136–145)
TRIGL SERPL-MCNC: 327 MG/DL (ref 30–150)
TSH SERPL DL<=0.005 MIU/L-ACNC: 2.01 UIU/ML (ref 0.4–4)
WBC # BLD AUTO: 6.73 K/UL (ref 3.9–12.7)

## 2023-01-23 PROCEDURE — 80053 COMPREHEN METABOLIC PANEL: CPT | Performed by: INTERNAL MEDICINE

## 2023-01-23 PROCEDURE — 36415 COLL VENOUS BLD VENIPUNCTURE: CPT | Mod: PO | Performed by: INTERNAL MEDICINE

## 2023-01-23 PROCEDURE — 85025 COMPLETE CBC W/AUTO DIFF WBC: CPT | Performed by: INTERNAL MEDICINE

## 2023-01-23 PROCEDURE — 84443 ASSAY THYROID STIM HORMONE: CPT | Performed by: INTERNAL MEDICINE

## 2023-01-23 PROCEDURE — 80061 LIPID PANEL: CPT | Performed by: INTERNAL MEDICINE

## 2023-02-03 ENCOUNTER — OFFICE VISIT (OUTPATIENT)
Dept: PAIN MEDICINE | Facility: CLINIC | Age: 68
End: 2023-02-03
Payer: MEDICARE

## 2023-02-03 VITALS
WEIGHT: 254.69 LBS | DIASTOLIC BLOOD PRESSURE: 84 MMHG | HEIGHT: 67 IN | SYSTOLIC BLOOD PRESSURE: 174 MMHG | BODY MASS INDEX: 39.98 KG/M2 | OXYGEN SATURATION: 96 % | HEART RATE: 96 BPM

## 2023-02-03 DIAGNOSIS — M47.816 LUMBAR SPONDYLOSIS: ICD-10-CM

## 2023-02-03 DIAGNOSIS — M51.36 DDD (DEGENERATIVE DISC DISEASE), LUMBAR: ICD-10-CM

## 2023-02-03 DIAGNOSIS — G57.11 MERALGIA PARAESTHETICA, RIGHT: Primary | ICD-10-CM

## 2023-02-03 PROCEDURE — 99214 PR OFFICE/OUTPT VISIT, EST, LEVL IV, 30-39 MIN: ICD-10-PCS | Mod: S$PBB,,,

## 2023-02-03 PROCEDURE — 99999 PR PBB SHADOW E&M-EST. PATIENT-LVL V: ICD-10-PCS | Mod: PBBFAC,,,

## 2023-02-03 PROCEDURE — 99999 PR PBB SHADOW E&M-EST. PATIENT-LVL V: CPT | Mod: PBBFAC,,,

## 2023-02-03 PROCEDURE — 99215 OFFICE O/P EST HI 40 MIN: CPT | Mod: PBBFAC,PN

## 2023-02-03 PROCEDURE — 99214 OFFICE O/P EST MOD 30 MIN: CPT | Mod: S$PBB,,,

## 2023-02-03 NOTE — PROGRESS NOTES
Subjective:     Patient ID: Taty Max is a 67 y.o. female    Chief Complaint: Leg Pain (Right thigh.  Pain is an 8 out of 10. ) and Back Pain      Referred by: No ref. provider found      HPI:    Interval History PA (02/03/2023):  Patient returns to clinic for follow up of right-sided leg pain.  Patient had a right lateral femoral cutaneous nerve block done on 05/04/2022 which she reports nearly 100% relief for approximately 6 months.  States over the past few months the symptoms have slowly been returning and are now back to what they were prior to the procedure.  States pain located over right anterior/lateral thigh.  Associated with burning, numbness and tingling.  Notes increased sensitivity to light touch, including pain from clothing that touches the area.  Denies any radiation past her thigh into her lower leg.  Also denies any pain in her bilateral lower back.  States she continues to have benefit from previous bilateral lumbar radiofrequency ablation last done in February 2022.  She continues to take medications prescribed by her PCP which include Flexeril, Mobic, and tramadol p.r.n. all with benefit, denies any adverse effects from these medications.  Denies any focal weakness, saddle paresthesias or bowel/bladder dysfunction.       Interval History NP (3/15/2022):  Mrs Max presents for follow up of left then right L3,4,5 RFAs. She states 100% lower back pain resolution and pleased with results. She does however mention R thigh numbness/tingling/pain. She states this is exact pain which would resolved with anterior femoral cutaneous nerve  Block in past performed by Dr Poe. She denies newer areas of pain, denies neurological changes. She is currently prescribed flexeril 5mg, mobic 7.5mg and Tramadol 50mg PRN pain by PCP intermittently but states does not like taking medications and would rather treat pain complaints interventionally. She denies SE of medications.  Denies perineal paresthesias,  bowel/bladder dysfunction.    Initial Encounter (10/6/21):  Taty Max is a 67 y.o. female who presents today with chronic bilateral low back pain right worse than left.  This pain has been present for years and has been treated in the past with lumbar radiofrequency ablations.  The most recent these was done in July of 2018. She reports very good relief from this procedure for over 1 year.  She states the pain has gradually returned after this.  Time.  Currently her pain is located across the lower lumbar region.  The pain will radiate to the bilateral hips/thighs.  She denies any associated numbness or tingling with this pain.  She denies any weakness or bowel bladder dysfunction.  She has completed physical therapy in the past.  Her most recent course of physical therapy resulted in abdominal hernia that required repair.  Patient has a history of colon cancer and hernia repair was more complex as result.  She is reluctant to attend additional physical therapy for fear of additional damage to her abdominal wall.  The pain is constant worse with activity.  She is interested in repeat radiofrequency ablations if possible..   This pain is described in detail below.    Physical Therapy:  Yes.    Non-pharmacologic Treatment:  Rest helps         TENS?  No    Pain Medications:         Currently taking:  Flexeril, tramadol, meloxicam    Has tried in the past:  Tylenol    Has not tried:   TCAs, SNRIs, anticonvulsants, topical creams    Blood thinners:  None    Interventional Therapies:   L5/S1 ILESI by Dr. Saavedra, most recent in 10/2012: 10 months relief  Right LFCN block 9/2014: Excellent relief of right thigh pain  Bilateral lumbar TPIs 10/2014: Excellent relief of muscle pain x 3 weeks  L4-L5 ILESI on 11/25/14 with 50% relief  Right SI joint injection 12/2014:  50% relief  Right SIJ injection and TPIs 3/2016: Excellent relief  L4/5 ILESI 8/30/2016: Excellent relief of leg pain  Right L3-5 Cooled RFA 10/2016:  Good  relief of sharp back pain  Right LFCN block 11/10/2016: Good relief until mid January 2017 2/2017: Right anterior femoral cutaneous nerve block  4/2018: Right anterior femoral cutaneous nerve and right LFCN block: 100% relief of that pain  7/2018: Right L3-5 Thermal RFA: 90% relief\  2/4/22: Left L3,4,5 % resolution  2/25/22 Right L3,4,5 % resolution.   05/04/2022 - right lateral femoral cutaneous nerve block - 100% relief x 6 months    Relevant Surgeries:  None    Affecting sleep?  Yes    Affecting daily activities? yes    Depressive symptoms? no          SI/HI? No    Work status: Employed    Pain Scores:    Best:       8/10  Worst:     10/10  Usually:   8/10  Today:    8/10    Review of Systems   Constitutional:  Negative for activity change, appetite change, chills, fatigue, fever and unexpected weight change.   HENT:  Negative for hearing loss.    Eyes:  Negative for visual disturbance.   Respiratory:  Negative for chest tightness and shortness of breath.    Cardiovascular:  Negative for chest pain.   Gastrointestinal:  Negative for abdominal pain, constipation, diarrhea, nausea and vomiting.   Genitourinary:  Negative for difficulty urinating.   Musculoskeletal:  Positive for back pain, gait problem and myalgias. Negative for neck pain.   Skin:  Negative for rash.   Neurological:  Positive for numbness. Negative for dizziness, weakness, light-headedness and headaches.   Psychiatric/Behavioral:  Positive for sleep disturbance. Negative for hallucinations and suicidal ideas. The patient is not nervous/anxious.      Past Medical History:   Diagnosis Date    Allergic rhinitis, seasonal 1/28/2013    Anxiety disorder 1/28/2013    Basal cell carcinoma     Bilateral retinal lattice degeneration     Cataract     Colon cancer 2008    s/p resection    DDD (degenerative disc disease), lumbar 11/25/2014    Diabetes mellitus type II, uncontrolled 7/25/2014    High myopia     History of colon cancer 4/10/2019     History of incisional hernia repair     Horseshoe retinal tear of both eyes     HTN (hypertension) 1/28/2013    Hypertension     Hypothyroidism 4/29/2014    Incisional hernia of anterior abdominal wall without obstruction or gangrene     Lumbar disc disease 7/25/2014    Metabolic syndrome 4/29/2014    Neuropathy due to chemotherapeutic drug 5/22/2017    Osteopenia 5/10/2013    Screening for colorectal cancer 9/11/2015    Normal 6/ 2015---5 yrs    Vitamin D deficiency disease     Vitreous detachment of both eyes        Past Surgical History:   Procedure Laterality Date    ADENOIDECTOMY      ANKLE SURGERY      left     BREAST LUMPECTOMY      CATARACT EXTRACTION      CATARACT EXTRACTION BILATERAL W/ ANTERIOR VITRECTOMY      COLECTOMY      s/p reanastemosis    COLON SURGERY      COLONOSCOPY N/A 6/15/2020    Procedure: COLONOSCOPY;  Surgeon: FRANCES Smiley MD;  Location: [address]);  Service: Endoscopy;  Laterality: N/A;  covid testing 6/13    EPIDURAL STEROID INJECTION Bilateral 11/5/2021    Procedure: Bilateral L3, L4, L5 Medial Branch Blocks;  Surgeon: Giovani Lewis Jr., MD;  Location: [facility];  Service: Pain Management;  Laterality: Bilateral;  Arrive @ 1100; No ATC; Check BG    EPIDURAL STEROID INJECTION Bilateral 12/10/2021    Procedure: Bilateral L3, L4, L5 Medial Brach Blocks (#2);  Surgeon: Giovani Lewis Jr., MD;  Location: [facility];  Service: Pain Management;  Laterality: Bilateral;  Arrive @ 1130; No ATC; Check BG    EPIDURAL STEROID INJECTION Left 2/4/2022    Procedure: Left L3, L4, L5 Radiofrequency Thermocoagulation of Medial Branches;  Surgeon: Giovani Lewis Jr., MD;  Location: [facility];  Service: Pain Management;  Laterality: Left;  Arrive @ 1215; No ATC; Check BG; Rapid COVID    EPIDURAL STEROID INJECTION Right 2/25/2022    Procedure: Right L3, L4, L5 Radiofrequency Thermocoagulation of Medial Branches;  Surgeon: Giovani Lewis Jr., MD;  Location: [facility];   "Service: Pain Management;  Laterality: Right;  @1045  No ATC  Check BG  Last time 1 & 50    EYE EXAMINATION UNDER ANESTHESIA W/ RETINAL CRYOTHERAPY AND RETINAL LASER      HERNIA REPAIR      KIDNEY SURGERY      "dilate urethra tube"    NASAL POLYP EXCISION      NASAL SEPTUM SURGERY      OPEN REDUCTION AND INTERNAL FIXATION (ORIF) OF FRACTURE OF DISTAL RADIUS Left 2020    Procedure: ORIF, FRACTURE, RADIUS, DISTAL,  ulna, closed treatment scaphoid fx, ORIF;  Surgeon: Garrett Duvall MD;  Location: [facility] OR;  Service: Orthopedics;  Laterality: Left;    RADIOFREQUENCY ABLATION OF LUMBAR MEDIAL BRANCH NERVE AT SINGLE LEVEL Right 2018    Procedure: RADIOFREQUENCY ABLATION, NERVE, MEDIAL BRANCH, LUMBAR, 1 LEVEL;  Surgeon: Vashti Poe MD;  Location: [facility];  Service: Pain Management;  Laterality: Right;  RIght Thermal RFA @ L3-5  (REPEAT)  [id number]  with IV Sedation    CONSENT NEEDED    s/p laser ou      TONSILLECTOMY      TUBAL LIGATION      UVULOPALATOPHARYNGOPLASTY         Social History     Socioeconomic History    Marital status:    Occupational History     Employer: [employer]   Tobacco Use    Smoking status: Former     Types: Cigarettes     Quit date: 1977     Years since quittin.5    Smokeless tobacco: Never   Substance and Sexual Activity    Alcohol use: Yes     Comment: once per week    Drug use: No    Sexual activity: Yes     Partners: Male     Birth control/protection: Post-menopausal       Review of patient's allergies indicates:   Allergen Reactions    Oxaliplatin Hives    Factive [gemifloxacin] Hives    Statins-hmg-coa reductase inhibitors      Memory w lipitor, muscles for 2 others    Daypro [oxaprozin] Rash    Latex Hives     Adhesives       Current Outpatient Medications on File Prior to Visit   Medication Sig Dispense Refill    azelastine (ASTELIN) 137 mcg (0.1 %) nasal spray INSTILL ONE SPRAY IN EACH NOSTRIL TWICE DAILY 30 mL 12    blood sugar diagnostic " (ONETOUCH VERIO) Strp 1 strip by Misc.(Non-Drug; Combo Route) route 2 (two) times daily before meals. 100 strip 11    blood sugar diagnostic Strp 1 strip by Misc.(Non-Drug; Combo Route) route 2 (two) times daily. DISP GLUCOMETER OF CHOICE AND LANCETS AS WELL 100 strip 12    cefadroxil (DURICEF) 1 gram tablet Take 1 tablet (1 g total) by mouth 2 (two) times daily. 14 tablet 0    diazePAM (VALIUM) 5 MG tablet TAKE ONE TABLET BY MOUTH EVERY NIGHT AT BEDTIME AS NEEDED FOR ANXIETY AND SPASMS 30 tablet 5    econazole nitrate 1 % cream Use bid 30 g 2    fluorouracil (EFUDEX) 5 % cream Use hs for 2 weeks 40 g 3    fluticasone propionate (FLONASE) 50 mcg/actuation nasal spray 2 sprays (100 mcg total) by Each Nare route once daily. 3 Bottle 12    hydroCHLOROthiazide (HYDRODIURIL) 12.5 MG Tab Take 1 tablet (12.5 mg total) by mouth once daily. 90 tablet 3    indomethacin (INDOCIN) 50 MG capsule Take 1 capsule (50 mg total) by mouth 3 (three) times daily with meals. Take regularly 3 times a day for 10 days and then on an as-needed basis 90 capsule 0    KRILL OIL ORAL Take 1 tablet by mouth once daily.      lancets (ONETOUCH DELICA LANCETS) 33 gauge Misc 1 lancet by Misc.(Non-Drug; Combo Route) route 2 (two) times daily before meals. 100 each 11    meloxicam (MOBIC) 7.5 MG tablet TAKE 1 TO 2 TABLETS DAILY  AS NEEDED 180 tablet 3    metFORMIN (GLUCOPHAGE-XR) 750 MG ER 24hr tablet Take 2 tablets (1,500 mg total) by mouth every morning. 180 tablet 3    montelukast (SINGULAIR) 10 mg tablet Take 1 tablet (10 mg total) by mouth every evening. 90 tablet 3    multivitamin (THERAGRAN) per tablet Take 1 tablet by mouth once daily. 1 Tablet Oral Every day      olmesartan (BENICAR) 20 MG tablet Take 1 tablet (20 mg total) by mouth once daily. 90 tablet 3    ONETOUCH DELICA LANCETS 33 gauge Misc       traMADoL (ULTRAM) 50 mg tablet TAKE 1 TABLET BY MOUTH EVERY 6 HOURS AS NEEDED Strength: 50 mg 120 tablet 5    triamcinolone acetonide 0.1%  "(KENALOG) 0.1 % cream USE TWICE A  g 3    zolpidem (AMBIEN) 5 MG Tab Take 1 tablet (5 mg total) by mouth nightly as needed. 30 tablet 5    albuterol 90 mcg/actuation inhaler Inhale 1-2 puffs into the lungs every 6 (six) hours as needed for Wheezing. 1 Inhaler 0    blood-glucose meter (ACCU-CHEK GUIDE GLUCOSE METER) Misc 1 Units by Misc.(Non-Drug; Combo Route) route 2 (two) times daily. 1 each 1    lancets (ACCU-CHEK FASTCLIX LANCET DRUM) Misc 1 lancet by Misc.(Non-Drug; Combo Route) route 2 (two) times daily. 200 each 11    metFORMIN (GLUCOPHAGE-XR) 750 MG ER 24hr tablet TAKE 2 TABLETS EVERY       MORNING 180 tablet 3     No current facility-administered medications on file prior to visit.       Objective:      BP (!) 174/84 (BP Location: Right arm, Patient Position: Sitting, BP Method: Medium (Automatic))   Pulse 96   Ht 5' 7" (1.702 m)   Wt 115.5 kg (254 lb 11.2 oz)   LMP  (LMP Unknown)   SpO2 96%   BMI 39.89 kg/m²     EXAM:  GEN:  Well developed, well nourished.  No acute distress.  Normal pain behavior.  HEENT:  No trauma.  Mucous membranes moist.  Nares patent bilaterally.  PSYCH: Normal affect. Thought content appropriate.  CHEST:  Breathing symmetric.  No audible wheezing.  ABD: Soft, non-distended.  SKIN:  Warm, pink, dry.  No rash on exposed areas.    EXT:  No cyanosis, clubbing, or edema.  No color change or changes in nail or hair growth.  NEURO/MUSCULOSKELETAL:  Fully alert, oriented, and appropriate. Speech normal pete. No cranial nerve deficits.   Gait:  Antalgic.  No trendelenburg sign bilaterally.   Motor Strength:  5/5 motor strength throughout lower extremities.   Sensory:  Pain to light touch over right anterior/lateral thigh  L-Spine:  Normal ROM without pain.  No pain with axial/facet loading bilaterally.  Negative SLR bilaterally.    No TTP over lumbar paraspinals, bilateral SI joints, hips, piriformis muscles, or GTB.        Imaging:    Narrative & " Impression    EXAMINATION:  XR LUMBAR SPINE AP AND LAT WITH FLEX/EXT     CLINICAL HISTORY:  LOWER BACK PAIN;  Dorsalgia, unspecified     TECHNIQUE:  AP and lateral views as well as lateral flexion and extension images are performed through the lumbar spine.     COMPARISON:  01/11/2012     FINDINGS:  Bones appear somewhat demineralized.  Mild curvature of the lumbar spine on the frontal view, convex toward the right.  The lateral views demonstrate slight retrolisthesis of L2 with respect L3.  This does not change significantly with flexion or extension.  Vertebral body heights are somewhat difficult to evaluate at the upper lumbar region.  Vertebral body heights appear to be adequately maintained.  Moderate hypertrophic spurring, disc space narrowing and vacuum phenomenon at the L4-5 and L5-S1 levels.  No definite evidence of spondylolysis.  No definite evidence of acute fracture or osseous destruction.  Pedicles appear to be intact.  Aortoiliac atherosclerotic calcification without evidence of aneurysm.     Impression:     Suboptimal visualization of the upper lumbar vertebral bodies.  No definite evidence of acute abnormality.  DJD as detailed above.  If clinically indicated, CT scan of the lumbar spine may be considered for further evaluation.     This report was flagged in Epic as abnormal.        Electronically signed by: Shashi Alcantara MD  Date:                                            09/13/2021  Time:                                           13:51       Narrative & Impression    EXAMINATION:  MRI LUMBAR SPINE WITHOUT CONTRAST     CLINICAL HISTORY:  DDD; Lumbago with sciatica, unspecified side     TECHNIQUE:  Multiplanar, multisequence MR images were acquired from the thoracolumbar junction to the sacrum without the administration of contrast.     COMPARISON:  Lumbar spine 09/13/2021.     FINDINGS:  Alignment: Minimal grade 1 retrolisthesis of L1 on L2.  Remaining lumbar spine maintains normal sagittal  alignment.     Vertebrae: Vertebral body heights are maintained without evidence for fracture or marrow infiltrative process.  Multilevel endplate degenerative change most prominent at L1-L2 and L4-L5.     Discs: Severe multilevel intervertebral disc space narrowing and disc desiccation throughout the lumbar spine.     Cord: Distal cord maintains normal signal intensity and contour.  Conus terminates at approximately L2 level.     Degenerative findings:     T12-L1: No spinal canal stenosis or neural foraminal narrowing.  Fluid in the right facet joint as well as a small T2 hyperintense focus at the left facet joint which may represent a facet joint cyst (axial series 13, image 12).     L1-L2: Minimal grade 1 retrolisthesis of L1 on L2, mild circumferential disc bulge, and moderate bilateral facet joint hypertrophy which contributes to mild bilateral neural foraminal narrowing.  Fluid in the facet joints, right greater than left.     L2-L3: Circumferential disc bulge, moderate bilateral facet joint hypertrophy, and ligamentum flavum hypertrophy with advanced cystic degenerative change surrounding the left facet joint.  No spinal canal stenosis or neural foraminal narrowing at this level.  Fluid in the right facet joint.     L3-L4: Circumferential disc bulge, moderate bilateral facet joint hypertrophy, and ligamentum flavum hypertrophy with no spinal canal stenosis or or neural foraminal narrowing.  Fluid in the right facet joint.     L4-L5: Circumferential disc bulge, severe bilateral facet joint hypertrophy, and ligamentum flavum hypertrophy contributing to mild spinal canal stenosis.  No neural foraminal narrowing.  Fluid in the left facet joint.     L5-S1: Circumferential disc bulge, severe bilateral neural foraminal narrowing, and ligamentum flavum hypertrophy contributing to mild spinal canal stenosis and mild right neural foraminal narrowing.     Paraspinal muscles & soft tissues: Mild atrophy of the paraspinal  musculature.     Sacroiliac joints are unremarkable.     Small T2 hyperintense focus in the midline sacrum, likely representing a Tarlov cyst.     Partially visualized intra-abdominal organs are unremarkable.     Impression:     Multilevel lumbar spondylosis as detailed above, worst at L5-S1 contributing to mild spinal canal stenosis as well as mild right neural foraminal narrowing.     Minimal grade 1 retrolisthesis of L1 on L2.     Advanced degenerative change in the left facet joint at L2-L3, as well as fluid and multiple facet joints throughout the lumbar spine.  Findings are nonspecific and may represent active inflammatory change.  Correlation advised.     Other findings as above.     Electronically signed by resident: Pancho Nayak  Date:                                            09/21/2021  Time:                                           09:02     Electronically signed by: Tyrone Jacques MD  Date:                                            09/21/2021  Time:                                           10:44         Assessment:       Encounter Diagnoses   Name Primary?    Meralgia paraesthetica, right Yes    DDD (degenerative disc disease), lumbar     Lumbar spondylosis            Plan:       Taty was seen today for leg pain and back pain.    Diagnoses and all orders for this visit:    Meralgia paraesthetica, right    DDD (degenerative disc disease), lumbar    Lumbar spondylosis          Taty Max is a 67 y.o. female with chronic bilateral low back pain which is 100% resolved with Left the Right L3,4,5 RFAs.  Continues to endorse relief from previous bilateral lumbar RFAs.  Patient reports return of pain over right anterior lateral thigh consistent with meralgia paresthetica.  Previously well relieved with repeat right lateral femoral cutaneous nerve blocks.      Prior records review.  Pertinent imaging studies reviewed by me. Imaging results were discussed with patient.  Schedule for repeat right lateral  femoral cutaneous nerve block under ultrasound.  Continue medications per PCP including Flexeril, Mobic, and tramadol p.r.n..  Patient reports taking with benefit, denies any adverse effects.  Stressed the importance of maintaining regular home exercise program and being mindful of how they use their back throughout the day.  Patient expressed understanding and agreement.  Return to clinic 2 weeks postprocedure.  Can also consider repeating bilateral lumbar RFA when indicated, patient reports continued benefit previous RFA done in February 2022.      Reynaldo Crow PA-C  [facility]  Interventional Pain Management   02/03/2023    This note was created by combination of typed  and M-Modal dictation.  Transcription and phonetic errors may be present.  If there are any questions, please contact me.

## 2023-02-13 ENCOUNTER — PROCEDURE VISIT (OUTPATIENT)
Dept: PAIN MEDICINE | Facility: CLINIC | Age: 68
End: 2023-02-13
Payer: MEDICARE

## 2023-02-13 VITALS
SYSTOLIC BLOOD PRESSURE: 142 MMHG | HEIGHT: 67 IN | BODY MASS INDEX: 40.17 KG/M2 | DIASTOLIC BLOOD PRESSURE: 84 MMHG | WEIGHT: 255.94 LBS | OXYGEN SATURATION: 93 % | HEART RATE: 102 BPM

## 2023-02-13 DIAGNOSIS — G57.11 MERALGIA PARAESTHETICA, RIGHT: Primary | ICD-10-CM

## 2023-02-13 PROCEDURE — 64450 NJX AA&/STRD OTHER PN/BRANCH: CPT | Mod: PBBFAC,PN | Performed by: PAIN MEDICINE

## 2023-02-13 PROCEDURE — 64447 PR NERVE BLOCK INJ, ANES/STEROID, FEMORAL, INCL IMAG GUIDANCE: ICD-10-PCS | Mod: S$PBB,RT,, | Performed by: PAIN MEDICINE

## 2023-02-13 PROCEDURE — 64447 NJX AA&/STRD FEMORAL NRV IMG: CPT | Mod: S$PBB,RT,, | Performed by: PAIN MEDICINE

## 2023-02-13 RX ORDER — TRIAMCINOLONE ACETONIDE 40 MG/ML
40 INJECTION, SUSPENSION INTRA-ARTICULAR; INTRAMUSCULAR
Status: COMPLETED | OUTPATIENT
Start: 2023-02-13 | End: 2023-02-13

## 2023-02-13 RX ORDER — CYCLOBENZAPRINE HCL 5 MG
5 TABLET ORAL
COMMUNITY
Start: 2023-02-07 | End: 2023-07-19 | Stop reason: SDUPTHER

## 2023-02-13 RX ADMIN — TRIAMCINOLONE ACETONIDE 40 MG: 40 INJECTION, SUSPENSION INTRA-ARTICULAR; INTRAMUSCULAR at 01:02

## 2023-02-13 NOTE — PROCEDURES
Procedures    Date of Procedure: 02/13/2023    Procedure:  Right lateral femoral cutaneous nerve block using ultrasound guidance        Pre-op diagnosis:  Right neuralgia; meralgia paresthetica    Post-op diagnosis:  Right neuralgia; meralgia paresthetica    Physician: Dr. Giovani Lewis    Assistant:  None    Anesthestia:  Local    EBL: None    Specimens: None      Right Lateral Femoral Cutaneous Nerve Block using US guidance:   The procedure was discussed with the patient including complications of nerve damage, bleeding, infection, and failure of pain relief.     All medications, allergies, and relevant histories were reviewed. No recent antibiotics or infections. A time-out was taken to verify the correct patient, procedure, laterality, and appropriate medications/allergies.     The patient was placed in the supine position. Inguinal area was prepped with chlorhexidine draped. Sterile precautions were observed throughout the procedure. After identifying the lateral femoral cutaneous nerve superficial to the sartorious muscle on the right using US guidance, Xylocaine 1% was infiltrated locally 1.5 cm. A 22-gauge B level needle was introduced and advanced to the lateral femoral cutaneous nerve.   5 mL of a mixture of 2cc of bupivacaine 0.5%, 2 cc 1% lidocaine with 40 mg of Kenalog was injected around the nerve after repeated negative aspirations. Needle was removed and a Band-Aid dressing applied.        The patient tolerated the procedure well without any complications and was released in excellent condition.    Future Management:   If helpful, can repeat as needed.    Follow up 6 weeks or sooner if needed.

## 2023-02-17 ENCOUNTER — TELEPHONE (OUTPATIENT)
Dept: FAMILY MEDICINE | Facility: CLINIC | Age: 68
End: 2023-02-17
Payer: MEDICARE

## 2023-02-20 ENCOUNTER — OFFICE VISIT (OUTPATIENT)
Dept: FAMILY MEDICINE | Facility: CLINIC | Age: 68
End: 2023-02-20
Payer: MEDICARE

## 2023-02-20 VITALS
OXYGEN SATURATION: 96 % | BODY MASS INDEX: 39.28 KG/M2 | SYSTOLIC BLOOD PRESSURE: 120 MMHG | DIASTOLIC BLOOD PRESSURE: 70 MMHG | HEIGHT: 67 IN | HEART RATE: 87 BPM | TEMPERATURE: 99 F | WEIGHT: 250.25 LBS

## 2023-02-20 DIAGNOSIS — E03.9 HYPOTHYROIDISM, UNSPECIFIED TYPE: ICD-10-CM

## 2023-02-20 DIAGNOSIS — R52 PAIN: ICD-10-CM

## 2023-02-20 DIAGNOSIS — G89.29 CHRONIC BILATERAL LOW BACK PAIN WITH BILATERAL SCIATICA: ICD-10-CM

## 2023-02-20 DIAGNOSIS — E78.5 HYPERLIPIDEMIA, UNSPECIFIED HYPERLIPIDEMIA TYPE: ICD-10-CM

## 2023-02-20 DIAGNOSIS — Z78.9 STATIN INTOLERANCE: ICD-10-CM

## 2023-02-20 DIAGNOSIS — F13.20 SEDATIVE DEPENDENCE: ICD-10-CM

## 2023-02-20 DIAGNOSIS — E66.01 MORBID OBESITY: ICD-10-CM

## 2023-02-20 DIAGNOSIS — G47.00 INSOMNIA, UNSPECIFIED TYPE: ICD-10-CM

## 2023-02-20 DIAGNOSIS — R79.89 ABNORMAL LIVER FUNCTION TESTS: ICD-10-CM

## 2023-02-20 DIAGNOSIS — E11.65 UNCONTROLLED TYPE 2 DIABETES MELLITUS WITH HYPERGLYCEMIA: Primary | ICD-10-CM

## 2023-02-20 DIAGNOSIS — T45.1X5A NEUROPATHY DUE TO CHEMOTHERAPEUTIC DRUG: ICD-10-CM

## 2023-02-20 DIAGNOSIS — F39 MOOD DISORDER: ICD-10-CM

## 2023-02-20 DIAGNOSIS — M54.42 CHRONIC BILATERAL LOW BACK PAIN WITH BILATERAL SCIATICA: ICD-10-CM

## 2023-02-20 DIAGNOSIS — M54.41 CHRONIC BILATERAL LOW BACK PAIN WITH BILATERAL SCIATICA: ICD-10-CM

## 2023-02-20 DIAGNOSIS — F11.20 MODERATE TRAMADOL DEPENDENCE: ICD-10-CM

## 2023-02-20 DIAGNOSIS — G47.33 OSA ON CPAP: ICD-10-CM

## 2023-02-20 DIAGNOSIS — G62.0 NEUROPATHY DUE TO CHEMOTHERAPEUTIC DRUG: ICD-10-CM

## 2023-02-20 DIAGNOSIS — I10 ESSENTIAL HYPERTENSION: ICD-10-CM

## 2023-02-20 DIAGNOSIS — Z85.038 HISTORY OF COLON CANCER: ICD-10-CM

## 2023-02-20 PROCEDURE — 99214 PR OFFICE/OUTPT VISIT, EST, LEVL IV, 30-39 MIN: ICD-10-PCS | Mod: S$PBB,,, | Performed by: INTERNAL MEDICINE

## 2023-02-20 PROCEDURE — 99999 PR PBB SHADOW E&M-EST. PATIENT-LVL III: ICD-10-PCS | Mod: PBBFAC,,, | Performed by: INTERNAL MEDICINE

## 2023-02-20 PROCEDURE — 99214 OFFICE O/P EST MOD 30 MIN: CPT | Mod: S$PBB,,, | Performed by: INTERNAL MEDICINE

## 2023-02-20 PROCEDURE — 99999 PR PBB SHADOW E&M-EST. PATIENT-LVL III: CPT | Mod: PBBFAC,,, | Performed by: INTERNAL MEDICINE

## 2023-02-20 PROCEDURE — 99213 OFFICE O/P EST LOW 20 MIN: CPT | Mod: PBBFAC,PO | Performed by: INTERNAL MEDICINE

## 2023-02-20 RX ORDER — TRAMADOL HYDROCHLORIDE 50 MG/1
TABLET ORAL
Qty: 120 TABLET | Refills: 5 | Status: SHIPPED | OUTPATIENT
Start: 2023-02-20 | End: 2023-08-09 | Stop reason: SDUPTHER

## 2023-02-20 RX ORDER — ALBUTEROL SULFATE 90 UG/1
1-2 AEROSOL, METERED RESPIRATORY (INHALATION) EVERY 6 HOURS PRN
Qty: 90 G | Refills: 12 | Status: SHIPPED | OUTPATIENT
Start: 2023-02-20 | End: 2024-02-20

## 2023-02-20 RX ORDER — DIAZEPAM 5 MG/1
TABLET ORAL
Qty: 30 TABLET | Refills: 5 | Status: SHIPPED | OUTPATIENT
Start: 2023-02-20

## 2023-02-20 NOTE — PROGRESS NOTES
Chief complaint Followup on blood pressure,, diabetes, cholesterol -last seen 8/21, 2/22    Patient's a 67 year-old white female poorly compliant with follow-up and general recommendations.   She is here to get her tramadol refilled.  Typically takes three per day.  I will send some with refills to the pharmacy.  She is scheduled to get some facet injections so she is pursuing pain management to try make her overall pain go away.  She occasionally takes the Valium for spasm but says she does not need a refill of that today.    We again discussed her high cholesterol but she had memory issues with statins and possibly Welchol.  She absolutely refuses Zetia and we discussed there is an injection as well and she will research but very resistant.  I reassured her that Zetia is completely different from the statins and she again will consider but was not open to starting it.  We did discuss her for symptoms and need for treatment of a high cholesterol could well be a heart attack or stroke.  She is well aware of this along with her other risk factors.  She has had a lot of medications give her side effects.  She took Januvia and had a lot of left lower back pain very consistent with lumbar strain and the symptoms persisted even stopping Januvia and she had to get some massage which helps and discussed it is probably a pulled muscle but she is insistent to believe it is Januvia and she read that could do that somewhere but reassured her it is not a common side effect from Januvia.    Diabetes uncontrolled with A1c of 7.8, 6.9, 7.2 , 6.8.  She was only on metformin.  We discussed injections, Amaryl and Januvia.  We gave her Januvia 50 mg as above.  We discussed potential for hypoglycemia which would be less with Januvia.  She has not been able to exercise as much lately.  She had an injury and a left wrist surgery.  She has gained a lot of weight.  Currently weight is stable which is good.  Her A1c went up to 7.8 then 6.9,  7.2 and she was working at home eating a lot of mm but for the past year she has improved her diet significantly eating much better and her sugars are running normal so hopefully she will have improvement.  We discussed her elevated ALT due to diabetes in the weight and we can expected to still be elevated.  She feels she cannot lose weight having gone to the gym twice a day when working in the past with not able to lose any weight.  She also has some hernia issue so does not planned on going back to the gym.        She does occasionally take a Flexeril and is having more aches and pains and may want to try taking a low-dose Flexeril during the day so I provided her with a refills.    LDL 190s -refuses statin, etc        She does occasionally take her tramadol for arthralgias.  Valium 5mg prn now retired and issues with  at home    ROS:   CONST:  no other new myalgias arthralgias and no new neurological deficits, no skin rashes     PAST MEDICAL HISTORY:                                                        1.  Allergic rhinitis.                                                       2.  Obstructive sleep apnea status post UPPP and treatment with CPAP.        3.  Chronic sinusitis with sinus surgery x2, Dr. Tucker and Dr. Alston.       4.  Hypertension.                                                            5.  Obesity.                                                                 6.  Colon cancer, status post right hemicolectomy and chemotherapy.          7.  History of iron deficiency anemia.                                       8.  Left superficial vein thrombosis of the arm.                             9.  Insomnia.                                                                10.  Basal cell carcinoma.                                                   11.  Memory issues when taking WelChol.                                      12.  Hyperlipidemia with LDL rise on WelChol. Tried 3 statins -various  effects- memory issue made her lose a job                               13.  Hypercalcemia secondary to HCTZ.                                        14.  Lumbar radiculopathy with history of epidurals -Dr Saavedra - now Joshua                      15.  Chronic cough due to allergies, per pulmonary workup, 2010.           16.  Osteopenia by bone density in .                                     17.  Elevated ALT.                                                           18.  Tubes tied.                                                             19.  Hernia repair.                                                          20.  Lumpectomy, of the breast I presume.   21.  COLONOSCOPY normal 6/15,== 5 years  22. Left ankle surgery   23. METABOLIC SYNDROME/DM -A1c  was 6.9  24. Vit D def-    25.  Hypothyroid?,  Placed on [place] Thyroid by Dr. Montgomery??    26.  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  Quit smoking in .  Drinks once a week.                                                                               FAMILY HISTORY:  Father  at 53 in a car accident.  Mom in good health.   Brother 46, a sister is 52.  She states that all members of her family have  Hypertension.                                                                  Vitals as above, exam otherwise deferred      Diagnoses and all orders for this visit:    Uncontrolled type 2 diabetes mellitus with hyperglycemia,  ,chronic condition and stable.     Hypothyroidism, unspecified type ,chronic condition and stable.     Essential hypertension ,chronic condition and stable.     Abnormal liver function tests. better    Morbid obesity    Mood disorder ,chronic condition and  stable.     Statin intolerance    Moderate tramadol dependence ,chronic condition and stable.     Insomnia, unspecified type    Sedative dependence    Chronic bilateral low back pain with bilateral sciatica ,chronic condition and stable.     Neuropathy due to chemotherapeutic drug    Hyperlipidemia, unspecified hyperlipidemia type    History of colon cancer ,chronic condition and stable.     JEREMY on CPAP    Pain  -     traMADoL (ULTRAM) 50 mg tablet; TAKE 1 TABLET BY MOUTH EVERY 6 HOURS AS NEEDED Strength: 50 mg    Other orders  -     albuterol (PROVENTIL/VENTOLIN HFA) 90 mcg/actuation inhaler; Inhale 1-2 puffs into the lungs every 6 (six) hours as needed for Wheezing.  -     diazePAM (VALIUM) 5 MG tablet; TAKE ONE TABLET BY MOUTH EVERY NIGHT AT BEDTIME AS NEEDED FOR ANXIETY AND SPASMS

## 2023-03-28 ENCOUNTER — OFFICE VISIT (OUTPATIENT)
Dept: PAIN MEDICINE | Facility: CLINIC | Age: 68
End: 2023-03-28
Payer: MEDICARE

## 2023-03-28 VITALS
OXYGEN SATURATION: 97 % | DIASTOLIC BLOOD PRESSURE: 76 MMHG | RESPIRATION RATE: 16 BRPM | HEART RATE: 87 BPM | SYSTOLIC BLOOD PRESSURE: 159 MMHG

## 2023-03-28 DIAGNOSIS — G57.11 MERALGIA PARAESTHETICA, RIGHT: Primary | ICD-10-CM

## 2023-03-28 DIAGNOSIS — M51.36 DDD (DEGENERATIVE DISC DISEASE), LUMBAR: ICD-10-CM

## 2023-03-28 DIAGNOSIS — M47.816 LUMBAR SPONDYLOSIS: ICD-10-CM

## 2023-03-28 PROCEDURE — 99214 OFFICE O/P EST MOD 30 MIN: CPT | Mod: PBBFAC,PN

## 2023-03-28 PROCEDURE — 99999 PR PBB SHADOW E&M-EST. PATIENT-LVL IV: CPT | Mod: PBBFAC,,,

## 2023-03-28 PROCEDURE — 99999 PR PBB SHADOW E&M-EST. PATIENT-LVL IV: ICD-10-PCS | Mod: PBBFAC,,,

## 2023-03-28 PROCEDURE — 99213 OFFICE O/P EST LOW 20 MIN: CPT | Mod: S$PBB,,,

## 2023-03-28 PROCEDURE — 99213 PR OFFICE/OUTPT VISIT, EST, LEVL III, 20-29 MIN: ICD-10-PCS | Mod: S$PBB,,,

## 2023-03-28 NOTE — PROGRESS NOTES
Subjective:     Patient ID: Taty Max is a 67 y.o. female    Chief Complaint: Leg Pain      Referred by: No ref. provider found      HPI:    Interval History PA (03/28/2023):  Patient returns to clinic for follow up.  Patient is s/p right lateral femoral cutaneous nerve block done on 02/13/2023 with 100% relief of symptoms.  Patient notes no longer having pain on her right lateral/anterior thigh, no longer sensitive to touch.  Overall feels symptoms/pain are well managed at this time.  Continues to endorse benefit from previous lumbar RFA.  Continues to take medications including Flexeril, Mobic, and tramadol p.r.n. per PCP all with benefit, denies any adverse effects from these medications.  No other concerns at this time.    Interval History PA (02/03/2023):  Patient returns to clinic for follow up of right-sided leg pain.  Patient had a right lateral femoral cutaneous nerve block done on 05/04/2022 which she reports nearly 100% relief for approximately 6 months.  States over the past few months the symptoms have slowly been returning and are now back to what they were prior to the procedure.  States pain located over right anterior/lateral thigh.  Associated with burning, numbness and tingling.  Notes increased sensitivity to light touch, including pain from clothing that touches the area.  Denies any radiation past her thigh into her lower leg.  Also denies any pain in her bilateral lower back.  States she continues to have benefit from previous bilateral lumbar radiofrequency ablation last done in February 2022.  She continues to take medications prescribed by her PCP which include Flexeril, Mobic, and tramadol p.r.n. all with benefit, denies any adverse effects from these medications.  Denies any focal weakness, saddle paresthesias or bowel/bladder dysfunction.       Interval History NP (3/15/2022):  Mrs Max presents for follow up of left then right L3,4,5 RFAs. She states 100% lower back pain  resolution and pleased with results. She does however mention R thigh numbness/tingling/pain. She states this is exact pain which would resolved with anterior femoral cutaneous nerve  Block in past performed by Dr Poe. She denies newer areas of pain, denies neurological changes. She is currently prescribed flexeril 5mg, mobic 7.5mg and Tramadol 50mg PRN pain by PCP intermittently but states does not like taking medications and would rather treat pain complaints interventionally. She denies SE of medications.  Denies perineal paresthesias, bowel/bladder dysfunction.    Initial Encounter (10/6/21):  Taty Max is a 67 y.o. female who presents today with chronic bilateral low back pain right worse than left.  This pain has been present for years and has been treated in the past with lumbar radiofrequency ablations.  The most recent these was done in July of 2018. She reports very good relief from this procedure for over 1 year.  She states the pain has gradually returned after this.  Time.  Currently her pain is located across the lower lumbar region.  The pain will radiate to the bilateral hips/thighs.  She denies any associated numbness or tingling with this pain.  She denies any weakness or bowel bladder dysfunction.  She has completed physical therapy in the past.  Her most recent course of physical therapy resulted in abdominal hernia that required repair.  Patient has a history of colon cancer and hernia repair was more complex as result.  She is reluctant to attend additional physical therapy for fear of additional damage to her abdominal wall.  The pain is constant worse with activity.  She is interested in repeat radiofrequency ablations if possible..   This pain is described in detail below.    Physical Therapy:  Yes.    Non-pharmacologic Treatment:  Rest helps         TENS?  No    Pain Medications:         Currently taking:  Flexeril, tramadol, meloxicam    Has tried in the past:  Tylenol    Has not  tried:   TCAs, SNRIs, anticonvulsants, topical creams    Blood thinners:  None    Interventional Therapies:   L5/S1 ILESI by Dr. Saavedra, most recent in 10/2012: 10 months relief  Right LFCN block 9/2014: Excellent relief of right thigh pain  Bilateral lumbar TPIs 10/2014: Excellent relief of muscle pain x 3 weeks  L4-L5 ILESI on 11/25/14 with 50% relief  Right SI joint injection 12/2014:  50% relief  Right SIJ injection and TPIs 3/2016: Excellent relief  L4/5 ILESI 8/30/2016: Excellent relief of leg pain  Right L3-5 Cooled RFA 10/2016:  Good relief of sharp back pain  Right LFCN block 11/10/2016: Good relief until mid January 2017 2/2017: Right anterior femoral cutaneous nerve block  4/2018: Right anterior femoral cutaneous nerve and right LFCN block: 100% relief of that pain  7/2018: Right L3-5 Thermal RFA: 90% relief\  2/4/22: Left L3,4,5 % resolution  2/25/22 Right L3,4,5 % resolution.   05/04/2022 - right lateral femoral cutaneous nerve block - 100% relief x 6 months  02/13/2023 - right lateral femoral cutaneous nerve block - 100% relief    Relevant Surgeries:  None    Affecting sleep?  Yes    Affecting daily activities? yes    Depressive symptoms? no          SI/HI? No    Work status: Employed    Pain Scores:    Best:       1/10  Worst:     5/10  Usually:   5/10  Today:    1/10    Review of Systems   Constitutional:  Negative for activity change, appetite change, chills, fatigue, fever and unexpected weight change.   HENT:  Negative for hearing loss.    Eyes:  Negative for visual disturbance.   Respiratory:  Negative for chest tightness and shortness of breath.    Cardiovascular:  Negative for chest pain.   Gastrointestinal:  Negative for abdominal pain, constipation, diarrhea, nausea and vomiting.   Genitourinary:  Negative for difficulty urinating.   Musculoskeletal:  Positive for back pain, gait problem and myalgias. Negative for neck pain.   Skin:  Negative for rash.   Neurological:  Positive  for numbness. Negative for dizziness, weakness, light-headedness and headaches.   Psychiatric/Behavioral:  Positive for sleep disturbance. Negative for hallucinations and suicidal ideas. The patient is not nervous/anxious.      Past Medical History:   Diagnosis Date    Allergic rhinitis, seasonal 1/28/2013    Anxiety disorder 1/28/2013    Basal cell carcinoma     Bilateral retinal lattice degeneration     Cataract     Colon cancer 2008    s/p resection    DDD (degenerative disc disease), lumbar 11/25/2014    Diabetes mellitus type II, uncontrolled 7/25/2014    High myopia     History of colon cancer 4/10/2019    History of incisional hernia repair     Horseshoe retinal tear of both eyes     HTN (hypertension) 1/28/2013    Hypertension     Hypothyroidism 4/29/2014    Incisional hernia of anterior abdominal wall without obstruction or gangrene     Lumbar disc disease 7/25/2014    Metabolic syndrome 4/29/2014    Neuropathy due to chemotherapeutic drug 5/22/2017    Osteopenia 5/10/2013    Screening for colorectal cancer 9/11/2015    Normal 6/ 2015---5 yrs    Vitamin D deficiency disease     Vitreous detachment of both eyes        Past Surgical History:   Procedure Laterality Date    ADENOIDECTOMY      ANKLE SURGERY      left     BREAST LUMPECTOMY      CATARACT EXTRACTION      CATARACT EXTRACTION BILATERAL W/ ANTERIOR VITRECTOMY      COLECTOMY      s/p reanastemosis    COLON SURGERY      COLONOSCOPY N/A 6/15/2020    Procedure: COLONOSCOPY;  Surgeon: FRANCES Smiley MD;  Location: [facility] ([address] [place] [address]);  Service: Endoscopy;  Laterality: N/A;  covid testing 6/13    EPIDURAL STEROID INJECTION Bilateral 11/5/2021    Procedure: Bilateral L3, L4, L5 Medial Branch Blocks;  Surgeon: Giovani Lewis Jr., MD;  Location: [facility];  Service: Pain Management;  Laterality: Bilateral;  Arrive @ 1100; No ATC; Check BG    EPIDURAL STEROID INJECTION Bilateral 12/10/2021    Procedure: Bilateral L3, L4, L5 Medial Brach Blocks (#2);   "Surgeon: Giovani Lewis Jr., MD;  Location: [facility] END[facility];  Service: Pain Management;  Laterality: Bilateral;  Arrive @ 1130; No ATC; Check BG    EPIDURAL STEROID INJECTION Left 2022    Procedure: Left L3, L4, L5 Radiofrequency Thermocoagulation of Medial Branches;  Surgeon: Giovani Lewis Jr., MD;  Location: [facility] END[facility];  Service: Pain Management;  Laterality: Left;  Arrive @ 1215; No ATC; Check BG; Rapid COVID    EPIDURAL STEROID INJECTION Right 2022    Procedure: Right L3, L4, L5 Radiofrequency Thermocoagulation of Medial Branches;  Surgeon: Giovani Lewis Jr., MD;  Location: [facility] END[facility];  Service: Pain Management;  Laterality: Right;  @1045  No ATC  Check BG  Last time 1 & 50    EYE EXAMINATION UNDER ANESTHESIA W/ RETINAL CRYOTHERAPY AND RETINAL LASER      HERNIA REPAIR      KIDNEY SURGERY      "dilate urethra tube"    NASAL POLYP EXCISION      NASAL SEPTUM SURGERY      OPEN REDUCTION AND INTERNAL FIXATION (ORIF) OF FRACTURE OF DISTAL RADIUS Left 2020    Procedure: ORIF, FRACTURE, RADIUS, DISTAL,  ulna, closed treatment scaphoid fx, ORIF;  Surgeon: Garrett Duvall MD;  Location: [facility] OR;  Service: Orthopedics;  Laterality: Left;    RADIOFREQUENCY ABLATION OF LUMBAR MEDIAL BRANCH NERVE AT SINGLE LEVEL Right 2018    Procedure: RADIOFREQUENCY ABLATION, NERVE, MEDIAL BRANCH, LUMBAR, 1 LEVEL;  Surgeon: Vashti Poe MD;  Location: [facility];  Service: Pain Management;  Laterality: Right;  RIght Thermal RFA @ L3-5  (REPEAT)  [id number]  with IV Sedation    CONSENT NEEDED    s/p laser ou      TONSILLECTOMY      TUBAL LIGATION      UVULOPALATOPHARYNGOPLASTY         Social History     Socioeconomic History    Marital status:    Occupational History     Employer: [employer]   Tobacco Use    Smoking status: Former     Types: Cigarettes     Quit date: 1977     Years since quittin.7    Smokeless tobacco: Never   Substance and Sexual Activity    Alcohol use: Yes    "  Comment: once per week    Drug use: No    Sexual activity: Yes     Partners: Male     Birth control/protection: Post-menopausal     Social Determinants of Health     Financial Resource Strain: Low Risk     Difficulty of Paying Living Expenses: Not hard at all   Food Insecurity: No Food Insecurity    Worried About Running Out of Food in the Last Year: Never true    Ran Out of Food in the Last Year: Never true   Transportation Needs: No Transportation Needs    Lack of Transportation (Medical): No    Lack of Transportation (Non-Medical): No   Physical Activity: Insufficiently Active    Days of Exercise per Week: 3 days    Minutes of Exercise per Session: 20 min   Stress: No Stress Concern Present    Feeling of Stress : Only a little   Social Connections: Unknown    Frequency of Communication with Friends and Family: Three times a week    Frequency of Social Gatherings with Friends and Family: Once a week    Active Member of Clubs or Organizations: Patient refused    Attends Club or Organization Meetings: Patient refused    Marital Status:    Housing Stability: Low Risk     Unable to Pay for Housing in the Last Year: No    Number of Places Lived in the Last Year: 1    Unstable Housing in the Last Year: No       Review of patient's allergies indicates:   Allergen Reactions    Oxaliplatin Hives    Factive [gemifloxacin] Hives    Statins-hmg-coa reductase inhibitors      Memory w lipitor, muscles for 2 others    Daypro [oxaprozin] Rash    Latex Hives     Adhesives       Current Outpatient Medications on File Prior to Visit   Medication Sig Dispense Refill    albuterol (PROVENTIL/VENTOLIN HFA) 90 mcg/actuation inhaler Inhale 1-2 puffs into the lungs every 6 (six) hours as needed for Wheezing. 90 g 12    azelastine (ASTELIN) 137 mcg (0.1 %) nasal spray INSTILL ONE SPRAY IN EACH NOSTRIL TWICE DAILY 30 mL 12    blood sugar diagnostic (ONETOUCH VERIO) Strp 1 strip by Misc.(Non-Drug; Combo Route) route 2 (two) times  daily before meals. 100 strip 11    blood sugar diagnostic Strp 1 strip by Misc.(Non-Drug; Combo Route) route 2 (two) times daily. DISP GLUCOMETER OF CHOICE AND LANCETS AS WELL 100 strip 12    cyclobenzaprine (FLEXERIL) 5 MG tablet Take 5 mg by mouth.      diazePAM (VALIUM) 5 MG tablet TAKE ONE TABLET BY MOUTH EVERY NIGHT AT BEDTIME AS NEEDED FOR ANXIETY AND SPASMS 30 tablet 5    fluticasone propionate (FLONASE) 50 mcg/actuation nasal spray 2 sprays (100 mcg total) by Each Nare route once daily. 3 Bottle 12    hydroCHLOROthiazide (HYDRODIURIL) 12.5 MG Tab Take 1 tablet (12.5 mg total) by mouth once daily. 90 tablet 3    KRILL OIL ORAL Take 1 tablet by mouth once daily.      lancets (ONETOUCH DELICA LANCETS) 33 gauge Misc 1 lancet by Misc.(Non-Drug; Combo Route) route 2 (two) times daily before meals. 100 each 11    meloxicam (MOBIC) 7.5 MG tablet TAKE 1 TO 2 TABLETS DAILY  AS NEEDED 180 tablet 3    metFORMIN (GLUCOPHAGE-XR) 750 MG ER 24hr tablet Take 2 tablets (1,500 mg total) by mouth every morning. 180 tablet 3    montelukast (SINGULAIR) 10 mg tablet Take 1 tablet (10 mg total) by mouth every evening. 90 tablet 3    multivitamin (THERAGRAN) per tablet Take 1 tablet by mouth once daily. 1 Tablet Oral Every day      olmesartan (BENICAR) 20 MG tablet Take 1 tablet (20 mg total) by mouth once daily. 90 tablet 3    ONETOUCH DELICA LANCETS 33 gauge Misc       traMADoL (ULTRAM) 50 mg tablet TAKE 1 TABLET BY MOUTH EVERY 6 HOURS AS NEEDED Strength: 50 mg 120 tablet 5    zolpidem (AMBIEN) 5 MG Tab Take 1 tablet (5 mg total) by mouth nightly as needed. 30 tablet 5    blood-glucose meter (ACCU-CHEK GUIDE GLUCOSE METER) Misc 1 Units by Misc.(Non-Drug; Combo Route) route 2 (two) times daily. (Patient not taking: Reported on 2/20/2023) 1 each 1     No current facility-administered medications on file prior to visit.       Objective:      BP (!) 159/76   Pulse 87   Resp 16   LMP  (LMP Unknown)   SpO2 97%     EXAM:  GEN:  Well  developed, well nourished.  No acute distress.   HEENT:  No trauma.  Mucous membranes moist.  Nares patent bilaterally.  PSYCH: Normal affect. Thought content appropriate.  CHEST:  Breathing symmetric.  No audible wheezing.  ABD: Soft, non-distended.  SKIN:  Warm, pink, dry.  No rash on exposed areas.    EXT:  No cyanosis, clubbing, or edema.  No color change or changes in nail or hair growth.  NEURO/MUSCULOSKELETAL:  Fully alert, oriented, and appropriate. Speech normal pete. No cranial nerve deficits.   Gait:  Normal.  No focal motor deficits.         Imaging:    Narrative & Impression    EXAMINATION:  XR LUMBAR SPINE AP AND LAT WITH FLEX/EXT     CLINICAL HISTORY:  LOWER BACK PAIN;  Dorsalgia, unspecified     TECHNIQUE:  AP and lateral views as well as lateral flexion and extension images are performed through the lumbar spine.     COMPARISON:  01/11/2012     FINDINGS:  Bones appear somewhat demineralized.  Mild curvature of the lumbar spine on the frontal view, convex toward the right.  The lateral views demonstrate slight retrolisthesis of L2 with respect L3.  This does not change significantly with flexion or extension.  Vertebral body heights are somewhat difficult to evaluate at the upper lumbar region.  Vertebral body heights appear to be adequately maintained.  Moderate hypertrophic spurring, disc space narrowing and vacuum phenomenon at the L4-5 and L5-S1 levels.  No definite evidence of spondylolysis.  No definite evidence of acute fracture or osseous destruction.  Pedicles appear to be intact.  Aortoiliac atherosclerotic calcification without evidence of aneurysm.     Impression:     Suboptimal visualization of the upper lumbar vertebral bodies.  No definite evidence of acute abnormality.  DJD as detailed above.  If clinically indicated, CT scan of the lumbar spine may be considered for further evaluation.     This report was flagged in Epic as abnormal.        Electronically signed by: Shashi Alcantara  MD  Date:                                            09/13/2021  Time:                                           13:51       Narrative & Impression    EXAMINATION:  MRI LUMBAR SPINE WITHOUT CONTRAST     CLINICAL HISTORY:  DDD; Lumbago with sciatica, unspecified side     TECHNIQUE:  Multiplanar, multisequence MR images were acquired from the thoracolumbar junction to the sacrum without the administration of contrast.     COMPARISON:  Lumbar spine 09/13/2021.     FINDINGS:  Alignment: Minimal grade 1 retrolisthesis of L1 on L2.  Remaining lumbar spine maintains normal sagittal alignment.     Vertebrae: Vertebral body heights are maintained without evidence for fracture or marrow infiltrative process.  Multilevel endplate degenerative change most prominent at L1-L2 and L4-L5.     Discs: Severe multilevel intervertebral disc space narrowing and disc desiccation throughout the lumbar spine.     Cord: Distal cord maintains normal signal intensity and contour.  Conus terminates at approximately L2 level.     Degenerative findings:     T12-L1: No spinal canal stenosis or neural foraminal narrowing.  Fluid in the right facet joint as well as a small T2 hyperintense focus at the left facet joint which may represent a facet joint cyst (axial series 13, image 12).     L1-L2: Minimal grade 1 retrolisthesis of L1 on L2, mild circumferential disc bulge, and moderate bilateral facet joint hypertrophy which contributes to mild bilateral neural foraminal narrowing.  Fluid in the facet joints, right greater than left.     L2-L3: Circumferential disc bulge, moderate bilateral facet joint hypertrophy, and ligamentum flavum hypertrophy with advanced cystic degenerative change surrounding the left facet joint.  No spinal canal stenosis or neural foraminal narrowing at this level.  Fluid in the right facet joint.     L3-L4: Circumferential disc bulge, moderate bilateral facet joint hypertrophy, and ligamentum flavum hypertrophy with no  spinal canal stenosis or or neural foraminal narrowing.  Fluid in the right facet joint.     L4-L5: Circumferential disc bulge, severe bilateral facet joint hypertrophy, and ligamentum flavum hypertrophy contributing to mild spinal canal stenosis.  No neural foraminal narrowing.  Fluid in the left facet joint.     L5-S1: Circumferential disc bulge, severe bilateral neural foraminal narrowing, and ligamentum flavum hypertrophy contributing to mild spinal canal stenosis and mild right neural foraminal narrowing.     Paraspinal muscles & soft tissues: Mild atrophy of the paraspinal musculature.     Sacroiliac joints are unremarkable.     Small T2 hyperintense focus in the midline sacrum, likely representing a Tarlov cyst.     Partially visualized intra-abdominal organs are unremarkable.     Impression:     Multilevel lumbar spondylosis as detailed above, worst at L5-S1 contributing to mild spinal canal stenosis as well as mild right neural foraminal narrowing.     Minimal grade 1 retrolisthesis of L1 on L2.     Advanced degenerative change in the left facet joint at L2-L3, as well as fluid and multiple facet joints throughout the lumbar spine.  Findings are nonspecific and may represent active inflammatory change.  Correlation advised.     Other findings as above.     Electronically signed by resident: Pancho Nayak  Date:                                            09/21/2021  Time:                                           09:02     Electronically signed by: Tyrone Jacques MD  Date:                                            09/21/2021  Time:                                           10:44         Assessment:       Encounter Diagnoses   Name Primary?    Meralgia paraesthetica, right Yes    DDD (degenerative disc disease), lumbar     Lumbar spondylosis              Plan:       Taty was seen today for leg pain.    Diagnoses and all orders for this visit:    Meralgia paraesthetica, right    DDD (degenerative disc  disease), lumbar    Lumbar spondylosis            Taty Max is a 67 y.o. female with chronic bilateral low back pain which is 100% resolved with Left the Right L3,4,5 RFAs.  Continues to endorse relief from previous bilateral lumbar RFAs.  Significant improvement with recent repeat right lateral femoral cutaneous nerve block.    Prior records review.  Patient is s/p repeat right lateral femoral cutaneous nerve block with 100% relief of symptoms.  Can consider repeating in the future when indicated.  Continue medications per PCP including Flexeril, Mobic, and tramadol p.r.n..  Patient reports taking with benefit, denies any adverse effects.  Stressed the importance of maintaining regular home exercise program and being mindful of how they use their back throughout the day.  Patient expressed understanding and agreement.   Return to clinic as needed.      Reynaldo Crow PA-C  [facility]  Interventional Pain Management   03/28/2023    This note was created by combination of typed  and M-Modal dictation.  Transcription and phonetic errors may be present.  If there are any questions, please contact me.

## 2023-07-19 ENCOUNTER — PATIENT MESSAGE (OUTPATIENT)
Dept: FAMILY MEDICINE | Facility: CLINIC | Age: 68
End: 2023-07-19
Payer: MEDICARE

## 2023-07-19 NOTE — TELEPHONE ENCOUNTER
Care Due:                  Date            Visit Type   Department     Provider  --------------------------------------------------------------------------------                                [facility]                              FOLLOWUP/OF  MED/ INTERNAL  Fidel Balderrama  Last Visit: 02-      FICE VISIT   MED/ PEDS      [place]  Next Visit: None Scheduled  None         None Found                                                            Last  Test          Frequency    Reason                     Performed    Due Date  --------------------------------------------------------------------------------    HBA1C.......  6 months...  metFORMIN................  01- 07-    [facility] Embedded Care Due Messages. Reference number: [id number].   7/19/2023 2:31:39 PM CDT

## 2023-07-20 ENCOUNTER — PATIENT MESSAGE (OUTPATIENT)
Dept: FAMILY MEDICINE | Facility: CLINIC | Age: 68
End: 2023-07-20
Payer: MEDICARE

## 2023-07-20 DIAGNOSIS — E78.5 HYPERLIPIDEMIA, UNSPECIFIED HYPERLIPIDEMIA TYPE: Primary | ICD-10-CM

## 2023-07-20 DIAGNOSIS — M85.80 OSTEOPENIA, UNSPECIFIED LOCATION: ICD-10-CM

## 2023-07-20 DIAGNOSIS — E03.9 HYPOTHYROIDISM, UNSPECIFIED TYPE: ICD-10-CM

## 2023-07-20 DIAGNOSIS — I10 PRIMARY HYPERTENSION: ICD-10-CM

## 2023-07-20 DIAGNOSIS — R79.89 ABNORMAL LIVER FUNCTION TESTS: ICD-10-CM

## 2023-07-20 RX ORDER — CYCLOBENZAPRINE HCL 5 MG
5 TABLET ORAL DAILY PRN
Qty: 30 TABLET | Refills: 0 | Status: SHIPPED | OUTPATIENT
Start: 2023-07-20 | End: 2023-08-09 | Stop reason: SDUPTHER

## 2023-07-26 ENCOUNTER — LAB VISIT (OUTPATIENT)
Dept: LAB | Facility: HOSPITAL | Age: 68
End: 2023-07-26
Payer: MEDICARE

## 2023-07-26 DIAGNOSIS — I10 PRIMARY HYPERTENSION: ICD-10-CM

## 2023-07-26 DIAGNOSIS — E78.5 HYPERLIPIDEMIA, UNSPECIFIED HYPERLIPIDEMIA TYPE: ICD-10-CM

## 2023-07-26 DIAGNOSIS — E03.9 HYPOTHYROIDISM, UNSPECIFIED TYPE: ICD-10-CM

## 2023-07-26 DIAGNOSIS — R79.89 ABNORMAL LIVER FUNCTION TESTS: ICD-10-CM

## 2023-07-26 DIAGNOSIS — M85.80 OSTEOPENIA, UNSPECIFIED LOCATION: ICD-10-CM

## 2023-07-26 LAB
ALBUMIN SERPL BCP-MCNC: 4.1 G/DL (ref 3.5–5.2)
ALP SERPL-CCNC: 58 U/L (ref 55–135)
ALT SERPL W/O P-5'-P-CCNC: 16 U/L (ref 10–44)
ANION GAP SERPL CALC-SCNC: 17 MMOL/L (ref 8–16)
AST SERPL-CCNC: 23 U/L (ref 10–40)
BASOPHILS # BLD AUTO: 0.05 K/UL (ref 0–0.2)
BASOPHILS NFR BLD: 0.8 % (ref 0–1.9)
BILIRUB SERPL-MCNC: 0.3 MG/DL (ref 0.1–1)
BUN SERPL-MCNC: 13 MG/DL (ref 8–23)
CALCIUM SERPL-MCNC: 10 MG/DL (ref 8.7–10.5)
CHLORIDE SERPL-SCNC: 101 MMOL/L (ref 95–110)
CHOLEST SERPL-MCNC: 321 MG/DL (ref 120–199)
CHOLEST/HDLC SERPL: 5 {RATIO} (ref 2–5)
CO2 SERPL-SCNC: 21 MMOL/L (ref 23–29)
CREAT SERPL-MCNC: 0.8 MG/DL (ref 0.5–1.4)
DIFFERENTIAL METHOD: NORMAL
EOSINOPHIL # BLD AUTO: 0.3 K/UL (ref 0–0.5)
EOSINOPHIL NFR BLD: 4.4 % (ref 0–8)
ERYTHROCYTE [DISTWIDTH] IN BLOOD BY AUTOMATED COUNT: 13.1 % (ref 11.5–14.5)
EST. GFR  (NO RACE VARIABLE): >60 ML/MIN/1.73 M^2
ESTIMATED AVG GLUCOSE: 146 MG/DL (ref 68–131)
GLUCOSE SERPL-MCNC: 112 MG/DL (ref 70–110)
HBA1C MFR BLD: 6.7 % (ref 4–5.6)
HCT VFR BLD AUTO: 41 % (ref 37–48.5)
HDLC SERPL-MCNC: 64 MG/DL (ref 40–75)
HDLC SERPL: 19.9 % (ref 20–50)
HGB BLD-MCNC: 13.1 G/DL (ref 12–16)
IMM GRANULOCYTES # BLD AUTO: 0.01 K/UL (ref 0–0.04)
IMM GRANULOCYTES NFR BLD AUTO: 0.2 % (ref 0–0.5)
LDLC SERPL CALC-MCNC: 203.4 MG/DL (ref 63–159)
LYMPHOCYTES # BLD AUTO: 2.4 K/UL (ref 1–4.8)
LYMPHOCYTES NFR BLD: 40.7 % (ref 18–48)
MCH RBC QN AUTO: 29.4 PG (ref 27–31)
MCHC RBC AUTO-ENTMCNC: 32 G/DL (ref 32–36)
MCV RBC AUTO: 92 FL (ref 82–98)
MONOCYTES # BLD AUTO: 0.5 K/UL (ref 0.3–1)
MONOCYTES NFR BLD: 8.6 % (ref 4–15)
NEUTROPHILS # BLD AUTO: 2.7 K/UL (ref 1.8–7.7)
NEUTROPHILS NFR BLD: 45.3 % (ref 38–73)
NONHDLC SERPL-MCNC: 257 MG/DL
NRBC BLD-RTO: 0 /100 WBC
PLATELET # BLD AUTO: 321 K/UL (ref 150–450)
PMV BLD AUTO: 10 FL (ref 9.2–12.9)
POTASSIUM SERPL-SCNC: 4.4 MMOL/L (ref 3.5–5.1)
PROT SERPL-MCNC: 7.8 G/DL (ref 6–8.4)
RBC # BLD AUTO: 4.45 M/UL (ref 4–5.4)
SODIUM SERPL-SCNC: 139 MMOL/L (ref 136–145)
T4 FREE SERPL-MCNC: 0.91 NG/DL (ref 0.71–1.51)
TRIGL SERPL-MCNC: 268 MG/DL (ref 30–150)
TSH SERPL DL<=0.005 MIU/L-ACNC: 1.17 UIU/ML (ref 0.4–4)
WBC # BLD AUTO: 5.9 K/UL (ref 3.9–12.7)

## 2023-07-26 PROCEDURE — 36415 COLL VENOUS BLD VENIPUNCTURE: CPT | Mod: PO | Performed by: NURSE PRACTITIONER

## 2023-07-26 PROCEDURE — 80061 LIPID PANEL: CPT | Performed by: NURSE PRACTITIONER

## 2023-07-26 PROCEDURE — 83036 HEMOGLOBIN GLYCOSYLATED A1C: CPT | Performed by: NURSE PRACTITIONER

## 2023-07-26 PROCEDURE — 80053 COMPREHEN METABOLIC PANEL: CPT | Performed by: NURSE PRACTITIONER

## 2023-07-26 PROCEDURE — 85025 COMPLETE CBC W/AUTO DIFF WBC: CPT | Performed by: NURSE PRACTITIONER

## 2023-07-26 PROCEDURE — 84439 ASSAY OF FREE THYROXINE: CPT | Performed by: NURSE PRACTITIONER

## 2023-07-26 PROCEDURE — 84443 ASSAY THYROID STIM HORMONE: CPT | Performed by: NURSE PRACTITIONER

## 2023-08-09 ENCOUNTER — OFFICE VISIT (OUTPATIENT)
Dept: FAMILY MEDICINE | Facility: CLINIC | Age: 68
End: 2023-08-09
Payer: MEDICARE

## 2023-08-09 VITALS
OXYGEN SATURATION: 96 % | BODY MASS INDEX: 37.96 KG/M2 | TEMPERATURE: 98 F | HEART RATE: 98 BPM | SYSTOLIC BLOOD PRESSURE: 142 MMHG | HEIGHT: 67 IN | WEIGHT: 241.88 LBS | DIASTOLIC BLOOD PRESSURE: 76 MMHG

## 2023-08-09 DIAGNOSIS — E78.5 HYPERLIPIDEMIA, UNSPECIFIED HYPERLIPIDEMIA TYPE: ICD-10-CM

## 2023-08-09 DIAGNOSIS — E66.01 MORBID OBESITY: ICD-10-CM

## 2023-08-09 DIAGNOSIS — F51.05 MOOD INSOMNIA: ICD-10-CM

## 2023-08-09 DIAGNOSIS — I10 ESSENTIAL HYPERTENSION: ICD-10-CM

## 2023-08-09 DIAGNOSIS — E11.65 UNCONTROLLED TYPE 2 DIABETES MELLITUS WITH HYPERGLYCEMIA: Primary | ICD-10-CM

## 2023-08-09 DIAGNOSIS — F11.20 MODERATE TRAMADOL DEPENDENCE: ICD-10-CM

## 2023-08-09 DIAGNOSIS — F39 MOOD INSOMNIA: ICD-10-CM

## 2023-08-09 DIAGNOSIS — E03.9 HYPOTHYROIDISM, UNSPECIFIED TYPE: ICD-10-CM

## 2023-08-09 DIAGNOSIS — Z78.9 STATIN INTOLERANCE: ICD-10-CM

## 2023-08-09 DIAGNOSIS — R52 PAIN: ICD-10-CM

## 2023-08-09 DIAGNOSIS — F39 MOOD DISORDER: ICD-10-CM

## 2023-08-09 DIAGNOSIS — G47.00 INSOMNIA, UNSPECIFIED TYPE: ICD-10-CM

## 2023-08-09 DIAGNOSIS — I10 PRIMARY HYPERTENSION: ICD-10-CM

## 2023-08-09 DIAGNOSIS — R79.89 ABNORMAL LIVER FUNCTION TESTS: ICD-10-CM

## 2023-08-09 PROCEDURE — 99999 PR PBB SHADOW E&M-EST. PATIENT-LVL IV: ICD-10-PCS | Mod: PBBFAC,,, | Performed by: INTERNAL MEDICINE

## 2023-08-09 PROCEDURE — 99214 OFFICE O/P EST MOD 30 MIN: CPT | Mod: S$PBB,,, | Performed by: INTERNAL MEDICINE

## 2023-08-09 PROCEDURE — 99214 OFFICE O/P EST MOD 30 MIN: CPT | Mod: PBBFAC,PO | Performed by: INTERNAL MEDICINE

## 2023-08-09 PROCEDURE — 99999 PR PBB SHADOW E&M-EST. PATIENT-LVL IV: CPT | Mod: PBBFAC,,, | Performed by: INTERNAL MEDICINE

## 2023-08-09 PROCEDURE — 99214 PR OFFICE/OUTPT VISIT, EST, LEVL IV, 30-39 MIN: ICD-10-PCS | Mod: S$PBB,,, | Performed by: INTERNAL MEDICINE

## 2023-08-09 RX ORDER — MELOXICAM 7.5 MG/1
TABLET ORAL
Qty: 180 TABLET | Refills: 3 | Status: SHIPPED | OUTPATIENT
Start: 2023-08-09

## 2023-08-09 RX ORDER — TRAMADOL HYDROCHLORIDE 50 MG/1
TABLET ORAL
Qty: 120 TABLET | Refills: 5 | Status: SHIPPED | OUTPATIENT
Start: 2023-08-09 | End: 2024-02-22 | Stop reason: SDUPTHER

## 2023-08-09 RX ORDER — CYCLOBENZAPRINE HCL 5 MG
5 TABLET ORAL 3 TIMES DAILY PRN
Qty: 90 TABLET | Refills: 12 | Status: SHIPPED | OUTPATIENT
Start: 2023-08-09 | End: 2024-01-11 | Stop reason: SDUPTHER

## 2023-08-09 RX ORDER — METFORMIN HYDROCHLORIDE 750 MG/1
1500 TABLET, EXTENDED RELEASE ORAL EVERY MORNING
Qty: 180 TABLET | Refills: 3 | Status: SHIPPED | OUTPATIENT
Start: 2023-08-09

## 2023-08-09 RX ORDER — ZOLPIDEM TARTRATE 5 MG/1
5 TABLET ORAL NIGHTLY PRN
Qty: 30 TABLET | Refills: 5 | Status: SHIPPED | OUTPATIENT
Start: 2023-08-09 | End: 2024-02-22 | Stop reason: SDUPTHER

## 2023-08-09 RX ORDER — FLUOXETINE HYDROCHLORIDE 20 MG/1
20 CAPSULE ORAL DAILY
Qty: 90 CAPSULE | Refills: 11 | Status: SHIPPED | OUTPATIENT
Start: 2023-08-09

## 2023-08-09 NOTE — PROGRESS NOTES
Chief complaint Followup on blood pressure,, diabetes, cholesterol     Patient's a 68 year-old white female poorly compliant with follow-up and general recommendations.   She is here to get her tramadol refilled.  Typically takes three per day.  I will send some with refills to the pharmacy.  She is scheduled to get some facet injections so she is pursuing pain management to try make her overall pain go away.  She occasionally takes the Valium for spasm but says she does not need a refill of that today.    More depressed after retired and mom  a year ago, crying alot    We again discussed her high cholesterol but she had memory issues with statins and possibly Welchol.  She absolutely refuses Zetia and we discussed there is an injection as well and she will research but very resistant.  I reassured her that Zetia is completely different from the statins and she again will consider but was not open to starting it.  We did discuss her for symptoms and need for treatment of a high cholesterol could well be a heart attack or stroke.  She is well aware of this along with her other risk factors.  She has had a lot of medications give her side effects.  She took Januvia and had a lot of left lower back pain very consistent with lumbar strain and the symptoms persisted even stopping Januvia and she had to get some massage which helps and discussed it is probably a pulled muscle but she is insistent to believe it is Januvia and she read that could do that somewhere but reassured her it is not a common side effect from Januvia.    Diabetes uncontrolled with A1c of 7.8, 6.9, 7.2 , 6.8, 6.7.  She was only on metformin.  We discussed injections, Amaryl and Januvia.  We gave her Januvia 50 mg as above.  We discussed potential for hypoglycemia which would be less with Januvia.  She has not been able to exercise as much lately.  She had an injury and a left wrist surgery.  She has gained a lot of weight.  Currently weight is  stable which is good.  Her A1c went up to 7.8 then 6.9, 7.2 and she was working at home eating a lot of mm but for the past year she has improved her diet significantly eating much better and her sugars are running normal so hopefully she will have improvement.  We discussed her elevated ALT due to diabetes in the weight and we can expected to still be elevated.  She feels she cannot lose weight having gone to the gym twice a day when working in the past with not able to lose any weight.  She also has some hernia issue so does not planned on going back to the gym.        She does occasionally take a Flexeril and is having more aches and pains and may want to try taking a low-dose Flexeril during the day so I provided her with a refills.    LDL 190s -refuses statin, etc        She does occasionally take her tramadol for arthralgias.  Valium 5mg prn now retired and issues with  at home    ROS:   CONST:  no other new myalgias arthralgias and no new neurological deficits, no skin rashes     PAST MEDICAL HISTORY:                                                        1.  Allergic rhinitis.                                                       2.  Obstructive sleep apnea status post UPPP and treatment with CPAP.        3.  Chronic sinusitis with sinus surgery x2, Dr. Tucker and Dr. Alston.       4.  Hypertension.                                                            5.  Obesity.                                                                 6.  Colon cancer, status post right hemicolectomy and chemotherapy.          7.  History of iron deficiency anemia.                                       8.  Left superficial vein thrombosis of the arm.                             9.  Insomnia.                                                                10.  Basal cell carcinoma.                                                   11.  Memory issues when taking WelChol.                                      12.   Hyperlipidemia with LDL rise on WelChol. Tried 3 statins -various effects- memory issue made her lose a job                               13.  Hypercalcemia secondary to HCTZ.                                        14.  Lumbar radiculopathy with history of epidurals -Dr Saavedra - now Joshua                      15.  Chronic cough due to allergies, per pulmonary workup, 2010.           16.  Osteopenia by bone density in .                                     17.  Elevated ALT.                                                           18.  Tubes tied.                                                             19.  Hernia repair.                                                          20.  Lumpectomy, of the breast I presume.   21.  COLONOSCOPY normal 6/15,== 5 years  22. Left ankle surgery   23. METABOLIC SYNDROME/DM -A1c  was 6.9  24. Vit D def-    25.  Hypothyroid?,  Placed on [facility] Thyroid by Dr. Montgomery??    26.  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  Quit smoking in .  Drinks once a week.                                                                               FAMILY HISTORY:  Father  at 53 in a car accident.  Mom in good health.   Brother 46, a sister is 52.  She states that all members of her family have  Hypertension.                                                                  Vitals as above, exam otherwise deferred  Diagnoses and all orders for this visit:    Uncontrolled type 2 diabetes mellitus with hyperglycemia, stable    Hyperlipidemia, unspecified hyperlipidemia type    Hypothyroidism, unspecified type ,chronic condition and stable.     Primary hypertension ,chronic condition and stable.     Abnormal liver  function tests, better    Statin intolerance    Mood disorder, worse, prozac restart, worked in the past    Morbid obesity    Essential hypertension ,chronic condition and stable.     Moderate tramadol dependence ,chronic condition and stable.     Insomnia, unspecified type    Mood insomnia, ambien    Pain  -     traMADoL (ULTRAM) 50 mg tablet; TAKE 1 TABLET BY MOUTH EVERY 6 HOURS AS NEEDED Strength: 50 mg    Other orders  -     FLUoxetine 20 MG capsule; Take 1 capsule (20 mg total) by mouth once daily.  -     cyclobenzaprine (FLEXERIL) 5 MG tablet; Take 1 tablet (5 mg total) by mouth 3 (three) times daily as needed for Muscle spasms.  -     metFORMIN (GLUCOPHAGE-XR) 750 MG ER 24hr tablet; Take 2 tablets (1,500 mg total) by mouth every morning.  -     meloxicam (MOBIC) 7.5 MG tablet; TAKE 1 TO 2 TABLETS DAILY  AS NEEDED  -     zolpidem (AMBIEN) 5 MG Tab; Take 1 tablet (5 mg total) by mouth nightly as needed.

## 2023-11-02 ENCOUNTER — OFFICE VISIT (OUTPATIENT)
Dept: DERMATOLOGY | Facility: CLINIC | Age: 68
End: 2023-11-02
Payer: MEDICARE

## 2023-11-02 DIAGNOSIS — L82.1 SEBORRHEIC KERATOSES: ICD-10-CM

## 2023-11-02 DIAGNOSIS — L57.0 MULTIPLE ACTINIC KERATOSES: Primary | ICD-10-CM

## 2023-11-02 DIAGNOSIS — D48.5 NEOPLASM OF UNCERTAIN BEHAVIOR OF SKIN: ICD-10-CM

## 2023-11-02 DIAGNOSIS — Z85.828 HISTORY OF SKIN CANCER: ICD-10-CM

## 2023-11-02 DIAGNOSIS — L81.4 LENTIGINES: ICD-10-CM

## 2023-11-02 PROCEDURE — 99214 PR OFFICE/OUTPT VISIT, EST, LEVL IV, 30-39 MIN: ICD-10-PCS | Mod: 25,S$PBB,, | Performed by: DERMATOLOGY

## 2023-11-02 PROCEDURE — 11103 TANGNTL BX SKIN EA SEP/ADDL: CPT | Mod: PBBFAC,PO | Performed by: DERMATOLOGY

## 2023-11-02 PROCEDURE — 11102 TANGNTL BX SKIN SINGLE LES: CPT | Mod: PBBFAC,PO | Performed by: DERMATOLOGY

## 2023-11-02 PROCEDURE — 11103 TANGNTL BX SKIN EA SEP/ADDL: CPT | Mod: S$PBB,,, | Performed by: DERMATOLOGY

## 2023-11-02 PROCEDURE — 11102 TANGNTL BX SKIN SINGLE LES: CPT | Mod: S$PBB,,, | Performed by: DERMATOLOGY

## 2023-11-02 PROCEDURE — 11102 PR TANGENTIAL BIOPSY, SKIN, SINGLE LESION: ICD-10-PCS | Mod: S$PBB,,, | Performed by: DERMATOLOGY

## 2023-11-02 PROCEDURE — 88305 TISSUE EXAM BY PATHOLOGIST: CPT | Mod: 26,,, | Performed by: PATHOLOGY

## 2023-11-02 PROCEDURE — 11103 PR TANGENTIAL BIOPSY, SKIN, EA ADDTL LESION: ICD-10-PCS | Mod: S$PBB,,, | Performed by: DERMATOLOGY

## 2023-11-02 PROCEDURE — 88305 TISSUE EXAM BY PATHOLOGIST: ICD-10-PCS | Mod: 26,,, | Performed by: PATHOLOGY

## 2023-11-02 PROCEDURE — 99214 OFFICE O/P EST MOD 30 MIN: CPT | Mod: PBBFAC,PO | Performed by: DERMATOLOGY

## 2023-11-02 PROCEDURE — 88305 TISSUE EXAM BY PATHOLOGIST: CPT | Performed by: PATHOLOGY

## 2023-11-02 PROCEDURE — 99999 PR PBB SHADOW E&M-EST. PATIENT-LVL IV: CPT | Mod: PBBFAC,,, | Performed by: DERMATOLOGY

## 2023-11-02 PROCEDURE — 99214 OFFICE O/P EST MOD 30 MIN: CPT | Mod: 25,S$PBB,, | Performed by: DERMATOLOGY

## 2023-11-02 PROCEDURE — 99999 PR PBB SHADOW E&M-EST. PATIENT-LVL IV: ICD-10-PCS | Mod: PBBFAC,,, | Performed by: DERMATOLOGY

## 2023-11-02 RX ORDER — FLUOROURACIL 50 MG/G
CREAM TOPICAL
Qty: 40 G | Refills: 3 | Status: SHIPPED | OUTPATIENT
Start: 2023-11-02

## 2023-11-02 NOTE — PROGRESS NOTES
Subjective:      Patient ID:  Taty Max is a 68 y.o. female who presents for   Chief Complaint   Patient presents with    Skin Check     UBSE    Itching     face     Her intertrigo has resolved, would like skin check.  New spots on chest and shoulder.     Itching - Follow-up  Affected locations: face, chest, torso, back and abdomen  Signs / symptoms: asymptomatic      Review of Systems   Constitutional:  Negative for fever, chills, weight loss, weight gain, fatigue and malaise.   Skin:  Positive for itching, daily sunscreen use and activity-related sunscreen use. Negative for wears hat.   Hematologic/Lymphatic: Does not bruise/bleed easily.       Objective:   Physical Exam   Constitutional: She appears well-developed and well-nourished. No distress.   Neurological: She is alert and oriented to person, place, and time. She is not disoriented.   Psychiatric: She has a normal mood and affect.   Skin:   Areas Examined (abnormalities noted in diagram):   Head / Face Inspection Performed  Neck Inspection Performed  Chest / Axilla Inspection Performed  Abdomen Inspection Performed  Back Inspection Performed  RUE Inspected  LUE Inspection Performed                 Diagram Legend     Erythematous scaling macule/papule c/w actinic keratosis       Vascular papule c/w angioma      Pigmented verrucoid papule/plaque c/w seborrheic keratosis      Yellow umbilicated papule c/w sebaceous hyperplasia      Irregularly shaped tan macule c/w lentigo     1-2 mm smooth white papules consistent with Milia      Movable subcutaneous cyst with punctum c/w epidermal inclusion cyst      Subcutaneous movable cyst c/w pilar cyst      Firm pink to brown papule c/w dermatofibroma      Pedunculated fleshy papule(s) c/w skin tag(s)      Evenly pigmented macule c/w junctional nevus     Mildly variegated pigmented, slightly irregular-bordered macule c/w mildly atypical nevus      Flesh colored to evenly pigmented papule c/w intradermal nevus        Pink pearly papule/plaque c/w basal cell carcinoma      Erythematous hyperkeratotic cursted plaque c/w SCC      Surgical scar with no sign of skin cancer recurrence      Open and closed comedones      Inflammatory papules and pustules      Verrucoid papule consistent consistent with wart     Erythematous eczematous patches and plaques     Dystrophic onycholytic nail with subungual debris c/w onychomycosis     Umbilicated papule    Erythematous-base heme-crusted tan verrucoid plaque consistent with inflamed seborrheic keratosis     Erythematous Silvery Scaling Plaque c/w Psoriasis     See annotation      Assessment / Plan:      Pathology Orders:       Normal Orders This Visit    Specimen to Pathology, Dermatology     Comments:    Number of Specimens:->2  ------------------------->-------------------------  Spec 1 Procedure:->Biopsy  Spec 1 Clinical Impression:->bcc vs scc  Spec 1 Source:->right chest  ------------------------->-------------------------  Spec 2 Procedure:->Biopsy  Spec 2 Clinical Impression:->bcc vs scc  Spec 2 Source:->right shoulder    Questions:    Procedure Type: Dermatology and skin neoplasms    Number of Specimens: 2    ------------------------: -------------------------    Spec 1 Procedure: Biopsy    Spec 1 Clinical Impression: bcc vs scc    Spec 1 Source: right chest    ------------------------: -------------------------    Spec 2 Procedure: Biopsy    Spec 2 Clinical Impression: bcc vs scc    Spec 2 Source: right shoulder    Release to patient:           Multiple actinic keratoses  -     fluorouraciL (EFUDEX) 5 % cream; Use hs for 2 weeks  Dispense: 40 g; Refill: 3 left cheek and above left eyebrow    Neoplasm of uncertain behavior of skin  -     Specimen to Pathology, Dermatology  Shave removal procedure note:  Right chest  Shave removal performed after verbal consent including risk of infection, scar, recurrence, need for additional treatment of site. Area prepped with alcohol,  "anesthetized 1% lidocaine with epinephrine. Lesional tissue shaved  followed by cautery and hyfrecation x 3. Lesion defect size 6mm No complications. Dressing applied. Wound care explained.      Shave removal procedure note:  Right shoulder  Shave removal performed after verbal consent including risk of infection, scar, recurrence, need for additional treatment of site. Area prepped with alcohol, anesthetized 1% lidocaine with epinephrine. Lesional tissue shaved  followed by cautery and hyfrecation x 3. Lesion defect size 5mm No complications. Dressing applied. Wound care explained.              Lentigines  The "ABCD" rules to observe pigmented lesions were reviewed.      Seborrheic keratoses  Seborrheic keratosis scattered, told benign no treatment needed.  Brochure provided.      History of skin cancers  Area(s) of previous NMSC evaluated with no signs of recurrence    Recommend daily sun protection/avoidance and use of at least SPF 30, broad spectrum sunscreen (OTC drug).                Follow up in about 6 months (around 5/2/2024).  "

## 2023-11-06 ENCOUNTER — PATIENT MESSAGE (OUTPATIENT)
Dept: ADMINISTRATIVE | Facility: HOSPITAL | Age: 68
End: 2023-11-06
Payer: MEDICARE

## 2023-11-06 LAB
FINAL PATHOLOGIC DIAGNOSIS: NORMAL
GROSS: NORMAL
Lab: NORMAL
MICROSCOPIC EXAM: NORMAL

## 2023-11-14 ENCOUNTER — PATIENT MESSAGE (OUTPATIENT)
Dept: DERMATOLOGY | Facility: CLINIC | Age: 68
End: 2023-11-14
Payer: MEDICARE

## 2023-11-27 ENCOUNTER — TELEPHONE (OUTPATIENT)
Dept: DERMATOLOGY | Facility: CLINIC | Age: 68
End: 2023-11-27
Payer: MEDICARE

## 2023-11-28 ENCOUNTER — TELEPHONE (OUTPATIENT)
Dept: DERMATOLOGY | Facility: CLINIC | Age: 68
End: 2023-11-28
Payer: MEDICARE

## 2023-11-28 NOTE — TELEPHONE ENCOUNTER
Pt informed of biopsy results proven BCC right chest. Scheduled for same day Mohs on 1/9 at 1pm. Pt confirmed time, date, and location. Appt reminder sent in the mail

## 2023-11-28 NOTE — TELEPHONE ENCOUNTER
----- Message from Brenda Ervincathy sent at 11/28/2023 11:39 AM CST -----  Regarding: appt access / advice  Contact: self @ [phone]  Pt says she is returning Lynda's call from yesterday concerning Mohs surgery.  She is confused says she thought she was scheduled for Mohs surgery on tomorrow with Dr Neal.  Appt states tomorrow is a procedure. Pls call asap.      Pt informed of message

## 2023-11-28 NOTE — TELEPHONE ENCOUNTER
----- Message from Seema Carmichael LPN sent at 11/28/2023  1:03 PM CST -----    ----- Message -----  From: Julio Neal MD  Sent: 11/28/2023  12:39 PM CST  To: Seema Carmichael LPN    I think Dr. Yin wanted mohs for the chest and us to do the shoulder.  Chest because of infiltrative pattern.  ----- Message -----  From: Seema Carmichael LPN  Sent: 11/28/2023  11:15 AM CST  To: Julio Neal MD    Which site are we doing?  ----- Message -----  From: Lynda Purcell RN  Sent: 11/27/2023   1:16 PM CST  To: Seema Carmichael LPN; Ángel Kim Staff; #    Good afternoon! We received a path report on this patient for her right chest and right shoulder. I noticed that the patient is scheduled with Dr. Neal for an excision but was unsure of which site. Just wanted to be sure we are doing Mohs on the correct site(s). Thanks!

## 2023-11-29 ENCOUNTER — PROCEDURE VISIT (OUTPATIENT)
Dept: DERMATOLOGY | Facility: CLINIC | Age: 68
End: 2023-11-29
Payer: MEDICARE

## 2023-11-29 DIAGNOSIS — C44.612 BASAL CELL CARCINOMA (BCC) OF RIGHT UPPER ARM: Primary | ICD-10-CM

## 2023-11-29 PROCEDURE — 13120 CMPLX RPR S/A/L 1.1-2.5 CM: CPT | Mod: PBBFAC,PN | Performed by: DERMATOLOGY

## 2023-11-29 PROCEDURE — 13120 PR RECMPL WND SCALP,EXTR 1.1-2.5 CM: ICD-10-PCS | Mod: S$PBB,,, | Performed by: DERMATOLOGY

## 2023-11-29 PROCEDURE — 88305 TISSUE EXAM BY PATHOLOGIST: CPT | Performed by: PATHOLOGY

## 2023-11-29 PROCEDURE — 11602 EXC TR-EXT MAL+MARG 1.1-2 CM: CPT | Mod: PBBFAC,PN | Performed by: DERMATOLOGY

## 2023-11-29 PROCEDURE — 11602 PR EXC SKIN MALIG 1.1-2 CM TRUNK,ARM,LEG: ICD-10-PCS | Mod: S$PBB,51,, | Performed by: DERMATOLOGY

## 2023-11-29 PROCEDURE — 88305 TISSUE EXAM BY PATHOLOGIST: ICD-10-PCS | Mod: 26,,, | Performed by: PATHOLOGY

## 2023-11-29 PROCEDURE — 88305 TISSUE EXAM BY PATHOLOGIST: CPT | Mod: 26,,, | Performed by: PATHOLOGY

## 2023-11-29 PROCEDURE — 11602 EXC TR-EXT MAL+MARG 1.1-2 CM: CPT | Mod: S$PBB,51,, | Performed by: DERMATOLOGY

## 2023-11-29 PROCEDURE — 13120 CMPLX RPR S/A/L 1.1-2.5 CM: CPT | Mod: S$PBB,,, | Performed by: DERMATOLOGY

## 2023-11-29 NOTE — PATIENT INSTRUCTIONS
Post- Operative Wound Care    Your doctor has performed local skin surgery today.  Vaseline ointment and a pressure bandage were placed after the surgery.  It is very important that you keep this bandage in place for 24 hours.  This will decrease the risk of post - operative infection and bleeding.  After 24 hours, you may remove the band aid and wash the area with warm soap and water and apply Vaseline ointment.  Many patients prefer to use Neosporin or Bacitracin ointment.  This is acceptable; however know that you can develop an allergy to this medication even if you have used it safely for years.  It is important to keep the area moist.  Letting it dry out and get air slows healing time, will worsen the scar, and make it more difficult to remove the stitches.  Band aid is optional after first 24 hours.    It is best NOT to use hydrogen peroxide or antibacterial soap to wash the operative site.       If you notice increasing redness, tenderness, pain, or yellow drainage at the biopsy or surgical site, please notify your doctor.  These are signs of an infection.    If your biopsy/surgical site is bleeding, apply firm pressure for 15 minutes straight.  Repeat for another 15 minutes, if it is still bleeding.   If the surgical site continues to bleed, then please contact your doctor.      For [facility] users:   You will receive your pathology results in [facility] as soon as they are available. Please be assured that your physician/provider will review your results and contact you should additional treatment be required. This is one more way [place] is putting you first.       [facility] [place] [place] [address]/ [phone] FAX/ [email or web address]

## 2023-11-29 NOTE — PROGRESS NOTES
PROCEDURE: Elliptical excision with complex layered repair in order to close large gap.    ANESTHETIC: 1.5 cc 1% Xylocaine with Epinephrine 1:100,000, buffered    SURGEON:  Julio Neal M.D.    ASSISTANTS: Seema Carmichael LPN    PREOPERATIVE DIAGNOSIS: Biopsy-proven Basal Cell Carcinoma    POSTOPERATIVE DIAGNOSIS: Same as preoperative diagnosis    PATHOLOGIC DIAGNOSIS: Pending    LOCATION: r shoulder    INITIAL LESION SIZE: 0.8 cm    EXCISED DIAMETER: 0.8 cm    PREPARATION: The diagnosis, procedure, alternatives, benefits and risks, including but not limited to: infection, bleeding/bruising, drug reactions, pain, scar or cosmetic defect, local sensation disturbances, wound dehiscence (separation of wound edges after sutures removed) and/or recurrence of present condition were explained to the patient. The patient elected to proceed.  Patient's identity was verified and the site was verified.    PROCEDURE: The location noted above was prepped, draped, and anesthetized in the usual sterile fashion per Seema Carmichael LPN. Lesional tissue was carefully marked with at least 4 mm margins of clinically normal skin in all directions. A fusiform elliptical excision was done with #15 blade carried down completely through the dermis into the deep subcutaneous tissues to the level of the non-muscle fascia, and dissection was carried out in that plane. The wound was undermined to a distance at least the maximum width of the defect as measured perpendicular to the closure line along at least one entire edge of the defect, in the case 1 cm. Electrocoagulation was used to obtain hemostasis. Blood loss was minimal. The wound was then approximated in a layered fashion with subcutaneous and intradermal sutures of 3.0 Monocryl, approximately 3 in number, and the wound was then superficially closed with simple interrupted sutures of 3.0 Prolene.    The patient tolerated the procedure well.    The area was cleaned and dressed  appropriately and the patient was given wound care instructions, as well as an appointment for follow-up evaluation.    LENGTH OF REPAIR: 2.2 cm

## 2023-12-01 LAB
FINAL PATHOLOGIC DIAGNOSIS: NORMAL
GROSS: NORMAL
Lab: NORMAL
MICROSCOPIC EXAM: NORMAL

## 2023-12-05 ENCOUNTER — PATIENT MESSAGE (OUTPATIENT)
Dept: DERMATOLOGY | Facility: CLINIC | Age: 68
End: 2023-12-05
Payer: MEDICARE

## 2023-12-19 ENCOUNTER — CLINICAL SUPPORT (OUTPATIENT)
Dept: DERMATOLOGY | Facility: CLINIC | Age: 68
End: 2023-12-19
Payer: MEDICARE

## 2023-12-19 DIAGNOSIS — Z48.02 VISIT FOR SUTURE REMOVAL: Primary | ICD-10-CM

## 2023-12-19 PROCEDURE — 99999 PR PBB SHADOW E&M-EST. PATIENT-LVL I: ICD-10-PCS | Mod: PBBFAC,,,

## 2023-12-19 PROCEDURE — 99211 OFF/OP EST MAY X REQ PHY/QHP: CPT | Mod: PBBFAC,PN

## 2023-12-19 PROCEDURE — 99999 PR PBB SHADOW E&M-EST. PATIENT-LVL I: CPT | Mod: PBBFAC,,,

## 2023-12-19 NOTE — PROGRESS NOTES
Suture Removal note:  CC: 68 y.o. female patient is here for suture removal.         HPI: Patient is s/p excision of BCC  from the R shoulder on 12/29/2023.  Patient reports no problems.    WOUND PE:  Sutures intact.  Wound healing well.  Good approximation of skin edges.  No signs or symptoms of infection.    IMPRESSION:  bcc, check margins, notch at   noon  - margins clear.    PLAN:  Sutures removed today.  Continue wound care.    RTC: PRN.

## 2024-01-09 ENCOUNTER — PROCEDURE VISIT (OUTPATIENT)
Dept: DERMATOLOGY | Facility: CLINIC | Age: 69
End: 2024-01-09
Payer: MEDICARE

## 2024-01-09 VITALS
WEIGHT: 241.88 LBS | HEIGHT: 67 IN | HEART RATE: 77 BPM | BODY MASS INDEX: 37.96 KG/M2 | DIASTOLIC BLOOD PRESSURE: 89 MMHG | SYSTOLIC BLOOD PRESSURE: 151 MMHG

## 2024-01-09 DIAGNOSIS — C44.519 BASAL CELL CARCINOMA, TRUNK: Primary | ICD-10-CM

## 2024-01-09 PROCEDURE — 13101 CMPLX RPR TRUNK 2.6-7.5 CM: CPT | Mod: 51,S$GLB,, | Performed by: DERMATOLOGY

## 2024-01-09 PROCEDURE — 99499 UNLISTED E&M SERVICE: CPT | Mod: S$GLB,,, | Performed by: DERMATOLOGY

## 2024-01-09 PROCEDURE — 17313 MOHS 1 STAGE T/A/L: CPT | Mod: S$GLB,,, | Performed by: DERMATOLOGY

## 2024-01-09 NOTE — PROGRESS NOTES
PROCEDURE: Mohs' Micrographic Surgery    INDICATION: Tumor with ill-defined borders. Tumor with aggressive histopathology. Aggressive histopathology including sclerosing, morpheaform/infiltrating, micronodular, superficial multicentric, poorly differentiated, basosquamous, or perineural invasion.    REFERRING PROVIDER: Brittany Yin M.D.    CASE NUMBER:     ANESTHETIC: 5 cc 0.5% Lidocaine with Epi 1:200,000 mixed 1:1 with 0.5% Bupivacaine    SURGICAL PREP: Hibiclens    SURGEON: Brigida Melo MD    ASSISTANTS: Theresa Ireland Surg Faye    PREOPERATIVE DIAGNOSIS: basal cell carcinoma- superficial, nodular, infiltrating    POSTOPERATIVE DIAGNOSIS: basal cell carcinoma    PATHOLOGIC DIAGNOSIS: basal cell carcinoma- superficial, nodular, infiltrating    HISTOLOGY OF SPECIMENS IN FIRST STAGE:   Tumor Type: No tumor seen. Inflammation.    STAGES OF MOHS' SURGERY PERFORMED: 1    TUMOR-FREE PLANE ACHIEVED: Yes    HEMOSTASIS: electrocoagulation     SPECIMENS: 2     LOCATION: right chest (superolateral). Location verified with Dr. Yin's clinical photograph. Patient also verified location with hand held mirror and by pointing to it herself.    INITIAL LESION SIZE: 0.8 x 1.0 cm    FINAL DEFECT SIZE: 1.5 x 2.0 cm    WOUND REPAIR/DISPOSITION: The patient tolerated Mohs' Micrographic Surgery for a basal cell carcinoma very well. When the tumor was completely removed, a repair of the surgical defect was undertaken.    PROCEDURE: Complex Linear Repair    INDICATION: Status post Mohs' Micrographic Surgery for basal cell carcinoma.    CASE NUMBER:     SURGEON: Brigida Melo MD    ASSISTANTS: Ronda Way PA-C and Theresa Ireland Surg Faye    ANESTHETIC: 3 cc 1% Lidocaine with Epinephrine 1:100,000    SURGICAL PREP: Hibiclens, prepped by An ROXANA Way    LOCATION: right chest  (superolateral)    DEFECT SIZE: 1.5 x 2.0 cm    WOUND REPAIR/DISPOSITION:  After the patient's carcinoma had been completely removed with Mohs'  "Micrographic Surgery, a repair of the surgical defect was undertaken. The patient was returned to the operating suite where the area of right chest was prepped, draped, and anesthetized in the usual sterile fashion. The wound was widely undermined in all directions. The wound was undermined to a distance at least the maximum width of the defect as measured perpendicular to the closure line along at least one entire edge of the defect, in this case 2 cm. Then, electrocoagulation was used to obtain meticulous hemostasis. 4-0 Vicryl buried vertical mattress sutures were placed into the subcutaneous and dermal plane to close the wound and yandel the cutaneous wound edge. Bilateral dog ears were identified and were removed by a standard Burow's triangle technique. The cutaneous wound edges were closed using running 5-0 fast absorbing gut suture.    The patient tolerated the procedure well.    The area was cleaned and dressed appropriately and the patient was given wound care instructions. Advised patient to contact our office with any questions or concerns regarding this area. Patient verbally stated understanding.    LENGTH OF REPAIR: 3.5 cm    Vitals:    01/09/24 1244 01/09/24 1432   BP: (!) 167/97 (!) 151/89   BP Location: Left arm Right arm   Patient Position: Sitting Sitting   BP Method: Medium (Automatic) Large (Automatic)   Pulse: 100 77   Weight: 109.7 kg (241 lb 13.5 oz)    Height: 5' 7" (1.702 m)          "

## 2024-01-11 NOTE — TELEPHONE ENCOUNTER
Care Due:                  Date            Visit Type   Department     Provider  --------------------------------------------------------------------------------                                [facility]                              FOLLOWUP/OF  MED/ INTERNAL  Fidel Balderrama  Last Visit: 08-      FICE VISIT   MED/ PEDS      [place]  Next Visit: None Scheduled  None         None Found                                                            Last  Test          Frequency    Reason                     Performed    Due Date  --------------------------------------------------------------------------------    HBA1C.......  6 months...  metFORMIN................  07- 01-    [facility] Embedded Care Due Messages. Reference number: [id number].   1/11/2024 2:12:56 PM CST

## 2024-01-12 ENCOUNTER — PATIENT MESSAGE (OUTPATIENT)
Dept: FAMILY MEDICINE | Facility: CLINIC | Age: 69
End: 2024-01-12
Payer: MEDICARE

## 2024-01-15 RX ORDER — CYCLOBENZAPRINE HCL 5 MG
5 TABLET ORAL 3 TIMES DAILY PRN
Qty: 90 TABLET | Refills: 12 | Status: SHIPPED | OUTPATIENT
Start: 2024-01-15 | End: 2024-01-19 | Stop reason: SDUPTHER

## 2024-01-17 ENCOUNTER — PATIENT MESSAGE (OUTPATIENT)
Dept: FAMILY MEDICINE | Facility: CLINIC | Age: 69
End: 2024-01-17
Payer: MEDICARE

## 2024-01-17 NOTE — TELEPHONE ENCOUNTER
No care due was identified.  [facility] Embedded Care Due Messages. Reference number: [id number].   1/17/2024 5:04:31 PM CST

## 2024-01-18 ENCOUNTER — PATIENT MESSAGE (OUTPATIENT)
Dept: FAMILY MEDICINE | Facility: CLINIC | Age: 69
End: 2024-01-18
Payer: MEDICARE

## 2024-01-19 RX ORDER — HYDROCHLOROTHIAZIDE 12.5 MG/1
12.5 TABLET ORAL DAILY
Qty: 90 TABLET | Refills: 3 | Status: SHIPPED | OUTPATIENT
Start: 2024-01-19

## 2024-01-19 RX ORDER — CYCLOBENZAPRINE HCL 10 MG
10 TABLET ORAL 3 TIMES DAILY PRN
Qty: 90 TABLET | Refills: 90 | Status: SHIPPED | OUTPATIENT
Start: 2024-01-19

## 2024-01-19 RX ORDER — OLMESARTAN MEDOXOMIL 20 MG/1
20 TABLET ORAL DAILY
Qty: 90 TABLET | Refills: 3 | Status: SHIPPED | OUTPATIENT
Start: 2024-01-19

## 2024-01-19 RX ORDER — MONTELUKAST SODIUM 10 MG/1
10 TABLET ORAL NIGHTLY
Qty: 90 TABLET | Refills: 3 | Status: SHIPPED | OUTPATIENT
Start: 2024-01-19

## 2024-01-19 NOTE — TELEPHONE ENCOUNTER
Please advise,    [facility] is out of 5mg cyclobenzaprine and their warehouse said they will possibly have it in the middle of Feb if not the end of Feb.  I cannot make it that long, my back is killing me and I'm having muscle spasms like crazy. [facility] 10[facility]. Is it possible to get a prescription for 10mg?

## 2024-02-22 DIAGNOSIS — R52 PAIN: ICD-10-CM

## 2024-02-22 NOTE — TELEPHONE ENCOUNTER
No care due was identified.  [facility] Embedded Care Due Messages. Reference number: [id number].   2/22/2024 11:00:06 AM CST

## 2024-02-24 RX ORDER — TRAMADOL HYDROCHLORIDE 50 MG/1
TABLET ORAL
Qty: 120 TABLET | Refills: 5 | Status: SHIPPED | OUTPATIENT
Start: 2024-02-24

## 2024-02-24 RX ORDER — ZOLPIDEM TARTRATE 5 MG/1
5 TABLET ORAL NIGHTLY PRN
Qty: 30 TABLET | Refills: 5 | Status: SHIPPED | OUTPATIENT
Start: 2024-02-24 | End: 2024-08-24

## 2024-02-27 DIAGNOSIS — Z00.00 ENCOUNTER FOR MEDICARE ANNUAL WELLNESS EXAM: ICD-10-CM

## 2024-02-28 ENCOUNTER — HOSPITAL ENCOUNTER (OUTPATIENT)
Dept: RADIOLOGY | Facility: HOSPITAL | Age: 69
Discharge: HOME | End: 2024-02-28
Attending: INTERNAL MEDICINE
Payer: MEDICARE

## 2024-02-28 DIAGNOSIS — Z12.31 ENCOUNTER FOR SCREENING MAMMOGRAM FOR BREAST CANCER: ICD-10-CM

## 2024-02-28 PROCEDURE — 77063 BREAST TOMOSYNTHESIS BI: CPT | Mod: 26,,, | Performed by: RADIOLOGY

## 2024-02-28 PROCEDURE — 77067 SCR MAMMO BI INCL CAD: CPT | Mod: TC,PO

## 2024-02-28 PROCEDURE — 77067 SCR MAMMO BI INCL CAD: CPT | Mod: 26,,, | Performed by: RADIOLOGY

## 2024-03-01 ENCOUNTER — PATIENT MESSAGE (OUTPATIENT)
Dept: ADMINISTRATIVE | Facility: HOSPITAL | Age: 69
End: 2024-03-01
Payer: MEDICARE

## 2024-03-13 ENCOUNTER — PATIENT OUTREACH (OUTPATIENT)
Dept: ADMINISTRATIVE | Facility: HOSPITAL | Age: 69
End: 2024-03-13
Payer: MEDICARE

## 2024-03-13 DIAGNOSIS — E11.9 TYPE 2 DIABETES MELLITUS WITHOUT COMPLICATION, WITHOUT LONG-TERM CURRENT USE OF INSULIN: Primary | ICD-10-CM

## 2024-03-13 DIAGNOSIS — M81.0 OSTEOPOROSIS, UNSPECIFIED OSTEOPOROSIS TYPE, UNSPECIFIED PATHOLOGICAL FRACTURE PRESENCE: ICD-10-CM

## 2024-03-18 ENCOUNTER — PATIENT MESSAGE (OUTPATIENT)
Dept: FAMILY MEDICINE | Facility: CLINIC | Age: 69
End: 2024-03-18
Payer: MEDICARE

## 2024-03-18 DIAGNOSIS — I10 PRIMARY HYPERTENSION: ICD-10-CM

## 2024-03-18 DIAGNOSIS — R79.89 ABNORMAL LIVER FUNCTION TESTS: ICD-10-CM

## 2024-03-18 DIAGNOSIS — E78.5 HYPERLIPIDEMIA, UNSPECIFIED HYPERLIPIDEMIA TYPE: ICD-10-CM

## 2024-03-18 DIAGNOSIS — E03.9 HYPOTHYROIDISM, UNSPECIFIED TYPE: ICD-10-CM

## 2024-03-18 DIAGNOSIS — Z00.00 ROUTINE GENERAL MEDICAL EXAMINATION AT A HEALTH CARE FACILITY: ICD-10-CM

## 2024-03-18 DIAGNOSIS — E55.9 VITAMIN D DEFICIENCY DISEASE: ICD-10-CM

## 2024-03-18 DIAGNOSIS — E11.65 UNCONTROLLED TYPE 2 DIABETES MELLITUS WITH HYPERGLYCEMIA: Primary | ICD-10-CM

## 2024-03-19 ENCOUNTER — HOSPITAL ENCOUNTER (OUTPATIENT)
Dept: RADIOLOGY | Facility: CLINIC | Age: 69
Discharge: HOME | End: 2024-03-19
Attending: INTERNAL MEDICINE
Payer: MEDICARE

## 2024-03-19 DIAGNOSIS — M81.0 OSTEOPOROSIS, UNSPECIFIED OSTEOPOROSIS TYPE, UNSPECIFIED PATHOLOGICAL FRACTURE PRESENCE: ICD-10-CM

## 2024-03-19 PROCEDURE — 77080 DXA BONE DENSITY AXIAL: CPT | Mod: TC,PO

## 2024-03-19 PROCEDURE — 77080 DXA BONE DENSITY AXIAL: CPT | Mod: 26,,, | Performed by: INTERNAL MEDICINE

## 2024-03-20 ENCOUNTER — OFFICE VISIT (OUTPATIENT)
Dept: DERMATOLOGY | Facility: CLINIC | Age: 69
End: 2024-03-20
Payer: MEDICARE

## 2024-03-20 VITALS — BODY MASS INDEX: 37.75 KG/M2 | WEIGHT: 241 LBS

## 2024-03-20 DIAGNOSIS — D48.5 NEOPLASM OF UNCERTAIN BEHAVIOR OF SKIN: Primary | ICD-10-CM

## 2024-03-20 DIAGNOSIS — L82.1 SEBORRHEIC KERATOSES: ICD-10-CM

## 2024-03-20 DIAGNOSIS — Z85.828 HISTORY OF SKIN CANCER: ICD-10-CM

## 2024-03-20 DIAGNOSIS — L57.0 MULTIPLE ACTINIC KERATOSES: ICD-10-CM

## 2024-03-20 DIAGNOSIS — L81.4 LENTIGINES: ICD-10-CM

## 2024-03-20 PROCEDURE — 1159F MED LIST DOCD IN RCRD: CPT | Mod: CPTII,S$GLB,, | Performed by: DERMATOLOGY

## 2024-03-20 PROCEDURE — 88305 TISSUE EXAM BY PATHOLOGIST: CPT | Performed by: PATHOLOGY

## 2024-03-20 PROCEDURE — 3288F FALL RISK ASSESSMENT DOCD: CPT | Mod: CPTII,S$GLB,, | Performed by: DERMATOLOGY

## 2024-03-20 PROCEDURE — 99999 PR PBB SHADOW E&M-EST. PATIENT-LVL IV: CPT | Mod: PBBFAC,,, | Performed by: DERMATOLOGY

## 2024-03-20 PROCEDURE — 1160F RVW MEDS BY RX/DR IN RCRD: CPT | Mod: CPTII,S$GLB,, | Performed by: DERMATOLOGY

## 2024-03-20 PROCEDURE — 3008F BODY MASS INDEX DOCD: CPT | Mod: CPTII,S$GLB,, | Performed by: DERMATOLOGY

## 2024-03-20 PROCEDURE — 4010F ACE/ARB THERAPY RXD/TAKEN: CPT | Mod: CPTII,S$GLB,, | Performed by: DERMATOLOGY

## 2024-03-20 PROCEDURE — 1101F PT FALLS ASSESS-DOCD LE1/YR: CPT | Mod: CPTII,S$GLB,, | Performed by: DERMATOLOGY

## 2024-03-20 PROCEDURE — 17000 DESTRUCT PREMALG LESION: CPT | Mod: XS,S$GLB,, | Performed by: DERMATOLOGY

## 2024-03-20 PROCEDURE — 99214 OFFICE O/P EST MOD 30 MIN: CPT | Mod: 25,S$GLB,, | Performed by: DERMATOLOGY

## 2024-03-20 PROCEDURE — 88305 TISSUE EXAM BY PATHOLOGIST: CPT | Mod: 26,,, | Performed by: PATHOLOGY

## 2024-03-20 PROCEDURE — 11301 SHAVE SKIN LESION 0.6-1.0 CM: CPT | Mod: S$GLB,,, | Performed by: DERMATOLOGY

## 2024-03-20 PROCEDURE — 1126F AMNT PAIN NOTED NONE PRSNT: CPT | Mod: CPTII,S$GLB,, | Performed by: DERMATOLOGY

## 2024-03-20 NOTE — PROGRESS NOTES
Subjective:      Patient ID:  Ttay Max is a 68 y.o. female who presents for   Chief Complaint   Patient presents with    Follow-up     She had the skin cancers removed from her chest and upper back.  She has been using the efudex some on her face, has peeled. New spot on left shoulder and bump on right arm.     Spot - Follow-up  Affected locations: face and left shoulder  Signs / symptoms: itching      Review of Systems   Constitutional:  Negative for fever, chills, weight loss, weight gain, fatigue, night sweats and malaise.   Skin:  Positive for daily sunscreen use and activity-related sunscreen use. Negative for wears hat.   Hematologic/Lymphatic: Bruises/bleeds easily.       Objective:   Physical Exam   Constitutional: She appears well-developed and well-nourished. No distress.   Neurological: She is alert and oriented to person, place, and time. She is not disoriented.   Psychiatric: She has a normal mood and affect.   Skin:   Areas Examined (abnormalities noted in diagram):   Head / Face Inspection Performed  Neck Inspection Performed  RUE Inspected  LUE Inspection Performed                 Diagram Legend     Erythematous scaling macule/papule c/w actinic keratosis       Vascular papule c/w angioma      Pigmented verrucoid papule/plaque c/w seborrheic keratosis      Yellow umbilicated papule c/w sebaceous hyperplasia      Irregularly shaped tan macule c/w lentigo     1-2 mm smooth white papules consistent with Milia      Movable subcutaneous cyst with punctum c/w epidermal inclusion cyst      Subcutaneous movable cyst c/w pilar cyst      Firm pink to brown papule c/w dermatofibroma      Pedunculated fleshy papule(s) c/w skin tag(s)      Evenly pigmented macule c/w junctional nevus     Mildly variegated pigmented, slightly irregular-bordered macule c/w mildly atypical nevus      Flesh colored to evenly pigmented papule c/w intradermal nevus       Pink pearly papule/plaque c/w basal cell carcinoma       "Erythematous hyperkeratotic cursted plaque c/w SCC      Surgical scar with no sign of skin cancer recurrence      Open and closed comedones      Inflammatory papules and pustules      Verrucoid papule consistent consistent with wart     Erythematous eczematous patches and plaques     Dystrophic onycholytic nail with subungual debris c/w onychomycosis     Umbilicated papule    Erythematous-base heme-crusted tan verrucoid plaque consistent with inflamed seborrheic keratosis     Erythematous Silvery Scaling Plaque c/w Psoriasis     See annotation      Assessment / Plan:      Pathology Orders:       Normal Orders This Visit    Specimen to Pathology, Dermatology     Questions:    Procedure Type: Dermatology and skin neoplasms    Number of Specimens: 1    ------------------------: -------------------------    Spec 1 Procedure: Biopsy    Spec 1 Clinical Impression: bcc vs sccis    Spec 1 Source: right forearm    Release to patient:           Neoplasm of uncertain behavior of skin  -     Specimen to Pathology, Dermatology  Shave removal procedure note:    Shave removal performed after verbal consent including risk of infection, scar, recurrence, need for additional treatment of site. Area prepped with alcohol, anesthetized 1% lidocaine with epinephrine. Lesional tissue shaved  followed by cautery and hyfrecation x 3. Lesion defect size 6mm No complications. Dressing applied. Wound care explained.        Multiple actinic keratoses  Will use efudex for the small ak s in eyebrows   Cryosurgery Procedure Note    Verbal consent from the patient is obtained and the patient is aware of the precancerous quality and need for treatment of these lesions. Liquid nitrogen cryosurgery is applied to the 1 actinic keratosis left shoulder, as detailed in the physical exam, to produce a freeze injury.      Lentigines  The "ABCD" rules to observe pigmented lesions were reviewed.      History of skin cancers  Area(s) of previous NMSC evaluated " with no signs of recurrence.      Recommend daily sun protection/avoidance and use of at least SPF 30, broad spectrum sunscreen (OTC drug).       Seborrheic keratoses  Seborrheic keratosis scattered, told benign no treatment needed.                Follow up in about 3 months (around 6/20/2024).

## 2024-03-22 ENCOUNTER — LAB VISIT (OUTPATIENT)
Dept: LAB | Facility: HOSPITAL | Age: 69
End: 2024-03-22
Attending: INTERNAL MEDICINE
Payer: MEDICARE

## 2024-03-22 DIAGNOSIS — E78.5 HYPERLIPIDEMIA, UNSPECIFIED HYPERLIPIDEMIA TYPE: ICD-10-CM

## 2024-03-22 DIAGNOSIS — E11.65 UNCONTROLLED TYPE 2 DIABETES MELLITUS WITH HYPERGLYCEMIA: ICD-10-CM

## 2024-03-22 DIAGNOSIS — Z00.00 ROUTINE GENERAL MEDICAL EXAMINATION AT A HEALTH CARE FACILITY: ICD-10-CM

## 2024-03-22 DIAGNOSIS — R79.89 ABNORMAL LIVER FUNCTION TESTS: ICD-10-CM

## 2024-03-22 DIAGNOSIS — E03.9 HYPOTHYROIDISM, UNSPECIFIED TYPE: ICD-10-CM

## 2024-03-22 DIAGNOSIS — I10 PRIMARY HYPERTENSION: ICD-10-CM

## 2024-03-22 DIAGNOSIS — E55.9 VITAMIN D DEFICIENCY DISEASE: ICD-10-CM

## 2024-03-22 DIAGNOSIS — E11.9 TYPE 2 DIABETES MELLITUS WITHOUT COMPLICATION, WITHOUT LONG-TERM CURRENT USE OF INSULIN: ICD-10-CM

## 2024-03-22 LAB
ALBUMIN SERPL BCP-MCNC: 3.7 G/DL (ref 3.5–5.2)
ALP SERPL-CCNC: 63 U/L (ref 55–135)
ALT SERPL W/O P-5'-P-CCNC: 15 U/L (ref 10–44)
ANION GAP SERPL CALC-SCNC: 11 MMOL/L (ref 8–16)
AST SERPL-CCNC: 20 U/L (ref 10–40)
BASOPHILS # BLD AUTO: 0.07 K/UL (ref 0–0.2)
BASOPHILS NFR BLD: 1.2 % (ref 0–1.9)
BILIRUB SERPL-MCNC: 0.3 MG/DL (ref 0.1–1)
BUN SERPL-MCNC: 21 MG/DL (ref 8–23)
CALCIUM SERPL-MCNC: 9.7 MG/DL (ref 8.7–10.5)
CHLORIDE SERPL-SCNC: 103 MMOL/L (ref 95–110)
CHOLEST SERPL-MCNC: 272 MG/DL (ref 120–199)
CHOLEST/HDLC SERPL: 4.5 {RATIO} (ref 2–5)
CO2 SERPL-SCNC: 23 MMOL/L (ref 23–29)
CREAT SERPL-MCNC: 0.7 MG/DL (ref 0.5–1.4)
DIFFERENTIAL METHOD BLD: NORMAL
EOSINOPHIL # BLD AUTO: 0.3 K/UL (ref 0–0.5)
EOSINOPHIL NFR BLD: 5 % (ref 0–8)
ERYTHROCYTE [DISTWIDTH] IN BLOOD BY AUTOMATED COUNT: 13.6 % (ref 11.5–14.5)
EST. GFR  (NO RACE VARIABLE): >60 ML/MIN/1.73 M^2
ESTIMATED AVG GLUCOSE: 154 MG/DL (ref 68–131)
ESTIMATED AVG GLUCOSE: 154 MG/DL (ref 68–131)
GLUCOSE SERPL-MCNC: 136 MG/DL (ref 70–110)
HBA1C MFR BLD: 7 % (ref 4–5.6)
HBA1C MFR BLD: 7 % (ref 4–5.6)
HCT VFR BLD AUTO: 39.9 % (ref 37–48.5)
HDLC SERPL-MCNC: 61 MG/DL (ref 40–75)
HDLC SERPL: 22.4 % (ref 20–50)
HGB BLD-MCNC: 12.8 G/DL (ref 12–16)
IMM GRANULOCYTES # BLD AUTO: 0.01 K/UL (ref 0–0.04)
IMM GRANULOCYTES NFR BLD AUTO: 0.2 % (ref 0–0.5)
LDLC SERPL CALC-MCNC: 151.2 MG/DL (ref 63–159)
LYMPHOCYTES # BLD AUTO: 2 K/UL (ref 1–4.8)
LYMPHOCYTES NFR BLD: 35.8 % (ref 18–48)
MCH RBC QN AUTO: 29.8 PG (ref 27–31)
MCHC RBC AUTO-ENTMCNC: 32.1 G/DL (ref 32–36)
MCV RBC AUTO: 93 FL (ref 82–98)
MONOCYTES # BLD AUTO: 0.5 K/UL (ref 0.3–1)
MONOCYTES NFR BLD: 8.6 % (ref 4–15)
NEUTROPHILS # BLD AUTO: 2.8 K/UL (ref 1.8–7.7)
NEUTROPHILS NFR BLD: 49.2 % (ref 38–73)
NONHDLC SERPL-MCNC: 211 MG/DL
NRBC BLD-RTO: 0 /100 WBC
PLATELET # BLD AUTO: 298 K/UL (ref 150–450)
PMV BLD AUTO: 10 FL (ref 9.2–12.9)
POTASSIUM SERPL-SCNC: 4.7 MMOL/L (ref 3.5–5.1)
PROT SERPL-MCNC: 7.2 G/DL (ref 6–8.4)
RBC # BLD AUTO: 4.3 M/UL (ref 4–5.4)
SODIUM SERPL-SCNC: 137 MMOL/L (ref 136–145)
T4 FREE SERPL-MCNC: 0.87 NG/DL (ref 0.71–1.51)
TRIGL SERPL-MCNC: 299 MG/DL (ref 30–150)
TSH SERPL DL<=0.005 MIU/L-ACNC: 0.93 UIU/ML (ref 0.4–4)
WBC # BLD AUTO: 5.61 K/UL (ref 3.9–12.7)

## 2024-03-22 PROCEDURE — 80053 COMPREHEN METABOLIC PANEL: CPT | Performed by: NURSE PRACTITIONER

## 2024-03-22 PROCEDURE — 85025 COMPLETE CBC W/AUTO DIFF WBC: CPT | Performed by: NURSE PRACTITIONER

## 2024-03-22 PROCEDURE — 83036 HEMOGLOBIN GLYCOSYLATED A1C: CPT | Performed by: INTERNAL MEDICINE

## 2024-03-22 PROCEDURE — 84439 ASSAY OF FREE THYROXINE: CPT | Performed by: NURSE PRACTITIONER

## 2024-03-22 PROCEDURE — 84443 ASSAY THYROID STIM HORMONE: CPT | Performed by: NURSE PRACTITIONER

## 2024-03-22 PROCEDURE — 36415 COLL VENOUS BLD VENIPUNCTURE: CPT | Mod: PO | Performed by: NURSE PRACTITIONER

## 2024-03-22 PROCEDURE — 80061 LIPID PANEL: CPT | Performed by: NURSE PRACTITIONER

## 2024-03-23 NOTE — PROGRESS NOTES
See POC   XR tech at bedside for chest XR. Writer assisting with repositioning patient. While laying patient flat and turning, patient de-saturated to 80% and started become more labored with his breathing. Opti-flow maxed out at this time (please see flowsheets). Patient recovered after roughly 10 mins to 89%.

## 2024-03-25 LAB
FINAL PATHOLOGIC DIAGNOSIS: NORMAL
GROSS: NORMAL
Lab: NORMAL
MICROSCOPIC EXAM: NORMAL

## 2024-03-26 ENCOUNTER — PATIENT MESSAGE (OUTPATIENT)
Dept: DERMATOLOGY | Facility: CLINIC | Age: 69
End: 2024-03-26
Payer: MEDICARE

## 2024-03-27 ENCOUNTER — PATIENT MESSAGE (OUTPATIENT)
Dept: DERMATOLOGY | Facility: CLINIC | Age: 69
End: 2024-03-27
Payer: MEDICARE

## 2024-04-11 ENCOUNTER — LAB VISIT (OUTPATIENT)
Dept: LAB | Facility: HOSPITAL | Age: 69
End: 2024-04-11
Attending: INTERNAL MEDICINE
Payer: MEDICARE

## 2024-04-11 ENCOUNTER — OFFICE VISIT (OUTPATIENT)
Dept: FAMILY MEDICINE | Facility: CLINIC | Age: 69
End: 2024-04-11
Payer: MEDICARE

## 2024-04-11 VITALS
OXYGEN SATURATION: 96 % | HEART RATE: 94 BPM | HEIGHT: 67 IN | SYSTOLIC BLOOD PRESSURE: 130 MMHG | BODY MASS INDEX: 38.89 KG/M2 | DIASTOLIC BLOOD PRESSURE: 68 MMHG | WEIGHT: 247.81 LBS | TEMPERATURE: 98 F

## 2024-04-11 DIAGNOSIS — R79.89 ABNORMAL LIVER FUNCTION TESTS: ICD-10-CM

## 2024-04-11 DIAGNOSIS — E11.9 TYPE 2 DIABETES MELLITUS WITHOUT COMPLICATION, WITHOUT LONG-TERM CURRENT USE OF INSULIN: ICD-10-CM

## 2024-04-11 DIAGNOSIS — E03.9 HYPOTHYROIDISM, UNSPECIFIED TYPE: ICD-10-CM

## 2024-04-11 DIAGNOSIS — E66.01 MORBID OBESITY: ICD-10-CM

## 2024-04-11 DIAGNOSIS — M79.10 MYALGIA DUE TO STATIN: ICD-10-CM

## 2024-04-11 DIAGNOSIS — T46.6X5A MYALGIA DUE TO STATIN: ICD-10-CM

## 2024-04-11 DIAGNOSIS — E11.65 UNCONTROLLED TYPE 2 DIABETES MELLITUS WITH HYPERGLYCEMIA: Primary | ICD-10-CM

## 2024-04-11 DIAGNOSIS — F39 MOOD DISORDER: ICD-10-CM

## 2024-04-11 DIAGNOSIS — T45.1X5A NEUROPATHY DUE TO CHEMOTHERAPEUTIC DRUG: ICD-10-CM

## 2024-04-11 DIAGNOSIS — E55.9 VITAMIN D DEFICIENCY DISEASE: ICD-10-CM

## 2024-04-11 DIAGNOSIS — G47.33 OSA ON CPAP: ICD-10-CM

## 2024-04-11 DIAGNOSIS — F11.20 MODERATE TRAMADOL DEPENDENCE: ICD-10-CM

## 2024-04-11 DIAGNOSIS — E78.5 HYPERLIPIDEMIA, UNSPECIFIED HYPERLIPIDEMIA TYPE: ICD-10-CM

## 2024-04-11 DIAGNOSIS — M47.816 LUMBAR SPONDYLOSIS: ICD-10-CM

## 2024-04-11 DIAGNOSIS — G62.0 NEUROPATHY DUE TO CHEMOTHERAPEUTIC DRUG: ICD-10-CM

## 2024-04-11 DIAGNOSIS — Z85.038 HISTORY OF COLON CANCER: ICD-10-CM

## 2024-04-11 LAB
ALBUMIN/CREAT UR: 17.1 UG/MG (ref 0–30)
CREAT UR-MCNC: 41 MG/DL (ref 15–325)
MICROALBUMIN UR DL<=1MG/L-MCNC: 7 UG/ML

## 2024-04-11 PROCEDURE — 99999 PR PBB SHADOW E&M-EST. PATIENT-LVL IV: CPT | Mod: PBBFAC,,, | Performed by: INTERNAL MEDICINE

## 2024-04-11 PROCEDURE — 82043 UR ALBUMIN QUANTITATIVE: CPT | Performed by: INTERNAL MEDICINE

## 2024-04-11 PROCEDURE — 3008F BODY MASS INDEX DOCD: CPT | Mod: CPTII,S$GLB,, | Performed by: INTERNAL MEDICINE

## 2024-04-11 PROCEDURE — 3051F HG A1C>EQUAL 7.0%<8.0%: CPT | Mod: CPTII,S$GLB,, | Performed by: INTERNAL MEDICINE

## 2024-04-11 PROCEDURE — 99214 OFFICE O/P EST MOD 30 MIN: CPT | Mod: S$GLB,,, | Performed by: INTERNAL MEDICINE

## 2024-04-11 PROCEDURE — 4010F ACE/ARB THERAPY RXD/TAKEN: CPT | Mod: CPTII,S$GLB,, | Performed by: INTERNAL MEDICINE

## 2024-04-11 PROCEDURE — 3078F DIAST BP <80 MM HG: CPT | Mod: CPTII,S$GLB,, | Performed by: INTERNAL MEDICINE

## 2024-04-11 PROCEDURE — 1159F MED LIST DOCD IN RCRD: CPT | Mod: CPTII,S$GLB,, | Performed by: INTERNAL MEDICINE

## 2024-04-11 PROCEDURE — 1125F AMNT PAIN NOTED PAIN PRSNT: CPT | Mod: CPTII,S$GLB,, | Performed by: INTERNAL MEDICINE

## 2024-04-11 PROCEDURE — 3075F SYST BP GE 130 - 139MM HG: CPT | Mod: CPTII,S$GLB,, | Performed by: INTERNAL MEDICINE

## 2024-04-11 NOTE — TELEPHONE ENCOUNTER
Left a voicemail message to inform the Patient, Rx refill has been approved and sent to preferred pharmacy.   Pt ambulatory at time of discharge

## 2024-04-11 NOTE — PROGRESS NOTES
Chief complaint Followup on blood pressure,, diabetes, cholesterol     Patient's a 68 year-old white female poorly compliant with follow-up and general recommendations.  Here to follow-up on lab work. Diabetes uncontrolled with A1c of 7.8, 6.9, 7.2 , 6.8, 6.7, 7.0.  She was only on metformin.  We discussed injections, Amaryl and Januvia.  We gave her Januvia 50 mg as above but was fearful of hurting her pancreas and I reassured her about that but she is still never took it..  We discussed potential for hypoglycemia which would be less with Januvia.  She has not been able to exercise as much lately.  She had an injury and a left wrist surgery.  She has gained a lot of weight.  Currently weight is stable which is good.  Her A1c went up to 7.8 then 6.9, 7.2 and she was working at home eating a lot of mm but for the past year she has improved her diet significantly eating much better and her sugars are running normal so hopefully she will have improvement.  We discussed her elevated ALT due to diabetes in the weight and we can expected to still be elevated.  She feels she cannot lose weight having gone to the gym twice a day when working in the past with not able to lose any weight.  She also has some hernia issue so does not planned on going back to the gym.      She usually is here to get her tramadol refilled but apparently did not specifically need that done today.   Typically takes three per day.  I will send some with refills to the pharmacy.  She is scheduled to get some facet injections so she is pursuing pain management to try make her overall pain go away.  She occasionally takes the Valium for spasm but says she does not need a refill of that today.      We again discussed her high cholesterol but she had memory issues with statins and possibly Welchol.  She absolutely refuses Zetia and we discussed there is an injection as well and she will research but very resistant.  I reassured her that Zetia is  completely different from the statins and she again will consider but was not open to starting it.  We did discuss her for symptoms and need for treatment of a high cholesterol could well be a heart attack or stroke.  She is well aware of this along with her other risk factors.  She has had a lot of medications give her side effects.  She took Januvia and had a lot of left lower back pain very consistent with lumbar strain and the symptoms persisted even stopping Januvia and she had to get some massage which helps and discussed it is probably a pulled muscle but she is insistent to believe it is Januvia and she read that could do that somewhere but reassured her it is not a common side effect from Januvia.        She does occasionally take a Flexeril and is having more aches and pains and may want to try taking a low-dose Flexeril during the day so I provided her with a refills.    LDL 190s -refuses statin, etc.  Her LDL did improve this time to the 150s range        She does occasionally take her tramadol for arthralgias.  Valium 5mg prn now retired and issues with  at home    ROS:   CONST:  no other new myalgias arthralgias and no new neurological deficits, no skin rashes     PAST MEDICAL HISTORY:                                                        1.  Allergic rhinitis.                                                       2.  Obstructive sleep apnea status post UPPP and treatment with CPAP.        3.  Chronic sinusitis with sinus surgery x2, Dr. Tucker and Dr. Alston.       4.  Hypertension.                                                            5.  Obesity.                                                                 6.  Colon cancer, status post right hemicolectomy and chemotherapy.          7.  History of iron deficiency anemia.                                       8.  Left superficial vein thrombosis of the arm.                             9.  Insomnia.                                                                 10.  Basal cell carcinoma.                                                   11.  Memory issues when taking WelChol.                                      12.  Hyperlipidemia with LDL rise on WelChol. Tried 3 statins -various effects- memory issue made her lose a job                               13.  Hypercalcemia secondary to HCTZ.                                        14.  Lumbar radiculopathy with history of epidurals -Dr Saavedra - now Joshua                      15.  Chronic cough due to allergies, per pulmonary workup, 2010.           16.  Osteopenia by bone density in .                                     17.  Elevated ALT.                                                           18.  Tubes tied.                                                             19.  Hernia repair.                                                          20.  Lumpectomy, of the breast I presume.   21.  COLONOSCOPY normal 6/15,== 5 years  22. Left ankle surgery   23. METABOLIC SYNDROME/DM -A1c  was 6.9  24. Vit D def-    25.  Hypothyroid?,  Placed on [facility] Thyroid by Dr. Montgomery??    26.  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  Quit smoking in .  Drinks once a week.                                                                               FAMILY HISTORY:  Father  at 53 in a car accident.  Mom in good health.   Brother 46, a sister is 52.  She states that all members of her family have  Hypertension.                                                                  Vitals as above, exam otherwise deferred          Diagnoses and all orders for this visit:    Uncontrolled type 2 diabetes mellitus with hyperglycemia, slight  rise in A1c, she needs to do better with her diet admittedly.  She is reluctant to take any further medications which would definitely be beneficial.  Rather than follow-up every six-month as she would like I would recommend following up every three months.    Hyperlipidemia, unspecified hyperlipidemia type, declines any medication but fortunately LDL improved somewhat.  She has been making her own herbal pills of turmeric and cayenne but discussed neither are clinically proven to directly address cholesterol    Hypothyroidism, unspecified type, euthyroid by labs    Abnormal liver function tests, back to normal    Vitamin D deficiency disease, improved as of last check 2021, continues on supplement    Mood disorder, chronic and stable    Morbid obesity, working on weight loss    Myalgia due to statin    Lumbar spondylosis, chronic and stable    Neuropathy due to chemotherapeutic drug    Moderate tramadol dependence    History of colon cancer, up-to-date on scope    JEREMY on CPAP

## 2024-08-27 ENCOUNTER — PATIENT MESSAGE (OUTPATIENT)
Dept: FAMILY MEDICINE | Facility: CLINIC | Age: 69
End: 2024-08-27
Payer: MEDICARE

## 2024-08-27 DIAGNOSIS — E03.9 HYPOTHYROIDISM, UNSPECIFIED TYPE: ICD-10-CM

## 2024-08-27 DIAGNOSIS — E78.5 HYPERLIPIDEMIA, UNSPECIFIED HYPERLIPIDEMIA TYPE: ICD-10-CM

## 2024-08-27 DIAGNOSIS — E55.9 VITAMIN D DEFICIENCY DISEASE: ICD-10-CM

## 2024-08-27 DIAGNOSIS — I10 PRIMARY HYPERTENSION: Primary | ICD-10-CM

## 2024-08-27 DIAGNOSIS — E11.65 UNCONTROLLED TYPE 2 DIABETES MELLITUS WITH HYPERGLYCEMIA: ICD-10-CM

## 2024-08-30 ENCOUNTER — LAB VISIT (OUTPATIENT)
Dept: LAB | Facility: HOSPITAL | Age: 69
End: 2024-08-30
Attending: INTERNAL MEDICINE
Payer: MEDICARE

## 2024-08-30 DIAGNOSIS — E11.65 UNCONTROLLED TYPE 2 DIABETES MELLITUS WITH HYPERGLYCEMIA: ICD-10-CM

## 2024-08-30 DIAGNOSIS — E78.5 HYPERLIPIDEMIA, UNSPECIFIED HYPERLIPIDEMIA TYPE: ICD-10-CM

## 2024-08-30 LAB
ESTIMATED AVG GLUCOSE: 137 MG/DL (ref 68–131)
HBA1C MFR BLD: 6.4 % (ref 4–5.6)

## 2024-08-30 PROCEDURE — 36415 COLL VENOUS BLD VENIPUNCTURE: CPT | Mod: PO | Performed by: INTERNAL MEDICINE

## 2024-08-30 PROCEDURE — 83036 HEMOGLOBIN GLYCOSYLATED A1C: CPT | Performed by: INTERNAL MEDICINE

## 2024-09-03 DIAGNOSIS — R52 PAIN: ICD-10-CM

## 2024-09-03 NOTE — TELEPHONE ENCOUNTER
No care due was identified.  [facility] Embedded Care Due Messages. Reference number: [id number].   9/03/2024 11:45:31 AM CDT

## 2024-09-05 RX ORDER — ZOLPIDEM TARTRATE 5 MG/1
5 TABLET ORAL NIGHTLY PRN
Qty: 30 TABLET | Refills: 5 | Status: SHIPPED | OUTPATIENT
Start: 2024-09-05 | End: 2025-03-06

## 2024-09-05 RX ORDER — TRAMADOL HYDROCHLORIDE 50 MG/1
TABLET ORAL
Qty: 120 TABLET | Refills: 5 | Status: SHIPPED | OUTPATIENT
Start: 2024-09-05

## 2024-09-26 ENCOUNTER — PATIENT OUTREACH (OUTPATIENT)
Dept: ADMINISTRATIVE | Facility: HOSPITAL | Age: 69
End: 2024-09-26
Payer: MEDICARE

## 2024-09-28 NOTE — TELEPHONE ENCOUNTER
No care due was identified.  [facility] Embedded Care Due Messages. Reference number: [id number].   9/28/2024 4:13:59 PM CDT

## 2024-10-01 RX ORDER — MELOXICAM 7.5 MG/1
TABLET ORAL
Qty: 180 TABLET | Refills: 12 | Status: SHIPPED | OUTPATIENT
Start: 2024-10-01

## 2024-10-07 NOTE — TELEPHONE ENCOUNTER
"Discharge Summary - Hospitalist   Name: Adelina Dyer 86 y.o. female I MRN: [id number]  Unit/Bed#: [address] [place] [address] Date of Admission: 10/4/2024   Date of Service: 10/7/2024 I Hospital Day: 1     Assessment & Plan  Dizziness  Positional in presentation/description  Orthostatics negative - CT of head negative for pathologic etiology, however, read as \"acute pansinusitis\" (see plan below)  History of CAD, hypertension and diabetes noted -> not unreasonable to pursue MRI to rule out possibility of \"stroke\" -> MRI fortunately negative for acute stroke/infarct  Limit/avoid sedating agents as possible  Symptomatically improved on a trial of Meclizine - continue working with PT/OT and training for Epley maneuvers, etc.   PT/OT recommended outpatient physical therapy  Chronic kidney disease, stage 3 (HCC)  Baseline creatinine of approximately 1.0-1.1 -> stable at 1.08   Monitor renal function and urine output  Limit/avoid hypotension and nephrotoxins as possible - note chronic ARB (Cozaar) use   Abnormal CT scan  CT imaging with radiologist read of \"acute pansinusitis\"  Patient currently without sinus symptoms - remains afebrile without leukocytosis  Given a dose of IV Unasyn in the ED -> observe off further antibiotics for now, as even if true infection, more likely viral in nature  Procalcitonin normal   Hypothyroidism  Continue Synthroid  Thyroid panel noting a decreased TSH and elevated free T4 -> in light of presenting symptoms, decrease Synthroid to 125 mcg and repeat thyroid panel in 6-8 weeks -> outpatient follow-up  Type 2 diabetes mellitus (HCC)  Lab Results   Component Value Date    HGBA1C 6.2 (H) 10/04/2024     Holding oral hypoglycemics while hospitalized  Continue SSI coverage per Accu-Cheks  Carbohydrate restriction  Hypoglycemia protocol  Essential hypertension  Continue HCTZ/Toprol-XL/Cozaar  CAD (coronary artery disease)  Status post prior angioplasty  Continue Toprol-XL/Cozaar/statin     Medical " ----- Message from Stephanie Way sent at 11/29/2018  9:07 AM CST -----  Contact: Patient's  Norberto  Needs Advice    Reason for call: Caller states that patient got up to use the restroom, and when she was finished either a stitch ruptured or something of that nature and it started leaking, and blood was everywhere so they are heading to the ER at [facility] right now.         Communication Preference: [phone]     "Problems       Resolved Problems  Date Reviewed: 10/7/2024   None       Discharging Physician / Practitioner: PANCHO Harding  PCP: Destinee Wong MD  Admission Date:   Admission Orders (From admission, onward)       Ordered        10/06/24 1237  INPATIENT ADMISSION  Once            10/04/24 1524  Place in Observation  Once                          Discharge Date: 10/07/24    Consultations During Hospital Stay:  None    Procedures Performed:   Chest x-ray: No radiographic evidence of acute intrathoracic process.  CT head without contrast: Acute pansinusitis  MRI brain: No acute infarction.  Mild chronic microangiopathic changes.  Sinus mucosal disease    Significant Findings / Test Results:   As noted above    Reason for Admission: Dizziness    Hospital Course:   Adelina Dyer is a 86 y.o. female patient who originally presented to the hospital on 10/4/2024 due to above.  Imagings with findings as noted above.  Patient was given a trial of meclizine and symptomatically improved.  PT/OT eval and treatment and training for Epley maneuvers.  Lab work revealed decreased TSH and elevated free T4.  Synthroid was decreased from 137 mcg to 125 mcg with recommendation to repeat thyroid panel in 6 to 8 weeks and outpatient follow-up.  Patient is being discharged home today to follow-up with outpatient PT for further management of symptoms.        Condition at Discharge: stable    Discharge Day Visit / Exam:     Subjective: Seen and examined with daughter at bedside.  Patient reported improvement in symptoms just with mild dizziness when getting from sitting to standing position.  Denies any acute event and offers no complaints.      Vitals: Blood Pressure: 123/63 (10/07/24 0743)  Pulse: (!) 52 (10/07/24 0743)  Temperature: (!) 97.2 °F (36.2 °C) (10/07/24 0743)  Respirations: 18 (10/07/24 0743)  Height: 5' 1.5\" (156.2 cm) (10/04/24 1732)  Weight - Scale: 78 kg (172 lb) (10/04/24 1732)  SpO2: 93 % (10/07/24 " 0743)    Physical Exam  Vitals and nursing note reviewed.   Constitutional:       General: She is not in acute distress.     Appearance: She is well-developed.   HENT:      Head: Normocephalic and atraumatic.   Eyes:      Conjunctiva/sclera: Conjunctivae normal.   Cardiovascular:      Rate and Rhythm: Normal rate and regular rhythm.      Heart sounds: No murmur heard.  Pulmonary:      Effort: Pulmonary effort is normal. No respiratory distress.      Breath sounds: Normal breath sounds.   Abdominal:      Palpations: Abdomen is soft.      Tenderness: There is no abdominal tenderness.   Musculoskeletal:         General: No swelling.      Cervical back: Neck supple.   Skin:     General: Skin is warm and dry.      Capillary Refill: Capillary refill takes less than 2 seconds.   Neurological:      Mental Status: She is alert.   Psychiatric:         Mood and Affect: Mood normal.          Discussion with Family: Updated  (daughter) at bedside.    Discharge instructions/Information to patient and family:   See after visit summary for information provided to patient and family.      Provisions for Follow-Up Care:  See after visit summary for information related to follow-up care and any pertinent home health orders.      Mobility at time of Discharge:   Basic Mobility Inpatient Raw Score: 19  JH-HLM Goal: 6: Walk 10 steps or more  JH-HLM Achieved: 7: Walk 25 feet or more       Disposition:   Home    Planned Readmission: No    Discharge Medications:  See after visit summary for reconciled discharge medications provided to patient and/or family.      Administrative Statements   Discharge Statement:  I have spent a total time of  minutes in caring for this patient on the day of the visit/encounter. .    **Please Note: This note may have been constructed using a voice recognition system**

## 2025-01-20 NOTE — TELEPHONE ENCOUNTER
Care Due:                  Date            Visit Type   Department     Provider  --------------------------------------------------------------------------------                                [facility]                              PRIMARY      MED/ INTERNAL  Fidel Balderrama  Last Visit: 04-      CARE ([facility])   MED/ PEDS      Ehrensing  Next Visit: None Scheduled  None         None Found                                                            Last  Test          Frequency    Reason                     Performed    Due Date  --------------------------------------------------------------------------------    Office Visit  12 months..  meloxicam................  04- 04-    CBC.........  12 months..  meloxicam................  03- 03-    CMP.........  12 months..  hydroCHLOROthiazide,       03- 03-                             meloxicam, metFORMIN,                             olmesartan...............    HBA1C.......  6 months...  metFORMIN................  08- 02-    [facility] Embedded Care Due Messages. Reference number: [id number].   1/19/2025 10:32:02 PM CST

## 2025-01-21 RX ORDER — MONTELUKAST SODIUM 10 MG/1
10 TABLET ORAL NIGHTLY
Qty: 90 TABLET | Refills: 0 | Status: SHIPPED | OUTPATIENT
Start: 2025-01-21

## 2025-01-21 RX ORDER — OLMESARTAN MEDOXOMIL 20 MG/1
20 TABLET ORAL DAILY
Qty: 90 TABLET | Refills: 0 | Status: SHIPPED | OUTPATIENT
Start: 2025-01-21

## 2025-01-21 RX ORDER — HYDROCHLOROTHIAZIDE 12.5 MG/1
12.5 TABLET ORAL DAILY
Qty: 90 TABLET | Refills: 0 | Status: SHIPPED | OUTPATIENT
Start: 2025-01-21

## 2025-01-21 NOTE — TELEPHONE ENCOUNTER
Provider Staff:  Action required for this patient    Requires appointment   Requires labs      Please see care gap opportunities below in Care Due Message.    Thanks!  [facility]     Appointments      Date Provider   Last Visit   4/11/2024 Fidel Dotson MD   Next Visit   Visit date not found Fidel Dotson MD     Refill Decision Note   Taty Max  is requesting a refill authorization.  Brief Assessment and Rationale for Refill:  Approve     Medication Therapy Plan:         Comments:     Note composed:11:19 AM 01/21/2025

## 2025-03-02 ENCOUNTER — TELEPHONE (OUTPATIENT)
Dept: FAMILY MEDICINE | Facility: CLINIC | Age: 70
End: 2025-03-02
Payer: MEDICARE

## 2025-03-02 DIAGNOSIS — E11.65 UNCONTROLLED TYPE 2 DIABETES MELLITUS WITH HYPERGLYCEMIA: Primary | ICD-10-CM

## 2025-03-02 NOTE — TELEPHONE ENCOUNTER
----- Message from Roxy sent at 3/1/2025 11:12 AM CST -----  Regarding: Lab Orders  Ms Max came in on Saturday to do her labs but no active orders were found.  Please enter the orders and let her know when she can come back.Thanks

## 2025-03-02 NOTE — TELEPHONE ENCOUNTER
Let pt know she needs to call for labs first so Dr BRIGGS can review what needed and put orders in      Full fasting labs this time and urine

## 2025-03-12 ENCOUNTER — PATIENT MESSAGE (OUTPATIENT)
Dept: FAMILY MEDICINE | Facility: CLINIC | Age: 70
End: 2025-03-12
Payer: MEDICARE

## 2025-03-17 ENCOUNTER — LAB VISIT (OUTPATIENT)
Dept: LAB | Facility: HOSPITAL | Age: 70
End: 2025-03-17
Attending: INTERNAL MEDICINE
Payer: MEDICARE

## 2025-03-17 ENCOUNTER — RESULTS FOLLOW-UP (OUTPATIENT)
Dept: FAMILY MEDICINE | Facility: CLINIC | Age: 70
End: 2025-03-17

## 2025-03-17 ENCOUNTER — PATIENT OUTREACH (OUTPATIENT)
Dept: ADMINISTRATIVE | Facility: HOSPITAL | Age: 70
End: 2025-03-17
Payer: MEDICARE

## 2025-03-17 DIAGNOSIS — E11.65 UNCONTROLLED TYPE 2 DIABETES MELLITUS WITH HYPERGLYCEMIA: ICD-10-CM

## 2025-03-17 LAB
ALBUMIN/CREAT UR: 29.6 UG/MG (ref 0–30)
CREAT UR-MCNC: 27 MG/DL (ref 15–325)
MICROALBUMIN UR DL<=1MG/L-MCNC: 8 UG/ML

## 2025-03-17 PROCEDURE — 82570 ASSAY OF URINE CREATININE: CPT | Performed by: INTERNAL MEDICINE

## 2025-04-25 ENCOUNTER — OFFICE VISIT (OUTPATIENT)
Dept: FAMILY MEDICINE | Facility: CLINIC | Age: 70
End: 2025-04-25
Payer: MEDICARE

## 2025-04-25 VITALS
DIASTOLIC BLOOD PRESSURE: 82 MMHG | SYSTOLIC BLOOD PRESSURE: 148 MMHG | WEIGHT: 227.31 LBS | OXYGEN SATURATION: 95 % | TEMPERATURE: 98 F | HEIGHT: 67 IN | BODY MASS INDEX: 35.68 KG/M2 | HEART RATE: 90 BPM

## 2025-04-25 DIAGNOSIS — E78.5 HYPERLIPIDEMIA, UNSPECIFIED HYPERLIPIDEMIA TYPE: ICD-10-CM

## 2025-04-25 DIAGNOSIS — E55.9 VITAMIN D DEFICIENCY DISEASE: ICD-10-CM

## 2025-04-25 DIAGNOSIS — T46.6X5A MYALGIA DUE TO STATIN: ICD-10-CM

## 2025-04-25 DIAGNOSIS — M47.816 LUMBAR SPONDYLOSIS: ICD-10-CM

## 2025-04-25 DIAGNOSIS — R79.89 ABNORMAL LIVER FUNCTION TESTS: ICD-10-CM

## 2025-04-25 DIAGNOSIS — F39 MOOD DISORDER: ICD-10-CM

## 2025-04-25 DIAGNOSIS — E66.01 MORBID OBESITY: ICD-10-CM

## 2025-04-25 DIAGNOSIS — G62.0 NEUROPATHY DUE TO CHEMOTHERAPEUTIC DRUG: ICD-10-CM

## 2025-04-25 DIAGNOSIS — E11.65 UNCONTROLLED TYPE 2 DIABETES MELLITUS WITH HYPERGLYCEMIA: ICD-10-CM

## 2025-04-25 DIAGNOSIS — R52 PAIN: ICD-10-CM

## 2025-04-25 DIAGNOSIS — E03.9 HYPOTHYROIDISM, UNSPECIFIED TYPE: ICD-10-CM

## 2025-04-25 DIAGNOSIS — T45.1X5A NEUROPATHY DUE TO CHEMOTHERAPEUTIC DRUG: ICD-10-CM

## 2025-04-25 DIAGNOSIS — F11.20 MODERATE TRAMADOL DEPENDENCE: ICD-10-CM

## 2025-04-25 DIAGNOSIS — Z00.00 ROUTINE MEDICAL EXAM: Primary | ICD-10-CM

## 2025-04-25 DIAGNOSIS — G47.33 OSA ON CPAP: ICD-10-CM

## 2025-04-25 DIAGNOSIS — G47.00 INSOMNIA, UNSPECIFIED TYPE: ICD-10-CM

## 2025-04-25 DIAGNOSIS — F41.9 ANXIETY DISORDER, UNSPECIFIED TYPE: ICD-10-CM

## 2025-04-25 DIAGNOSIS — I10 PRIMARY HYPERTENSION: ICD-10-CM

## 2025-04-25 DIAGNOSIS — M79.10 MYALGIA DUE TO STATIN: ICD-10-CM

## 2025-04-25 DIAGNOSIS — Z12.31 ENCOUNTER FOR SCREENING MAMMOGRAM FOR BREAST CANCER: ICD-10-CM

## 2025-04-25 DIAGNOSIS — Z85.038 HISTORY OF COLON CANCER: ICD-10-CM

## 2025-04-25 DIAGNOSIS — M85.80 OSTEOPENIA, UNSPECIFIED LOCATION: ICD-10-CM

## 2025-04-25 PROCEDURE — 99999 PR PBB SHADOW E&M-EST. PATIENT-LVL IV: CPT | Mod: PBBFAC,,, | Performed by: INTERNAL MEDICINE

## 2025-04-25 RX ORDER — AZELASTINE 1 MG/ML
SPRAY, METERED NASAL
Qty: 30 ML | Refills: 12 | Status: SHIPPED | OUTPATIENT
Start: 2025-04-25

## 2025-04-25 RX ORDER — HYDROCHLOROTHIAZIDE 12.5 MG/1
12.5 TABLET ORAL DAILY
Qty: 90 TABLET | Refills: 3 | Status: SHIPPED | OUTPATIENT
Start: 2025-04-25

## 2025-04-25 RX ORDER — CYCLOBENZAPRINE HCL 10 MG
10 TABLET ORAL 3 TIMES DAILY PRN
Qty: 90 TABLET | Refills: 3 | Status: SHIPPED | OUTPATIENT
Start: 2025-04-25

## 2025-04-25 RX ORDER — METFORMIN HYDROCHLORIDE 750 MG/1
1500 TABLET, EXTENDED RELEASE ORAL EVERY MORNING
Qty: 180 TABLET | Refills: 3 | Status: SHIPPED | OUTPATIENT
Start: 2025-04-25

## 2025-04-25 RX ORDER — MELOXICAM 7.5 MG/1
TABLET ORAL
Qty: 180 TABLET | Refills: 3 | Status: SHIPPED | OUTPATIENT
Start: 2025-04-25

## 2025-04-25 RX ORDER — TRAMADOL HYDROCHLORIDE 50 MG/1
TABLET ORAL
Qty: 120 TABLET | Refills: 5 | Status: SHIPPED | OUTPATIENT
Start: 2025-04-25

## 2025-04-25 RX ORDER — OLMESARTAN MEDOXOMIL 20 MG/1
20 TABLET ORAL DAILY
Qty: 90 TABLET | Refills: 3 | Status: SHIPPED | OUTPATIENT
Start: 2025-04-25

## 2025-04-25 RX ORDER — ZOLPIDEM TARTRATE 5 MG/1
5 TABLET ORAL NIGHTLY PRN
Qty: 30 TABLET | Refills: 5 | Status: SHIPPED | OUTPATIENT
Start: 2025-04-25 | End: 2025-10-24

## 2025-04-25 RX ORDER — ALBUTEROL SULFATE 90 UG/1
1-2 INHALANT RESPIRATORY (INHALATION) EVERY 6 HOURS PRN
Qty: 90 G | Refills: 12 | Status: SHIPPED | OUTPATIENT
Start: 2025-04-25 | End: 2026-04-25

## 2025-04-25 RX ORDER — MONTELUKAST SODIUM 10 MG/1
10 TABLET ORAL NIGHTLY
Qty: 90 TABLET | Refills: 3 | Status: SHIPPED | OUTPATIENT
Start: 2025-04-25

## 2025-04-25 NOTE — PROGRESS NOTES
Chief complaint physical, last seen 4/24          Patient's a 69 year-old white female poorly compliant with follow-up and general recommendations. COLONOSCOPY normal 6/15,6/20== 5 years but report says 3 yrs  ????    Mammo 2/24        ROS:   CONST: weight stable. EYES: no vision change. ENT: no sore throat. CV: no chest pain w/ exertion. RESP: no shortness of breath. GI: no nausea, vomiting, diarrhea. No dysphagia. : no urinary issues. MUSCULOSKELETAL: no new myalgias or arthralgias. SKIN: no new changes. NEURO: no focal deficits. PSYCH: no new issues. ENDOCRINE: no polyuria. HEME: no lymph nodes. ALLERGY: no general pruritis.ashes     PAST MEDICAL HISTORY:                                                        1.  Allergic rhinitis.                                                       2.  Obstructive sleep apnea status post UPPP and treatment with CPAP.        3.  Chronic sinusitis with sinus surgery x2, Dr. Tucker and Dr. Alston.       4.  Hypertension.                                                            5.  Obesity.                                                                 6.  Colon cancer, status post right hemicolectomy and chemotherapy.          7.  History of iron deficiency anemia.                                       8.  Left superficial vein thrombosis of the arm.                             9.  Insomnia.                                                                10.  Basal cell carcinoma.                                                   11.  Memory issues when taking WelChol.                                      12.  Hyperlipidemia with LDL rise on WelChol. Tried 3 statins -various effects- memory issue made her lose a job                               13.  Hypercalcemia secondary to HCTZ.                                        14.  Lumbar radiculopathy with history of epidurals -Dr Saavedra - now [facility]                      15.  Chronic cough due to allergies, per pulmonary workup,  2010.           16.  Osteopenia by bone density in .                                     17.  Elevated ALT.                                                           18.  Tubes tied.                                                             19.  Hernia repair.                                                          20.  Lumpectomy, of the breast I presume.   21.  COLONOSCOPY normal 6/15,== 5 years  22. Left ankle surgery   23. METABOLIC SYNDROME/DM -A1c  was 6.9  24. Vit D def-    25.  Hypothyroid?,  Placed on [place] Thyroid by Dr. Montgomery??    26.  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  Quit smoking in .  Drinks once a week.                                                                               FAMILY HISTORY:  Father  at 53 in a car accident.  Mom in good health.   Brother 46, a sister is 52.  She states that all members of her family have  Hypertension.                                                                  Gen: no distress  EYES: conjunctiva clear, non-icteric, PERRL  ENT: nose clear, nasal mucosa normal, oropharynx clear and moist, teeth good  NECK:supple, thyroid non-palpable  RESP: effort is good, lungs clear  CV: heart RRR w/o murmur, gallops or rubs; no carotid bruits, no edema  GI: abdomen soft, non-distended, non-tender, no hepatosplenomegaly  MS: gait normal, no clubbing or cyanosis of the digits  SKIN: no rashes, warm to touch          Diagnoses and all orders for this visit:    Routine medical exam    Encounter for screening mammogram for breast cancer    History of colon cancer                                                                                            Additional significant and  separate evaluation and management issues:      Additionally patient has numerous other medical issues to address completely separate from those of a preventative visit and medically necessary that we address them today.     Here to follow-up on lab work. Diabetes uncontrolled with A1c of 7.8, 6.9, 7.2 , 6.8, 6.7, 7.0, 6.4, 6.8.  She was only on metformin.  We discussed injections, Amaryl and Januvia.  We gave her Januvia 50 mg as above but was fearful of hurting her pancreas and I reassured her about that but she is still never took it..  We discussed potential for hypoglycemia which would be less with Januvia.  She has not been able to exercise as much lately.  She had an injury and a left wrist surgery.  She has gained a lot of weight.  Currently weight is stable which is good.  Her A1c went up to 7.8 then 6.9, 7.2 and she was working at home eating a lot of mm but for the past year she has improved her diet significantly eating much better and her sugars are running normal so hopefully she will have improvement.  We discussed her elevated ALT due to diabetes in the weight and we can expected to still be elevated.  She feels she cannot lose weight having gone to the gym twice a day when working in the past with not able to lose any weight.  She also has some hernia issue so does not planned on going back to the gym.  Still eating blue bell ice cream at times.  Otherwise watching what she eats and she is losing some weight      She usually is here to get her tramadol refilled but apparently did not specifically need that done today.   Typically takes three per day.  I will send some with refills to the pharmacy.  She is scheduled to get some facet injections so she is pursuing pain management to try make her overall pain go away.  She occasionally takes the Valium for spasm but says she does not need a refill of that today.      We again discussed her high cholesterol but she had memory issues with statins and  possibly Welchol.  She absolutely refuses Zetia and we discussed there is an injection as well and she will research but very resistant.  I reassured her that Zetia is completely different from the statins and she again will consider but was not open to starting it.  We did discuss her for symptoms and need for treatment of a high cholesterol could well be a heart attack or stroke.  She is well aware of this along with her other risk factors.  She has had a lot of medications give her side effects.  She took Januvia and had a lot of left lower back pain very consistent with lumbar strain and the symptoms persisted even stopping Januvia and she had to get some massage which helps and discussed it is probably a pulled muscle but she is insistent to believe it is Januvia and she read that could do that somewhere but reassured her it is not a common side effect from Januvia.        She does occasionally take a Flexeril and is having more aches and pains and may want to try taking a low-dose Flexeril during the day so I provided her with a refills.    LDL 190s -refuses statin, etc.  Her LDL is over 200        She does occasionally take her tramadol for arthralgias.  Valium 5mg prn now retired and issues with  at home        Evaluation and management of all the separate issues will be based on medical decision making.      Assessment and plan:        Uncontrolled type 2 diabetes mellitus with hyperglycemia, uncontrolled, in discussed better diet, discussed follow up every three-month    Primary hypertension, fair control, monitor at home    Hyperlipidemia, unspecified hyperlipidemia type, uncontrolled but reluctant to take medicine even Zetia    Hypothyroidism, unspecified type, euthyroid    Vitamin D deficiency disease, chronic and stable    Abnormal liver function tests, chronic and stable    Mood disorder, chronic and stable    Morbid obesity, working on weight loss    Myalgia due to statin    Lumbar  spondylosis, intermittent use of tramadol, trying to take less    Neuropathy due to chemotherapeutic drug    Moderate tramadol dependence    JEREMY on CPAP    Insomnia, unspecified type, taking Ambien 3 times per week and trying to cut down    Osteopenia, unspecified location    Anxiety disorder, unspecified type    Pain  The following orders have not been finalized:  -     traMADoL (ULTRAM) 50 mg tablet    Other orders  The following orders have not been finalized:  -     albuterol (PROVENTIL/VENTOLIN HFA) 90 mcg/actuation inhaler  -     azelastine (ASTELIN) 137 mcg (0.1 %) nasal spray  -     cyclobenzaprine (FLEXERIL) 10 MG tablet  -     hydroCHLOROthiazide 12.5 MG Tab  -     meloxicam (MOBIC) 7.5 MG tablet  -     metFORMIN (GLUCOPHAGE-XR) 750 MG ER 24hr tablet  -     olmesartan (BENICAR) 20 MG tablet  -     zolpidem (AMBIEN) 5 MG Tab  -     montelukast (SINGULAIR) 10 mg tablet

## 2025-04-28 ENCOUNTER — CLINICAL SUPPORT (OUTPATIENT)
Dept: ENDOSCOPY | Facility: HOSPITAL | Age: 70
End: 2025-04-28
Attending: INTERNAL MEDICINE
Payer: MEDICARE

## 2025-04-28 ENCOUNTER — TELEPHONE (OUTPATIENT)
Dept: ENDOSCOPY | Facility: HOSPITAL | Age: 70
End: 2025-04-28

## 2025-04-28 ENCOUNTER — PATIENT MESSAGE (OUTPATIENT)
Dept: FAMILY MEDICINE | Facility: CLINIC | Age: 70
End: 2025-04-28
Payer: MEDICARE

## 2025-04-28 DIAGNOSIS — Z12.12 ENCOUNTER FOR COLORECTAL CANCER SCREENING: Primary | ICD-10-CM

## 2025-04-28 DIAGNOSIS — Z85.038 HISTORY OF COLON CANCER: ICD-10-CM

## 2025-04-28 DIAGNOSIS — Z12.11 SPECIAL SCREENING FOR MALIGNANT NEOPLASMS, COLON: Primary | ICD-10-CM

## 2025-04-28 DIAGNOSIS — Z12.11 ENCOUNTER FOR COLORECTAL CANCER SCREENING: Primary | ICD-10-CM

## 2025-05-09 ENCOUNTER — TELEPHONE (OUTPATIENT)
Dept: ENDOSCOPY | Facility: HOSPITAL | Age: 70
End: 2025-05-09

## 2025-05-09 ENCOUNTER — CLINICAL SUPPORT (OUTPATIENT)
Dept: ENDOSCOPY | Facility: HOSPITAL | Age: 70
End: 2025-05-09
Attending: INTERNAL MEDICINE
Payer: MEDICARE

## 2025-05-09 DIAGNOSIS — Z12.11 ENCOUNTER FOR COLORECTAL CANCER SCREENING: ICD-10-CM

## 2025-05-09 DIAGNOSIS — Z12.12 ENCOUNTER FOR COLORECTAL CANCER SCREENING: ICD-10-CM

## 2025-05-14 ENCOUNTER — ANESTHESIA (OUTPATIENT)
Dept: ENDOSCOPY | Facility: HOSPITAL | Age: 70
End: 2025-05-14
Payer: MEDICARE

## 2025-05-14 ENCOUNTER — HOSPITAL ENCOUNTER (OUTPATIENT)
Facility: HOSPITAL | Age: 70
Discharge: HOME | End: 2025-05-14
Attending: SURGERY | Admitting: SURGERY
Payer: MEDICARE

## 2025-05-14 ENCOUNTER — ANESTHESIA EVENT (OUTPATIENT)
Dept: ENDOSCOPY | Facility: HOSPITAL | Age: 70
End: 2025-05-14
Payer: MEDICARE

## 2025-05-14 VITALS
HEART RATE: 74 BPM | HEIGHT: 67 IN | WEIGHT: 227.5 LBS | OXYGEN SATURATION: 98 % | DIASTOLIC BLOOD PRESSURE: 76 MMHG | RESPIRATION RATE: 16 BRPM | BODY MASS INDEX: 35.71 KG/M2 | TEMPERATURE: 98 F | SYSTOLIC BLOOD PRESSURE: 171 MMHG

## 2025-05-14 DIAGNOSIS — Z85.038 HISTORY OF COLON CANCER: Primary | ICD-10-CM

## 2025-05-14 DIAGNOSIS — Z12.11 ENCOUNTER FOR SCREENING COLONOSCOPY: ICD-10-CM

## 2025-05-14 LAB — POCT GLUCOSE: 167 MG/DL (ref 70–110)

## 2025-05-14 PROCEDURE — 45380 COLONOSCOPY AND BIOPSY: CPT | Mod: PT,,, | Performed by: SURGERY

## 2025-05-14 PROCEDURE — 25000003 PHARM REV CODE 250: Performed by: SURGERY

## 2025-05-14 PROCEDURE — 45380 COLONOSCOPY AND BIOPSY: CPT | Mod: PT | Performed by: SURGERY

## 2025-05-14 PROCEDURE — 37000009 HC ANESTHESIA EA ADD 15 MINS: Performed by: SURGERY

## 2025-05-14 PROCEDURE — 88305 TISSUE EXAM BY PATHOLOGIST: CPT | Mod: TC | Performed by: SURGERY

## 2025-05-14 PROCEDURE — 37000008 HC ANESTHESIA 1ST 15 MINUTES: Performed by: SURGERY

## 2025-05-14 PROCEDURE — 63600175 PHARM REV CODE 636 W HCPCS

## 2025-05-14 RX ORDER — SODIUM CHLORIDE 9 MG/ML
INJECTION, SOLUTION INTRAVENOUS CONTINUOUS
Status: DISCONTINUED | OUTPATIENT
Start: 2025-05-14 | End: 2025-05-14 | Stop reason: HOSPADM

## 2025-05-14 RX ORDER — LIDOCAINE HYDROCHLORIDE 20 MG/ML
INJECTION, SOLUTION EPIDURAL; INFILTRATION; INTRACAUDAL; PERINEURAL
Status: DISCONTINUED | OUTPATIENT
Start: 2025-05-14 | End: 2025-05-14

## 2025-05-14 RX ORDER — PROPOFOL 10 MG/ML
VIAL (ML) INTRAVENOUS
Status: DISCONTINUED | OUTPATIENT
Start: 2025-05-14 | End: 2025-05-14

## 2025-05-14 RX ADMIN — PROPOFOL 60 MG: 10 INJECTION, EMULSION INTRAVENOUS at 08:05

## 2025-05-14 RX ADMIN — PROPOFOL 30 MG: 10 INJECTION, EMULSION INTRAVENOUS at 08:05

## 2025-05-14 RX ADMIN — PROPOFOL 150 MCG/KG/MIN: 10 INJECTION, EMULSION INTRAVENOUS at 08:05

## 2025-05-14 RX ADMIN — SODIUM CHLORIDE: 0.9 INJECTION, SOLUTION INTRAVENOUS at 08:05

## 2025-05-14 RX ADMIN — LIDOCAINE HYDROCHLORIDE 50 MG: 20 INJECTION, SOLUTION EPIDURAL; INFILTRATION; INTRACAUDAL; PERINEURAL at 08:05

## 2025-05-14 NOTE — H&P
COLONOSCOPY HISTORY AND PHYSICAL EXAM    Procedure : Colonoscopy      INDICATIONS: personal history of colon cancer    Family Hx of CRC: none    Last Colonoscopy:  2020  Findings: normal        Past Medical History:   Diagnosis Date    Allergic rhinitis, seasonal 01/28/2013    Anxiety disorder 01/28/2013    Basal cell carcinoma     Basal cell carcinoma (BCC) of chest 01/09/2024    right chest    Bilateral retinal lattice degeneration     Cataract     Colon cancer 2008    s/p resection    DDD (degenerative disc disease), lumbar 11/25/2014    Diabetes mellitus type II, uncontrolled 07/25/2014    High myopia     History of colon cancer 04/10/2019    History of incisional hernia repair     [place]hoe retinal tear of both eyes     HTN (hypertension) 01/28/2013    Hypertension     Hypothyroidism 04/29/2014    Incisional hernia of anterior abdominal wall without obstruction or gangrene     Lumbar disc disease 07/25/2014    Metabolic syndrome 04/29/2014    Neuropathy due to chemotherapeutic drug 05/22/2017    Osteopenia 05/10/2013    Screening for colorectal cancer 09/11/2015    Normal 6/ 2015---5 yrs    Vitamin D deficiency disease     Vitreous detachment of both eyes      Sedation Problems: NO  Family History   Problem Relation Name Age of Onset    Cancer Maternal Grandmother      Hyperlipidemia Mother      Hypertension Mother      Breast cancer Mother      Allergies Father      Allergies Sister      Allergies Brother      Heart disease Maternal Grandfather      Stroke Paternal Grandmother      Colon cancer Neg Hx      Ovarian cancer Neg Hx       Fam Hx of Sedation Problems: NO  Social History[1]    Review of Systems - Negative except   Respiratory ROS: no dyspnea  Cardiovascular ROS: no exertional chest pain  Gastrointestinal ROS: NO abdominal discomfort,  NO rectal bleeding  Musculoskeletal ROS: no muscular pain  Neurological ROS: no recent stroke    Physical Exam:  LMP  (LMP Unknown)   General: no distress  Head:  normocephalic  Mallampati Score   Neck: supple, symmetrical, trachea midline  Lungs:  clear to auscultation bilaterally and normal respiratory effort  Heart: regular rate and rhythm and no murmur  Abdomen: soft, non-tender non-distented; bowel sounds normal; no masses,  no organomegaly  Extremities: no cyanosis or edema, or clubbing    ASA:  III    PLAN  COLONOSCOPY.    SedationPlan :MAC    The details of the procedure, the possible need for biopsy or polypectomy and the potential risks including bleeding, perforation, missed polyps were discussed in detail.    Cari Alcantara MD, FACS, FASCRS  Staff Surgeon   Colon & Rectal Surgery           [1]   Social History  Socioeconomic History    Marital status:    Occupational History     Employer: [employer]   Tobacco Use    Smoking status: Former     Current packs/day: 0.00     Types: Cigarettes     Quit date: 1977     Years since quittin.8    Smokeless tobacco: Never    Tobacco comments:     Patient Quit Smoking on 1977.   Substance and Sexual Activity    Alcohol use: Yes     Comment: once per week    Drug use: No    Sexual activity: Yes     Partners: Male     Birth control/protection: Post-menopausal     Social Drivers of Health     Financial Resource Strain: Low Risk  (2025)    Overall Financial Resource Strain (CARDIA)     Difficulty of Paying Living Expenses: Not hard at all   Food Insecurity: No Food Insecurity (2025)    Hunger Vital Sign     Worried About Running Out of Food in the Last Year: Never true     Ran Out of Food in the Last Year: Never true   Transportation Needs: No Transportation Needs (2025)    PRAPARE - Transportation     Lack of Transportation (Medical): No     Lack of Transportation (Non-Medical): No   Physical Activity: Insufficiently Active (2025)    Exercise Vital Sign     Days of Exercise per Week: 4 days     Minutes of Exercise per Session: 30 min   Stress: No Stress Concern Present (2025)     [facility] [place] of Occupational Health - Occupational Stress Questionnaire     Feeling of Stress : Not at all   Housing Stability: Low Risk  (4/18/2025)    Housing Stability Vital Sign     Unable to Pay for Housing in the Last Year: No     Number of Times Moved in the Last Year: 0     Homeless in the Last Year: No

## 2025-05-14 NOTE — TRANSFER OF CARE
"Anesthesia Transfer of Care Note    Patient: Taty Max    Procedure(s) Performed: Procedure(s) (LRB):  COLONOSCOPY, SCREENING, HIGH RISK PATIENT (N/A)    Patient location: PACU    Anesthesia Type: general    Transport from OR: Transported from OR on room air with adequate spontaneous ventilation    Post pain: adequate analgesia    Post assessment: no apparent anesthetic complications and tolerated procedure well    Post vital signs: stable    Level of consciousness: awake, alert and oriented    Nausea/Vomiting: no nausea/vomiting    Complications: none    Transfer of care protocol was followed      Last vitals: Visit Vitals  BP (!) 138/98 (BP Location: Left arm, Patient Position: Lying)   Pulse 93   Temp 36.6 °C (97.9 °F) (Temporal)   Resp 16   Ht 5' 7" (1.702 m)   Wt 103.2 kg (227 lb 8.2 oz)   LMP  (LMP Unknown)   SpO2 98%   BMI 35.63 kg/m²     "

## 2025-05-14 NOTE — ANESTHESIA POSTPROCEDURE EVALUATION
Anesthesia Post Evaluation    Patient: Taty Max    Procedure(s) Performed: Procedure(s) (LRB):  COLONOSCOPY, SCREENING, HIGH RISK PATIENT (N/A)    Final Anesthesia Type: general      Patient location during evaluation: PACU  Patient participation: Yes- Able to Participate  Level of consciousness: awake and alert  Post-procedure vital signs: reviewed and stable  Pain management: adequate  Airway patency: patent    PONV status at discharge: No PONV  Anesthetic complications: no      Cardiovascular status: blood pressure returned to baseline  Respiratory status: unassisted  Hydration status: euvolemic  Follow-up not needed.              Vitals Value Taken Time   /76 05/14/25 09:45   Temp 36.6 °C (97.9 °F) 05/14/25 09:14   Pulse 74 05/14/25 09:45   Resp 16 05/14/25 09:45   SpO2 98 % 05/14/25 09:45         Event Time   Out of Recovery 09:49:38         Pain/Pina Score: Pina Score: 10 (5/14/2025  9:25 AM)

## 2025-05-14 NOTE — PROVATION PATIENT INSTRUCTIONS
Discharge Summary/Instructions after an Endoscopic Procedure  Patient Name: Taty Max  Patient MRN: [id number]  Patient YOB: 1955  Wednesday, May 14, 2025  Cari Alcantara MD  Dear patient,  As a result of recent federal legislation (The Federal Cures Act), you may   receive lab or pathology results from your procedure in your [facility]   account before your physician is able to contact you. Your physician or   their representative will relay the results to you with their   recommendations at their soonest availability.  Thank you,  RESTRICTIONS:  During your procedure today, you received medications for sedation.  These   medications may affect your judgment, balance and coordination.  Therefore,   for 24 hours, you have the following restrictions:   - DO NOT drive a car, operate machinery, make legal/financial decisions,   sign important papers or drink alcohol.    ACTIVITY:  Today: no heavy lifting, straining or running due to procedural   sedation/anesthesia.  The following day: return to full activity including work.  DIET:  Eat and drink normally unless instructed otherwise.     TREATMENT FOR COMMON SIDE EFFECTS:  - Mild abdominal pain, nausea, belching, bloating or excessive gas:  rest,   eat lightly and use a heating pad.  - Sore Throat: treat with throat lozenges and/or gargle with warm salt   water.  - Because air was used during the procedure, expelling large amounts of air   from your rectum or belching is normal.  - If a bowel prep was taken, you may not have a bowel movement for 1-3 days.    This is normal.  SYMPTOMS TO WATCH FOR AND REPORT TO YOUR PHYSICIAN:  1. Abdominal pain or bloating, other than gas cramps.  2. Chest pain.  3. Back pain.  4. Signs of infection such as: chills or fever occurring within 24 hours   after the procedure.  5. Rectal bleeding, which would show as bright red, maroon, or black stools.   (A tablespoon of blood from the rectum is not serious, especially if    hemorrhoids are present.)  6. Vomiting.  7. Weakness or dizziness.  GO DIRECTLY TO THE NEAREST EMERGENCY ROOM IF YOU HAVE ANY OF THE FOLLOWING:      Difficulty breathing              Chills and/or fever over 101 F   Persistent vomiting and/or vomiting blood   Severe abdominal pain   Severe chest pain   Black, tarry stools   Bleeding- more than one tablespoon   Any other symptom or condition that you feel may need urgent attention  Your doctor recommends these additional instructions:  If any biopsies were taken, your doctors clinic will contact you in 1 to 2   weeks with any results.  - Patient has a contact number available for emergencies.  The signs and   symptoms of potential delayed complications were discussed with the   patient.  Return to normal activities tomorrow.  Written discharge   instructions were provided to the patient.   - Resume previous diet.   - Continue present medications.   - Discharge patient to home (ambulatory).   - Repeat colonoscopy in 5 years for surveillance.  For questions, problems or results please call your physician - Cari Alcantara MD at Work:  [phone].  [facility] [place], EMERGENCY ROOM PHONE NUMBER: [phone]  IF A COMPLICATION OR EMERGENCY SITUATION ARISES AND YOU ARE UNABLE TO REACH   YOUR PHYSICIAN - GO DIRECTLY TO THE EMERGENCY ROOM.  MD Cari Jim MD  5/14/2025 9:17:06 AM  This report has been verified and signed electronically.  Dear patient,  As a result of recent federal legislation (The Federal Cures Act), you may   receive lab or pathology results from your procedure in your [facility]   account before your physician is able to contact you. Your physician or   their representative will relay the results to you with their   recommendations at their soonest availability.  Thank you,  PROVATION

## 2025-05-14 NOTE — ANESTHESIA PREPROCEDURE EVALUATION
[facility]  Anesthesia Pre-Operative Evaluation     Patient Name: Taty Max  YOB: 1955  MRN: [id number]  [facility]: [id number]       Admit Date: 5/14/2025   Admit Team: Networked reference to record PCT   Hospital Day: 1  Date of Procedure: 5/14/2025  Anesthesia: Choice Procedure: Procedure(s) (LRB):  COLONOSCOPY, SCREENING, HIGH RISK PATIENT (N/A)  Pre-Operative Diagnosis: History of colon cancer [Z85.038]  Proceduralist:Surgeons and Role:     * Cari Alcantara MD - Primary  Code Status: Prior   Advanced Directive: <no information>  Isolation Precautions: No active isolations  Capacity: Full capacity     SUBJECTIVE:   Taty Max is a 69 y.o. female who  has a past medical history of Allergic rhinitis, seasonal (01/28/2013), Anxiety disorder (01/28/2013), Basal cell carcinoma, Basal cell carcinoma (BCC) of chest (01/09/2024), Bilateral retinal lattice degeneration, Cataract, Colon cancer (2008), DDD (degenerative disc disease), lumbar (11/25/2014), Diabetes mellitus type II, uncontrolled (07/25/2014), High myopia, History of colon cancer (04/10/2019), History of incisional hernia repair, Horseshoe retinal tear of both eyes, HTN (hypertension) (01/28/2013), Hypertension, Hypothyroidism (04/29/2014), Incisional hernia of anterior abdominal wall without obstruction or gangrene, Lumbar disc disease (07/25/2014), Metabolic syndrome (04/29/2014), Neuropathy due to chemotherapeutic drug (05/22/2017), Osteopenia (05/10/2013), Screening for colorectal cancer (09/11/2015), Vitamin D deficiency disease, and Vitreous detachment of both eyes.  No notes on file    Hospital LOS: 0 days  ICU LOS: Patient does not have an ICU stay during this admission.    she has a current medication list which includes the following long-term medication(s): albuterol, azelastine, blood-glucose meter, diazepam, hydrochlorothiazide, metformin, olmesartan, and zolpidem.   Current Outpatient Medications   Medication  Instructions    albuterol (PROVENTIL/VENTOLIN HFA) 90 mcg/actuation inhaler 1-2 puffs, Inhalation, Every 6 hours PRN    azelastine (ASTELIN) 137 mcg (0.1 %) nasal spray INSTILL ONE SPRAY IN EACH NOSTRIL TWICE DAILY    blood-glucose meter (ACCU-CHEK GUIDE GLUCOSE METER) Misc 1 Units, Misc.(Non-Drug; Combo Route), 2 times daily    cyclobenzaprine (FLEXERIL) 10 mg, Oral, 3 times daily PRN    diazePAM (VALIUM) 5 MG tablet TAKE ONE TABLET BY MOUTH EVERY NIGHT AT BEDTIME AS NEEDED FOR ANXIETY AND SPASMS    fluorouraciL (EFUDEX) 5 % cream Use hs for 2 weeks    fluticasone propionate (FLONASE) 100 mcg, Each Nostril, Daily    hydroCHLOROthiazide 12.5 mg, Oral, Daily    KRILL OIL ORAL 1 tablet, Daily    meloxicam (MOBIC) 7.5 MG tablet TAKE 1 TO 2 TABLETS DAILY  AS NEEDED    metFORMIN (GLUCOPHAGE-XR) 1,500 mg, Oral, Every morning    montelukast (SINGULAIR) 10 mg, Oral, Nightly    multivitamin (THERAGRAN) per tablet 1 tablet, Daily    olmesartan (BENICAR) 20 mg, Oral, Daily    traMADoL (ULTRAM) 50 mg tablet TAKE 1 TABLET BY MOUTH EVERY 6 HOURS AS NEEDED Strength: 50 mg    zolpidem (AMBIEN) 5 mg, Oral, Nightly PRN     ALLERGIES:     Review of patient's allergies indicates:   Allergen Reactions    Oxaliplatin Hives    Factive [gemifloxacin] Hives    Statins-hmg-coa reductase inhibitors      Memory w lipitor, muscles for 2 others    Daypro [oxaprozin] Rash    Latex Hives     Adhesives     LDA:   AIRWAY:         * No LDAs found *      Lines/Drains/Airways       None                  Anesthesia Evaluation      Airway   Mallampati: III  TM distance: Normal  Neck ROM: Normal ROM  Dental    (+) Intact    Pulmonary    (+) sleep apnea  Cardiovascular   (+) hypertension    Neuro/Psych    (+) neuromuscular disease, psychiatric history    GI/Hepatic/Renal      Endo/Other    (+) diabetes mellitus, hypothyroidism  Abdominal                    MEDICATIONS:     Current Outpatient Medications on File Prior to Encounter   Medication Sig Dispense  Refill Last Dose/Taking    albuterol (PROVENTIL/VENTOLIN HFA) 90 mcg/actuation inhaler Inhale 1-2 puffs into the lungs every 6 (six) hours as needed for Wheezing. 90 g 12     azelastine (ASTELIN) 137 mcg (0.1 %) nasal spray INSTILL ONE SPRAY IN EACH NOSTRIL TWICE DAILY 30 mL 12     blood-glucose meter (ACCU-CHEK GUIDE GLUCOSE METER) Misc 1 Units by Misc.(Non-Drug; Combo Route) route 2 (two) times daily. 1 each 1     cyclobenzaprine (FLEXERIL) 10 MG tablet Take 1 tablet (10 mg total) by mouth 3 (three) times daily as needed for Muscle spasms. 90 tablet 3     diazePAM (VALIUM) 5 MG tablet TAKE ONE TABLET BY MOUTH EVERY NIGHT AT BEDTIME AS NEEDED FOR ANXIETY AND SPASMS 30 tablet 5     fluorouraciL (EFUDEX) 5 % cream Use hs for 2 weeks 40 g 3     fluticasone propionate (FLONASE) 50 mcg/actuation nasal spray 2 sprays (100 mcg total) by Each Nare route once daily. (Patient not taking: Reported on 4/25/2025) 3 Bottle 12     hydroCHLOROthiazide 12.5 MG Tab Take 1 tablet (12.5 mg total) by mouth once daily. 90 tablet 3     KRILL OIL ORAL Take 1 tablet by mouth once daily.       meloxicam (MOBIC) 7.5 MG tablet TAKE 1 TO 2 TABLETS DAILY  AS NEEDED 180 tablet 3     metFORMIN (GLUCOPHAGE-XR) 750 MG ER 24hr tablet Take 2 tablets (1,500 mg total) by mouth every morning. 180 tablet 3     montelukast (SINGULAIR) 10 mg tablet Take 1 tablet (10 mg total) by mouth every evening. 90 tablet 3     multivitamin (THERAGRAN) per tablet Take 1 tablet by mouth once daily. 1 Tablet Oral Every day       olmesartan (BENICAR) 20 MG tablet Take 1 tablet (20 mg total) by mouth once daily. 90 tablet 3     traMADoL (ULTRAM) 50 mg tablet TAKE 1 TABLET BY MOUTH EVERY 6 HOURS AS NEEDED Strength: 50 mg 120 tablet 5     zolpidem (AMBIEN) 5 MG Tab Take 1 tablet (5 mg total) by mouth nightly as needed. 30 tablet 5       Inpatient Medications:  Antibiotics (From admission, onward)      None          VTE Risk Mitigation (From admission, onward)      None               Current Medications[1]       History:   There are no hospital problems to display for this patient.    Surgical History:    has a past surgical history that includes Colon surgery; Cataract extraction; s/p laser ou; Colectomy; Cataract extraction bilateral w/ anterior vitrectomy; Eye examination under anesthesia w/ retinal cryotherapy and retinal laser; Uvulopalatopharyngoplasty (1990s); Ankle surgery; Hernia repair; Tubal ligation; Nasal polyp excision; Tonsillectomy; Adenoidectomy; Kidney surgery; Nasal septum surgery; Breast lumpectomy; Radiofrequency ablation of lumbar medial branch nerve at single level (Right, 7/6/2018); Open reduction and internal fixation (ORIF) of fracture of distal radius (Left, 2/12/2020); Colonoscopy (N/A, 6/15/2020); Epidural steroid injection (Bilateral, 11/5/2021); Epidural steroid injection (Bilateral, 12/10/2021); Epidural steroid injection (Left, 2/4/2022); and Epidural steroid injection (Right, 2/25/2022).   Social History:    reports being sexually active and has had partner(s) who are male. She reports using the following method of birth control/protection: Post-menopausal.  reports that she quit smoking about 47 years ago. Her smoking use included cigarettes. She has never used smokeless tobacco. She reports current alcohol use. She reports that she does not use drugs.    There were no vitals filed for this visit.  Vital Signs Range (Last 24H):       There is no height or weight on file to calculate BMI.  Wt Readings from Last 4 Encounters:   04/25/25 103.1 kg (227 lb 4.7 oz)   04/11/24 112.4 kg (247 lb 12.8 oz)   03/20/24 109.3 kg (241 lb)   01/09/24 109.7 kg (241 lb 13.5 oz)        Intake/Output - Last 3 Shifts       None          Lab Results   Component Value Date    WBC 7.68 03/17/2025    HGB 14.0 03/17/2025    HCT 43.8 03/17/2025     03/17/2025     03/17/2025    K 4.4 03/17/2025     03/17/2025    CREATININE 0.8 03/17/2025    BUN 21 03/17/2025     "CO2 25 03/17/2025     (H) 03/17/2025    CALCIUM 10.2 03/17/2025    MG 1.6 02/03/2020    PHOS 3.4 02/03/2020    ALKPHOS 70 03/17/2025    ALT 15 03/17/2025    AST 21 03/17/2025    ALBUMIN 4.1 03/17/2025    INR 0.9 02/03/2020    APTT 26.0 06/29/2010    HGBA1C 6.8 (H) 03/17/2025     03/15/2010     (H) 04/20/2014     No results found for this or any previous visit (from the past 12 hours).  No results for input(s): "WBC", "HGB", "HCT", "PLT", "NA", "K", "CREATININE", "GLU", "INR", "LACTATE", "5HIAAPLASMA", "5HIAAURINT", "5HIAA", "3CLVG80OH" in the last 168 hours.  No LMP recorded (lmp unknown). Patient is postmenopausal.    EKG:   Results for orders placed or performed during the hospital encounter of 02/03/20   EKG 12-lead    Collection Time: 02/03/20  3:46 PM    Narrative    Test Reason : Z01.811,    Vent. Rate : 080 BPM     Atrial Rate : 080 BPM     P-R Int : 138 ms          QRS Dur : 084 ms      QT Int : 386 ms       P-R-T Axes : 024 035 065 degrees     QTc Int : 445 ms    Normal sinus rhythm  Low voltage, precordial leads  Borderline Abnormal ECG  When compared with ECG of 25-OCT-2018 11:51,  No significant change was found  Confirmed by Salvatore Lincoln MD ([id number]) on 2/4/2020 1:12:25 PM    Referred By: [email or web address]   SELF           Confirmed By:Salvatore Lincoln MD     TTE:  Results for orders placed in visit on 09/29/20    Echo Color Flow Doppler? Yes    Interpretation Summary  · The left ventricle is normal in size with normal systolic function. The estimated ejection fraction is 65%.  · There is left ventricular concentric hypertrophy.  · Normal left ventricular diastolic function.  · Normal right ventricular size with normal right ventricular systolic function.  · No pulmonary hypertension  · Normal central venous pressure (3 mmHg).  · The estimated PA systolic pressure is 9 mmHg.    MALCOLM:  No results found for this or any previous visit.          Pre-op Assessment          Review of " Systems  Cardiovascular:     Hypertension                                    Hypertension         Pulmonary:        Sleep Apnea     Obstructive Sleep Apnea (JEREMY).           Neurological:    Neuromuscular Disease,                                 Neuromuscular Disease   Endocrine:  Diabetes Hypothyroidism   Diabetes                   Hypothyroidism          Psych:  Psychiatric History                  Physical Exam  General: Well nourished    Airway:  Mallampati: III / II  Mouth Opening: Normal  TM Distance: Normal  Tongue: Normal  Neck ROM: Normal ROM    Dental:  Intact        Anesthesia Plan  Type of Anesthesia, risks & benefits discussed:    Anesthesia Type: Gen ETT, Gen Natural Airway, MAC  Intra-op Monitoring Plan: Standard ASA Monitors  Post Op Pain Control Plan: multimodal analgesia and IV/PO Opioids PRN  Induction:  IV  Airway Plan: Direct and Video, Post-Induction  Informed Consent: Informed consent signed with the Patient and all parties understand the risks and agree with anesthesia plan.  All questions answered.   ASA Score: 3  Day of Surgery Review of History & Physical: H&P completed by Anesthesiologist.  Anesthesia Plan Notes: Chart reviewed, patient interviewed and examined.  The anesthetic plan was explained.  Risks, benefits, and alternatives were discussed. Questions were answered and the consent was signed.        GABE Maria M.D.         Ready For Surgery From Anesthesia Perspective.     .           [1]   No current facility-administered medications for this encounter.

## 2025-05-15 ENCOUNTER — RESULTS FOLLOW-UP (OUTPATIENT)
Dept: SURGERY | Facility: CLINIC | Age: 70
End: 2025-05-15

## 2025-05-15 LAB
ESTROGEN SERPL-MCNC: NORMAL PG/ML
INSULIN SERPL-ACNC: NORMAL U[IU]/ML
LAB AP CLINICAL INFORMATION: NORMAL
LAB AP GROSS DESCRIPTION: NORMAL
LAB AP PERFORMING LOCATION(S): NORMAL
LAB AP REPORT FOOTNOTES: NORMAL

## 2025-06-09 ENCOUNTER — PATIENT MESSAGE (OUTPATIENT)
Dept: ADMINISTRATIVE | Facility: HOSPITAL | Age: 70
End: 2025-06-09
Payer: MEDICARE

## 2025-08-15 ENCOUNTER — HOSPITAL ENCOUNTER (OUTPATIENT)
Dept: RADIOLOGY | Facility: HOSPITAL | Age: 70
Discharge: HOME | End: 2025-08-15
Attending: INTERNAL MEDICINE
Payer: MEDICARE

## 2025-08-15 DIAGNOSIS — Z12.31 ENCOUNTER FOR SCREENING MAMMOGRAM FOR BREAST CANCER: ICD-10-CM

## 2025-08-15 PROCEDURE — 77067 SCR MAMMO BI INCL CAD: CPT | Mod: TC,PO

## 2025-08-29 ENCOUNTER — TELEPHONE (OUTPATIENT)
Dept: PHARMACY | Facility: CLINIC | Age: 70
End: 2025-08-29
Payer: MEDICARE

## (undated) DEVICE — TRAY MINOR GEN SURG

## (undated) DEVICE — GUIDEWIRE ORTHO .054 X 6 IN
Type: IMPLANTABLE DEVICE | Site: WRIST | Status: NON-FUNCTIONAL
Removed: 2020-02-12

## (undated) DEVICE — DRESSING XEROFORM FOIL PK 1X8

## (undated) DEVICE — ELECTRODE REM PLYHSV RETURN 9

## (undated) DEVICE — SCREW BNE N LOK HEXLB 3.5X14
Type: IMPLANTABLE DEVICE | Site: WRIST | Status: NON-FUNCTIONAL
Removed: 2020-02-12

## (undated) DEVICE — GAUZE SPONGE 4X4 12PLY

## (undated) DEVICE — TOURNIQUET SB QC DP 18X4IN

## (undated) DEVICE — BIT DRILL QUICK RELEASE 2.0MM

## (undated) DEVICE — SCREW BONE NONLOCK HEX 3.5X10
Type: IMPLANTABLE DEVICE | Site: WRIST | Status: NON-FUNCTIONAL
Removed: 2020-02-12

## (undated) DEVICE — BIT DRILL QUICK RELEASE 2.8MM

## (undated) DEVICE — TIP DRIVER HEX LOCK GROOVE 1.5

## (undated) DEVICE — TRAP DIGIT FINGER

## (undated) DEVICE — SPLINT PLASTER F.S 4INX15IN

## (undated) DEVICE — Device

## (undated) DEVICE — BANDAGE ADHESIVE

## (undated) DEVICE — DRAPE STERI-DRAPE 1000 17X11IN

## (undated) DEVICE — APPLICATOR CHLORAPREP ORN 26ML

## (undated) DEVICE — DRAPE PLASTIC U 60X72

## (undated) DEVICE — KIT TRACTION SHLDR ST DISP LF

## (undated) DEVICE — SEE MEDLINE ITEM 152622

## (undated) DEVICE — DRAPE ABDOMINAL TIBURON 14X11

## (undated) DEVICE — DRAPE C ARM 42 X 120 10/BX

## (undated) DEVICE — SEE MEDLINE ITEM 146417

## (undated) DEVICE — SEE MEDLINE ITEM 146313

## (undated) DEVICE — DRILL QUICK RELEASE 2.8MM 5IN

## (undated) DEVICE — PAD CAST SPECIALIST STRL 4

## (undated) DEVICE — DRESSING ADH ISLAND 3.6 X 14

## (undated) DEVICE — UNDERGLOVES BIOGEL PI SIZE 8

## (undated) DEVICE — SEE MEDLINE ITEM 152515

## (undated) DEVICE — STOCKINET 4INX48

## (undated) DEVICE — GLOVE PI ULTRA TOUCH G SURGEON

## (undated) DEVICE — SEE MEDLINE ITEM 157117